# Patient Record
Sex: MALE | Race: WHITE | Employment: FULL TIME
[De-identification: names, ages, dates, MRNs, and addresses within clinical notes are randomized per-mention and may not be internally consistent; named-entity substitution may affect disease eponyms.]

---

## 2016-12-02 LAB
CHOLESTEROL, TOTAL: 187 MG/DL
CHOLESTEROL/HDL RATIO: 5.8
HDLC SERPL-MCNC: 32 MG/DL (ref 35–70)
LDL CHOLESTEROL CALCULATED: 97 MG/DL (ref 0–160)
PROSTATE SPECIFIC ANTIGEN: 0.58 NG/ML
TRIGL SERPL-MCNC: 289 MG/DL
VLDLC SERPL CALC-MCNC: 58 MG/DL

## 2017-03-01 ENCOUNTER — OFFICE VISIT (OUTPATIENT)
Dept: INTERNAL MEDICINE | Facility: CLINIC | Age: 52
End: 2017-03-01

## 2017-03-01 VITALS
DIASTOLIC BLOOD PRESSURE: 80 MMHG | BODY MASS INDEX: 37.08 KG/M2 | WEIGHT: 259 LBS | HEIGHT: 70 IN | RESPIRATION RATE: 18 BRPM | HEART RATE: 82 BPM | SYSTOLIC BLOOD PRESSURE: 142 MMHG

## 2017-03-01 DIAGNOSIS — Z00.00 ENCOUNTER FOR GENERAL ADULT MEDICAL EXAMINATION W/O ABNORMAL FINDINGS: Primary | ICD-10-CM

## 2017-03-01 DIAGNOSIS — E78.2 MIXED HYPERLIPIDEMIA: ICD-10-CM

## 2017-03-01 DIAGNOSIS — F41.9 ANXIETY AND DEPRESSION: ICD-10-CM

## 2017-03-01 DIAGNOSIS — M54.6 THORACIC BACK PAIN, UNSPECIFIED BACK PAIN LATERALITY, UNSPECIFIED CHRONICITY: ICD-10-CM

## 2017-03-01 DIAGNOSIS — F32.A ANXIETY AND DEPRESSION: ICD-10-CM

## 2017-03-01 PROBLEM — I10 ESSENTIAL HYPERTENSION: Status: ACTIVE | Noted: 2017-03-01

## 2017-03-01 PROBLEM — M15.0 PRIMARY OSTEOARTHRITIS INVOLVING MULTIPLE JOINTS: Status: ACTIVE | Noted: 2017-03-01

## 2017-03-01 PROBLEM — M15.9 PRIMARY OSTEOARTHRITIS INVOLVING MULTIPLE JOINTS: Status: ACTIVE | Noted: 2017-03-01

## 2017-03-01 PROCEDURE — 99204 OFFICE O/P NEW MOD 45 MIN: CPT | Performed by: NURSE PRACTITIONER

## 2017-03-01 RX ORDER — CYCLOBENZAPRINE HCL 10 MG
10 TABLET ORAL EVERY 8 HOURS PRN
Qty: 30 TABLET | Refills: 0 | Status: SHIPPED | OUTPATIENT
Start: 2017-03-01 | End: 2017-03-11

## 2017-03-01 RX ORDER — ATORVASTATIN CALCIUM 10 MG/1
10 TABLET, FILM COATED ORAL DAILY
Qty: 30 TABLET | Refills: 3 | Status: SHIPPED | OUTPATIENT
Start: 2017-03-01 | End: 2017-07-21 | Stop reason: SDUPTHER

## 2017-03-01 RX ORDER — ALBUTEROL SULFATE 90 UG/1
2 AEROSOL, METERED RESPIRATORY (INHALATION)
COMMUNITY
End: 2017-05-08 | Stop reason: SDUPTHER

## 2017-03-01 RX ORDER — FLUTICASONE PROPIONATE 50 MCG
SPRAY, SUSPENSION (ML) NASAL PRN
COMMUNITY
Start: 2016-12-22 | End: 2020-12-11 | Stop reason: SDUPTHER

## 2017-03-01 ASSESSMENT — PATIENT HEALTH QUESTIONNAIRE - PHQ9
SUM OF ALL RESPONSES TO PHQ QUESTIONS 1-9: 0
1. LITTLE INTEREST OR PLEASURE IN DOING THINGS: 0
SUM OF ALL RESPONSES TO PHQ9 QUESTIONS 1 & 2: 0
2. FEELING DOWN, DEPRESSED OR HOPELESS: 0

## 2017-03-01 ASSESSMENT — ENCOUNTER SYMPTOMS
BOWEL INCONTINENCE: 0
BACK PAIN: 1
COUGH: 0
ABDOMINAL PAIN: 0
SHORTNESS OF BREATH: 0

## 2017-03-16 RX ORDER — CITALOPRAM 40 MG/1
40 TABLET ORAL DAILY
Qty: 90 TABLET | Refills: 3 | Status: SHIPPED | OUTPATIENT
Start: 2017-03-16 | End: 2017-05-08 | Stop reason: ALTCHOICE

## 2017-03-16 RX ORDER — METOPROLOL SUCCINATE 50 MG/1
50 TABLET, EXTENDED RELEASE ORAL DAILY
Qty: 90 TABLET | Refills: 3 | Status: SHIPPED | OUTPATIENT
Start: 2017-03-16 | End: 2017-05-01 | Stop reason: SDUPTHER

## 2017-03-28 ENCOUNTER — TELEPHONE (OUTPATIENT)
Dept: PRIMARY CARE CLINIC | Age: 52
End: 2017-03-28

## 2017-03-28 DIAGNOSIS — M15.9 PRIMARY OSTEOARTHRITIS INVOLVING MULTIPLE JOINTS: Primary | ICD-10-CM

## 2017-03-31 ENCOUNTER — TELEPHONE (OUTPATIENT)
Dept: PRIMARY CARE CLINIC | Age: 52
End: 2017-03-31

## 2017-03-31 DIAGNOSIS — M15.9 PRIMARY OSTEOARTHRITIS INVOLVING MULTIPLE JOINTS: Primary | ICD-10-CM

## 2017-05-01 ENCOUNTER — OFFICE VISIT (OUTPATIENT)
Dept: PRIMARY CARE CLINIC | Age: 52
End: 2017-05-01
Payer: COMMERCIAL

## 2017-05-01 VITALS
HEIGHT: 70 IN | WEIGHT: 250 LBS | BODY MASS INDEX: 35.79 KG/M2 | SYSTOLIC BLOOD PRESSURE: 168 MMHG | RESPIRATION RATE: 18 BRPM | DIASTOLIC BLOOD PRESSURE: 108 MMHG | HEART RATE: 80 BPM

## 2017-05-01 DIAGNOSIS — J01.40 ACUTE NON-RECURRENT PANSINUSITIS: Primary | ICD-10-CM

## 2017-05-01 DIAGNOSIS — I10 ESSENTIAL HYPERTENSION: ICD-10-CM

## 2017-05-01 PROCEDURE — 99214 OFFICE O/P EST MOD 30 MIN: CPT | Performed by: NURSE PRACTITIONER

## 2017-05-01 RX ORDER — METOPROLOL SUCCINATE 100 MG/1
100 TABLET, EXTENDED RELEASE ORAL DAILY
Qty: 90 TABLET | Refills: 3
Start: 2017-05-01 | End: 2017-05-08 | Stop reason: SDUPTHER

## 2017-05-01 RX ORDER — ALPRAZOLAM 0.5 MG/1
0.5 TABLET ORAL 2 TIMES DAILY
Qty: 180 TABLET | Refills: 0 | Status: SHIPPED | OUTPATIENT
Start: 2017-05-01 | End: 2017-10-30 | Stop reason: SDUPTHER

## 2017-05-01 ASSESSMENT — ENCOUNTER SYMPTOMS
CONSTIPATION: 0
VOMITING: 0
CHEST TIGHTNESS: 1
WHEEZING: 0
ABDOMINAL PAIN: 0
TROUBLE SWALLOWING: 0
DIARRHEA: 0
COUGH: 1
SORE THROAT: 0
BLOOD IN STOOL: 0
SINUS PRESSURE: 1
NAUSEA: 0
RHINORRHEA: 1
SHORTNESS OF BREATH: 1

## 2017-05-04 ENCOUNTER — TELEPHONE (OUTPATIENT)
Dept: PRIMARY CARE CLINIC | Age: 52
End: 2017-05-04

## 2017-05-04 DIAGNOSIS — I10 ESSENTIAL HYPERTENSION: Primary | ICD-10-CM

## 2017-05-04 RX ORDER — LISINOPRIL 10 MG/1
10 TABLET ORAL DAILY
Qty: 30 TABLET | Refills: 3 | Status: SHIPPED | OUTPATIENT
Start: 2017-05-04 | End: 2017-05-08 | Stop reason: SDUPTHER

## 2017-05-08 ENCOUNTER — OFFICE VISIT (OUTPATIENT)
Dept: PRIMARY CARE CLINIC | Age: 52
End: 2017-05-08
Payer: COMMERCIAL

## 2017-05-08 VITALS
SYSTOLIC BLOOD PRESSURE: 158 MMHG | DIASTOLIC BLOOD PRESSURE: 88 MMHG | BODY MASS INDEX: 35.79 KG/M2 | WEIGHT: 250 LBS | HEIGHT: 70 IN | HEART RATE: 60 BPM | RESPIRATION RATE: 18 BRPM

## 2017-05-08 DIAGNOSIS — F32.A ANXIETY AND DEPRESSION: ICD-10-CM

## 2017-05-08 DIAGNOSIS — F41.9 ANXIETY AND DEPRESSION: ICD-10-CM

## 2017-05-08 DIAGNOSIS — I10 ESSENTIAL HYPERTENSION: Primary | ICD-10-CM

## 2017-05-08 PROCEDURE — 99214 OFFICE O/P EST MOD 30 MIN: CPT | Performed by: NURSE PRACTITIONER

## 2017-05-08 RX ORDER — LISINOPRIL 20 MG/1
20 TABLET ORAL DAILY
Qty: 30 TABLET | Refills: 3 | Status: SHIPPED | OUTPATIENT
Start: 2017-05-08 | End: 2017-06-05 | Stop reason: SDUPTHER

## 2017-05-08 RX ORDER — ALBUTEROL SULFATE 90 UG/1
2 AEROSOL, METERED RESPIRATORY (INHALATION) EVERY 6 HOURS PRN
Qty: 1 INHALER | Refills: 3 | Status: SHIPPED | OUTPATIENT
Start: 2017-05-08 | End: 2018-03-19 | Stop reason: SDUPTHER

## 2017-05-08 RX ORDER — METOPROLOL SUCCINATE 100 MG/1
100 TABLET, EXTENDED RELEASE ORAL DAILY
Qty: 90 TABLET | Refills: 3 | Status: SHIPPED | OUTPATIENT
Start: 2017-05-08 | End: 2018-01-19 | Stop reason: SDUPTHER

## 2017-05-08 RX ORDER — VENLAFAXINE HYDROCHLORIDE 37.5 MG/1
37.5 CAPSULE, EXTENDED RELEASE ORAL DAILY
Qty: 30 CAPSULE | Refills: 3 | Status: SHIPPED | OUTPATIENT
Start: 2017-05-08 | End: 2017-06-05 | Stop reason: SDUPTHER

## 2017-05-08 ASSESSMENT — ENCOUNTER SYMPTOMS
TROUBLE SWALLOWING: 0
ABDOMINAL PAIN: 0
WHEEZING: 0
BLOOD IN STOOL: 0
COUGH: 0
NAUSEA: 0
CONSTIPATION: 0
VOMITING: 0
SHORTNESS OF BREATH: 0
SINUS PRESSURE: 0
DIARRHEA: 0
SORE THROAT: 0

## 2017-06-05 ENCOUNTER — OFFICE VISIT (OUTPATIENT)
Dept: PRIMARY CARE CLINIC | Age: 52
End: 2017-06-05
Payer: COMMERCIAL

## 2017-06-05 VITALS
RESPIRATION RATE: 18 BRPM | BODY MASS INDEX: 36.71 KG/M2 | WEIGHT: 256.4 LBS | HEIGHT: 70 IN | DIASTOLIC BLOOD PRESSURE: 82 MMHG | SYSTOLIC BLOOD PRESSURE: 138 MMHG | HEART RATE: 68 BPM

## 2017-06-05 DIAGNOSIS — F41.9 ANXIETY AND DEPRESSION: ICD-10-CM

## 2017-06-05 DIAGNOSIS — F32.A ANXIETY AND DEPRESSION: ICD-10-CM

## 2017-06-05 DIAGNOSIS — I10 ESSENTIAL HYPERTENSION: ICD-10-CM

## 2017-06-05 PROCEDURE — 99214 OFFICE O/P EST MOD 30 MIN: CPT | Performed by: NURSE PRACTITIONER

## 2017-06-05 RX ORDER — VENLAFAXINE HYDROCHLORIDE 75 MG/1
75 CAPSULE, EXTENDED RELEASE ORAL DAILY
Qty: 30 CAPSULE | Refills: 3 | Status: SHIPPED | OUTPATIENT
Start: 2017-06-05 | End: 2017-10-06 | Stop reason: SDUPTHER

## 2017-06-05 RX ORDER — LISINOPRIL 30 MG/1
30 TABLET ORAL DAILY
Qty: 30 TABLET | Refills: 3 | Status: SHIPPED | OUTPATIENT
Start: 2017-06-05 | End: 2017-09-26 | Stop reason: SDUPTHER

## 2017-06-05 ASSESSMENT — ENCOUNTER SYMPTOMS
SHORTNESS OF BREATH: 0
VOMITING: 0
SORE THROAT: 0
COUGH: 0
DIARRHEA: 0
CONSTIPATION: 0
TROUBLE SWALLOWING: 0
NAUSEA: 0
BLOOD IN STOOL: 0
SINUS PRESSURE: 0
ABDOMINAL PAIN: 0
WHEEZING: 0

## 2017-07-05 ENCOUNTER — TELEPHONE (OUTPATIENT)
Dept: PRIMARY CARE CLINIC | Age: 52
End: 2017-07-05

## 2017-07-21 ENCOUNTER — OFFICE VISIT (OUTPATIENT)
Dept: PRIMARY CARE CLINIC | Age: 52
End: 2017-07-21
Payer: COMMERCIAL

## 2017-07-21 VITALS
DIASTOLIC BLOOD PRESSURE: 74 MMHG | HEIGHT: 71 IN | HEART RATE: 76 BPM | WEIGHT: 252 LBS | SYSTOLIC BLOOD PRESSURE: 116 MMHG | BODY MASS INDEX: 35.28 KG/M2 | RESPIRATION RATE: 16 BRPM

## 2017-07-21 DIAGNOSIS — R91.8 MULTIPLE LUNG NODULES: ICD-10-CM

## 2017-07-21 DIAGNOSIS — I10 ESSENTIAL HYPERTENSION: Primary | ICD-10-CM

## 2017-07-21 DIAGNOSIS — Z23 NEED FOR PROPHYLACTIC VACCINATION WITH TETANUS-DIPHTHERIA (TD): ICD-10-CM

## 2017-07-21 DIAGNOSIS — E78.2 MIXED HYPERLIPIDEMIA: ICD-10-CM

## 2017-07-21 PROCEDURE — 90714 TD VACC NO PRESV 7 YRS+ IM: CPT | Performed by: NURSE PRACTITIONER

## 2017-07-21 PROCEDURE — 99214 OFFICE O/P EST MOD 30 MIN: CPT | Performed by: NURSE PRACTITIONER

## 2017-07-21 PROCEDURE — 90471 IMMUNIZATION ADMIN: CPT | Performed by: NURSE PRACTITIONER

## 2017-07-21 RX ORDER — ATORVASTATIN CALCIUM 10 MG/1
10 TABLET, FILM COATED ORAL DAILY
Qty: 30 TABLET | Refills: 5 | Status: SHIPPED | OUTPATIENT
Start: 2017-07-21 | End: 2018-01-19 | Stop reason: SDUPTHER

## 2017-07-21 ASSESSMENT — ENCOUNTER SYMPTOMS
VOMITING: 0
BLURRED VISION: 0
BLOOD IN STOOL: 0
TROUBLE SWALLOWING: 0
COUGH: 0
SORE THROAT: 0
SINUS PRESSURE: 0
NAUSEA: 0
DIARRHEA: 0
CONSTIPATION: 0
WHEEZING: 0
ABDOMINAL PAIN: 0
SHORTNESS OF BREATH: 0

## 2017-09-26 DIAGNOSIS — I10 ESSENTIAL HYPERTENSION: ICD-10-CM

## 2017-09-26 RX ORDER — LISINOPRIL 30 MG/1
TABLET ORAL
Qty: 30 TABLET | Refills: 2 | Status: SHIPPED | OUTPATIENT
Start: 2017-09-26 | End: 2017-10-06 | Stop reason: SDUPTHER

## 2017-10-06 ENCOUNTER — OFFICE VISIT (OUTPATIENT)
Dept: PRIMARY CARE CLINIC | Age: 52
End: 2017-10-06
Payer: COMMERCIAL

## 2017-10-06 VITALS
HEART RATE: 77 BPM | HEIGHT: 71 IN | DIASTOLIC BLOOD PRESSURE: 88 MMHG | SYSTOLIC BLOOD PRESSURE: 134 MMHG | BODY MASS INDEX: 35.28 KG/M2 | WEIGHT: 252 LBS | OXYGEN SATURATION: 98 %

## 2017-10-06 DIAGNOSIS — E78.2 MIXED HYPERLIPIDEMIA: ICD-10-CM

## 2017-10-06 DIAGNOSIS — Z12.5 SCREENING FOR PROSTATE CANCER: ICD-10-CM

## 2017-10-06 DIAGNOSIS — F41.9 ANXIETY AND DEPRESSION: ICD-10-CM

## 2017-10-06 DIAGNOSIS — F32.A ANXIETY AND DEPRESSION: ICD-10-CM

## 2017-10-06 DIAGNOSIS — Z13.1 ENCOUNTER FOR SCREENING FOR DIABETES MELLITUS: ICD-10-CM

## 2017-10-06 DIAGNOSIS — I10 ESSENTIAL HYPERTENSION: Primary | ICD-10-CM

## 2017-10-06 PROCEDURE — 90670 PCV13 VACCINE IM: CPT | Performed by: NURSE PRACTITIONER

## 2017-10-06 PROCEDURE — 90471 IMMUNIZATION ADMIN: CPT | Performed by: NURSE PRACTITIONER

## 2017-10-06 PROCEDURE — 99214 OFFICE O/P EST MOD 30 MIN: CPT | Performed by: NURSE PRACTITIONER

## 2017-10-06 RX ORDER — LISINOPRIL 30 MG/1
TABLET ORAL
Qty: 90 TABLET | Refills: 3 | Status: SHIPPED | OUTPATIENT
Start: 2017-10-06 | End: 2018-09-08 | Stop reason: SDUPTHER

## 2017-10-06 RX ORDER — ALPRAZOLAM 0.5 MG/1
0.5 TABLET ORAL 2 TIMES DAILY
Qty: 180 TABLET | Refills: 0 | Status: CANCELLED | OUTPATIENT
Start: 2017-10-06

## 2017-10-06 RX ORDER — VENLAFAXINE HYDROCHLORIDE 75 MG/1
75 CAPSULE, EXTENDED RELEASE ORAL DAILY
Qty: 90 CAPSULE | Refills: 3 | Status: SHIPPED | OUTPATIENT
Start: 2017-10-06 | End: 2017-10-09 | Stop reason: SDUPTHER

## 2017-10-06 ASSESSMENT — ENCOUNTER SYMPTOMS
BLURRED VISION: 0
ABDOMINAL PAIN: 0
SHORTNESS OF BREATH: 0
COUGH: 0
ORTHOPNEA: 0
BACK PAIN: 0

## 2017-10-06 ASSESSMENT — PATIENT HEALTH QUESTIONNAIRE - PHQ9
SUM OF ALL RESPONSES TO PHQ9 QUESTIONS 1 & 2: 0
2. FEELING DOWN, DEPRESSED OR HOPELESS: 0
1. LITTLE INTEREST OR PLEASURE IN DOING THINGS: 0
SUM OF ALL RESPONSES TO PHQ QUESTIONS 1-9: 0

## 2017-10-06 NOTE — PROGRESS NOTES
tablet by mouth daily 30 tablet 5    Diclofenac Sodium  MG TB24 Take 100 mg by mouth daily 10 tablet 0    metoprolol succinate (TOPROL XL) 100 MG extended release tablet Take 1 tablet by mouth daily 90 tablet 3    albuterol sulfate  (90 BASE) MCG/ACT inhaler Inhale 2 puffs into the lungs every 6 hours as needed for Wheezing 1 Inhaler 3    ALPRAZolam (XANAX) 0.5 MG tablet Take 1 tablet by mouth 2 times daily 180 tablet 0    Handicap Placmike MISC Expires 3/31/2022 1 each 0    fluticasone (FLONASE) 50 MCG/ACT nasal spray        No current facility-administered medications for this visit. No Known Allergies    Health Maintenance   Topic Date Due    Hepatitis C screen  1965    HIV screen  10/18/1978    Diabetes screen  10/18/2003    DTaP/Tdap/Td vaccine (1 - Tdap) 07/22/2017    Flu vaccine (1) 10/06/2018 (Originally 9/1/2017)    Lipid screen  12/02/2021    Colon cancer screen colonoscopy  05/27/2026    Pneumococcal med risk  Completed       Subjective:      Review of Systems   Constitutional: Negative for chills, fever and malaise/fatigue. HENT: Positive for hearing loss. Negative for congestion. Eyes: Negative for blurred vision and visual disturbance. Respiratory: Negative for cough and shortness of breath. Cardiovascular: Negative for chest pain, palpitations, orthopnea and PND. Gastrointestinal: Negative for abdominal pain. Genitourinary: Negative for difficulty urinating and dysuria. Musculoskeletal: Negative for arthralgias, back pain and neck pain. Neurological: Negative for dizziness and headaches. Psychiatric/Behavioral: Negative for self-injury and sleep disturbance. Objective:     Physical Exam   Constitutional: He is oriented to person, place, and time. He appears well-developed and well-nourished. HENT:   Head: Normocephalic and atraumatic. Eyes: Pupils are equal, round, and reactive to light. Neck: Normal range of motion. Cardiovascular: Normal rate, regular rhythm and normal heart sounds. Pulmonary/Chest: Effort normal and breath sounds normal.   Abdominal: Soft. Bowel sounds are normal. There is no tenderness. Musculoskeletal: Normal range of motion. Neurological: He is alert and oriented to person, place, and time. Skin: Skin is warm and dry. Psychiatric: He has a normal mood and affect. His behavior is normal. Judgment and thought content normal.   Nursing note and vitals reviewed. /88  Pulse 77  Ht 5' 10.5\" (1.791 m)  Wt 252 lb (114.3 kg)  SpO2 98%  BMI 35.65 kg/m2    Assessment:      1. Essential hypertension  lisinopril (PRINIVIL;ZESTRIL) 30 MG tablet    Comprehensive Metabolic Panel    CBC   2. Encounter for screening for diabetes mellitus  Hemoglobin A1C   3. Anxiety and depression  venlafaxine (EFFEXOR XR) 75 MG extended release capsule   4. Mixed hyperlipidemia  Lipid Panel   5. Screening for prostate cancer  Psa screening       Plan:      Return in about 6 months (around 4/6/2018) for hypertension. 1. HTN- Stable. Continue current meds. Rx given for annual labs. Follow up in six months for recheck BP. 2. Anxiety/depression- Well controlled. Continue current meds. Follow up in six months for recheck. Of the 25 minute duration appointment visit, Sofi Sanchez [facility] spent at least 50% of the face-to-face time in counseling, explanation of diagnosis, planning of further management, and answering all questions.       Orders Placed This Encounter   Procedures    Comprehensive Metabolic Panel     Standing Status:   Future     Standing Expiration Date:   10/7/2018    CBC     Standing Status:   Future     Standing Expiration Date:   10/6/2018    Psa screening     Standing Status:   Future     Standing Expiration Date:   10/6/2018    Lipid Panel     Standing Status:   Future     Standing Expiration Date:   10/6/2018     Order Specific Question:   Is Patient Fasting?/# of Hours     Answer: 12    Hemoglobin A1C     Standing Status:   Future     Standing Expiration Date:   10/6/2018     Orders Placed This Encounter   Medications    lisinopril (PRINIVIL;ZESTRIL) 30 MG tablet     Sig: TAKE 1 TABLET BY MOUTH ONE TIME A DAY     Dispense:  90 tablet     Refill:  3    venlafaxine (EFFEXOR XR) 75 MG extended release capsule     Sig: Take 1 capsule by mouth daily     Dispense:  90 capsule     Refill:  3       Patient given educational materials - see patient instructions. Discussed use, benefit, and side effects of prescribed medications. All patient questions answered. Pt voiced understanding. Reviewed health maintenance. Instructed to continue current medications, diet and exercise. Patient agreed with treatment plan. Follow up as directed.       Electronically signed by Cuco Khoury CNP on 10/6/2017 at 4:18 PM

## 2017-10-06 NOTE — MR AVS SNAPSHOT
albuterol sulfate  (90 BASE) MCG/ACT inhaler Inhale 2 puffs into the lungs every 6 hours as needed for Wheezing    ALPRAZolam (XANAX) 0.5 MG tablet Take 1 tablet by mouth 2 times daily    Shelby ROSAS Expires 3/31/2022    fluticasone (FLONASE) 50 MCG/ACT nasal spray       Allergies           No Known Allergies         Additional Information        Basic Information     Date Of Birth Sex Race Ethnicity Preferred Language    1965 Male White Non-/Non  English      Problem List as of 10/6/2017  Date Reviewed: 10/6/2017                Multiple lung nodules    Anxiety and depression    Mixed hyperlipidemia    Essential hypertension    Primary osteoarthritis involving multiple joints      Immunizations as of 10/6/2017     Name Date    Pneumococcal Polysaccharide ([id number]) 10/6/2015    Td 7/21/2017      Preventive Care        Date Due    Hepatitis C screening is recommended for all adults regardless of risk factors born between [facility] at least once (lifetime) who have never been tested. 1965    HIV screening is recommended for all people regardless of risk factors  aged 15-65 years at least once (lifetime) who have never been HIV tested. 10/18/1978    Diabetes Screening 10/18/2003    Tetanus Combination Vaccine (1 - Tdap) 7/22/2017    Yearly Flu Vaccine (1) 10/6/2018 (Originally 9/1/2017)    Cholesterol Screening 12/2/2021    Colonoscopy 5/27/2026            [employer] Signup           Our records indicate that you have an active [employer] account. You can view your After Visit Summary by going to [email or web address] and logging in with your [employer] username and password. If you don't have a [employer] username and password but a parent or guardian has access to your record, the parent or guardian should login with their own [employer] username and password and access your record to view the After Visit Summary.      Additional Information

## 2017-10-06 NOTE — PROGRESS NOTES
Visit Information    Have you changed or started any medications since your last visit including any over-the-counter medicines, vitamins, or herbal medicines? no   Are you having any side effects from any of your medications? -  no  Have you stopped taking any of your medications? Is so, why? -  no    Have you seen any other physician or provider since your last visit? No  Have you had any other diagnostic tests since your last visit? No  Have you been seen in the emergency room and/or had an admission to a hospital since we last saw you? No  Have you had your routine dental cleaning in the past 6 months? yes     Have you activated your [employer] account? If not, what are your barriers?  Yes     Patient Care Team:  Della Barron CNP as PCP - General (Nurse Practitioner)  Alaina Villatoro MD as Consulting Physician (Internal Medicine)  Zak Morris MD (Thoracic Surgery)    Medical History Review  Past Medical, Family, and Social History reviewed and does contribute to the patient presenting condition    Health Maintenance   Topic Date Due    Hepatitis C screen  1965    HIV screen  10/18/1978    Pneumococcal med risk (1 of 1 - PPSV23) 10/18/1982    Diabetes screen  10/18/2003    DTaP/Tdap/Td vaccine (1 - Tdap) 07/22/2017    Flu vaccine (1) 09/01/2017    Lipid screen  12/02/2021    Colon cancer screen colonoscopy  05/27/2026

## 2017-10-09 DIAGNOSIS — F32.A ANXIETY AND DEPRESSION: ICD-10-CM

## 2017-10-09 DIAGNOSIS — F41.9 ANXIETY AND DEPRESSION: ICD-10-CM

## 2017-10-09 RX ORDER — VENLAFAXINE HYDROCHLORIDE 75 MG/1
75 CAPSULE, EXTENDED RELEASE ORAL DAILY
Qty: 30 CAPSULE | Refills: 0 | Status: SHIPPED | OUTPATIENT
Start: 2017-10-09 | End: 2018-01-19 | Stop reason: SDUPTHER

## 2017-10-30 DIAGNOSIS — F41.9 ANXIETY AND DEPRESSION: Primary | ICD-10-CM

## 2017-10-30 DIAGNOSIS — F32.A ANXIETY AND DEPRESSION: Primary | ICD-10-CM

## 2017-10-30 RX ORDER — ALPRAZOLAM 0.5 MG/1
0.5 TABLET ORAL 2 TIMES DAILY
Qty: 180 TABLET | Refills: 0 | Status: SHIPPED | OUTPATIENT
Start: 2017-10-30 | End: 2017-11-03 | Stop reason: SDUPTHER

## 2017-10-30 NOTE — TELEPHONE ENCOUNTER
Health Maintenance   Topic Date Due    Hepatitis C screen  1965    HIV screen  10/18/1978    Diabetes screen  10/18/2003    DTaP/Tdap/Td vaccine (1 - Tdap) 07/22/2017    Flu vaccine (1) 10/06/2018 (Originally 9/1/2017)    Lipid screen  12/02/2021    Colon cancer screen colonoscopy  05/27/2026    Pneumococcal med risk  Completed             (applicable per patient's age: Cancer Screenings, Depression Screening, Fall Risk Screening, Immunizations)    LDL Calculated (mg/dL)   Date Value   12/02/2016 97      (goal A1C is < 7)   (goal LDL is <100) need 30-50% reduction from baseline     BP Readings from Last 3 Encounters:   10/06/17 134/88   07/21/17 116/74   06/05/17 138/82    (goal /80)      All Future Testing planned in [facility]:  Lab Frequency Next Occurrence   CT CHEST W CONTRAST Once 09/21/2017   Comprehensive Metabolic Panel Once 95/14/7542   CBC Once 10/06/2017   Psa screening Once 01/06/2018   Lipid Panel Once 01/06/2018   Hemoglobin A1C Once 11/05/2017       Next Visit Date:  Future Appointments  Date Time Provider Mirtha Bonilla   4/6/2018 9:00 AM Jaelyn Tavera CNP [id number]            Patient Active Problem List:     Anxiety and depression     Mixed hyperlipidemia     Essential hypertension     Primary osteoarthritis involving multiple joints     Multiple lung nodules

## 2017-11-03 DIAGNOSIS — F41.9 ANXIETY AND DEPRESSION: ICD-10-CM

## 2017-11-03 DIAGNOSIS — F32.A ANXIETY AND DEPRESSION: ICD-10-CM

## 2017-11-03 RX ORDER — ALPRAZOLAM 0.5 MG/1
0.5 TABLET ORAL 2 TIMES DAILY
Qty: 180 TABLET | Refills: 0 | Status: SHIPPED | OUTPATIENT
Start: 2017-11-03 | End: 2018-03-19 | Stop reason: SDUPTHER

## 2017-11-11 ENCOUNTER — HOSPITAL ENCOUNTER (OUTPATIENT)
Age: 52
Discharge: HOME | End: 2017-11-11
Payer: COMMERCIAL

## 2017-11-11 DIAGNOSIS — Z13.1 ENCOUNTER FOR SCREENING FOR DIABETES MELLITUS: ICD-10-CM

## 2017-11-11 DIAGNOSIS — I10 ESSENTIAL HYPERTENSION: ICD-10-CM

## 2017-11-11 DIAGNOSIS — E78.2 MIXED HYPERLIPIDEMIA: ICD-10-CM

## 2017-11-11 DIAGNOSIS — Z12.5 SCREENING FOR PROSTATE CANCER: ICD-10-CM

## 2017-11-11 LAB
ALBUMIN SERPL-MCNC: 4.1 G/DL (ref 3.5–5.2)
ALBUMIN/GLOBULIN RATIO: 1.2 (ref 1–2.5)
ALP BLD-CCNC: 121 U/L (ref 40–129)
ALT SERPL-CCNC: 39 U/L (ref 5–41)
ANION GAP SERPL CALCULATED.3IONS-SCNC: 13 MMOL/L (ref 9–17)
AST SERPL-CCNC: 28 U/L
BILIRUB SERPL-MCNC: 0.33 MG/DL (ref 0.3–1.2)
BUN BLDV-MCNC: 11 MG/DL (ref 6–20)
BUN/CREAT BLD: NORMAL (ref 9–20)
CALCIUM SERPL-MCNC: 8.9 MG/DL (ref 8.6–10.4)
CHLORIDE BLD-SCNC: 102 MMOL/L (ref 98–107)
CHOLESTEROL/HDL RATIO: 7.3
CHOLESTEROL: 174 MG/DL
CO2: 26 MMOL/L (ref 20–31)
CREAT SERPL-MCNC: 0.7 MG/DL (ref 0.7–1.2)
GFR AFRICAN AMERICAN: >60 ML/MIN
GFR NON-AFRICAN AMERICAN: >60 ML/MIN
GFR SERPL CREATININE-BSD FRML MDRD: NORMAL ML/MIN/{1.73_M2}
GFR SERPL CREATININE-BSD FRML MDRD: NORMAL ML/MIN/{1.73_M2}
GLUCOSE BLD-MCNC: 94 MG/DL (ref 70–99)
HCT VFR BLD CALC: 49.7 % (ref 40.7–50.3)
HDLC SERPL-MCNC: 24 MG/DL
HEMOGLOBIN: 15.8 G/DL (ref 13–17)
LDL CHOLESTEROL: 110 MG/DL (ref 0–130)
MCH RBC QN AUTO: 30.2 PG (ref 25.2–33.5)
MCHC RBC AUTO-ENTMCNC: 31.8 G/DL (ref 28.4–34.8)
MCV RBC AUTO: 95 FL (ref 82.6–102.9)
PDW BLD-RTO: 12.5 % (ref 11.8–14.4)
PLATELET # BLD: 260 K/UL (ref 138–453)
PMV BLD AUTO: 9.8 FL (ref 8.1–13.5)
POTASSIUM SERPL-SCNC: 5 MMOL/L (ref 3.7–5.3)
PROSTATE SPECIFIC ANTIGEN: 0.59 UG/L
RBC # BLD: 5.23 M/UL (ref 4.21–5.77)
SODIUM BLD-SCNC: 141 MMOL/L (ref 135–144)
TOTAL PROTEIN: 7.4 G/DL (ref 6.4–8.3)
TRIGL SERPL-MCNC: 201 MG/DL
VLDLC SERPL CALC-MCNC: ABNORMAL MG/DL (ref 1–30)
WBC # BLD: 11.3 K/UL (ref 3.5–11.3)

## 2017-11-11 PROCEDURE — 80053 COMPREHEN METABOLIC PANEL: CPT

## 2017-11-11 PROCEDURE — 85027 COMPLETE CBC AUTOMATED: CPT

## 2017-11-11 PROCEDURE — G0103 PSA SCREENING: HCPCS

## 2017-11-11 PROCEDURE — 80061 LIPID PANEL: CPT

## 2017-11-11 PROCEDURE — 36415 COLL VENOUS BLD VENIPUNCTURE: CPT

## 2017-11-11 PROCEDURE — 83036 HEMOGLOBIN GLYCOSYLATED A1C: CPT

## 2017-11-12 LAB
ESTIMATED AVERAGE GLUCOSE: 100 MG/DL
HBA1C MFR BLD: 5.1 % (ref 4–6)

## 2017-11-14 ENCOUNTER — TELEPHONE (OUTPATIENT)
Dept: PRIMARY CARE CLINIC | Age: 52
End: 2017-11-14

## 2017-11-17 ENCOUNTER — HOSPITAL ENCOUNTER (OUTPATIENT)
Dept: CT IMAGING | Age: 52
Discharge: HOME | End: 2017-11-17
Payer: COMMERCIAL

## 2017-11-17 ENCOUNTER — TELEPHONE (OUTPATIENT)
Dept: PRIMARY CARE CLINIC | Age: 52
End: 2017-11-17

## 2017-11-17 DIAGNOSIS — R91.8 MULTIPLE LUNG NODULES: ICD-10-CM

## 2017-11-17 PROCEDURE — 71260 CT THORAX DX C+: CPT

## 2017-11-17 PROCEDURE — 6360000004 HC RX CONTRAST MEDICATION: Performed by: NURSE PRACTITIONER

## 2017-11-17 PROCEDURE — 2580000003 HC RX 258: Performed by: NURSE PRACTITIONER

## 2017-11-17 RX ORDER — 0.9 % SODIUM CHLORIDE 0.9 %
80 INTRAVENOUS SOLUTION INTRAVENOUS ONCE
Status: COMPLETED | OUTPATIENT
Start: 2017-11-17 | End: 2017-11-17

## 2017-11-17 RX ORDER — SODIUM CHLORIDE 0.9 % (FLUSH) 0.9 %
10 SYRINGE (ML) INJECTION PRN
Status: DISCONTINUED | OUTPATIENT
Start: 2017-11-17 | End: 2017-11-20 | Stop reason: HOSPADM

## 2017-11-17 RX ADMIN — IOPAMIDOL 75 ML: 755 INJECTION, SOLUTION INTRAVENOUS at 09:03

## 2017-11-17 RX ADMIN — Medication 10 ML: at 09:04

## 2017-11-17 RX ADMIN — SODIUM CHLORIDE 80 ML: 9 INJECTION, SOLUTION INTRAVENOUS at 09:03

## 2017-11-17 NOTE — TELEPHONE ENCOUNTER
----- Message from Nereyda Gallo CNP sent at 11/17/2017  1:19 PM EST -----  Please let him know that his CT is normal, no suspicious nodules.  No need for further follow up

## 2018-01-07 ENCOUNTER — HOSPITAL ENCOUNTER (EMERGENCY)
Age: 53
Discharge: HOME | End: 2018-01-07
Attending: EMERGENCY MEDICINE
Payer: COMMERCIAL

## 2018-01-07 VITALS
HEART RATE: 83 BPM | WEIGHT: 250 LBS | TEMPERATURE: 98.4 F | BODY MASS INDEX: 35.79 KG/M2 | OXYGEN SATURATION: 99 % | SYSTOLIC BLOOD PRESSURE: 154 MMHG | RESPIRATION RATE: 16 BRPM | DIASTOLIC BLOOD PRESSURE: 87 MMHG | HEIGHT: 70 IN

## 2018-01-07 DIAGNOSIS — J01.00 ACUTE MAXILLARY SINUSITIS, RECURRENCE NOT SPECIFIED: Primary | ICD-10-CM

## 2018-01-07 PROCEDURE — 99282 EMERGENCY DEPT VISIT SF MDM: CPT

## 2018-01-07 RX ORDER — AZITHROMYCIN 250 MG/1
TABLET, FILM COATED ORAL
Qty: 1 PACKET | Refills: 0 | Status: SHIPPED | OUTPATIENT
Start: 2018-01-07 | End: 2018-01-17

## 2018-01-07 RX ORDER — PREDNISONE 10 MG/1
TABLET ORAL
Qty: 20 TABLET | Refills: 0 | Status: SHIPPED | OUTPATIENT
Start: 2018-01-07 | End: 2018-01-17

## 2018-01-07 ASSESSMENT — ENCOUNTER SYMPTOMS
SORE THROAT: 0
COUGH: 1
VOMITING: 0
DIARRHEA: 0

## 2018-01-07 NOTE — ED PROVIDER NOTES
LISINOPRIL (PRINIVIL;ZESTRIL) 30 MG TABLET    TAKE 1 TABLET BY MOUTH ONE TIME A DAY    METOPROLOL SUCCINATE (TOPROL XL) 100 MG EXTENDED RELEASE TABLET    Take 1 tablet by mouth daily    VENLAFAXINE (EFFEXOR XR) 75 MG EXTENDED RELEASE CAPSULE    Take 1 capsule by mouth daily       ALLERGIES     has No Known Allergies. FAMILY HISTORY     indicated that his mother is alive. He indicated that his father is . He indicated that his sister is alive. family history includes Heart Disease in his sister; Mult Sclerosis in his father. SOCIAL HISTORY      reports that he has been smoking. He has been smoking about 2.00 packs per day. He has never used smokeless tobacco. He reports that he drinks alcohol. He reports that he does not use drugs. PHYSICAL EXAM    (up to 7 for level 4, 8 or more for level 5)   INITIAL VITALS:  height is 5' 10\" (1.778 m) and weight is 113.4 kg (250 lb). His oral temperature is 98.4 °F (36.9 °C). His blood pressure is 154/87 (abnormal) and his pulse is 83. His respiration is 16 and oxygen saturation is 99%. Physical Exam   Constitutional: He appears well-developed and well-nourished. HENT:   Head: Normocephalic and atraumatic. Right Ear: External ear normal.   Left Ear: External ear normal.   Mouth/Throat: Oropharynx is clear and moist.   The patient is noted to have some swelling of the nasal turbinates suggestive of a sinusitis   Eyes: Conjunctivae are normal.   Neck: Normal range of motion. Neck supple. No JVD present. Cardiovascular: Normal rate, regular rhythm and normal heart sounds. Pulmonary/Chest: Effort normal and breath sounds normal. No stridor. No respiratory distress. He has no wheezes. He has no rales. Abdominal: Soft. He exhibits no distension. There is no tenderness. Musculoskeletal: Normal range of motion. Lymphadenopathy:     He has no cervical adenopathy. Neurological: He is alert.    No focal deficits are appreciated   Skin: Skin is

## 2018-01-19 ENCOUNTER — OFFICE VISIT (OUTPATIENT)
Dept: PRIMARY CARE CLINIC | Age: 53
End: 2018-01-19
Payer: COMMERCIAL

## 2018-01-19 VITALS
WEIGHT: 244 LBS | HEART RATE: 76 BPM | RESPIRATION RATE: 17 BRPM | OXYGEN SATURATION: 98 % | HEIGHT: 71 IN | SYSTOLIC BLOOD PRESSURE: 122 MMHG | BODY MASS INDEX: 34.16 KG/M2 | DIASTOLIC BLOOD PRESSURE: 84 MMHG

## 2018-01-19 DIAGNOSIS — J06.9 UPPER RESPIRATORY TRACT INFECTION, UNSPECIFIED TYPE: ICD-10-CM

## 2018-01-19 DIAGNOSIS — I10 ESSENTIAL HYPERTENSION: Primary | ICD-10-CM

## 2018-01-19 DIAGNOSIS — R22.2 NODULE OF CHEST WALL: ICD-10-CM

## 2018-01-19 DIAGNOSIS — E78.2 MIXED HYPERLIPIDEMIA: ICD-10-CM

## 2018-01-19 DIAGNOSIS — F41.9 ANXIETY AND DEPRESSION: ICD-10-CM

## 2018-01-19 DIAGNOSIS — F32.A ANXIETY AND DEPRESSION: ICD-10-CM

## 2018-01-19 PROCEDURE — 99215 OFFICE O/P EST HI 40 MIN: CPT | Performed by: NURSE PRACTITIONER

## 2018-01-19 RX ORDER — PREDNISONE 20 MG/1
TABLET ORAL
Qty: 18 TABLET | Refills: 0 | Status: SHIPPED | OUTPATIENT
Start: 2018-01-19 | End: 2018-03-19 | Stop reason: SDUPTHER

## 2018-01-19 RX ORDER — LEVOFLOXACIN 500 MG/1
500 TABLET, FILM COATED ORAL DAILY
Qty: 10 TABLET | Refills: 0 | Status: SHIPPED | OUTPATIENT
Start: 2018-01-19 | End: 2018-03-19 | Stop reason: SDUPTHER

## 2018-01-19 RX ORDER — ALPRAZOLAM 0.5 MG/1
0.5 TABLET ORAL 2 TIMES DAILY
Qty: 180 TABLET | Refills: 0 | Status: CANCELLED | OUTPATIENT
Start: 2018-01-19 | End: 2018-04-19

## 2018-01-19 RX ORDER — ATORVASTATIN CALCIUM 10 MG/1
10 TABLET, FILM COATED ORAL DAILY
Qty: 30 TABLET | Refills: 5 | Status: SHIPPED | OUTPATIENT
Start: 2018-01-19 | End: 2018-08-04 | Stop reason: SDUPTHER

## 2018-01-19 RX ORDER — METOPROLOL SUCCINATE 100 MG/1
100 TABLET, EXTENDED RELEASE ORAL DAILY
Qty: 90 TABLET | Refills: 3 | Status: SHIPPED | OUTPATIENT
Start: 2018-01-19 | End: 2019-03-18 | Stop reason: SDUPTHER

## 2018-01-19 RX ORDER — VENLAFAXINE HYDROCHLORIDE 150 MG/1
150 CAPSULE, EXTENDED RELEASE ORAL DAILY
Qty: 90 CAPSULE | Refills: 3 | Status: SHIPPED | OUTPATIENT
Start: 2018-01-19 | End: 2018-12-24 | Stop reason: SDUPTHER

## 2018-01-19 ASSESSMENT — ENCOUNTER SYMPTOMS
SINUS PRESSURE: 1
BACK PAIN: 0
COUGH: 0
SINUS PAIN: 1
ABDOMINAL PAIN: 0
SHORTNESS OF BREATH: 0

## 2018-01-19 NOTE — PROGRESS NOTES
Abdominal: Soft. Bowel sounds are normal. There is no tenderness. Musculoskeletal: Normal range of motion. Neurological: He is alert and oriented to person, place, and time. Skin: Skin is warm and dry. Soft mobile lesion to right shin   Flesh colored   Psychiatric: He has a normal mood and affect. His behavior is normal. Judgment and thought content normal.   Nursing note and vitals reviewed. /84   Pulse 76   Resp 17   Ht 5' 10.5\" (1.791 m)   Wt 244 lb (110.7 kg)   SpO2 98%   BMI 34.52 kg/m²     Assessment:      1. Essential hypertension  metoprolol succinate (TOPROL XL) 100 MG extended release tablet   2. Anxiety and depression  venlafaxine (EFFEXOR XR) 150 MG extended release capsule   3. Mixed hyperlipidemia  atorvastatin (LIPITOR) 10 MG tablet   4. Nodule of chest wall  US CHEST INCLUDING MEDIASTINUM   5. Upper respiratory tract infection, unspecified type         Plan:      Return in about 3 months (around 4/19/2018), or if symptoms worsen or fail to improve, for recheck. 1. Nodule of chest wall- Extensively reviewed past results with patient and wife. Recommend that he seek follow up appointment with Dr. Adonis Aguirre to review chest CT 11/2017 and obtain further plans. Recommend he follow with Dr. Veto Pretty to review US chest/ct chest and further actions. Patient and wife are in agreement with plan of care. 2. URI- Rx given for levaquin and prednisone. Follow up if no improvement in symptoms with completion of antibiotic. 3. Chronic medical conditions- Stable. Meds renewed at current dose. Labs are up to date. Follow up in three months for recheck. Of the 40 minute duration appointment visit, Hailey VARGAS spent at least 50% of the face-to-face time in counseling, explanation of diagnosis, planning of further management, and answering all questions.       Orders Placed This Encounter   Procedures    US CHEST INCLUDING MEDIASTINUM     Standing Status:   Future

## 2018-01-26 ENCOUNTER — TELEPHONE (OUTPATIENT)
Dept: PRIMARY CARE CLINIC | Age: 53
End: 2018-01-26

## 2018-01-26 DIAGNOSIS — R22.2 NODULE OF CHEST WALL: ICD-10-CM

## 2018-03-19 ENCOUNTER — OFFICE VISIT (OUTPATIENT)
Dept: PRIMARY CARE CLINIC | Age: 53
End: 2018-03-19
Payer: COMMERCIAL

## 2018-03-19 VITALS
RESPIRATION RATE: 18 BRPM | BODY MASS INDEX: 34.94 KG/M2 | DIASTOLIC BLOOD PRESSURE: 80 MMHG | WEIGHT: 249.6 LBS | HEIGHT: 71 IN | TEMPERATURE: 98.2 F | HEART RATE: 72 BPM | SYSTOLIC BLOOD PRESSURE: 138 MMHG

## 2018-03-19 DIAGNOSIS — F32.A ANXIETY AND DEPRESSION: ICD-10-CM

## 2018-03-19 DIAGNOSIS — J06.9 UPPER RESPIRATORY TRACT INFECTION, UNSPECIFIED TYPE: Primary | ICD-10-CM

## 2018-03-19 DIAGNOSIS — R05.9 COUGH: ICD-10-CM

## 2018-03-19 DIAGNOSIS — F41.9 ANXIETY AND DEPRESSION: ICD-10-CM

## 2018-03-19 PROCEDURE — 99213 OFFICE O/P EST LOW 20 MIN: CPT | Performed by: NURSE PRACTITIONER

## 2018-03-19 RX ORDER — GUAIFENESIN AND CODEINE PHOSPHATE 100; 10 MG/5ML; MG/5ML
5 SOLUTION ORAL 2 TIMES DAILY PRN
Qty: 100 ML | Refills: 0 | Status: SHIPPED | OUTPATIENT
Start: 2018-03-19 | End: 2018-03-26

## 2018-03-19 RX ORDER — ALBUTEROL SULFATE 90 UG/1
2 AEROSOL, METERED RESPIRATORY (INHALATION) EVERY 6 HOURS PRN
Qty: 1 INHALER | Refills: 3 | Status: SHIPPED | OUTPATIENT
Start: 2018-03-19 | End: 2019-11-18 | Stop reason: SDUPTHER

## 2018-03-19 RX ORDER — ALPRAZOLAM 0.5 MG/1
0.5 TABLET ORAL 2 TIMES DAILY
Qty: 180 TABLET | Refills: 1 | Status: SHIPPED | OUTPATIENT
Start: 2018-03-19 | End: 2018-10-05 | Stop reason: SDUPTHER

## 2018-03-19 RX ORDER — PREDNISONE 20 MG/1
TABLET ORAL
Qty: 18 TABLET | Refills: 0 | Status: SHIPPED | OUTPATIENT
Start: 2018-03-19 | End: 2018-03-29

## 2018-03-19 RX ORDER — LEVOFLOXACIN 500 MG/1
500 TABLET, FILM COATED ORAL DAILY
Qty: 10 TABLET | Refills: 0 | Status: SHIPPED | OUTPATIENT
Start: 2018-03-19 | End: 2018-03-29

## 2018-03-19 ASSESSMENT — ENCOUNTER SYMPTOMS
SHORTNESS OF BREATH: 1
TROUBLE SWALLOWING: 0
BLOOD IN STOOL: 0
COUGH: 1
CONSTIPATION: 0
SORE THROAT: 0
SINUS PAIN: 0
DIARRHEA: 0
NAUSEA: 0
VOMITING: 0
CHEST TIGHTNESS: 0
ABDOMINAL PAIN: 0
WHEEZING: 1
SINUS PRESSURE: 1

## 2018-03-19 NOTE — PROGRESS NOTES
Pneumococcal med risk  Completed       Subjective:      Review of Systems   Constitutional: Positive for chills and fatigue. Negative for activity change, appetite change, fever and unexpected weight change. HENT: Positive for congestion, postnasal drip and sinus pressure. Negative for ear pain, hearing loss, sinus pain, sore throat and trouble swallowing. Eyes: Negative for visual disturbance. Respiratory: Positive for cough, shortness of breath and wheezing. Negative for chest tightness. Cardiovascular: Negative for chest pain, palpitations and leg swelling. Gastrointestinal: Negative for abdominal pain, blood in stool, constipation, diarrhea, nausea and vomiting. Endocrine: Negative for cold intolerance, heat intolerance, polydipsia, polyphagia and polyuria. Genitourinary: Negative for difficulty urinating, frequency, hematuria and urgency. Musculoskeletal: Negative for arthralgias and myalgias. Skin: Negative for rash. Allergic/Immunologic: Negative for environmental allergies. Neurological: Negative for dizziness, weakness, light-headedness and headaches. Psychiatric/Behavioral: Negative for confusion. The patient is not nervous/anxious. Objective:     Physical Exam   Constitutional: He is oriented to person, place, and time. He appears well-developed and well-nourished. HENT:   Head: Normocephalic. Right Ear: Tympanic membrane is not erythematous. A middle ear effusion is present. Left Ear: Tympanic membrane is not erythematous. A middle ear effusion is present. Nose: Mucosal edema and rhinorrhea present. Right sinus exhibits no maxillary sinus tenderness and no frontal sinus tenderness. Left sinus exhibits no maxillary sinus tenderness and no frontal sinus tenderness. Mouth/Throat: Posterior oropharyngeal erythema present. Eyes: Conjunctivae and EOM are normal. Pupils are equal, round, and reactive to light. Neck: Normal range of motion.    Cardiovascular: Normal rate, regular rhythm, normal heart sounds and intact distal pulses. No murmur heard. Pulmonary/Chest: Effort normal. He has wheezes. Expiratory wheezes throughout   Abdominal: Soft. Bowel sounds are normal. He exhibits no distension. Musculoskeletal: Normal range of motion. Neurological: He is alert and oriented to person, place, and time. Skin: Skin is warm and dry. Psychiatric: He has a normal mood and affect. His behavior is normal. Judgment and thought content normal.     /80 (Site: Left Arm, Position: Sitting, Cuff Size: Large Adult)   Pulse 72   Temp 98.2 °F (36.8 °C)   Resp 18   Ht 5' 10.5\" (1.791 m)   Wt 249 lb 9.6 oz (113.2 kg)   BMI 35.31 kg/m²     Assessment:      1. Upper respiratory tract infection, unspecified type  predniSONE (DELTASONE) 20 MG tablet    levofloxacin (LEVAQUIN) 500 MG tablet   2. Cough  albuterol sulfate  (90 Base) MCG/ACT inhaler    guaiFENesin-codeine (TUSSI-ORGANIDIN NR) 100-10 MG/5ML syrup   3. Anxiety and depression  ALPRAZolam (XANAX) 0.5 MG tablet             Plan:      Return if symptoms worsen or fail to improve. URI, coughprednisone, Levaquin, albuterol, increase fluids and rest, cough does not improve  Anxiety/depressionrequesting refill on Xanax   Patient given educational materials - see patient instructions. Discussed use, benefit, and side effects of prescribed medications. All patient questions answered. Pt voiced understanding. Reviewed health maintenance. Instructed to continue current medications, diet and exercise. Patient agreed with treatment plan. Follow up as directed.      Electronically signed by Yasir Doe CNP on 3/20/2018 at 10:08 AM

## 2018-04-06 ENCOUNTER — OFFICE VISIT (OUTPATIENT)
Dept: PRIMARY CARE CLINIC | Age: 53
End: 2018-04-06
Payer: COMMERCIAL

## 2018-04-06 VITALS
SYSTOLIC BLOOD PRESSURE: 118 MMHG | HEIGHT: 71 IN | BODY MASS INDEX: 34.72 KG/M2 | DIASTOLIC BLOOD PRESSURE: 72 MMHG | WEIGHT: 248 LBS | HEART RATE: 78 BPM | OXYGEN SATURATION: 98 %

## 2018-04-06 DIAGNOSIS — J30.1 ACUTE SEASONAL ALLERGIC RHINITIS DUE TO POLLEN: ICD-10-CM

## 2018-04-06 DIAGNOSIS — I10 ESSENTIAL HYPERTENSION: Primary | ICD-10-CM

## 2018-04-06 PROCEDURE — 99214 OFFICE O/P EST MOD 30 MIN: CPT | Performed by: NURSE PRACTITIONER

## 2018-04-06 RX ORDER — LORATADINE 10 MG/1
10 TABLET ORAL DAILY
Qty: 30 TABLET | Refills: 2 | Status: SHIPPED | OUTPATIENT
Start: 2018-04-06 | End: 2018-12-04 | Stop reason: SDUPTHER

## 2018-04-06 ASSESSMENT — ENCOUNTER SYMPTOMS
RHINORRHEA: 1
ORTHOPNEA: 0
DIARRHEA: 1
BLURRED VISION: 0
COUGH: 0
BACK PAIN: 1
SHORTNESS OF BREATH: 0
ABDOMINAL PAIN: 0

## 2018-07-30 ENCOUNTER — OFFICE VISIT (OUTPATIENT)
Dept: PRIMARY CARE CLINIC | Age: 53
End: 2018-07-30
Payer: COMMERCIAL

## 2018-07-30 VITALS
OXYGEN SATURATION: 98 % | RESPIRATION RATE: 16 BRPM | SYSTOLIC BLOOD PRESSURE: 108 MMHG | DIASTOLIC BLOOD PRESSURE: 68 MMHG | WEIGHT: 248 LBS | HEIGHT: 71 IN | BODY MASS INDEX: 34.72 KG/M2 | HEART RATE: 71 BPM

## 2018-07-30 DIAGNOSIS — K58.0 IRRITABLE BOWEL SYNDROME WITH DIARRHEA: Primary | ICD-10-CM

## 2018-07-30 DIAGNOSIS — M54.5 ACUTE MIDLINE LOW BACK PAIN, WITH SCIATICA PRESENCE UNSPECIFIED: ICD-10-CM

## 2018-07-30 PROCEDURE — 99214 OFFICE O/P EST MOD 30 MIN: CPT | Performed by: NURSE PRACTITIONER

## 2018-07-30 RX ORDER — DICYCLOMINE HYDROCHLORIDE 10 MG/1
10 CAPSULE ORAL 4 TIMES DAILY
Qty: 120 CAPSULE | Refills: 3 | Status: SHIPPED | OUTPATIENT
Start: 2018-07-30 | End: 2018-09-11 | Stop reason: ALTCHOICE

## 2018-07-30 ASSESSMENT — ENCOUNTER SYMPTOMS
ABDOMINAL PAIN: 0
COUGH: 0
ABDOMINAL DISTENTION: 1
SHORTNESS OF BREATH: 0
CONSTIPATION: 0
VOMITING: 0
BACK PAIN: 0
NAUSEA: 0
DIARRHEA: 0

## 2018-07-30 ASSESSMENT — PATIENT HEALTH QUESTIONNAIRE - PHQ9
2. FEELING DOWN, DEPRESSED OR HOPELESS: 0
1. LITTLE INTEREST OR PLEASURE IN DOING THINGS: 0
SUM OF ALL RESPONSES TO PHQ9 QUESTIONS 1 & 2: 0
SUM OF ALL RESPONSES TO PHQ QUESTIONS 1-9: 0

## 2018-07-30 NOTE — PROGRESS NOTES
[facility] PR[facility]  [address] [place] [address]   [address]  [place] [address]  Dept: [phone]  Dept Fax: [phone]    Ana Valdovinos is a 45 y.o. male who presents today for his medical conditions/complaints as noted below. Ana Valdovinos is c/o of Back Pain (mid back ); Abdominal Pain (x 2 weeks cramping ); and Gas (x 2 weeks )        HPI:     Presents with wife  Called off work today because back pain and abdominal pain were really bad, however symptoms have resolved  Denies need for work note    Back pain x 2 weeks  Denies mechanism of injury  No pain on exam today, states pain was lower back on the left side    Abdominal pain x 2 weeks  Resolved on exam today  C/o gas/ bloating/cramping. \"As soon as I walk out of here the symptoms will be back. \"    Also concerned about the following:  Possible abdominal hernia, left wrist pain x 1 day, possible diverticulitis  States that when it is really hot at work everything hurts, \"I get leg cramps, rib cramps, everything hurts. \"    Discussed importance of diet, weight loss and drinking at least 64oz water daily  Hard to obtain accurate hx from patient, jumping for different problem/complaint        Past Medical History:   Diagnosis Date    Arthritis     Depression     Emphysema lung ([address]     Hyperlipidemia     Hypersomnia with sleep apnea, unspecified     Hypertension       Past Surgical History:   Procedure Laterality Date    COLONOSCOPY  5/27/2016    JOINT REPLACEMENT Bilateral        Family History   Problem Relation Age of Onset    Mult Sclerosis Father     Heart Disease Sister        Social History   Substance Use Topics    Smoking status: Current Every Day Smoker     Packs/day: 2.00    Smokeless tobacco: Never Used    Alcohol use Yes      Comment: daily      Current Outpatient Prescriptions   Medication Sig Dispense Refill    aspirin 81 MG tablet Take 81 mg by mouth daily      dicyclomine (BENTYL) 10 MG capsule Take 1 capsule by mouth 4 times daily 120 capsule 3    loratadine (CLARITIN) 10 MG tablet Take 1 tablet by mouth daily 30 tablet 2    albuterol sulfate  (90 Base) MCG/ACT inhaler Inhale 2 puffs into the lungs every 6 hours as needed for Wheezing 1 Inhaler 3    Diclofenac Sodium  MG TB24 Take 100 mg by mouth daily 90 tablet 3    metoprolol succinate (TOPROL XL) 100 MG extended release tablet Take 1 tablet by mouth daily 90 tablet 3    atorvastatin (LIPITOR) 10 MG tablet Take 1 tablet by mouth daily 30 tablet 5    venlafaxine (EFFEXOR XR) 150 MG extended release capsule Take 1 capsule by mouth daily 90 capsule 3    lisinopril (PRINIVIL;ZESTRIL) 30 MG tablet TAKE 1 TABLET BY MOUTH ONE TIME A DAY 90 tablet 3    Handicap Klever MISC Expires 3/31/2022 1 each 0    fluticasone (FLONASE) 50 MCG/ACT nasal spray        No current facility-administered medications for this visit. No Known Allergies    Health Maintenance   Topic Date Due    Hepatitis C screen  1965    HIV screen  10/18/1978    Shingles Vaccine (1 of 2 - 2 Dose Series) 10/18/2013    DTaP/Tdap/Td vaccine (1 - Tdap) 07/22/2017    Flu vaccine (1) 10/06/2018 (Originally 9/1/2018)    Potassium monitoring  11/11/2018    Creatinine monitoring  11/11/2018    Lipid screen  11/11/2022    Colon cancer screen colonoscopy  05/27/2026    Pneumococcal med risk  Completed       Subjective:      Review of Systems   Constitutional: Negative for chills, fatigue and fever. HENT: Negative for congestion. Eyes: Negative for visual disturbance. Respiratory: Negative for cough and shortness of breath. Cardiovascular: Negative for chest pain and palpitations. Gastrointestinal: Positive for abdominal distention. Negative for abdominal pain, constipation, diarrhea, nausea and vomiting. Genitourinary: Negative for difficulty urinating and dysuria. Musculoskeletal: Negative for arthralgias and back pain.    Neurological: Negative for dizziness and headaches. Psychiatric/Behavioral: Positive for decreased concentration. Negative for self-injury, sleep disturbance and suicidal ideas. The patient is nervous/anxious. Objective:     Physical Exam   Constitutional: He is oriented to person, place, and time. He appears well-developed and well-nourished. HENT:   Head: Normocephalic and atraumatic. Eyes: Pupils are equal, round, and reactive to light. Neck: Normal range of motion. Cardiovascular: Normal rate, regular rhythm and normal heart sounds. Pulmonary/Chest: Effort normal and breath sounds normal.   Abdominal: Soft. Bowel sounds are normal. There is no tenderness. Musculoskeletal: Normal range of motion. Neurological: He is alert and oriented to person, place, and time. Skin: Skin is warm and dry. Psychiatric: He has a normal mood and affect. His behavior is normal. Judgment and thought content normal.   Nursing note and vitals reviewed. /68   Pulse 71   Resp 16   Ht 5' 11\" (1.803 m)   Wt 248 lb (112.5 kg)   SpO2 98%   BMI 34.59 kg/m²     Assessment:       Diagnosis Orders   1. Irritable bowel syndrome with diarrhea     2. Acute midline low back pain, with sciatica presence unspecified         Plan:      Return if symptoms worsen or fail to improve. 1. IBS-D- Rx given for bentyl with instruction for use. Follow up if symptoms persist with use of medication. 2. Low back pain- Resolved. Notify office if symptoms change/worsen. Of the 25 minute duration appointment visit, Dre [facility]redia [facility] spent at least 50% of the face-to-face time in counseling, explanation of diagnosis, planning of further management, and answering all questions. Orders Placed This Encounter   Medications    dicyclomine (BENTYL) 10 MG capsule     Sig: Take 1 capsule by mouth 4 times daily     Dispense:  120 capsule     Refill:  3       Patient given educational materials - see patient instructions. Discussed use, benefit, and side effects of prescribed medications. All patient questions answered. Pt voiced understanding. Reviewed health maintenance. Instructed to continue current medications, diet and exercise. Patient agreed with treatment plan. Follow up as directed.       Electronically signed by BULMARO Erwin CNP on 7/30/2018 at 12:35 PM

## 2018-08-04 DIAGNOSIS — E78.2 MIXED HYPERLIPIDEMIA: ICD-10-CM

## 2018-08-06 RX ORDER — ATORVASTATIN CALCIUM 10 MG/1
TABLET, FILM COATED ORAL
Qty: 90 TABLET | Refills: 4 | Status: SHIPPED | OUTPATIENT
Start: 2018-08-06 | End: 2019-04-05 | Stop reason: SDUPTHER

## 2018-08-06 NOTE — TELEPHONE ENCOUNTER
Last OV 07/30/2018    Health Maintenance   Topic Date Due    Hepatitis C screen  1965    HIV screen  10/18/1978    Shingles Vaccine (1 of 2 - 2 Dose Series) 10/18/2013    DTaP/Tdap/Td vaccine (1 - Tdap) 07/22/2017    Flu vaccine (1) 10/06/2018 (Originally 9/1/2018)    Potassium monitoring  11/11/2018    Creatinine monitoring  11/11/2018    Lipid screen  11/11/2022    Colon cancer screen colonoscopy  05/27/2026    Pneumococcal med risk  Completed             (applicable per patient's age: Cancer Screenings, Depression Screening, Fall Risk Screening, Immunizations)    Hemoglobin A1C (%)   Date Value   11/11/2017 5.1     LDL Cholesterol (mg/dL)   Date Value   11/11/2017 110     LDL Calculated (mg/dL)   Date Value   12/02/2016 97     AST (U/L)   Date Value   11/11/2017 28     ALT (U/L)   Date Value   11/11/2017 39     BUN (mg/dL)   Date Value   11/11/2017 11      (goal A1C is < 7)   (goal LDL is <100) need 30-50% reduction from baseline     BP Readings from Last 3 Encounters:   07/30/18 108/68   04/06/18 118/72   03/19/18 138/80    (goal /80)      All Future Testing planned in [facility]:      Next Visit Date:  Future Appointments  Date Time Provider Mirtha Bonilla   10/5/2018 9:00 AM BULMARO Figueroa 14            Patient Active Problem List:     Anxiety and depression     Mixed hyperlipidemia     Essential hypertension     Primary osteoarthritis involving multiple joints     Multiple lung nodules

## 2018-08-08 DIAGNOSIS — E78.2 MIXED HYPERLIPIDEMIA: ICD-10-CM

## 2018-08-08 RX ORDER — ATORVASTATIN CALCIUM 10 MG/1
TABLET, FILM COATED ORAL
Qty: 90 TABLET | Refills: 4 | Status: SHIPPED | OUTPATIENT
Start: 2018-08-08 | End: 2019-03-01 | Stop reason: SDUPTHER

## 2018-09-04 ENCOUNTER — HOSPITAL ENCOUNTER (OUTPATIENT)
Age: 53
Discharge: HOME | End: 2018-09-04
Payer: COMMERCIAL

## 2018-09-04 ENCOUNTER — OFFICE VISIT (OUTPATIENT)
Dept: PRIMARY CARE CLINIC | Age: 53
End: 2018-09-04
Payer: COMMERCIAL

## 2018-09-04 VITALS
HEIGHT: 71 IN | OXYGEN SATURATION: 98 % | WEIGHT: 250 LBS | SYSTOLIC BLOOD PRESSURE: 113 MMHG | DIASTOLIC BLOOD PRESSURE: 74 MMHG | BODY MASS INDEX: 35 KG/M2 | HEART RATE: 69 BPM

## 2018-09-04 DIAGNOSIS — R10.84 GENERALIZED ABDOMINAL PAIN: ICD-10-CM

## 2018-09-04 DIAGNOSIS — R10.84 GENERALIZED ABDOMINAL PAIN: Primary | ICD-10-CM

## 2018-09-04 LAB
ABSOLUTE EOS #: 0.42 K/UL (ref 0–0.44)
ABSOLUTE IMMATURE GRANULOCYTE: 0.06 K/UL (ref 0–0.3)
ABSOLUTE LYMPH #: 3.15 K/UL (ref 1.1–3.7)
ABSOLUTE MONO #: 0.79 K/UL (ref 0.1–1.2)
ALBUMIN SERPL-MCNC: 4 G/DL (ref 3.5–5.2)
ALBUMIN/GLOBULIN RATIO: 1.3 (ref 1–2.5)
ALP BLD-CCNC: 112 U/L (ref 40–129)
ALT SERPL-CCNC: 27 U/L (ref 5–41)
ANION GAP SERPL CALCULATED.3IONS-SCNC: 18 MMOL/L (ref 9–17)
AST SERPL-CCNC: 21 U/L
BASOPHILS # BLD: 1 % (ref 0–2)
BASOPHILS ABSOLUTE: 0.07 K/UL (ref 0–0.2)
BILIRUB SERPL-MCNC: 0.44 MG/DL (ref 0.3–1.2)
BUN BLDV-MCNC: 17 MG/DL (ref 6–20)
BUN/CREAT BLD: ABNORMAL (ref 9–20)
CALCIUM SERPL-MCNC: 8.9 MG/DL (ref 8.6–10.4)
CHLORIDE BLD-SCNC: 101 MMOL/L (ref 98–107)
CO2: 21 MMOL/L (ref 20–31)
CREAT SERPL-MCNC: 1.11 MG/DL (ref 0.7–1.2)
DIFFERENTIAL TYPE: ABNORMAL
EOSINOPHILS RELATIVE PERCENT: 4 % (ref 1–4)
GFR AFRICAN AMERICAN: >60 ML/MIN
GFR NON-AFRICAN AMERICAN: >60 ML/MIN
GFR SERPL CREATININE-BSD FRML MDRD: ABNORMAL ML/MIN/{1.73_M2}
GFR SERPL CREATININE-BSD FRML MDRD: ABNORMAL ML/MIN/{1.73_M2}
GLUCOSE BLD-MCNC: 120 MG/DL (ref 70–99)
HCT VFR BLD CALC: 45.8 % (ref 40.7–50.3)
HEMOGLOBIN: 15.1 G/DL (ref 13–17)
IMMATURE GRANULOCYTES: 1 %
LYMPHOCYTES # BLD: 27 % (ref 24–43)
MCH RBC QN AUTO: 30.1 PG (ref 25.2–33.5)
MCHC RBC AUTO-ENTMCNC: 33 G/DL (ref 28.4–34.8)
MCV RBC AUTO: 91.2 FL (ref 82.6–102.9)
MONOCYTES # BLD: 7 % (ref 3–12)
NRBC AUTOMATED: 0 PER 100 WBC
PDW BLD-RTO: 12.8 % (ref 11.8–14.4)
PLATELET # BLD: 293 K/UL (ref 138–453)
PLATELET ESTIMATE: ABNORMAL
PMV BLD AUTO: 9.9 FL (ref 8.1–13.5)
POTASSIUM SERPL-SCNC: 4.7 MMOL/L (ref 3.7–5.3)
RBC # BLD: 5.02 M/UL (ref 4.21–5.77)
RBC # BLD: ABNORMAL 10*6/UL
SEG NEUTROPHILS: 60 % (ref 36–65)
SEGMENTED NEUTROPHILS ABSOLUTE COUNT: 7.38 K/UL (ref 1.5–8.1)
SODIUM BLD-SCNC: 140 MMOL/L (ref 135–144)
TOTAL PROTEIN: 7.1 G/DL (ref 6.4–8.3)
WBC # BLD: 11.9 K/UL (ref 3.5–11.3)
WBC # BLD: ABNORMAL 10*3/UL

## 2018-09-04 PROCEDURE — 36415 COLL VENOUS BLD VENIPUNCTURE: CPT

## 2018-09-04 PROCEDURE — 85025 COMPLETE CBC W/AUTO DIFF WBC: CPT

## 2018-09-04 PROCEDURE — 80053 COMPREHEN METABOLIC PANEL: CPT

## 2018-09-04 PROCEDURE — 99213 OFFICE O/P EST LOW 20 MIN: CPT | Performed by: NURSE PRACTITIONER

## 2018-09-04 ASSESSMENT — ENCOUNTER SYMPTOMS
ABDOMINAL DISTENTION: 1
NAUSEA: 1
CONSTIPATION: 0
ANAL BLEEDING: 1
HEMATOCHEZIA: 1
SHORTNESS OF BREATH: 0
VOMITING: 1
CHEST TIGHTNESS: 0
TROUBLE SWALLOWING: 0
BACK PAIN: 1
ABDOMINAL PAIN: 1
BELCHING: 1
DIARRHEA: 0
RECTAL PAIN: 0

## 2018-09-04 NOTE — PATIENT INSTRUCTIONS
Patient Education        Abdominal Pain: Care Instructions  Your Care Instructions    Abdominal pain has many possible causes. Some aren't serious and get better on their own in a few days. Others need more testing and treatment. If your pain continues or gets worse, you need to be rechecked and may need more tests to find out what is wrong. You may need surgery to correct the problem. Don't ignore new symptoms, such as fever, nausea and vomiting, urination problems, pain that gets worse, and dizziness. These may be signs of a more serious problem. Your doctor may have recommended a follow-up visit in the next 8 to 12 hours. If you are not getting better, you may need more tests or treatment. The doctor has checked you carefully, but problems can develop later. If you notice any problems or new symptoms, get medical treatment right away. Follow-up care is a key part of your treatment and safety. Be sure to make and go to all appointments, and call your doctor if you are having problems. It's also a good idea to know your test results and keep a list of the medicines you take. How can you care for yourself at home? · Rest until you feel better. · To prevent dehydration, drink plenty of fluids, enough so that your urine is light yellow or clear like water. Choose water and other caffeine-free clear liquids until you feel better. If you have kidney, heart, or liver disease and have to limit fluids, talk with your doctor before you increase the amount of fluids you drink. · If your stomach is upset, eat mild foods, such as rice, dry toast or crackers, bananas, and applesauce. Try eating several small meals instead of two or three large ones. · Wait until 48 hours after all symptoms have gone away before you have spicy foods, alcohol, and drinks that contain caffeine. · Do not eat foods that are high in fat. · Avoid anti-inflammatory medicines such as aspirin, ibuprofen (Advil, Motrin), and naproxen (Aleve). These can cause stomach upset. Talk to your doctor if you take daily aspirin for another health problem. When should you call for help? Call 911 anytime you think you may need emergency care. For example, call if:    · You passed out (lost consciousness).     · You pass maroon or very bloody stools.     · You vomit blood or what looks like coffee grounds.     · You have new, severe belly pain.    Call your doctor now or seek immediate medical care if:    · Your pain gets worse, especially if it becomes focused in one area of your belly.     · You have a new or higher fever.     · Your stools are black and look like tar, or they have streaks of blood.     · You have unexpected vaginal bleeding.     · You have symptoms of a urinary tract infection. These may include:  ¨ Pain when you urinate. ¨ Urinating more often than usual.  ¨ Blood in your urine.     · You are dizzy or lightheaded, or you feel like you may faint.    Watch closely for changes in your health, and be sure to contact your doctor if:    · You are not getting better after 1 day (24 hours). Where can you learn more? Go to [email or web address]://[email or web address] org and sign in to your [employer] account. Enter L917 in the [employer] box to learn more about \"Abdominal Pain: Care Instructions. \"     If you do not have an account, please click on the \"Sign Up Now\" link. Current as of: November 20, 2017  Content Version: 11.7  © 7746-9676 [employer]. Care instructions adapted under license by [facility] ([facility]). If you have questions about a medical condition or this instruction, always ask your healthcare professional. [place] [address] any warranty or liability for your use of this information.

## 2018-09-04 NOTE — PROGRESS NOTES
Contrast? Radiologist Recommendation; Future    Return or seek emergent care if no improvement. Cheryl Masonodessa is agreeable to this plan of care.   Electronically signed by BULMARO Frnak CNP on 9/4/2018 at 1:09 PM

## 2018-09-08 ENCOUNTER — HOSPITAL ENCOUNTER (OUTPATIENT)
Dept: CT IMAGING | Age: 53
Discharge: HOME | End: 2018-09-10
Payer: COMMERCIAL

## 2018-09-08 DIAGNOSIS — R10.84 GENERALIZED ABDOMINAL PAIN: ICD-10-CM

## 2018-09-08 DIAGNOSIS — I10 ESSENTIAL HYPERTENSION: ICD-10-CM

## 2018-09-08 PROCEDURE — 6360000004 HC RX CONTRAST MEDICATION: Performed by: NURSE PRACTITIONER

## 2018-09-08 PROCEDURE — 74177 CT ABD & PELVIS W/CONTRAST: CPT

## 2018-09-08 PROCEDURE — 2580000003 HC RX 258: Performed by: NURSE PRACTITIONER

## 2018-09-08 RX ORDER — SODIUM CHLORIDE 0.9 % (FLUSH) 0.9 %
10 SYRINGE (ML) INJECTION PRN
Status: DISCONTINUED | OUTPATIENT
Start: 2018-09-08 | End: 2018-09-11 | Stop reason: HOSPADM

## 2018-09-08 RX ORDER — 0.9 % SODIUM CHLORIDE 0.9 %
80 INTRAVENOUS SOLUTION INTRAVENOUS ONCE
Status: COMPLETED | OUTPATIENT
Start: 2018-09-08 | End: 2018-09-08

## 2018-09-08 RX ADMIN — IOHEXOL 50 ML: 240 INJECTION, SOLUTION INTRATHECAL; INTRAVASCULAR; INTRAVENOUS; ORAL at 15:10

## 2018-09-08 RX ADMIN — Medication 10 ML: at 15:11

## 2018-09-08 RX ADMIN — SODIUM CHLORIDE 60 ML: 9 INJECTION, SOLUTION INTRAVENOUS at 15:11

## 2018-09-08 RX ADMIN — IOPAMIDOL 75 ML: 755 INJECTION, SOLUTION INTRAVENOUS at 15:10

## 2018-09-10 ENCOUNTER — TELEPHONE (OUTPATIENT)
Dept: PRIMARY CARE CLINIC | Age: 53
End: 2018-09-10

## 2018-09-10 RX ORDER — PREDNISONE 20 MG/1
TABLET ORAL
Qty: 18 TABLET | Refills: 0 | Status: SHIPPED | OUTPATIENT
Start: 2018-09-10 | End: 2018-09-11 | Stop reason: SDUPTHER

## 2018-09-10 RX ORDER — LISINOPRIL 30 MG/1
TABLET ORAL
Qty: 90 TABLET | Refills: 3 | Status: SHIPPED | OUTPATIENT
Start: 2018-09-10 | End: 2019-09-08 | Stop reason: SDUPTHER

## 2018-09-11 ENCOUNTER — TELEPHONE (OUTPATIENT)
Dept: PRIMARY CARE CLINIC | Age: 53
End: 2018-09-11

## 2018-09-11 ENCOUNTER — OFFICE VISIT (OUTPATIENT)
Dept: PRIMARY CARE CLINIC | Age: 53
End: 2018-09-11
Payer: COMMERCIAL

## 2018-09-11 VITALS
TEMPERATURE: 98 F | SYSTOLIC BLOOD PRESSURE: 118 MMHG | WEIGHT: 246 LBS | HEIGHT: 71 IN | BODY MASS INDEX: 34.44 KG/M2 | DIASTOLIC BLOOD PRESSURE: 60 MMHG | RESPIRATION RATE: 16 BRPM | HEART RATE: 74 BPM

## 2018-09-11 DIAGNOSIS — R10.84 GENERALIZED ABDOMINAL PAIN: Primary | ICD-10-CM

## 2018-09-11 PROCEDURE — 99214 OFFICE O/P EST MOD 30 MIN: CPT | Performed by: NURSE PRACTITIONER

## 2018-09-11 RX ORDER — PREDNISONE 20 MG/1
TABLET ORAL
Qty: 18 TABLET | Refills: 0 | Status: SHIPPED | OUTPATIENT
Start: 2018-09-11 | End: 2018-09-21

## 2018-09-11 ASSESSMENT — ENCOUNTER SYMPTOMS
SHORTNESS OF BREATH: 0
ABDOMINAL PAIN: 1
COUGH: 0
FLATUS: 1
DIARRHEA: 0
CONSTIPATION: 0
NAUSEA: 1
BELCHING: 0
HEMATOCHEZIA: 0
VOMITING: 1
BACK PAIN: 0

## 2018-09-11 NOTE — PROGRESS NOTES
[facility] PRIMARY CARE  [address] [place] [address]   [address]  alex onofre [facility] [address]  Dept: [phone]  Dept Fax: [phone]    Meera Ho is a 45 y.o. male who presents today for his medical conditions/complaints as noted below. Meera Ho is c/o of   Chief Complaint   Patient presents with    Abdominal Pain           HPI:     Presents with wife  Started prednisone yesterday  Continues to have abdominal pain  Would like to review CT abdomen  Requesting CT and labs be faxed to Dr. Daniel Lopez  On going abdominal pain since 7/2018  Has missed 1 week of work d/t pain        Abdominal Pain   This is a recurrent problem. The current episode started in the past 7 days. The onset quality is sudden. The problem occurs constantly. The problem has been gradually worsening. The pain is located in the periumbilical region. The pain is at a severity of 5/10. The pain is moderate. The quality of the pain is sharp and cramping. The abdominal pain radiates to the periumbilical region. Associated symptoms include flatus, nausea, vomiting and weight loss. Pertinent negatives include no anorexia, arthralgias, belching, constipation, diarrhea, dysuria, fever, frequency, headaches, hematochezia, hematuria, melena or myalgias. The pain is aggravated by eating. The pain is relieved by nothing. The treatment provided no relief. Prior diagnostic workup includes CT scan.        Hemoglobin A1C (%)   Date Value   11/11/2017 5.1             ( goal A1C is < 7)   No results found for: LABMICR  LDL Cholesterol (mg/dL)   Date Value   11/11/2017 110     LDL Calculated (mg/dL)   Date Value   12/02/2016 97       (goal LDL is <100)   AST (U/L)   Date Value   09/04/2018 21     ALT (U/L)   Date Value   09/04/2018 27     BUN (mg/dL)   Date Value   09/04/2018 17     BP Readings from Last 3 Encounters:   09/11/18 118/60   09/04/18 113/74   07/30/18 108/68          (goal 120/80)    Past Medical History: Ht 5' 11\" (1.803 m)   Wt 246 lb (111.6 kg)   BMI 34.31 kg/m²     Assessment:       Diagnosis Orders   1. Generalized abdominal pain  [facility] [address], Tonya Da Silva, DO*    predniSONE (DELTASONE) 20 MG tablet             Plan:      Return if symptoms worsen or fail to improve. 1. Generalized abdominal pain- Continue prednisone. Will complete FMLA. Rx given for referral to GI. Recommend clear fluid diet. Follow up as needed. Of the 25 minute duration appointment visit, Timmy CASTANEDA-BC spent at least 50% of the face-to-face time in counseling, explanation of diagnosis, planning of further management, and answering all questions. Orders Placed This Encounter   Procedures    [facility] Kiersten Barker DO*     Referral Priority:   Routine     Referral Type:   Consult for Advice and Opinion     Referral Reason:   Specialty Services Required     Referred to Provider:   Rajani Sanchez DO     Requested Specialty:   Gastroenterology     Number of Visits Requested:   1     Orders Placed This Encounter   Medications    predniSONE (DELTASONE) 20 MG tablet     Sig: 3 tabs x 3 days, then 2 tabs x 3 days, then 1 tab x 3 days     Dispense:  18 tablet     Refill:  0       Patient given educational materials - see patient instructions. Discussed use, benefit, and side effects of prescribed medications. All patient questions answered. Pt voiced understanding. Reviewed health maintenance. Instructed to continue current medications, diet and exercise. Patient agreed with treatment plan. Follow up as directed.      Electronically signed by BULMARO Fowler CNP on 9/11/2018 at 12:24 PM

## 2018-09-18 ENCOUNTER — TELEPHONE (OUTPATIENT)
Dept: PRIMARY CARE CLINIC | Age: 53
End: 2018-09-18

## 2018-10-05 ENCOUNTER — OFFICE VISIT (OUTPATIENT)
Dept: PRIMARY CARE CLINIC | Age: 53
End: 2018-10-05
Payer: COMMERCIAL

## 2018-10-05 VITALS
WEIGHT: 246.2 LBS | HEART RATE: 69 BPM | OXYGEN SATURATION: 97 % | BODY MASS INDEX: 34.47 KG/M2 | RESPIRATION RATE: 15 BRPM | DIASTOLIC BLOOD PRESSURE: 82 MMHG | SYSTOLIC BLOOD PRESSURE: 134 MMHG | HEIGHT: 71 IN

## 2018-10-05 DIAGNOSIS — R10.84 GENERALIZED ABDOMINAL PAIN: ICD-10-CM

## 2018-10-05 DIAGNOSIS — I10 ESSENTIAL HYPERTENSION: Primary | ICD-10-CM

## 2018-10-05 DIAGNOSIS — F32.A ANXIETY AND DEPRESSION: ICD-10-CM

## 2018-10-05 DIAGNOSIS — F41.9 ANXIETY AND DEPRESSION: ICD-10-CM

## 2018-10-05 PROCEDURE — 99214 OFFICE O/P EST MOD 30 MIN: CPT | Performed by: NURSE PRACTITIONER

## 2018-10-05 RX ORDER — ALPRAZOLAM 0.5 MG/1
0.5 TABLET ORAL 2 TIMES DAILY
Qty: 180 TABLET | Refills: 1 | Status: SHIPPED | OUTPATIENT
Start: 2018-10-05 | End: 2019-09-11 | Stop reason: SDUPTHER

## 2018-10-05 ASSESSMENT — ENCOUNTER SYMPTOMS
NAUSEA: 1
ABDOMINAL PAIN: 1
ABDOMINAL DISTENTION: 1
VOMITING: 1
SHORTNESS OF BREATH: 0
BACK PAIN: 0
COUGH: 0

## 2018-10-19 ENCOUNTER — HOSPITAL ENCOUNTER (OUTPATIENT)
Age: 53
Setting detail: SPECIMEN
Discharge: HOME | End: 2018-10-19
Payer: COMMERCIAL

## 2018-10-23 LAB — SURGICAL PATHOLOGY REPORT: NORMAL

## 2018-10-24 ENCOUNTER — OFFICE VISIT (OUTPATIENT)
Dept: PRIMARY CARE CLINIC | Age: 53
End: 2018-10-24
Payer: COMMERCIAL

## 2018-10-24 VITALS
DIASTOLIC BLOOD PRESSURE: 78 MMHG | HEART RATE: 70 BPM | WEIGHT: 245.5 LBS | SYSTOLIC BLOOD PRESSURE: 120 MMHG | OXYGEN SATURATION: 98 % | RESPIRATION RATE: 15 BRPM | HEIGHT: 71 IN | BODY MASS INDEX: 34.37 KG/M2

## 2018-10-24 DIAGNOSIS — M15.9 PRIMARY OSTEOARTHRITIS INVOLVING MULTIPLE JOINTS: Primary | ICD-10-CM

## 2018-10-24 PROCEDURE — 99214 OFFICE O/P EST MOD 30 MIN: CPT | Performed by: NURSE PRACTITIONER

## 2018-10-24 ASSESSMENT — PATIENT HEALTH QUESTIONNAIRE - PHQ9
SUM OF ALL RESPONSES TO PHQ9 QUESTIONS 1 & 2: 0
SUM OF ALL RESPONSES TO PHQ QUESTIONS 1-9: 0
2. FEELING DOWN, DEPRESSED OR HOPELESS: 0
1. LITTLE INTEREST OR PLEASURE IN DOING THINGS: 0
SUM OF ALL RESPONSES TO PHQ QUESTIONS 1-9: 0

## 2018-10-24 ASSESSMENT — ENCOUNTER SYMPTOMS
BACK PAIN: 0
ABDOMINAL PAIN: 0
SHORTNESS OF BREATH: 0
COUGH: 0

## 2018-10-24 NOTE — PROGRESS NOTES
[facility] PRIMARY CARE  [address] [place] [address]   [address]  [facility] [address]  Dept: [phone]  Dept Fax: [phone]    Maryuri Mccollum is a 52 y.o. male who presentstoday for his medical conditions/complaints as noted below.   Maryuri Mccollum is c/o of  Chief Complaint   Patient presents with    Discuss Medications           HPI:     Presents today to discuss medication for joint pain  Was seen in [facility] 10/20/18 prior to EGD, was advised to avoid diclofenac sodium as it may be contributing/irritating ulcer  Had EGD, states he is to repeat EGD in 6 weeks with Dr. Rossy Thakur  Has held diclofenac sodium with improvement in abdominal pain  Worsening of joint pain in hands/feet        Hemoglobin A1C (%)   Date Value   11/11/2017 5.1             ( goal A1C is < 7)   No results found for: LABMICR  LDL Cholesterol (mg/dL)   Date Value   11/11/2017 110     LDL Calculated (mg/dL)   Date Value   12/02/2016 97       (goal LDL is <100)   AST (U/L)   Date Value   09/04/2018 21     ALT (U/L)   Date Value   09/04/2018 27     BUN (mg/dL)   Date Value   09/04/2018 17     BP Readings from Last 3 Encounters:   10/24/18 120/78   10/05/18 134/82   09/11/18 118/60          ([id number])    Past Medical History:   Diagnosis Date    Arthritis     Depression     Emphysema lung ([address]     Hyperlipidemia     Hypersomnia with sleep apnea, unspecified     Hypertension       Past Surgical History:   Procedure Laterality Date    COLONOSCOPY  5/27/2016    JOINT REPLACEMENT Bilateral        Family History   Problem Relation Age of Onset    Mult Sclerosis Father     Heart Disease Sister        Social History   Substance Use Topics    Smoking status: Current Every Day Smoker     Packs/day: 2.00     Years: 20.00    Smokeless tobacco: Never Used    Alcohol use Yes      Comment: daily      Current Outpatient Prescriptions   Medication Sig Dispense Refill    diclofenac sodium (VOLTAREN) 1 % GEL Apply 2 g topically 4 times daily as needed for Pain For upper extremity, use 2 gm QID. For lower extremity, use 4 gm QID. 10 Tube 11    ALPRAZolam (XANAX) 0.5 MG tablet Take 1 tablet by mouth 2 times daily for 90 days. . 180 tablet 1    lisinopril (PRINIVIL;ZESTRIL) 30 MG tablet TAKE 1 TABLET DAILY 90 tablet 3    atorvastatin (LIPITOR) 10 MG tablet TAKE 1 TABLET BY MOUTH ONE TIME A DAY  90 tablet 4    atorvastatin (LIPITOR) 10 MG tablet TAKE 1 TABLET BY MOUTH ONE TIME A DAY  90 tablet 4    loratadine (CLARITIN) 10 MG tablet Take 1 tablet by mouth daily 30 tablet 2    albuterol sulfate  (90 Base) MCG/ACT inhaler Inhale 2 puffs into the lungs every 6 hours as needed for Wheezing 1 Inhaler 3    metoprolol succinate (TOPROL XL) 100 MG extended release tablet Take 1 tablet by mouth daily 90 tablet 3    venlafaxine (EFFEXOR XR) 150 MG extended release capsule Take 1 capsule by mouth daily 90 capsule 3    Handicap Klever MISC Expires 3/31/2022 1 each 0    fluticasone (FLONASE) 50 MCG/ACT nasal spray        No current facility-administered medications for this visit. No Known Allergies    Health Maintenance   Topic Date Due    Hepatitis C screen  1965    HIV screen  10/18/1978    Shingles Vaccine (1 of 2 - 2 Dose Series) 10/18/2013    DTaP/Tdap/Td vaccine (1 - Tdap) 07/22/2017    Flu vaccine (1) 09/01/2018    Low dose CT lung screening  06/04/2019    Potassium monitoring  09/04/2019    Creatinine monitoring  09/04/2019    Diabetes screen  11/11/2020    Lipid screen  11/11/2022    Colon cancer screen colonoscopy  05/27/2026    Pneumococcal med risk  Completed       Subjective:      Review of Systems   Constitutional: Negative for chills and fever. HENT: Negative for congestion. Eyes: Negative for visual disturbance. Respiratory: Negative for cough and shortness of breath. Cardiovascular: Negative for chest pain and palpitations. Gastrointestinal: Negative for abdominal pain.

## 2018-11-06 ENCOUNTER — TELEPHONE (OUTPATIENT)
Dept: PRIMARY CARE CLINIC | Age: 53
End: 2018-11-06

## 2018-11-06 RX ORDER — PANTOPRAZOLE SODIUM 20 MG/1
20 TABLET, DELAYED RELEASE ORAL
Qty: 180 TABLET | Refills: 1 | Status: SHIPPED | OUTPATIENT
Start: 2018-11-06 | End: 2019-03-01 | Stop reason: SDUPTHER

## 2018-11-19 ENCOUNTER — TELEPHONE (OUTPATIENT)
Dept: NEUROLOGY | Age: 53
End: 2018-11-19

## 2018-12-04 DIAGNOSIS — J30.1 ACUTE SEASONAL ALLERGIC RHINITIS DUE TO POLLEN: ICD-10-CM

## 2018-12-04 RX ORDER — LORATADINE 10 MG/1
TABLET ORAL
Qty: 30 TABLET | Refills: 1 | Status: SHIPPED | OUTPATIENT
Start: 2018-12-04 | End: 2019-03-01 | Stop reason: SDUPTHER

## 2018-12-11 ENCOUNTER — ANESTHESIA EVENT (OUTPATIENT)
Dept: ENDOSCOPY | Age: 53
End: 2018-12-11
Payer: COMMERCIAL

## 2018-12-11 ENCOUNTER — ANESTHESIA (OUTPATIENT)
Dept: ENDOSCOPY | Age: 53
End: 2018-12-11
Payer: COMMERCIAL

## 2018-12-11 ENCOUNTER — HOSPITAL ENCOUNTER (OUTPATIENT)
Age: 53
Setting detail: OUTPATIENT SURGERY
Discharge: HOME | End: 2018-12-11
Attending: INTERNAL MEDICINE | Admitting: INTERNAL MEDICINE
Payer: COMMERCIAL

## 2018-12-11 VITALS
HEART RATE: 62 BPM | RESPIRATION RATE: 19 BRPM | DIASTOLIC BLOOD PRESSURE: 84 MMHG | BODY MASS INDEX: 35 KG/M2 | SYSTOLIC BLOOD PRESSURE: 142 MMHG | WEIGHT: 250 LBS | TEMPERATURE: 98.2 F | OXYGEN SATURATION: 94 % | HEIGHT: 71 IN

## 2018-12-11 VITALS
DIASTOLIC BLOOD PRESSURE: 87 MMHG | SYSTOLIC BLOOD PRESSURE: 137 MMHG | OXYGEN SATURATION: 89 % | RESPIRATION RATE: 27 BRPM

## 2018-12-11 PROCEDURE — 2580000003 HC RX 258: Performed by: INTERNAL MEDICINE

## 2018-12-11 PROCEDURE — 7100000011 HC PHASE II RECOVERY - ADDTL 15 MIN: Performed by: INTERNAL MEDICINE

## 2018-12-11 PROCEDURE — 2500000003 HC RX 250 WO HCPCS: Performed by: NURSE ANESTHETIST, CERTIFIED REGISTERED

## 2018-12-11 PROCEDURE — 6360000002 HC RX W HCPCS: Performed by: NURSE ANESTHETIST, CERTIFIED REGISTERED

## 2018-12-11 PROCEDURE — 3700000000 HC ANESTHESIA ATTENDED CARE: Performed by: INTERNAL MEDICINE

## 2018-12-11 PROCEDURE — 7100000010 HC PHASE II RECOVERY - FIRST 15 MIN: Performed by: INTERNAL MEDICINE

## 2018-12-11 PROCEDURE — 3609017100 HC EGD: Performed by: INTERNAL MEDICINE

## 2018-12-11 PROCEDURE — 3700000001 HC ADD 15 MINUTES (ANESTHESIA): Performed by: INTERNAL MEDICINE

## 2018-12-11 PROCEDURE — 2580000003 HC RX 258: Performed by: NURSE ANESTHETIST, CERTIFIED REGISTERED

## 2018-12-11 RX ORDER — ASPIRIN 81 MG/1
81 TABLET, CHEWABLE ORAL DAILY
COMMUNITY
End: 2019-01-04 | Stop reason: ALTCHOICE

## 2018-12-11 RX ORDER — LIDOCAINE HYDROCHLORIDE 10 MG/ML
INJECTION, SOLUTION INFILTRATION; PERINEURAL PRN
Status: DISCONTINUED | OUTPATIENT
Start: 2018-12-11 | End: 2018-12-11 | Stop reason: SDUPTHER

## 2018-12-11 RX ORDER — PROPOFOL 10 MG/ML
INJECTION, EMULSION INTRAVENOUS PRN
Status: DISCONTINUED | OUTPATIENT
Start: 2018-12-11 | End: 2018-12-11 | Stop reason: SDUPTHER

## 2018-12-11 RX ORDER — SODIUM CHLORIDE, SODIUM LACTATE, POTASSIUM CHLORIDE, CALCIUM CHLORIDE 600; 310; 30; 20 MG/100ML; MG/100ML; MG/100ML; MG/100ML
INJECTION, SOLUTION INTRAVENOUS CONTINUOUS PRN
Status: DISCONTINUED | OUTPATIENT
Start: 2018-12-11 | End: 2018-12-11 | Stop reason: SDUPTHER

## 2018-12-11 RX ORDER — SODIUM CHLORIDE 9 MG/ML
INJECTION, SOLUTION INTRAVENOUS CONTINUOUS
Status: DISCONTINUED | OUTPATIENT
Start: 2018-12-11 | End: 2018-12-11 | Stop reason: HOSPADM

## 2018-12-11 RX ADMIN — PROPOFOL 50 MG: 10 INJECTION, EMULSION INTRAVENOUS at 09:24

## 2018-12-11 RX ADMIN — SODIUM CHLORIDE, POTASSIUM CHLORIDE, SODIUM LACTATE AND CALCIUM CHLORIDE: 600; 310; 30; 20 INJECTION, SOLUTION INTRAVENOUS at 09:15

## 2018-12-11 RX ADMIN — LIDOCAINE HYDROCHLORIDE 50 MG: 10 INJECTION, SOLUTION INFILTRATION; PERINEURAL at 09:21

## 2018-12-11 RX ADMIN — SODIUM CHLORIDE: 9 INJECTION, SOLUTION INTRAVENOUS at 08:25

## 2018-12-11 RX ADMIN — PROPOFOL 50 MG: 10 INJECTION, EMULSION INTRAVENOUS at 09:27

## 2018-12-11 RX ADMIN — PROPOFOL 150 MG: 10 INJECTION, EMULSION INTRAVENOUS at 09:21

## 2018-12-11 ASSESSMENT — PAIN - FUNCTIONAL ASSESSMENT: PAIN_FUNCTIONAL_ASSESSMENT: 0-10

## 2018-12-11 ASSESSMENT — ENCOUNTER SYMPTOMS
STRIDOR: 0
SHORTNESS OF BREATH: 0

## 2018-12-11 NOTE — ANESTHESIA PRE PROCEDURE
Sarah Louise CNP   Handicap Placard MISC Expires 3/31/2022 3/31/17   BULMARO Diaz CNP       Current medications:    No current facility-administered medications for this encounter. Allergies:  No Known Allergies    Problem List:    Patient Active Problem List   Diagnosis Code    Anxiety and depression F41.9, F32.9    Mixed hyperlipidemia E78.2    Essential hypertension I10    Primary osteoarthritis involving multiple joints M15.0    Multiple lung nodules R91.8       Past Medical History:        Diagnosis Date    Arthritis     Depression     Emphysema lung ([address]     Hyperlipidemia     Hypersomnia with sleep apnea, unspecified     Hypertension        Past Surgical History:        Procedure Laterality Date    COLONOSCOPY  5/27/2016    JOINT REPLACEMENT Bilateral        Social History:    Social History   Substance Use Topics    Smoking status: Current Every Day Smoker     Packs/day: 2.00     Years: 20.00    Smokeless tobacco: Never Used    Alcohol use Yes      Comment: daily                                Ready to quit: Not Answered  Counseling given: Not Answered      Vital Signs (Current):   Vitals:    12/11/18 0810   BP: 130/74   Pulse: 70   Resp: 16   Temp: 96.9 °F (36.1 °C)   TempSrc: Infrared   SpO2: 97%   Weight: 250 lb (113.4 kg)   Height: 5' 11\" (1.803 m)                                              BP Readings from Last 3 Encounters:   12/11/18 130/74   10/24/18 120/78   10/05/18 134/82       NPO Status: Time of last liquid consumption: 2000                        Time of last solid consumption: 2000                        Date of last liquid consumption: 12/10/18                        Date of last solid food consumption: 12/10/18    BMI:   Wt Readings from Last 3 Encounters:   12/11/18 250 lb (113.4 kg)   10/24/18 245 lb 8 oz (111.4 kg)   10/05/18 246 lb 3.2 oz (111.7 kg)     Body mass index is 34.87 kg/m².     CBC:   Lab Results   Component Value Date    WBC 11.9 09/04/2018    RBC 5.02 09/04/2018    HGB 15.1 09/04/2018    HCT 45.8 09/04/2018    MCV 91.2 09/04/2018    RDW 12.8 09/04/2018     09/04/2018       CMP:   Lab Results   Component Value Date     09/04/2018    K 4.7 09/04/2018     09/04/2018    CO2 21 09/04/2018    BUN 17 09/04/2018    CREATININE 1.11 09/04/2018    GFRAA >60 09/04/2018    LABGLOM >60 09/04/2018    GLUCOSE 120 09/04/2018    PROT 7.1 09/04/2018    CALCIUM 8.9 09/04/2018    BILITOT 0.44 09/04/2018    ALKPHOS 112 09/04/2018    AST 21 09/04/2018    ALT 27 09/04/2018       POC Tests: No results for input(s): POCGLU, POCNA, POCK, POCCL, POCBUN, POCHEMO, POCHCT in the last 72 hours. Coags: No results found for: PROTIME, INR, APTT    HCG (If Applicable): No results found for: PREGTESTUR, PREGSERUM, HCG, HCGQUANT     ABGs: No results found for: PHART, PO2ART, JYW0EQD, LUC6KFF, BEART, D2KINTKC     Type & Screen (If Applicable):  No results found for: LABABO, LABRH    Anesthesia Evaluation   no history of anesthetic complications:   Airway: Mallampati: II     Neck ROM: full  Mouth opening: > = 3 FB Dental:    (+) partials      Pulmonary:   (+) COPD:  sleep apnea:      (-) asthma, shortness of breath and stridor                           Cardiovascular:    (+) hypertension:, hyperlipidemia    (-) pacemaker, past MI, CABG/stent and  angina        Rate: normal                    Neuro/Psych:   (+) depression/anxiety    (-) seizures, TIA and CVA           GI/Hepatic/Renal:        (-) GERD, liver disease and no renal disease       Endo/Other:        (-) diabetes mellitus, hypothyroidism, hyperthyroidism, blood dyscrasia               Abdominal:       Abdomen: soft. Vascular:                                        Anesthesia Plan      MAC     ASA 3       Induction: intravenous. Anesthetic plan and risks discussed with patient.                       Heron Juárez MD   12/11/2018

## 2018-12-24 DIAGNOSIS — F41.9 ANXIETY AND DEPRESSION: ICD-10-CM

## 2018-12-24 DIAGNOSIS — F32.A ANXIETY AND DEPRESSION: ICD-10-CM

## 2018-12-24 NOTE — TELEPHONE ENCOUNTER
Last ov  10/24/18    Health Maintenance   Topic Date Due    Hepatitis C screen  1965    HIV screen  10/18/1978    Shingles Vaccine (1 of 2 - 2 Dose Series) 10/18/2013    DTaP/Tdap/Td vaccine (1 - Tdap) 07/22/2017    Flu vaccine (1) 09/01/2018    Low dose CT lung screening  06/04/2019    Potassium monitoring  09/04/2019    Creatinine monitoring  09/04/2019    Diabetes screen  11/11/2020    Lipid screen  11/11/2022    Colon cancer screen colonoscopy  05/27/2026    Pneumococcal med risk  Completed             (applicable per patient's age: Cancer Screenings, Depression Screening, Fall Risk Screening, Immunizations)    Hemoglobin A1C (%)   Date Value   11/11/2017 5.1     LDL Cholesterol (mg/dL)   Date Value   11/11/2017 110     LDL Calculated (mg/dL)   Date Value   12/02/2016 97     AST (U/L)   Date Value   09/04/2018 21     ALT (U/L)   Date Value   09/04/2018 27     BUN (mg/dL)   Date Value   09/04/2018 17      (goal A1C is < 7)   (goal LDL is <100) need 30-50% reduction from baseline     BP Readings from Last 3 Encounters:   12/11/18 (!) 142/84   12/11/18 137/87   10/24/18 120/78    (goal /80)      All Future Testing planned in [facility]:      Next Visit Date:  Future Appointments  Date Time Provider Mritha Bonilla   1/4/2019 9:40 AM BULMARO Mcgarry - CNP [facility]            Patient Active Problem List:     Anxiety and depression     Mixed hyperlipidemia     Essential hypertension     Primary osteoarthritis involving multiple joints     Multiple lung nodules

## 2018-12-25 RX ORDER — VENLAFAXINE HYDROCHLORIDE 150 MG/1
CAPSULE, EXTENDED RELEASE ORAL
Qty: 90 CAPSULE | Refills: 3 | Status: SHIPPED | OUTPATIENT
Start: 2018-12-25 | End: 2019-09-11 | Stop reason: SDUPTHER

## 2019-01-04 ENCOUNTER — OFFICE VISIT (OUTPATIENT)
Dept: PRIMARY CARE CLINIC | Age: 54
End: 2019-01-04
Payer: COMMERCIAL

## 2019-01-04 VITALS
SYSTOLIC BLOOD PRESSURE: 128 MMHG | BODY MASS INDEX: 38.25 KG/M2 | HEART RATE: 75 BPM | DIASTOLIC BLOOD PRESSURE: 72 MMHG | OXYGEN SATURATION: 97 % | WEIGHT: 267.2 LBS | HEIGHT: 70 IN

## 2019-01-04 DIAGNOSIS — Z12.5 SCREENING FOR PROSTATE CANCER: ICD-10-CM

## 2019-01-04 DIAGNOSIS — Z13.1 SCREENING FOR DIABETES MELLITUS: ICD-10-CM

## 2019-01-04 DIAGNOSIS — E78.2 MIXED HYPERLIPIDEMIA: ICD-10-CM

## 2019-01-04 DIAGNOSIS — Z13.0 SCREENING FOR DEFICIENCY ANEMIA: ICD-10-CM

## 2019-01-04 DIAGNOSIS — R60.0 LOCALIZED EDEMA: ICD-10-CM

## 2019-01-04 DIAGNOSIS — Z13.29 SCREENING FOR THYROID DISORDER: ICD-10-CM

## 2019-01-04 DIAGNOSIS — I10 ESSENTIAL HYPERTENSION: Primary | ICD-10-CM

## 2019-01-04 PROCEDURE — 99214 OFFICE O/P EST MOD 30 MIN: CPT | Performed by: NURSE PRACTITIONER

## 2019-01-04 RX ORDER — HYDROCHLOROTHIAZIDE 25 MG/1
25 TABLET ORAL EVERY MORNING
Qty: 30 TABLET | Refills: 0 | Status: SHIPPED | OUTPATIENT
Start: 2019-01-04 | End: 2019-01-31 | Stop reason: SDUPTHER

## 2019-01-04 RX ORDER — HYDROCHLOROTHIAZIDE 25 MG/1
25 TABLET ORAL DAILY
Qty: 30 TABLET | Refills: 0 | Status: SHIPPED | OUTPATIENT
Start: 2019-01-04 | End: 2019-01-04 | Stop reason: CLARIF

## 2019-01-04 ASSESSMENT — PATIENT HEALTH QUESTIONNAIRE - PHQ9
1. LITTLE INTEREST OR PLEASURE IN DOING THINGS: 0
SUM OF ALL RESPONSES TO PHQ9 QUESTIONS 1 & 2: 0
2. FEELING DOWN, DEPRESSED OR HOPELESS: 0
SUM OF ALL RESPONSES TO PHQ QUESTIONS 1-9: 0
SUM OF ALL RESPONSES TO PHQ QUESTIONS 1-9: 0

## 2019-01-04 ASSESSMENT — ENCOUNTER SYMPTOMS
ABDOMINAL PAIN: 0
BACK PAIN: 0
SHORTNESS OF BREATH: 0
COUGH: 0
ORTHOPNEA: 0
BLURRED VISION: 0

## 2019-01-31 RX ORDER — HYDROCHLOROTHIAZIDE 25 MG/1
25 TABLET ORAL DAILY
Qty: 30 TABLET | Refills: 5 | Status: SHIPPED | OUTPATIENT
Start: 2019-01-31 | End: 2019-04-05

## 2019-03-01 ENCOUNTER — OFFICE VISIT (OUTPATIENT)
Dept: PRIMARY CARE CLINIC | Age: 54
End: 2019-03-01
Payer: COMMERCIAL

## 2019-03-01 ENCOUNTER — HOSPITAL ENCOUNTER (OUTPATIENT)
Age: 54
Discharge: HOME | End: 2019-03-01
Payer: COMMERCIAL

## 2019-03-01 VITALS
DIASTOLIC BLOOD PRESSURE: 74 MMHG | HEART RATE: 66 BPM | SYSTOLIC BLOOD PRESSURE: 122 MMHG | BODY MASS INDEX: 36.45 KG/M2 | WEIGHT: 254 LBS | RESPIRATION RATE: 16 BRPM

## 2019-03-01 DIAGNOSIS — J06.9 UPPER RESPIRATORY TRACT INFECTION, UNSPECIFIED TYPE: Primary | ICD-10-CM

## 2019-03-01 DIAGNOSIS — E78.2 MIXED HYPERLIPIDEMIA: ICD-10-CM

## 2019-03-01 DIAGNOSIS — Z13.1 SCREENING FOR DIABETES MELLITUS: ICD-10-CM

## 2019-03-01 DIAGNOSIS — J30.1 ACUTE SEASONAL ALLERGIC RHINITIS DUE TO POLLEN: ICD-10-CM

## 2019-03-01 DIAGNOSIS — R60.0 LOCALIZED EDEMA: ICD-10-CM

## 2019-03-01 DIAGNOSIS — Z12.5 SCREENING FOR PROSTATE CANCER: ICD-10-CM

## 2019-03-01 DIAGNOSIS — Z13.0 SCREENING FOR DEFICIENCY ANEMIA: ICD-10-CM

## 2019-03-01 DIAGNOSIS — Z13.29 SCREENING FOR THYROID DISORDER: ICD-10-CM

## 2019-03-01 DIAGNOSIS — I10 ESSENTIAL HYPERTENSION: ICD-10-CM

## 2019-03-01 LAB
ALBUMIN SERPL-MCNC: 4.1 G/DL (ref 3.5–5.2)
ALBUMIN/GLOBULIN RATIO: 1.5 (ref 1–2.5)
ALP BLD-CCNC: 100 U/L (ref 40–129)
ALT SERPL-CCNC: 36 U/L (ref 5–41)
ANION GAP SERPL CALCULATED.3IONS-SCNC: 14 MMOL/L (ref 9–17)
AST SERPL-CCNC: 30 U/L
BILIRUB SERPL-MCNC: 0.29 MG/DL (ref 0.3–1.2)
BUN BLDV-MCNC: 18 MG/DL (ref 6–20)
BUN/CREAT BLD: ABNORMAL (ref 9–20)
CALCIUM SERPL-MCNC: 9 MG/DL (ref 8.6–10.4)
CHLORIDE BLD-SCNC: 107 MMOL/L (ref 98–107)
CHOLESTEROL/HDL RATIO: 5.5
CHOLESTEROL: 142 MG/DL
CO2: 22 MMOL/L (ref 20–31)
CREAT SERPL-MCNC: 0.94 MG/DL (ref 0.7–1.2)
ESTIMATED AVERAGE GLUCOSE: 117 MG/DL
GFR AFRICAN AMERICAN: >60 ML/MIN
GFR NON-AFRICAN AMERICAN: >60 ML/MIN
GFR SERPL CREATININE-BSD FRML MDRD: ABNORMAL ML/MIN/{1.73_M2}
GFR SERPL CREATININE-BSD FRML MDRD: ABNORMAL ML/MIN/{1.73_M2}
GLUCOSE BLD-MCNC: 111 MG/DL (ref 70–99)
HBA1C MFR BLD: 5.7 % (ref 4–6)
HCT VFR BLD CALC: 47.3 % (ref 40.7–50.3)
HDLC SERPL-MCNC: 26 MG/DL
HEMOGLOBIN: 15.1 G/DL (ref 13–17)
LDL CHOLESTEROL: 69 MG/DL (ref 0–130)
MCH RBC QN AUTO: 28.9 PG (ref 25.2–33.5)
MCHC RBC AUTO-ENTMCNC: 31.9 G/DL (ref 28.4–34.8)
MCV RBC AUTO: 90.4 FL (ref 82.6–102.9)
NRBC AUTOMATED: 0 PER 100 WBC
PDW BLD-RTO: 14.4 % (ref 11.8–14.4)
PLATELET # BLD: 284 K/UL (ref 138–453)
PMV BLD AUTO: 10.1 FL (ref 8.1–13.5)
POTASSIUM SERPL-SCNC: 5.8 MMOL/L (ref 3.7–5.3)
PROSTATE SPECIFIC ANTIGEN: 0.6 UG/L
RBC # BLD: 5.23 M/UL (ref 4.21–5.77)
SODIUM BLD-SCNC: 143 MMOL/L (ref 135–144)
TOTAL PROTEIN: 6.8 G/DL (ref 6.4–8.3)
TRIGL SERPL-MCNC: 234 MG/DL
TSH SERPL DL<=0.05 MIU/L-ACNC: 1.15 MIU/L (ref 0.3–5)
VLDLC SERPL CALC-MCNC: ABNORMAL MG/DL (ref 1–30)
WBC # BLD: 8.3 K/UL (ref 3.5–11.3)

## 2019-03-01 PROCEDURE — G0103 PSA SCREENING: HCPCS

## 2019-03-01 PROCEDURE — 84443 ASSAY THYROID STIM HORMONE: CPT

## 2019-03-01 PROCEDURE — 85027 COMPLETE CBC AUTOMATED: CPT

## 2019-03-01 PROCEDURE — 99214 OFFICE O/P EST MOD 30 MIN: CPT | Performed by: NURSE PRACTITIONER

## 2019-03-01 PROCEDURE — 36415 COLL VENOUS BLD VENIPUNCTURE: CPT

## 2019-03-01 PROCEDURE — 80061 LIPID PANEL: CPT

## 2019-03-01 PROCEDURE — 83036 HEMOGLOBIN GLYCOSYLATED A1C: CPT

## 2019-03-01 PROCEDURE — 80053 COMPREHEN METABOLIC PANEL: CPT

## 2019-03-01 RX ORDER — PANTOPRAZOLE SODIUM 20 MG/1
20 TABLET, DELAYED RELEASE ORAL
Qty: 180 TABLET | Refills: 1 | Status: SHIPPED | OUTPATIENT
Start: 2019-03-01 | End: 2019-04-05 | Stop reason: SDUPTHER

## 2019-03-01 RX ORDER — LEVOFLOXACIN 500 MG/1
500 TABLET, FILM COATED ORAL DAILY
Qty: 10 TABLET | Refills: 0 | Status: SHIPPED | OUTPATIENT
Start: 2019-03-01 | End: 2019-05-15 | Stop reason: SDUPTHER

## 2019-03-01 RX ORDER — LORATADINE 10 MG/1
TABLET ORAL
Qty: 30 TABLET | Refills: 1 | Status: SHIPPED | OUTPATIENT
Start: 2019-03-01 | End: 2019-09-11 | Stop reason: SDUPTHER

## 2019-03-01 RX ORDER — ATORVASTATIN CALCIUM 10 MG/1
TABLET, FILM COATED ORAL
Qty: 90 TABLET | Refills: 4 | Status: SHIPPED | OUTPATIENT
Start: 2019-03-01 | End: 2019-09-11 | Stop reason: SDUPTHER

## 2019-03-01 ASSESSMENT — ENCOUNTER SYMPTOMS
SINUS PAIN: 1
COUGH: 0
SORE THROAT: 0
SINUS PRESSURE: 1
ABDOMINAL PAIN: 0
BACK PAIN: 0
SHORTNESS OF BREATH: 0

## 2019-03-18 DIAGNOSIS — I10 ESSENTIAL HYPERTENSION: ICD-10-CM

## 2019-03-18 RX ORDER — METOPROLOL SUCCINATE 100 MG/1
TABLET, EXTENDED RELEASE ORAL
Qty: 90 TABLET | Refills: 3 | Status: SHIPPED | OUTPATIENT
Start: 2019-03-18 | End: 2019-09-11 | Stop reason: SDUPTHER

## 2019-04-04 NOTE — TELEPHONE ENCOUNTER
Last OV 03/01/2019    Health Maintenance   Topic Date Due    Hepatitis C screen  1965    HIV screen  10/18/1978    Shingles Vaccine (1 of 2) 10/18/2013    DTaP/Tdap/Td vaccine (1 - Tdap) 07/22/2017    Flu vaccine (Season Ended) 09/02/2019 (Originally 9/1/2019)    Low dose CT lung screening  06/04/2019    A1C test (Diabetic or Prediabetic)  03/01/2020    Potassium monitoring  03/01/2020    Creatinine monitoring  03/01/2020    Lipid screen  03/01/2024    Colon cancer screen colonoscopy  05/27/2026    Pneumococcal 0-64 years at Risk Vaccine  Completed             (applicable per patient's age: Cancer Screenings, Depression Screening, Fall Risk Screening, Immunizations)    Hemoglobin A1C (%)   Date Value   03/01/2019 5.7   11/11/2017 5.1     LDL Cholesterol (mg/dL)   Date Value   03/01/2019 69     LDL Calculated (mg/dL)   Date Value   12/02/2016 97     AST (U/L)   Date Value   03/01/2019 30     ALT (U/L)   Date Value   03/01/2019 36     BUN (mg/dL)   Date Value   03/01/2019 18      (goal A1C is < 7)   (goal LDL is <100) need 30-50% reduction from baseline     BP Readings from Last 3 Encounters:   03/01/19 122/74   01/04/19 128/72   12/11/18 (!) 142/84    (goal /80)      All Future Testing planned in [facility]:      Next Visit Date:  Future Appointments   Date Time Provider Mirtha Bonilla   4/5/2019  7:40 AM BULMARO Miles - CNP [facility] Evangel[facility] Dawson            Patient Active Problem List:     Anxiety and depression     Mixed hyperlipidemia     Essential hypertension     Primary osteoarthritis involving multiple joints     Multiple lung nodules

## 2019-04-05 ENCOUNTER — HOSPITAL ENCOUNTER (OUTPATIENT)
Age: 54
Discharge: HOME | End: 2019-04-05
Payer: COMMERCIAL

## 2019-04-05 ENCOUNTER — OFFICE VISIT (OUTPATIENT)
Dept: PRIMARY CARE CLINIC | Age: 54
End: 2019-04-05
Payer: COMMERCIAL

## 2019-04-05 VITALS
SYSTOLIC BLOOD PRESSURE: 114 MMHG | WEIGHT: 268 LBS | DIASTOLIC BLOOD PRESSURE: 70 MMHG | BODY MASS INDEX: 38.37 KG/M2 | HEART RATE: 71 BPM | RESPIRATION RATE: 16 BRPM | HEIGHT: 70 IN

## 2019-04-05 DIAGNOSIS — E87.5 HYPERKALEMIA: ICD-10-CM

## 2019-04-05 DIAGNOSIS — Z00.00 ENCOUNTER FOR GENERAL ADULT MEDICAL EXAMINATION W/O ABNORMAL FINDINGS: Primary | ICD-10-CM

## 2019-04-05 LAB — POTASSIUM SERPL-SCNC: 5.2 MMOL/L (ref 3.7–5.3)

## 2019-04-05 PROCEDURE — 84132 ASSAY OF SERUM POTASSIUM: CPT

## 2019-04-05 PROCEDURE — 99396 PREV VISIT EST AGE 40-64: CPT | Performed by: NURSE PRACTITIONER

## 2019-04-05 PROCEDURE — 36415 COLL VENOUS BLD VENIPUNCTURE: CPT

## 2019-04-05 RX ORDER — HYDROCHLOROTHIAZIDE 25 MG/1
25 TABLET ORAL DAILY
Qty: 90 TABLET | Refills: 0 | Status: SHIPPED | OUTPATIENT
Start: 2019-04-05 | End: 2019-06-13 | Stop reason: SDUPTHER

## 2019-04-05 RX ORDER — PANTOPRAZOLE SODIUM 20 MG/1
20 TABLET, DELAYED RELEASE ORAL
Qty: 180 TABLET | Refills: 1 | Status: SHIPPED | OUTPATIENT
Start: 2019-04-05 | End: 2019-09-09 | Stop reason: SDUPTHER

## 2019-04-05 ASSESSMENT — PATIENT HEALTH QUESTIONNAIRE - PHQ9
2. FEELING DOWN, DEPRESSED OR HOPELESS: 0
SUM OF ALL RESPONSES TO PHQ QUESTIONS 1-9: 0
SUM OF ALL RESPONSES TO PHQ9 QUESTIONS 1 & 2: 0
1. LITTLE INTEREST OR PLEASURE IN DOING THINGS: 0
SUM OF ALL RESPONSES TO PHQ QUESTIONS 1-9: 0

## 2019-04-05 ASSESSMENT — ENCOUNTER SYMPTOMS
COUGH: 0
SHORTNESS OF BREATH: 0
ABDOMINAL PAIN: 0
BACK PAIN: 0

## 2019-04-05 NOTE — PROGRESS NOTES
N/A 12/11/2018    EGD ESOPHAGOGASTRODUODENOSCOPY performed by Annette Ford DO at [facility]       Family History   Problem Relation Age of Onset    Mult Sclerosis Father     Heart Disease Sister           Social History     Tobacco Use    Smoking status: Current Every Day Smoker     Packs/day: 2.00     Years: 20.00     Pack years: 40.00    Smokeless tobacco: Never Used   Substance Use Topics    Alcohol use: Yes     Comment: daily      Current Outpatient Medications   Medication Sig Dispense Refill    pantoprazole (PROTONIX) 20 MG tablet Take 1 tablet by mouth 2 times daily (before meals) 180 tablet 1    hydrochlorothiazide (HYDRODIURIL) 25 MG tablet Take 1 tablet by mouth daily 90 tablet 0    Diclofenac Sodium  MG TB24 TAKE 1 TABLET DAILY 90 tablet 3    metoprolol succinate (TOPROL XL) 100 MG extended release tablet TAKE 1 TABLET DAILY 90 tablet 3    atorvastatin (LIPITOR) 10 MG tablet TAKE 1 TABLET BY MOUTH ONE TIME A DAY 90 tablet 4    loratadine (CLARITIN) 10 MG tablet TAKE 1 TABLET BY MOUTH ONE TIME A DAY 30 tablet 1    venlafaxine (EFFEXOR XR) 150 MG extended release capsule TAKE 1 CAPSULE DAILY 90 capsule 3    lisinopril (PRINIVIL;ZESTRIL) 30 MG tablet TAKE 1 TABLET DAILY 90 tablet 3    albuterol sulfate  (90 Base) MCG/ACT inhaler Inhale 2 puffs into the lungs every 6 hours as needed for Wheezing 1 Inhaler 3    [facility] 1 each 0    fluticasone (FLONASE) 50 MCG/ACT nasal spray        No current facility-administered medications for this visit.       No Known Allergies    Health Maintenance   Topic Date Due    Hepatitis C screen  1965    HIV screen  10/18/1978    Shingles Vaccine (1 of 2) 10/18/2013    DTaP/Tdap/Td vaccine (1 - Tdap) 07/22/2017    Flu vaccine (Season Ended) 09/02/2019 (Originally 9/1/2019)    Low dose CT lung screening  06/04/2019    A1C test (Diabetic or Prediabetic)  03/01/2020    Potassium monitoring  03/01/2020    Creatinine monitoring  03/01/2020    Lipid screen  03/01/2024    Colon cancer screen colonoscopy  05/27/2026    Pneumococcal 0-64 years at Risk Vaccine  Completed       Subjective:      Review of Systems   Constitutional: Negative for chills, fatigue and fever. HENT: Negative for congestion. Eyes: Negative for visual disturbance. Respiratory: Negative for cough and shortness of breath. Cardiovascular: Positive for leg swelling (1+ BLE). Negative for chest pain and palpitations. Gastrointestinal: Negative for abdominal pain. Genitourinary: Negative for difficulty urinating and dysuria. Musculoskeletal: Negative for arthralgias and back pain. Neurological: Negative for dizziness and headaches. Psychiatric/Behavioral: Negative for self-injury, sleep disturbance and suicidal ideas. The patient is not nervous/anxious. Objective:     Physical Exam   Constitutional: He is oriented to person, place, and time. He appears well-developed and well-nourished. HENT:   Head: Normocephalic and atraumatic. Eyes: Pupils are equal, round, and reactive to light. Neck: Normal range of motion. Cardiovascular: Normal rate, regular rhythm and normal heart sounds. Pulmonary/Chest: Effort normal and breath sounds normal.   Abdominal: Soft. Bowel sounds are normal. There is no tenderness. Musculoskeletal: Normal range of motion. Neurological: He is alert and oriented to person, place, and time. Skin: Skin is warm and dry. Psychiatric: He has a normal mood and affect. His behavior is normal. Judgment and thought content normal.   Nursing note and vitals reviewed. /70   Pulse 71   Resp 16   Ht 5' 10\" (1.778 m)   Wt 268 lb (121.6 kg)   BMI 38.45 kg/m²     Assessment:       Diagnosis Orders   1. Encounter for general adult medical examination w/o abnormal findings     2. Hyperkalemia  Potassium             Plan:      Return in about 6 months (around 10/5/2019) for hypertension.     1. Health maintenance Reviewed labs, will repeat potassium. Follow up in six months/as needed. 2. Hyperkalemia- Resume HCTZ, repeat potassium. Follow up pending results. Orders Placed This Encounter   Procedures    Potassium     Standing Status:   Future     Standing Expiration Date:   4/4/2020        Orders Placed This Encounter   Medications    pantoprazole (PROTONIX) 20 MG tablet     Sig: Take 1 tablet by mouth 2 times daily (before meals)     Dispense:  180 tablet     Refill:  1    hydrochlorothiazide (HYDRODIURIL) 25 MG tablet     Sig: Take 1 tablet by mouth daily     Dispense:  90 tablet     Refill:  0       Patient given educational materials - see patient instructions. Discussed use, benefit, and side effects of prescribed medications. All patientquestions answered. Pt voiced understanding. Reviewed health maintenance. Instructedto continue current medications, diet and exercise. Patient agreed with treatmentplan. Follow up as directed.      Electronicallysigned by BULMARO Garza CNP on 4/5/2019 at 8:09 AM

## 2019-04-05 NOTE — PROGRESS NOTES
monitoring  03/01/2020    Creatinine monitoring  03/01/2020    Lipid screen  03/01/2024    Colon cancer screen colonoscopy  05/27/2026    Pneumococcal 0-64 years at Risk Vaccine  Completed

## 2019-05-15 ENCOUNTER — OFFICE VISIT (OUTPATIENT)
Dept: PRIMARY CARE CLINIC | Age: 54
End: 2019-05-15
Payer: COMMERCIAL

## 2019-05-15 VITALS
WEIGHT: 269 LBS | DIASTOLIC BLOOD PRESSURE: 84 MMHG | HEART RATE: 80 BPM | BODY MASS INDEX: 38.51 KG/M2 | RESPIRATION RATE: 16 BRPM | SYSTOLIC BLOOD PRESSURE: 132 MMHG | HEIGHT: 70 IN | TEMPERATURE: 98 F

## 2019-05-15 DIAGNOSIS — J06.9 UPPER RESPIRATORY TRACT INFECTION, UNSPECIFIED TYPE: ICD-10-CM

## 2019-05-15 DIAGNOSIS — G56.03 BILATERAL CARPAL TUNNEL SYNDROME: ICD-10-CM

## 2019-05-15 PROCEDURE — 99214 OFFICE O/P EST MOD 30 MIN: CPT | Performed by: NURSE PRACTITIONER

## 2019-05-15 RX ORDER — BENZONATATE 100 MG/1
100-200 CAPSULE ORAL 3 TIMES DAILY PRN
Qty: 60 CAPSULE | Refills: 0 | Status: SHIPPED | OUTPATIENT
Start: 2019-05-15 | End: 2019-05-22

## 2019-05-15 RX ORDER — PREDNISONE 20 MG/1
TABLET ORAL
Qty: 18 TABLET | Refills: 0 | Status: SHIPPED | OUTPATIENT
Start: 2019-05-15 | End: 2019-05-25

## 2019-05-15 RX ORDER — LEVOFLOXACIN 500 MG/1
500 TABLET, FILM COATED ORAL DAILY
Qty: 10 TABLET | Refills: 0 | Status: SHIPPED | OUTPATIENT
Start: 2019-05-15 | End: 2019-05-25

## 2019-05-15 ASSESSMENT — ENCOUNTER SYMPTOMS
COUGH: 1
ABDOMINAL PAIN: 0
WHEEZING: 1
SORE THROAT: 1
SINUS PAIN: 1
SHORTNESS OF BREATH: 1
BACK PAIN: 0
SINUS PRESSURE: 1

## 2019-06-13 RX ORDER — HYDROCHLOROTHIAZIDE 25 MG/1
25 TABLET ORAL DAILY
Qty: 90 TABLET | Refills: 1 | Status: SHIPPED | OUTPATIENT
Start: 2019-06-13 | End: 2019-09-11 | Stop reason: ALTCHOICE

## 2019-09-08 DIAGNOSIS — I10 ESSENTIAL HYPERTENSION: ICD-10-CM

## 2019-09-09 RX ORDER — LISINOPRIL 30 MG/1
TABLET ORAL
Qty: 90 TABLET | Refills: 3 | Status: SHIPPED | OUTPATIENT
Start: 2019-09-09 | End: 2019-09-11 | Stop reason: SDUPTHER

## 2019-09-09 RX ORDER — PANTOPRAZOLE SODIUM 20 MG/1
TABLET, DELAYED RELEASE ORAL
Qty: 180 TABLET | Refills: 3 | Status: SHIPPED | OUTPATIENT
Start: 2019-09-09 | End: 2019-09-11 | Stop reason: SDUPTHER

## 2019-09-09 NOTE — TELEPHONE ENCOUNTER
Last OV 5/15/19    Health Maintenance   Topic Date Due    Hepatitis C screen  1965    HIV screen  10/18/1978    Shingles Vaccine (1 of 2) 10/18/2013    DTaP/Tdap/Td vaccine (1 - Tdap) 07/22/2017    Low dose CT lung screening  06/04/2019    Flu vaccine (1) 09/01/2019    A1C test (Diabetic or Prediabetic)  03/01/2020    Creatinine monitoring  03/01/2020    Potassium monitoring  04/05/2020    Lipid screen  03/01/2024    Colon cancer screen colonoscopy  05/27/2026    Pneumococcal 0-64 years Vaccine  Completed             (applicable per patient's age: Cancer Screenings, Depression Screening, Fall Risk Screening, Immunizations)    Hemoglobin A1C (%)   Date Value   03/01/2019 5.7   11/11/2017 5.1     LDL Cholesterol (mg/dL)   Date Value   03/01/2019 69     LDL Calculated (mg/dL)   Date Value   12/02/2016 97     AST (U/L)   Date Value   03/01/2019 30     ALT (U/L)   Date Value   03/01/2019 36     BUN (mg/dL)   Date Value   03/01/2019 18      (goal A1C is < 7)   (goal LDL is <100) need 30-50% reduction from baseline     BP Readings from Last 3 Encounters:   05/15/19 132/84   04/05/19 114/70   03/01/19 122/74    (goal /80)      All Future Testing planned in [facility]:      Next Visit Date:  Future Appointments   Date Time Provider Mirtha Bonilla   10/11/2019  8:00 AM BULMARO Rodriguez - CNP [facility] Natasha Goltz            Patient Active Problem List:     Anxiety and depression     Mixed hyperlipidemia     Essential hypertension     Primary osteoarthritis involving multiple joints     Multiple lung nodules

## 2019-09-11 ENCOUNTER — OFFICE VISIT (OUTPATIENT)
Dept: PRIMARY CARE CLINIC | Age: 54
End: 2019-09-11
Payer: COMMERCIAL

## 2019-09-11 VITALS
HEART RATE: 80 BPM | RESPIRATION RATE: 16 BRPM | SYSTOLIC BLOOD PRESSURE: 128 MMHG | WEIGHT: 270.4 LBS | HEIGHT: 71 IN | OXYGEN SATURATION: 98 % | DIASTOLIC BLOOD PRESSURE: 78 MMHG | BODY MASS INDEX: 37.85 KG/M2

## 2019-09-11 DIAGNOSIS — F32.A ANXIETY AND DEPRESSION: ICD-10-CM

## 2019-09-11 DIAGNOSIS — G47.30 SLEEP APNEA, UNSPECIFIED TYPE: Primary | ICD-10-CM

## 2019-09-11 DIAGNOSIS — F41.9 ANXIETY AND DEPRESSION: ICD-10-CM

## 2019-09-11 DIAGNOSIS — J30.1 ACUTE SEASONAL ALLERGIC RHINITIS DUE TO POLLEN: ICD-10-CM

## 2019-09-11 DIAGNOSIS — E78.2 MIXED HYPERLIPIDEMIA: ICD-10-CM

## 2019-09-11 DIAGNOSIS — I10 ESSENTIAL HYPERTENSION: ICD-10-CM

## 2019-09-11 PROCEDURE — 99214 OFFICE O/P EST MOD 30 MIN: CPT | Performed by: NURSE PRACTITIONER

## 2019-09-11 RX ORDER — LISINOPRIL 30 MG/1
TABLET ORAL
Qty: 90 TABLET | Refills: 3 | Status: SHIPPED | OUTPATIENT
Start: 2019-09-11 | End: 2020-05-11

## 2019-09-11 RX ORDER — METOPROLOL SUCCINATE 100 MG/1
TABLET, EXTENDED RELEASE ORAL
Qty: 90 TABLET | Refills: 3 | Status: SHIPPED | OUTPATIENT
Start: 2019-09-11 | End: 2020-09-08

## 2019-09-11 RX ORDER — ALPRAZOLAM 0.5 MG/1
0.5 TABLET ORAL 2 TIMES DAILY PRN
Qty: 14 TABLET | Refills: 0 | Status: SHIPPED | OUTPATIENT
Start: 2019-09-11 | End: 2019-09-18

## 2019-09-11 RX ORDER — LORATADINE 10 MG/1
TABLET ORAL
Qty: 30 TABLET | Refills: 1 | Status: SHIPPED | OUTPATIENT
Start: 2019-09-11 | End: 2020-12-11

## 2019-09-11 RX ORDER — ALPRAZOLAM 0.5 MG/1
0.5 TABLET ORAL 2 TIMES DAILY
Qty: 180 TABLET | Refills: 1 | Status: CANCELLED | OUTPATIENT
Start: 2019-09-11 | End: 2019-12-10

## 2019-09-11 RX ORDER — PANTOPRAZOLE SODIUM 20 MG/1
TABLET, DELAYED RELEASE ORAL
Qty: 180 TABLET | Refills: 3 | Status: SHIPPED | OUTPATIENT
Start: 2019-09-11 | End: 2020-09-08

## 2019-09-11 RX ORDER — PANTOPRAZOLE SODIUM 20 MG/1
TABLET, DELAYED RELEASE ORAL
Qty: 180 TABLET | Refills: 3 | Status: CANCELLED | OUTPATIENT
Start: 2019-09-11

## 2019-09-11 RX ORDER — VENLAFAXINE HYDROCHLORIDE 150 MG/1
CAPSULE, EXTENDED RELEASE ORAL
Qty: 90 CAPSULE | Refills: 3 | Status: SHIPPED | OUTPATIENT
Start: 2019-09-11 | End: 2020-09-08

## 2019-09-11 RX ORDER — ALPRAZOLAM 0.5 MG/1
0.5 TABLET ORAL 2 TIMES DAILY
Qty: 180 TABLET | Refills: 1 | Status: SHIPPED | OUTPATIENT
Start: 2019-09-11 | End: 2019-12-10

## 2019-09-11 RX ORDER — ATORVASTATIN CALCIUM 10 MG/1
TABLET, FILM COATED ORAL
Qty: 90 TABLET | Refills: 4 | Status: SHIPPED | OUTPATIENT
Start: 2019-09-11 | End: 2020-02-19 | Stop reason: SDUPTHER

## 2019-09-11 ASSESSMENT — PATIENT HEALTH QUESTIONNAIRE - PHQ9
SUM OF ALL RESPONSES TO PHQ9 QUESTIONS 1 & 2: 0
2. FEELING DOWN, DEPRESSED OR HOPELESS: 0
SUM OF ALL RESPONSES TO PHQ QUESTIONS 1-9: 0
SUM OF ALL RESPONSES TO PHQ QUESTIONS 1-9: 0
1. LITTLE INTEREST OR PLEASURE IN DOING THINGS: 0

## 2019-09-11 ASSESSMENT — ENCOUNTER SYMPTOMS
COUGH: 0
SHORTNESS OF BREATH: 0
ABDOMINAL PAIN: 0
RHINORRHEA: 0
BACK PAIN: 0
SINUS PRESSURE: 0
SINUS PAIN: 0
SORE THROAT: 0

## 2019-09-11 NOTE — PROGRESS NOTES
[facility] PRIMARY CARE  [facility] [address]  [address] [address]  Dept: [phone]  Dept Fax: [phone]    Marisela Suresh is a 52 y.o. male who presentstoday for his medical conditions/complaints as noted below.   Marisela Suresh is c/o of  Chief Complaint   Patient presents with    Laryngitis     1 month            HPI:     Presents today with chief concern of loss of voice/raspy voice  Has been ongoing for the past month  Comes/goes    Hx of allergies, using claritin  Not currently using flonase    Hx of GERD, c/o midsternal chest pain    Anxiety currently well controlled with meds, requesting refill  Has 3 tabs left, will need short term rx to local pharmacy    Hx of sleep apnea  Requesting rx for So Clean machine  Feels like this is contribute to his symptoms    Denies any other problems/concerns      Hemoglobin A1C (%)   Date Value   03/01/2019 5.7   11/11/2017 5.1             ( goal A1C is < 7)   No results found for: LABMICR  LDL Cholesterol (mg/dL)   Date Value   03/01/2019 69   11/11/2017 110     LDL Calculated (mg/dL)   Date Value   12/02/2016 97       (goal LDL is <100)   AST (U/L)   Date Value   03/01/2019 30     ALT (U/L)   Date Value   03/01/2019 36     BUN (mg/dL)   Date Value   03/01/2019 18     BP Readings from Last 3 Encounters:   09/11/19 128/78   05/15/19 132/84   04/05/19 114/70          ([id number])    Past Medical History:   Diagnosis Date    Arthritis     Depression     Emphysema lung ([address]     Hyperlipidemia     Hypersomnia with sleep apnea, unspecified     Hypertension       Past Surgical History:   Procedure Laterality Date    COLONOSCOPY  5/27/2016    JOINT REPLACEMENT Bilateral     UPPER GASTROINTESTINAL ENDOSCOPY N/A 12/11/2018    EGD ESOPHAGOGASTRODUODENOSCOPY performed by Cathleen July, DO at [facility]       Family History   Problem Relation Age of Onset    Mult Sclerosis Father     Heart Disease Sister

## 2019-10-11 ENCOUNTER — OFFICE VISIT (OUTPATIENT)
Dept: PRIMARY CARE CLINIC | Age: 54
End: 2019-10-11
Payer: COMMERCIAL

## 2019-10-11 VITALS
HEIGHT: 71 IN | OXYGEN SATURATION: 98 % | HEART RATE: 69 BPM | DIASTOLIC BLOOD PRESSURE: 70 MMHG | SYSTOLIC BLOOD PRESSURE: 118 MMHG | WEIGHT: 268 LBS | BODY MASS INDEX: 37.52 KG/M2

## 2019-10-11 DIAGNOSIS — K21.9 GASTROESOPHAGEAL REFLUX DISEASE WITHOUT ESOPHAGITIS: ICD-10-CM

## 2019-10-11 DIAGNOSIS — G56.03 BILATERAL CARPAL TUNNEL SYNDROME: Primary | ICD-10-CM

## 2019-10-11 PROCEDURE — 99214 OFFICE O/P EST MOD 30 MIN: CPT | Performed by: NURSE PRACTITIONER

## 2019-10-11 RX ORDER — GREEN TEA/HOODIA GORDONII 315-12.5MG
1 CAPSULE ORAL DAILY
Qty: 90 TABLET | Refills: 0 | Status: SHIPPED | OUTPATIENT
Start: 2019-10-11 | End: 2019-11-10

## 2019-10-11 ASSESSMENT — ENCOUNTER SYMPTOMS
SHORTNESS OF BREATH: 0
BACK PAIN: 0
ABDOMINAL PAIN: 0
COUGH: 0

## 2019-10-25 ENCOUNTER — OFFICE VISIT (OUTPATIENT)
Dept: ORTHOPEDIC SURGERY | Age: 54
End: 2019-10-25
Payer: COMMERCIAL

## 2019-10-25 VITALS — BODY MASS INDEX: 36.4 KG/M2 | HEIGHT: 71 IN | WEIGHT: 260 LBS

## 2019-10-25 DIAGNOSIS — G56.03 BILATERAL CARPAL TUNNEL SYNDROME: Primary | ICD-10-CM

## 2019-10-25 PROCEDURE — 99213 OFFICE O/P EST LOW 20 MIN: CPT | Performed by: PHYSICIAN ASSISTANT

## 2019-10-25 ASSESSMENT — ENCOUNTER SYMPTOMS
EYE PAIN: 0
COLOR CHANGE: 0
SORE THROAT: 0
CHEST TIGHTNESS: 0
EYE REDNESS: 0
NAUSEA: 0
RHINORRHEA: 0
SHORTNESS OF BREATH: 0
VOMITING: 0

## 2019-11-14 ENCOUNTER — TELEPHONE (OUTPATIENT)
Dept: ORTHOPEDIC SURGERY | Age: 54
End: 2019-11-14

## 2019-11-18 ENCOUNTER — OFFICE VISIT (OUTPATIENT)
Dept: PRIMARY CARE CLINIC | Age: 54
End: 2019-11-18
Payer: COMMERCIAL

## 2019-11-18 VITALS
RESPIRATION RATE: 16 BRPM | WEIGHT: 272.8 LBS | OXYGEN SATURATION: 96 % | HEIGHT: 70 IN | HEART RATE: 66 BPM | DIASTOLIC BLOOD PRESSURE: 82 MMHG | BODY MASS INDEX: 39.05 KG/M2 | SYSTOLIC BLOOD PRESSURE: 124 MMHG

## 2019-11-18 DIAGNOSIS — J06.9 UPPER RESPIRATORY TRACT INFECTION, UNSPECIFIED TYPE: Primary | ICD-10-CM

## 2019-11-18 DIAGNOSIS — R05.9 COUGH: ICD-10-CM

## 2019-11-18 PROCEDURE — 99214 OFFICE O/P EST MOD 30 MIN: CPT | Performed by: NURSE PRACTITIONER

## 2019-11-18 RX ORDER — PREDNISONE 20 MG/1
TABLET ORAL
Qty: 18 TABLET | Refills: 0 | Status: SHIPPED | OUTPATIENT
Start: 2019-11-18 | End: 2020-03-30 | Stop reason: SDUPTHER

## 2019-11-18 RX ORDER — ALBUTEROL SULFATE 90 UG/1
2 AEROSOL, METERED RESPIRATORY (INHALATION) EVERY 6 HOURS PRN
Qty: 1 INHALER | Refills: 3 | Status: SHIPPED | OUTPATIENT
Start: 2019-11-18 | End: 2021-12-23 | Stop reason: SDUPTHER

## 2019-11-18 RX ORDER — AZITHROMYCIN 250 MG/1
TABLET, FILM COATED ORAL
Qty: 6 TABLET | Refills: 0 | Status: SHIPPED | OUTPATIENT
Start: 2019-11-18 | End: 2020-03-30 | Stop reason: SDUPTHER

## 2019-11-18 ASSESSMENT — ENCOUNTER SYMPTOMS
SINUS PAIN: 1
SORE THROAT: 1
COUGH: 1
SHORTNESS OF BREATH: 1
SINUS PRESSURE: 1
ABDOMINAL PAIN: 0
BACK PAIN: 0
WHEEZING: 1

## 2019-11-18 ASSESSMENT — PATIENT HEALTH QUESTIONNAIRE - PHQ9
SUM OF ALL RESPONSES TO PHQ QUESTIONS 1-9: 0
1. LITTLE INTEREST OR PLEASURE IN DOING THINGS: 0
SUM OF ALL RESPONSES TO PHQ QUESTIONS 1-9: 0
2. FEELING DOWN, DEPRESSED OR HOPELESS: 0
SUM OF ALL RESPONSES TO PHQ9 QUESTIONS 1 & 2: 0

## 2019-12-03 ENCOUNTER — HOSPITAL ENCOUNTER (OUTPATIENT)
Dept: PREADMISSION TESTING | Age: 54
Discharge: HOME | End: 2019-12-07
Payer: COMMERCIAL

## 2019-12-03 VITALS
TEMPERATURE: 98.2 F | OXYGEN SATURATION: 100 % | BODY MASS INDEX: 37.1 KG/M2 | WEIGHT: 265 LBS | SYSTOLIC BLOOD PRESSURE: 138 MMHG | RESPIRATION RATE: 18 BRPM | DIASTOLIC BLOOD PRESSURE: 81 MMHG | HEART RATE: 66 BPM | HEIGHT: 71 IN

## 2019-12-03 LAB
ABSOLUTE EOS #: 0.3 K/UL (ref 0–0.4)
ABSOLUTE IMMATURE GRANULOCYTE: ABNORMAL K/UL (ref 0–0.3)
ABSOLUTE LYMPH #: 3 K/UL (ref 1–4.8)
ABSOLUTE MONO #: 0.9 K/UL (ref 0.1–1.3)
ANION GAP SERPL CALCULATED.3IONS-SCNC: 12 MMOL/L (ref 9–17)
BASOPHILS # BLD: 0 % (ref 0–2)
BASOPHILS ABSOLUTE: 0 K/UL (ref 0–0.2)
BUN BLDV-MCNC: 19 MG/DL (ref 6–20)
BUN/CREAT BLD: ABNORMAL (ref 9–20)
CALCIUM SERPL-MCNC: 9.9 MG/DL (ref 8.6–10.4)
CHLORIDE BLD-SCNC: 96 MMOL/L (ref 98–107)
CO2: 26 MMOL/L (ref 20–31)
CREAT SERPL-MCNC: 0.89 MG/DL (ref 0.7–1.2)
DIFFERENTIAL TYPE: ABNORMAL
EOSINOPHILS RELATIVE PERCENT: 2 % (ref 0–4)
GFR AFRICAN AMERICAN: >60 ML/MIN
GFR NON-AFRICAN AMERICAN: >60 ML/MIN
GFR SERPL CREATININE-BSD FRML MDRD: ABNORMAL ML/MIN/{1.73_M2}
GFR SERPL CREATININE-BSD FRML MDRD: ABNORMAL ML/MIN/{1.73_M2}
GLUCOSE BLD-MCNC: 136 MG/DL (ref 70–99)
HCT VFR BLD CALC: 48.5 % (ref 41–53)
HEMOGLOBIN: 16.4 G/DL (ref 13.5–17.5)
IMMATURE GRANULOCYTES: ABNORMAL %
LYMPHOCYTES # BLD: 20 % (ref 24–44)
MCH RBC QN AUTO: 30.4 PG (ref 26–34)
MCHC RBC AUTO-ENTMCNC: 33.9 G/DL (ref 31–37)
MCV RBC AUTO: 89.7 FL (ref 80–100)
MONOCYTES # BLD: 6 % (ref 1–7)
NRBC AUTOMATED: ABNORMAL PER 100 WBC
PDW BLD-RTO: 13.6 % (ref 11.5–14.9)
PLATELET # BLD: 293 K/UL (ref 150–450)
PLATELET ESTIMATE: ABNORMAL
PMV BLD AUTO: 7.2 FL (ref 6–12)
POTASSIUM SERPL-SCNC: 5.9 MMOL/L (ref 3.7–5.3)
RBC # BLD: 5.41 M/UL (ref 4.5–5.9)
RBC # BLD: ABNORMAL 10*6/UL
SEG NEUTROPHILS: 72 % (ref 36–66)
SEGMENTED NEUTROPHILS ABSOLUTE COUNT: 10.8 K/UL (ref 1.3–9.1)
SODIUM BLD-SCNC: 134 MMOL/L (ref 135–144)
WBC # BLD: 15.1 K/UL (ref 3.5–11)
WBC # BLD: ABNORMAL 10*3/UL

## 2019-12-03 PROCEDURE — 93005 ELECTROCARDIOGRAM TRACING: CPT | Performed by: ANESTHESIOLOGY

## 2019-12-03 PROCEDURE — 80048 BASIC METABOLIC PNL TOTAL CA: CPT

## 2019-12-03 PROCEDURE — 85025 COMPLETE CBC W/AUTO DIFF WBC: CPT

## 2019-12-03 PROCEDURE — 36415 COLL VENOUS BLD VENIPUNCTURE: CPT

## 2019-12-04 ENCOUNTER — ANESTHESIA EVENT (OUTPATIENT)
Dept: OPERATING ROOM | Age: 54
End: 2019-12-04
Payer: COMMERCIAL

## 2019-12-04 LAB
EKG ATRIAL RATE: 65 BPM
EKG P AXIS: 42 DEGREES
EKG P-R INTERVAL: 136 MS
EKG Q-T INTERVAL: 386 MS
EKG QRS DURATION: 80 MS
EKG QTC CALCULATION (BAZETT): 401 MS
EKG R AXIS: 51 DEGREES
EKG T AXIS: 55 DEGREES
EKG VENTRICULAR RATE: 65 BPM

## 2019-12-04 PROCEDURE — 93010 ELECTROCARDIOGRAM REPORT: CPT | Performed by: INTERNAL MEDICINE

## 2019-12-04 RX ORDER — LIDOCAINE HYDROCHLORIDE 10 MG/ML
1 INJECTION, SOLUTION EPIDURAL; INFILTRATION; INTRACAUDAL; PERINEURAL
Status: CANCELLED | OUTPATIENT
Start: 2019-12-04 | End: 2019-12-04

## 2019-12-04 RX ORDER — SODIUM CHLORIDE 0.9 % (FLUSH) 0.9 %
10 SYRINGE (ML) INJECTION PRN
Status: CANCELLED | OUTPATIENT
Start: 2019-12-04

## 2019-12-04 RX ORDER — SODIUM CHLORIDE 0.9 % (FLUSH) 0.9 %
10 SYRINGE (ML) INJECTION EVERY 12 HOURS SCHEDULED
Status: CANCELLED | OUTPATIENT
Start: 2019-12-04

## 2019-12-04 RX ORDER — SODIUM CHLORIDE, SODIUM LACTATE, POTASSIUM CHLORIDE, CALCIUM CHLORIDE 600; 310; 30; 20 MG/100ML; MG/100ML; MG/100ML; MG/100ML
INJECTION, SOLUTION INTRAVENOUS CONTINUOUS
Status: CANCELLED | OUTPATIENT
Start: 2019-12-04

## 2019-12-11 RX ORDER — HYDROCHLOROTHIAZIDE 25 MG/1
TABLET ORAL
Qty: 90 TABLET | Refills: 2 | Status: SHIPPED | OUTPATIENT
Start: 2019-12-11 | End: 2020-05-22 | Stop reason: ALTCHOICE

## 2019-12-17 ENCOUNTER — ANESTHESIA (OUTPATIENT)
Dept: OPERATING ROOM | Age: 54
End: 2019-12-17
Payer: COMMERCIAL

## 2019-12-17 ENCOUNTER — HOSPITAL ENCOUNTER (OUTPATIENT)
Age: 54
Setting detail: OUTPATIENT SURGERY
Discharge: HOME | End: 2019-12-17
Attending: ORTHOPAEDIC SURGERY | Admitting: ORTHOPAEDIC SURGERY
Payer: COMMERCIAL

## 2019-12-17 VITALS
WEIGHT: 265 LBS | HEART RATE: 68 BPM | OXYGEN SATURATION: 100 % | TEMPERATURE: 97.1 F | SYSTOLIC BLOOD PRESSURE: 122 MMHG | BODY MASS INDEX: 37.1 KG/M2 | RESPIRATION RATE: 16 BRPM | HEIGHT: 71 IN | DIASTOLIC BLOOD PRESSURE: 79 MMHG

## 2019-12-17 VITALS — DIASTOLIC BLOOD PRESSURE: 50 MMHG | SYSTOLIC BLOOD PRESSURE: 86 MMHG | OXYGEN SATURATION: 99 % | TEMPERATURE: 97.2 F

## 2019-12-17 DIAGNOSIS — G56.02 CARPAL TUNNEL SYNDROME, LEFT: Primary | ICD-10-CM

## 2019-12-17 LAB
-: NORMAL
POTASSIUM SERPL-SCNC: 5.1 MMOL/L (ref 3.7–5.3)
REASON FOR REJECTION: NORMAL
ZZ NTE CLEAN UP: ORDERED TEST: NORMAL
ZZ NTE WITH NAME CLEAN UP: SPECIMEN SOURCE: NORMAL

## 2019-12-17 PROCEDURE — 3700000001 HC ADD 15 MINUTES (ANESTHESIA): Performed by: ORTHOPAEDIC SURGERY

## 2019-12-17 PROCEDURE — 6360000002 HC RX W HCPCS: Performed by: ORTHOPAEDIC SURGERY

## 2019-12-17 PROCEDURE — 2580000003 HC RX 258: Performed by: ANESTHESIOLOGY

## 2019-12-17 PROCEDURE — 3600000002 HC SURGERY LEVEL 2 BASE: Performed by: ORTHOPAEDIC SURGERY

## 2019-12-17 PROCEDURE — 2500000003 HC RX 250 WO HCPCS: Performed by: ANESTHESIOLOGY

## 2019-12-17 PROCEDURE — 7100000000 HC PACU RECOVERY - FIRST 15 MIN: Performed by: ORTHOPAEDIC SURGERY

## 2019-12-17 PROCEDURE — 2709999900 HC NON-CHARGEABLE SUPPLY: Performed by: ORTHOPAEDIC SURGERY

## 2019-12-17 PROCEDURE — 64721 CARPAL TUNNEL SURGERY: CPT | Performed by: ORTHOPAEDIC SURGERY

## 2019-12-17 PROCEDURE — 84132 ASSAY OF SERUM POTASSIUM: CPT

## 2019-12-17 PROCEDURE — 7100000031 HC ASPR PHASE II RECOVERY - ADDTL 15 MIN: Performed by: ORTHOPAEDIC SURGERY

## 2019-12-17 PROCEDURE — 36415 COLL VENOUS BLD VENIPUNCTURE: CPT

## 2019-12-17 PROCEDURE — 7100000001 HC PACU RECOVERY - ADDTL 15 MIN: Performed by: ORTHOPAEDIC SURGERY

## 2019-12-17 PROCEDURE — 7100000030 HC ASPR PHASE II RECOVERY - FIRST 15 MIN: Performed by: ORTHOPAEDIC SURGERY

## 2019-12-17 PROCEDURE — 3600000012 HC SURGERY LEVEL 2 ADDTL 15MIN: Performed by: ORTHOPAEDIC SURGERY

## 2019-12-17 PROCEDURE — 3700000000 HC ANESTHESIA ATTENDED CARE: Performed by: ORTHOPAEDIC SURGERY

## 2019-12-17 PROCEDURE — 6360000002 HC RX W HCPCS: Performed by: ANESTHESIOLOGY

## 2019-12-17 RX ORDER — MEPERIDINE HYDROCHLORIDE 25 MG/ML
12.5 INJECTION INTRAMUSCULAR; INTRAVENOUS; SUBCUTANEOUS EVERY 5 MIN PRN
Status: DISCONTINUED | OUTPATIENT
Start: 2019-12-17 | End: 2019-12-17 | Stop reason: HOSPADM

## 2019-12-17 RX ORDER — SODIUM CHLORIDE 0.9 % (FLUSH) 0.9 %
10 SYRINGE (ML) INJECTION PRN
Status: DISCONTINUED | OUTPATIENT
Start: 2019-12-17 | End: 2019-12-17 | Stop reason: HOSPADM

## 2019-12-17 RX ORDER — GLYCOPYRROLATE 1 MG/5 ML
SYRINGE (ML) INTRAVENOUS PRN
Status: DISCONTINUED | OUTPATIENT
Start: 2019-12-17 | End: 2019-12-17 | Stop reason: SDUPTHER

## 2019-12-17 RX ORDER — METOCLOPRAMIDE HYDROCHLORIDE 5 MG/ML
10 INJECTION INTRAMUSCULAR; INTRAVENOUS
Status: DISCONTINUED | OUTPATIENT
Start: 2019-12-17 | End: 2019-12-17 | Stop reason: HOSPADM

## 2019-12-17 RX ORDER — SODIUM CHLORIDE, SODIUM LACTATE, POTASSIUM CHLORIDE, CALCIUM CHLORIDE 600; 310; 30; 20 MG/100ML; MG/100ML; MG/100ML; MG/100ML
INJECTION, SOLUTION INTRAVENOUS CONTINUOUS PRN
Status: DISCONTINUED | OUTPATIENT
Start: 2019-12-17 | End: 2019-12-17 | Stop reason: SDUPTHER

## 2019-12-17 RX ORDER — HYDROCODONE BITARTRATE AND ACETAMINOPHEN 5; 325 MG/1; MG/1
1 TABLET ORAL EVERY 6 HOURS PRN
Qty: 20 TABLET | Refills: 0 | Status: SHIPPED | OUTPATIENT
Start: 2019-12-17 | End: 2019-12-24

## 2019-12-17 RX ORDER — DIPHENHYDRAMINE HYDROCHLORIDE 50 MG/ML
12.5 INJECTION INTRAMUSCULAR; INTRAVENOUS
Status: DISCONTINUED | OUTPATIENT
Start: 2019-12-17 | End: 2019-12-17 | Stop reason: HOSPADM

## 2019-12-17 RX ORDER — FENTANYL CITRATE 50 UG/ML
INJECTION, SOLUTION INTRAMUSCULAR; INTRAVENOUS PRN
Status: DISCONTINUED | OUTPATIENT
Start: 2019-12-17 | End: 2019-12-17 | Stop reason: SDUPTHER

## 2019-12-17 RX ORDER — HYDROCODONE BITARTRATE AND ACETAMINOPHEN 5; 325 MG/1; MG/1
1 TABLET ORAL PRN
Status: DISCONTINUED | OUTPATIENT
Start: 2019-12-17 | End: 2019-12-17 | Stop reason: HOSPADM

## 2019-12-17 RX ORDER — SODIUM CHLORIDE 0.9 % (FLUSH) 0.9 %
10 SYRINGE (ML) INJECTION EVERY 12 HOURS SCHEDULED
Status: DISCONTINUED | OUTPATIENT
Start: 2019-12-17 | End: 2019-12-17 | Stop reason: HOSPADM

## 2019-12-17 RX ORDER — LABETALOL 20 MG/4 ML (5 MG/ML) INTRAVENOUS SYRINGE
5 EVERY 10 MIN PRN
Status: DISCONTINUED | OUTPATIENT
Start: 2019-12-17 | End: 2019-12-17 | Stop reason: HOSPADM

## 2019-12-17 RX ORDER — PROPOFOL 10 MG/ML
INJECTION, EMULSION INTRAVENOUS PRN
Status: DISCONTINUED | OUTPATIENT
Start: 2019-12-17 | End: 2019-12-17 | Stop reason: SDUPTHER

## 2019-12-17 RX ORDER — HYDRALAZINE HYDROCHLORIDE 20 MG/ML
5 INJECTION INTRAMUSCULAR; INTRAVENOUS EVERY 10 MIN PRN
Status: DISCONTINUED | OUTPATIENT
Start: 2019-12-17 | End: 2019-12-17 | Stop reason: HOSPADM

## 2019-12-17 RX ORDER — FENTANYL CITRATE 50 UG/ML
25 INJECTION, SOLUTION INTRAMUSCULAR; INTRAVENOUS EVERY 5 MIN PRN
Status: DISCONTINUED | OUTPATIENT
Start: 2019-12-17 | End: 2019-12-17 | Stop reason: HOSPADM

## 2019-12-17 RX ORDER — LIDOCAINE HYDROCHLORIDE 10 MG/ML
INJECTION, SOLUTION EPIDURAL; INFILTRATION; INTRACAUDAL; PERINEURAL PRN
Status: DISCONTINUED | OUTPATIENT
Start: 2019-12-17 | End: 2019-12-17 | Stop reason: SDUPTHER

## 2019-12-17 RX ORDER — ONDANSETRON 2 MG/ML
4 INJECTION INTRAMUSCULAR; INTRAVENOUS
Status: DISCONTINUED | OUTPATIENT
Start: 2019-12-17 | End: 2019-12-17 | Stop reason: HOSPADM

## 2019-12-17 RX ORDER — HYDROCODONE BITARTRATE AND ACETAMINOPHEN 5; 325 MG/1; MG/1
2 TABLET ORAL PRN
Status: DISCONTINUED | OUTPATIENT
Start: 2019-12-17 | End: 2019-12-17 | Stop reason: HOSPADM

## 2019-12-17 RX ORDER — ROPIVACAINE HYDROCHLORIDE 5 MG/ML
INJECTION, SOLUTION EPIDURAL; INFILTRATION; PERINEURAL PRN
Status: DISCONTINUED | OUTPATIENT
Start: 2019-12-17 | End: 2019-12-17 | Stop reason: ALTCHOICE

## 2019-12-17 RX ORDER — LIDOCAINE HYDROCHLORIDE 10 MG/ML
1 INJECTION, SOLUTION EPIDURAL; INFILTRATION; INTRACAUDAL; PERINEURAL
Status: DISCONTINUED | OUTPATIENT
Start: 2019-12-17 | End: 2019-12-17 | Stop reason: HOSPADM

## 2019-12-17 RX ORDER — MORPHINE SULFATE 2 MG/ML
2 INJECTION, SOLUTION INTRAMUSCULAR; INTRAVENOUS EVERY 5 MIN PRN
Status: DISCONTINUED | OUTPATIENT
Start: 2019-12-17 | End: 2019-12-17 | Stop reason: HOSPADM

## 2019-12-17 RX ORDER — FENTANYL CITRATE 50 UG/ML
50 INJECTION, SOLUTION INTRAMUSCULAR; INTRAVENOUS EVERY 5 MIN PRN
Status: DISCONTINUED | OUTPATIENT
Start: 2019-12-17 | End: 2019-12-17 | Stop reason: HOSPADM

## 2019-12-17 RX ORDER — ALPRAZOLAM 0.5 MG/1
0.5 TABLET ORAL DAILY
COMMUNITY
End: 2020-08-26 | Stop reason: SDUPTHER

## 2019-12-17 RX ORDER — SODIUM CHLORIDE, SODIUM LACTATE, POTASSIUM CHLORIDE, CALCIUM CHLORIDE 600; 310; 30; 20 MG/100ML; MG/100ML; MG/100ML; MG/100ML
INJECTION, SOLUTION INTRAVENOUS CONTINUOUS
Status: DISCONTINUED | OUTPATIENT
Start: 2019-12-17 | End: 2019-12-17 | Stop reason: HOSPADM

## 2019-12-17 RX ADMIN — PROPOFOL 200 MG: 10 INJECTION, EMULSION INTRAVENOUS at 13:34

## 2019-12-17 RX ADMIN — SODIUM CHLORIDE, POTASSIUM CHLORIDE, SODIUM LACTATE AND CALCIUM CHLORIDE: 600; 310; 30; 20 INJECTION, SOLUTION INTRAVENOUS at 12:25

## 2019-12-17 RX ADMIN — LIDOCAINE HYDROCHLORIDE 5 ML: 10 INJECTION, SOLUTION EPIDURAL; INFILTRATION; INTRACAUDAL at 13:34

## 2019-12-17 RX ADMIN — FENTANYL CITRATE 50 MCG: 50 INJECTION, SOLUTION INTRAMUSCULAR; INTRAVENOUS at 13:35

## 2019-12-17 RX ADMIN — FENTANYL CITRATE 50 MCG: 50 INJECTION, SOLUTION INTRAMUSCULAR; INTRAVENOUS at 13:49

## 2019-12-17 RX ADMIN — Medication 0.4 MG: at 13:58

## 2019-12-17 RX ADMIN — SODIUM CHLORIDE, POTASSIUM CHLORIDE, SODIUM LACTATE AND CALCIUM CHLORIDE: 600; 310; 30; 20 INJECTION, SOLUTION INTRAVENOUS at 13:31

## 2019-12-17 ASSESSMENT — PULMONARY FUNCTION TESTS
PIF_VALUE: 3
PIF_VALUE: 4
PIF_VALUE: 4
PIF_VALUE: 3
PIF_VALUE: 5
PIF_VALUE: 2
PIF_VALUE: 3
PIF_VALUE: 3
PIF_VALUE: 5
PIF_VALUE: 3
PIF_VALUE: 4
PIF_VALUE: 2
PIF_VALUE: 3
PIF_VALUE: 3
PIF_VALUE: 2
PIF_VALUE: 0
PIF_VALUE: 5
PIF_VALUE: 3
PIF_VALUE: 0
PIF_VALUE: 4
PIF_VALUE: 2
PIF_VALUE: 13
PIF_VALUE: 3
PIF_VALUE: 5
PIF_VALUE: 6
PIF_VALUE: 3
PIF_VALUE: 2

## 2019-12-17 ASSESSMENT — PAIN SCALES - WONG BAKER: WONGBAKER_NUMERICALRESPONSE: 0

## 2019-12-17 ASSESSMENT — PAIN - FUNCTIONAL ASSESSMENT: PAIN_FUNCTIONAL_ASSESSMENT: 0-10

## 2019-12-17 ASSESSMENT — COPD QUESTIONNAIRES: CAT_SEVERITY: NO INTERVAL CHANGE

## 2019-12-17 ASSESSMENT — ENCOUNTER SYMPTOMS
STRIDOR: 0
SHORTNESS OF BREATH: 0

## 2019-12-17 ASSESSMENT — LIFESTYLE VARIABLES: SMOKING_STATUS: 0

## 2019-12-17 ASSESSMENT — PAIN SCALES - GENERAL: PAINLEVEL_OUTOF10: 0

## 2019-12-27 ENCOUNTER — OFFICE VISIT (OUTPATIENT)
Dept: ORTHOPEDIC SURGERY | Age: 54
End: 2019-12-27

## 2019-12-27 DIAGNOSIS — G56.02 CARPAL TUNNEL SYNDROME, LEFT: Primary | ICD-10-CM

## 2019-12-27 PROCEDURE — 99024 POSTOP FOLLOW-UP VISIT: CPT | Performed by: PHYSICIAN ASSISTANT

## 2020-01-27 ENCOUNTER — ANESTHESIA EVENT (OUTPATIENT)
Dept: OPERATING ROOM | Age: 55
End: 2020-01-27
Payer: COMMERCIAL

## 2020-01-27 ENCOUNTER — ANESTHESIA (OUTPATIENT)
Dept: OPERATING ROOM | Age: 55
End: 2020-01-27
Payer: COMMERCIAL

## 2020-01-27 ENCOUNTER — HOSPITAL ENCOUNTER (OUTPATIENT)
Age: 55
Setting detail: OUTPATIENT SURGERY
Discharge: HOME | End: 2020-01-27
Attending: ORTHOPAEDIC SURGERY | Admitting: ORTHOPAEDIC SURGERY
Payer: COMMERCIAL

## 2020-01-27 VITALS — SYSTOLIC BLOOD PRESSURE: 89 MMHG | TEMPERATURE: 97.3 F | OXYGEN SATURATION: 100 % | DIASTOLIC BLOOD PRESSURE: 51 MMHG

## 2020-01-27 VITALS
OXYGEN SATURATION: 97 % | DIASTOLIC BLOOD PRESSURE: 92 MMHG | BODY MASS INDEX: 35.21 KG/M2 | HEART RATE: 68 BPM | SYSTOLIC BLOOD PRESSURE: 135 MMHG | HEIGHT: 72 IN | RESPIRATION RATE: 16 BRPM | WEIGHT: 260 LBS | TEMPERATURE: 96.5 F

## 2020-01-27 LAB — POTASSIUM SERPL-SCNC: 4.8 MMOL/L (ref 3.7–5.3)

## 2020-01-27 PROCEDURE — 7100000031 HC ASPR PHASE II RECOVERY - ADDTL 15 MIN: Performed by: ORTHOPAEDIC SURGERY

## 2020-01-27 PROCEDURE — 7100000010 HC PHASE II RECOVERY - FIRST 15 MIN: Performed by: ORTHOPAEDIC SURGERY

## 2020-01-27 PROCEDURE — 2580000003 HC RX 258: Performed by: ANESTHESIOLOGY

## 2020-01-27 PROCEDURE — 2709999900 HC NON-CHARGEABLE SUPPLY: Performed by: ORTHOPAEDIC SURGERY

## 2020-01-27 PROCEDURE — 2500000003 HC RX 250 WO HCPCS: Performed by: NURSE ANESTHETIST, CERTIFIED REGISTERED

## 2020-01-27 PROCEDURE — 7100000000 HC PACU RECOVERY - FIRST 15 MIN: Performed by: ORTHOPAEDIC SURGERY

## 2020-01-27 PROCEDURE — 6360000002 HC RX W HCPCS: Performed by: ORTHOPAEDIC SURGERY

## 2020-01-27 PROCEDURE — 3600000012 HC SURGERY LEVEL 2 ADDTL 15MIN: Performed by: ORTHOPAEDIC SURGERY

## 2020-01-27 PROCEDURE — 3700000001 HC ADD 15 MINUTES (ANESTHESIA): Performed by: ORTHOPAEDIC SURGERY

## 2020-01-27 PROCEDURE — 3700000000 HC ANESTHESIA ATTENDED CARE: Performed by: ORTHOPAEDIC SURGERY

## 2020-01-27 PROCEDURE — 3600000002 HC SURGERY LEVEL 2 BASE: Performed by: ORTHOPAEDIC SURGERY

## 2020-01-27 PROCEDURE — 64721 CARPAL TUNNEL SURGERY: CPT | Performed by: ORTHOPAEDIC SURGERY

## 2020-01-27 PROCEDURE — 6360000002 HC RX W HCPCS: Performed by: NURSE ANESTHETIST, CERTIFIED REGISTERED

## 2020-01-27 PROCEDURE — 7100000011 HC PHASE II RECOVERY - ADDTL 15 MIN: Performed by: ORTHOPAEDIC SURGERY

## 2020-01-27 PROCEDURE — 7100000001 HC PACU RECOVERY - ADDTL 15 MIN: Performed by: ORTHOPAEDIC SURGERY

## 2020-01-27 PROCEDURE — 36415 COLL VENOUS BLD VENIPUNCTURE: CPT

## 2020-01-27 PROCEDURE — 7100000030 HC ASPR PHASE II RECOVERY - FIRST 15 MIN: Performed by: ORTHOPAEDIC SURGERY

## 2020-01-27 PROCEDURE — 84132 ASSAY OF SERUM POTASSIUM: CPT

## 2020-01-27 RX ORDER — ONDANSETRON 2 MG/ML
4 INJECTION INTRAMUSCULAR; INTRAVENOUS
Status: DISCONTINUED | OUTPATIENT
Start: 2020-01-27 | End: 2020-01-27 | Stop reason: HOSPADM

## 2020-01-27 RX ORDER — METOCLOPRAMIDE HYDROCHLORIDE 5 MG/ML
10 INJECTION INTRAMUSCULAR; INTRAVENOUS
Status: DISCONTINUED | OUTPATIENT
Start: 2020-01-27 | End: 2020-01-27 | Stop reason: HOSPADM

## 2020-01-27 RX ORDER — MEPERIDINE HYDROCHLORIDE 25 MG/ML
12.5 INJECTION INTRAMUSCULAR; INTRAVENOUS; SUBCUTANEOUS EVERY 5 MIN PRN
Status: DISCONTINUED | OUTPATIENT
Start: 2020-01-27 | End: 2020-01-27 | Stop reason: HOSPADM

## 2020-01-27 RX ORDER — FENTANYL CITRATE 50 UG/ML
50 INJECTION, SOLUTION INTRAMUSCULAR; INTRAVENOUS EVERY 5 MIN PRN
Status: DISCONTINUED | OUTPATIENT
Start: 2020-01-27 | End: 2020-01-27 | Stop reason: HOSPADM

## 2020-01-27 RX ORDER — HYDRALAZINE HYDROCHLORIDE 20 MG/ML
5 INJECTION INTRAMUSCULAR; INTRAVENOUS EVERY 10 MIN PRN
Status: DISCONTINUED | OUTPATIENT
Start: 2020-01-27 | End: 2020-01-27 | Stop reason: HOSPADM

## 2020-01-27 RX ORDER — HYDROCODONE BITARTRATE AND ACETAMINOPHEN 5; 325 MG/1; MG/1
2 TABLET ORAL PRN
Status: DISCONTINUED | OUTPATIENT
Start: 2020-01-27 | End: 2020-01-27 | Stop reason: HOSPADM

## 2020-01-27 RX ORDER — HYDROCODONE BITARTRATE AND ACETAMINOPHEN 5; 325 MG/1; MG/1
1 TABLET ORAL PRN
Status: DISCONTINUED | OUTPATIENT
Start: 2020-01-27 | End: 2020-01-27 | Stop reason: HOSPADM

## 2020-01-27 RX ORDER — FENTANYL CITRATE 50 UG/ML
25 INJECTION, SOLUTION INTRAMUSCULAR; INTRAVENOUS EVERY 5 MIN PRN
Status: DISCONTINUED | OUTPATIENT
Start: 2020-01-27 | End: 2020-01-27 | Stop reason: HOSPADM

## 2020-01-27 RX ORDER — FENTANYL CITRATE 50 UG/ML
INJECTION, SOLUTION INTRAMUSCULAR; INTRAVENOUS PRN
Status: DISCONTINUED | OUTPATIENT
Start: 2020-01-27 | End: 2020-01-27 | Stop reason: SDUPTHER

## 2020-01-27 RX ORDER — SODIUM CHLORIDE, SODIUM LACTATE, POTASSIUM CHLORIDE, CALCIUM CHLORIDE 600; 310; 30; 20 MG/100ML; MG/100ML; MG/100ML; MG/100ML
INJECTION, SOLUTION INTRAVENOUS CONTINUOUS
Status: DISCONTINUED | OUTPATIENT
Start: 2020-01-27 | End: 2020-01-27 | Stop reason: HOSPADM

## 2020-01-27 RX ORDER — HYDROCODONE BITARTRATE AND ACETAMINOPHEN 5; 325 MG/1; MG/1
1 TABLET ORAL EVERY 6 HOURS PRN
Qty: 20 TABLET | Refills: 0 | Status: SHIPPED | OUTPATIENT
Start: 2020-01-27 | End: 2020-02-03

## 2020-01-27 RX ORDER — LIDOCAINE HYDROCHLORIDE 10 MG/ML
INJECTION, SOLUTION EPIDURAL; INFILTRATION; INTRACAUDAL; PERINEURAL PRN
Status: DISCONTINUED | OUTPATIENT
Start: 2020-01-27 | End: 2020-01-27 | Stop reason: SDUPTHER

## 2020-01-27 RX ORDER — ROPIVACAINE HYDROCHLORIDE 5 MG/ML
INJECTION, SOLUTION EPIDURAL; INFILTRATION; PERINEURAL PRN
Status: DISCONTINUED | OUTPATIENT
Start: 2020-01-27 | End: 2020-01-27 | Stop reason: ALTCHOICE

## 2020-01-27 RX ORDER — DEXAMETHASONE SODIUM PHOSPHATE 4 MG/ML
INJECTION, SOLUTION INTRA-ARTICULAR; INTRALESIONAL; INTRAMUSCULAR; INTRAVENOUS; SOFT TISSUE PRN
Status: DISCONTINUED | OUTPATIENT
Start: 2020-01-27 | End: 2020-01-27 | Stop reason: SDUPTHER

## 2020-01-27 RX ORDER — PROPOFOL 10 MG/ML
INJECTION, EMULSION INTRAVENOUS PRN
Status: DISCONTINUED | OUTPATIENT
Start: 2020-01-27 | End: 2020-01-27 | Stop reason: SDUPTHER

## 2020-01-27 RX ORDER — MORPHINE SULFATE 2 MG/ML
2 INJECTION, SOLUTION INTRAMUSCULAR; INTRAVENOUS EVERY 5 MIN PRN
Status: DISCONTINUED | OUTPATIENT
Start: 2020-01-27 | End: 2020-01-27 | Stop reason: HOSPADM

## 2020-01-27 RX ORDER — MIDAZOLAM HYDROCHLORIDE 1 MG/ML
INJECTION INTRAMUSCULAR; INTRAVENOUS PRN
Status: DISCONTINUED | OUTPATIENT
Start: 2020-01-27 | End: 2020-01-27 | Stop reason: SDUPTHER

## 2020-01-27 RX ORDER — DIPHENHYDRAMINE HYDROCHLORIDE 50 MG/ML
12.5 INJECTION INTRAMUSCULAR; INTRAVENOUS
Status: DISCONTINUED | OUTPATIENT
Start: 2020-01-27 | End: 2020-01-27 | Stop reason: HOSPADM

## 2020-01-27 RX ORDER — ONDANSETRON 2 MG/ML
INJECTION INTRAMUSCULAR; INTRAVENOUS PRN
Status: DISCONTINUED | OUTPATIENT
Start: 2020-01-27 | End: 2020-01-27 | Stop reason: SDUPTHER

## 2020-01-27 RX ORDER — LABETALOL 20 MG/4 ML (5 MG/ML) INTRAVENOUS SYRINGE
5 EVERY 10 MIN PRN
Status: DISCONTINUED | OUTPATIENT
Start: 2020-01-27 | End: 2020-01-27 | Stop reason: HOSPADM

## 2020-01-27 RX ADMIN — FENTANYL CITRATE 50 MCG: 50 INJECTION, SOLUTION INTRAMUSCULAR; INTRAVENOUS at 13:18

## 2020-01-27 RX ADMIN — FENTANYL CITRATE 50 MCG: 50 INJECTION, SOLUTION INTRAMUSCULAR; INTRAVENOUS at 13:09

## 2020-01-27 RX ADMIN — DEXAMETHASONE SODIUM PHOSPHATE 4 MG: 4 INJECTION, SOLUTION INTRA-ARTICULAR; INTRALESIONAL; INTRAMUSCULAR; INTRAVENOUS; SOFT TISSUE at 13:09

## 2020-01-27 RX ADMIN — SODIUM CHLORIDE, POTASSIUM CHLORIDE, SODIUM LACTATE AND CALCIUM CHLORIDE: 600; 310; 30; 20 INJECTION, SOLUTION INTRAVENOUS at 13:00

## 2020-01-27 RX ADMIN — MIDAZOLAM 2 MG: 1 INJECTION INTRAMUSCULAR; INTRAVENOUS at 13:01

## 2020-01-27 RX ADMIN — ONDANSETRON 4 MG: 2 INJECTION INTRAMUSCULAR; INTRAVENOUS at 13:09

## 2020-01-27 RX ADMIN — PROPOFOL 200 MG: 10 INJECTION, EMULSION INTRAVENOUS at 13:04

## 2020-01-27 RX ADMIN — LIDOCAINE HYDROCHLORIDE 50 MG: 10 INJECTION, SOLUTION EPIDURAL; INFILTRATION; INTRACAUDAL; PERINEURAL at 13:04

## 2020-01-27 ASSESSMENT — PULMONARY FUNCTION TESTS
PIF_VALUE: 3
PIF_VALUE: 3
PIF_VALUE: 0
PIF_VALUE: 1
PIF_VALUE: 0
PIF_VALUE: 2
PIF_VALUE: 5
PIF_VALUE: 4
PIF_VALUE: 4
PIF_VALUE: 5
PIF_VALUE: 3
PIF_VALUE: 4
PIF_VALUE: 2
PIF_VALUE: 3
PIF_VALUE: 3
PIF_VALUE: 0
PIF_VALUE: 3
PIF_VALUE: 1
PIF_VALUE: 5
PIF_VALUE: 1
PIF_VALUE: 3
PIF_VALUE: 4
PIF_VALUE: 3
PIF_VALUE: 2
PIF_VALUE: 4
PIF_VALUE: 1
PIF_VALUE: 3
PIF_VALUE: 0
PIF_VALUE: 1

## 2020-01-27 ASSESSMENT — PAIN - FUNCTIONAL ASSESSMENT: PAIN_FUNCTIONAL_ASSESSMENT: 0-10

## 2020-01-27 ASSESSMENT — PAIN SCALES - GENERAL
PAINLEVEL_OUTOF10: 0

## 2020-01-27 ASSESSMENT — ENCOUNTER SYMPTOMS: STRIDOR: 0

## 2020-01-27 NOTE — H&P
[facility] [address]       NAME:  Alec Dumont  MRN: [id number]   YOB: 1965   Date: 1/27/2020   Age: 62 y.o. Gender: male       COMPLAINT AND PRESENT HISTORY:               Alec Dumont is 62 y.o. , male, here for right carpal tunnel release. He is s/p left carpal tunnel release 12/17/2019. Pt completed pre-admission testing 12/3/2019. Pt states he has healed well from his surgery to the left hand and wrist. Pt reports some mild paraesthesias that are resolving, and his  strength has increased. He is right handed. He continues to have right hand numbness and tingling with decreased  strength. He works as a . Pt reports he has had the bilateral hand and wrist pain for 7 years. He has tried nocturnal splints with minimal reduction of symptoms. He was dropping objects frequently. No other somatic complaints. He has hearing loss, wears hearing aids. Pt is a heavy smoker, 2.5 ppd x 46 years.      PAST MEDICAL HISTORY     Past Medical History:   Diagnosis Date    Arthritis     Wilcox esophagus     Depression     DU (duodenal ulcer)     Emphysema lung ([address]     [place] (hard of hearing)     Hyperlipidemia     Hypersomnia with sleep apnea, unspecified     CPAP nightly    Hypertension     AGUS (obstructive sleep apnea)     wears CPAP    Thoracic outlet syndrome     Rt         SURGICAL HISTORY       Past Surgical History:   Procedure Laterality Date    CARPAL TUNNEL RELEASE Left 12/17/2019    CARPAL TUNNEL RELEASE performed by Amanda Fernando MD at [facility] 1945 Right     COLONOSCOPY  5/27/2016    JOINT REPLACEMENT Bilateral     hips    LYMPH NODE DISSECTION      neck    SOFT TISSUE TUMOR RESECTION      back    UPPER GASTROINTESTINAL ENDOSCOPY N/A 12/11/2018    EGD ESOPHAGOGASTRODUODENOSCOPY [facility]    Marital status:      Spouse name: Not on file    Number of children: Not on file    Years of education: Not on file    Highest education level: Not on file   Occupational History    Not on file   Social Needs    Financial resource strain: Not on file    Food insecurity:     Worry: Not on file     Inability: Not on file    Transportation needs:     Medical: Not on file     Non-medical: Not on file   Tobacco Use    Smoking status: Current Every Day Smoker     Packs/day: 2.50     Years: 45.00     Pack years: 112.50     Types: Cigarettes    Smokeless tobacco: Never Used   Substance and Sexual Activity    Alcohol use: Yes     Comment: daily, 6-8 beer daily    Drug use: No    Sexual activity: Not on file   Lifestyle    Physical activity:     Days per week: Not on file     Minutes per session: Not on file    Stress: Not on file   Relationships    Social connections:     Talks on phone: Not on file     Gets together: Not on file     Attends Denominational service: Not on file     Active member of club or organization: Not on file     Attends meetings of clubs or organizations: Not on file     Relationship status: Not on file    Intimate partner violence:     Fear of current or ex partner: Not on file     Emotionally abused: Not on file     Physically abused: Not on file     Forced sexual activity: Not on file   Other Topics Concern    Not on file   Social History Narrative    Not on file           REVIEW OF SYSTEMS      No Known Allergies    No current facility-administered medications on file prior to encounter. Current Outpatient Medications on File Prior to Encounter   Medication Sig Dispense Refill    ALPRAZolam (XANAX) 0.5 MG tablet Take 0.5 mg by mouth daily.       lisinopril (PRINIVIL;ZESTRIL) 30 MG tablet TAKE 1 TABLET DAILY 90 tablet 3    metoprolol succinate (TOPROL XL) 100 MG extended release tablet TAKE 1 TABLET DAILY 90 tablet 3    loratadine (CLARITIN) 10 MG tablet TAKE lymphadenopathy. LOCOMOTOR, BACK AND SPINE:  No tenderness or deformities. EXTREMITIES:  Right hand  strength diminished as compared to left. Post surgical scar to the left dorsal wrist. +Tinel's right. Symmetrical, 1+ pitting LE edema. No calf tenderness. No discoloration or ulcerations. NEUROLOGIC:  The patient is conscious, alert, oriented, No apparent focal sensory or motor deficits.                       PROVISIONAL DIAGNOSES / SURGERY:      Right Carpal Tunnel Syndrome  Right Carpal Tunnel Release     BULMARO Andersen - CNP on 1/27/2020 at 10:57 AM

## 2020-01-27 NOTE — OP NOTE
Pre-operative diagnosis- Carpal Tunnel Syndrome right  Post-operative diagnosis- Carpal Tunnel Syndrome right  Procedure-right carpal Tunnel Release  Surgeon-Lashawn  Assistant- [facility]  Anesthetist-General  EBL- None      This patient has presented with a history of carpal tunnel symptoms of the right hand including pain, numbness, weakness, and nocturnal paraesthesias. The patient has EMG/NCV findings consistent with carpal tunnel syndrome. The patient has failed a trail of nocturnal splinting and, therefore, it was advised the patient would benefit from carpal tunnel release. Consent was obtained with a  good comprehension of all risks and benefits. The patient was taken to the operative suite and the above anesthesia was administered. A tourniquet was applied to the operative arm which was then prepped and draped in the usual sterile fashion. Time-out was called to verify the appropriate side for surgery. The arm was exsanguinated and the tourniquet inflated to 250mmHg. An incision was made at the junction of Castro's line and the radial side of the ring finger. It was extended proximally for 1.5cm and curved slightly ulnarly. The Sub-Q and the palmar fascia was spread with a mosquito hemostat and retractors positioned. The transverse carpal ligament was identified puncture with the hemostat, which was used to protect the underlying structures of the carpal tunnel while the TCL was divided with a #15 blade proximally and distally. The wrist was hyperextended to facilitate complete release of the TCL into the deep volar forearm fascia with blunt Metzenbaum scissor. Complete distal release was carried out with complete release verified. , the wound was irrigated and then injected with 6cc of 0.5% ropivacaine. The wound was then closed with4-0 nylon sutures in a vertical mattress pattern. A sterile bulky bandage was applied and wrapped with a 2inch ace bandage.  The tourniquet was deflated at less than 20 minutes. The patient was awaken and taken to the PACU on good condition.

## 2020-01-27 NOTE — ANESTHESIA POSTPROCEDURE EVALUATION
Department of Anesthesiology  Postprocedure Note    Patient: Radames Doyle  MRN: [id number]  YOB: 1965  Date of evaluation: 1/27/2020  Time:  2:20 PM     Procedure Summary     Date:  01/27/20 Room / Location:  [address] [place] [address]    Anesthesia Start:  1301 Anesthesia Stop:  7098    Procedure:  CARPAL TUNNEL RELEASE (Right Hand) Diagnosis:  (RIGHT CARPAL TUNNEL SYNDROME   UPDATE PER ANES)    Surgeon:  Reed Willson MD Responsible Provider:  Laurence Prader, MD    Anesthesia Type:  general ASA Status:  2          Anesthesia Type: general    Mita Phase I: Mita Score: 10    Mita Phase II:      Last vitals: Reviewed and per EMR flowsheets.        Anesthesia Post Evaluation    Comments: POST- ANESTHESIA EVALUATION       Pt Name: Radames Doyle  MRN: [id number]  YOB: 1965  Date of evaluation: 1/27/2020  Time:  2:20 PM      /66   Pulse 65   Temp 97.7 °F (36.5 °C)   Resp 19   Ht 6' (1.829 m)   Wt 260 lb (117.9 kg)   SpO2 95%   BMI 35.26 kg/m²      Consciousness Level  Awake  Cardiopulmonary Status  Stable  Pain Adequately Treated YES  Nausea / Vomiting  NO  Adequate Hydration  YES  Anesthesia Related Complications NONE      Electronically signed by Dena Hernandez MD on 1/27/2020 at 2:20 PM

## 2020-01-27 NOTE — ANESTHESIA PRE PROCEDURE
Department of Anesthesiology  Preprocedure Note       Name:  Tessa Phoenix   Age:  62 y.o.  :  1965                                          MRN:  [id number]         Date:  2020      Surgeon: Ramón Rosen):  Nahun Pereira MD    Procedure: CARPAL TUNNEL RELEASE (Right Hand)    Medications prior to admission:   Prior to Admission medications    Medication Sig Start Date End Date Taking? Authorizing Provider   ALPRAZolam Ulysess Miracle) 0.5 MG tablet Take 0.5 mg by mouth daily.    Yes Historical Provider, MD   lisinopril (PRINIVIL;ZESTRIL) 30 MG tablet TAKE 1 TABLET DAILY 19  Yes BULMARO Dempsey CNP   metoprolol succinate (TOPROL XL) 100 MG extended release tablet TAKE 1 TABLET DAILY 19  Yes BULMARO Dempsey CNP   loratadine (CLARITIN) 10 MG tablet TAKE 1 TABLET BY MOUTH ONE TIME A DAY 19  Yes BULMARO Dempsey CNP   venlafaxine (EFFEXOR XR) 150 MG extended release capsule TAKE 1 CAPSULE DAILY 19  Yes BULMARO Dempsey CNP   pantoprazole (PROTONIX) 20 MG tablet TAKE 1 TABLET TWICE A DAY BEFORE MEALS 19  Yes BULMARO Dempsey CNP   hydrochlorothiazide (HYDRODIURIL) 25 MG tablet TAKE 1 TABLET DAILY 19   BULMARO Dempsey CNP   albuterol sulfate  (90 Base) MCG/ACT inhaler Inhale 2 puffs into the lungs every 6 hours as needed for Wheezing 19   BULMARO Dempsey CNP   Diclofenac Sodium  MG TB24 TAKE 1 TABLET DAILY 19   BULMARO Dempsey CNP   atorvastatin (LIPITOR) 10 MG tablet TAKE 1 TABLET BY MOUTH ONE TIME A DAY 19   BULMARO Dempsey CNP   Handicap Placard MISC Expires 3/31/2022 3/31/17   BULMARO Dempsey CNP   fluticasone Rafaela Nilesh) 50 MCG/ACT nasal spray as needed  16   Historical Provider, MD       Current medications:    Current Facility-Administered Medications   Medication Dose Route Frequency Provider Last Rate Last Dose    lactated ringers infusion last liquid consumption: 0030                        Time of last solid consumption: 1900                        Date of last liquid consumption: 01/27/20                        Date of last solid food consumption: 01/26/20    BMI:   Wt Readings from Last 3 Encounters:   01/27/20 260 lb (117.9 kg)   12/17/19 265 lb (120.2 kg)   12/03/19 265 lb (120.2 kg)     Body mass index is 35.26 kg/m². CBC:   Lab Results   Component Value Date    WBC 15.1 12/03/2019    RBC 5.41 12/03/2019    HGB 16.4 12/03/2019    HCT 48.5 12/03/2019    MCV 89.7 12/03/2019    RDW 13.6 12/03/2019     12/03/2019       CMP:   Lab Results   Component Value Date     12/03/2019    K 5.1 12/17/2019    CL 96 12/03/2019    CO2 26 12/03/2019    BUN 19 12/03/2019    CREATININE 0.89 12/03/2019    GFRAA >60 12/03/2019    LABGLOM >60 12/03/2019    GLUCOSE 136 12/03/2019    PROT 6.8 03/01/2019    CALCIUM 9.9 12/03/2019    BILITOT 0.29 03/01/2019    ALKPHOS 100 03/01/2019    AST 30 03/01/2019    ALT 36 03/01/2019       POC Tests: No results for input(s): POCGLU, POCNA, POCK, POCCL, POCBUN, POCHEMO, POCHCT in the last 72 hours.     Coags: No results found for: PROTIME, INR, APTT    HCG (If Applicable): No results found for: PREGTESTUR, PREGSERUM, HCG, HCGQUANT     ABGs: No results found for: PHART, PO2ART, XRG6PHC, GOJ4UJP, BEART, K7UZWCRB     Type & Screen (If Applicable):  No results found for: LABABO, [address]    Anesthesia Evaluation  Patient summary reviewed and Nursing notes reviewed  Airway: Mallampati: III  TM distance: >3 FB   Neck ROM: full  Mouth opening: > = 3 FB Dental: normal exam         Pulmonary: breath sounds clear to auscultation  (+) COPD:  sleep apnea: on CPAP,      (-) rhonchi, wheezes, rales and stridor                           Cardiovascular:    (+) hypertension:,     (-) murmur, weak pulses,  friction rub, systolic click, carotid bruit,  JVD and peripheral edema    ECG reviewed  Rhythm: regular  Rate: normal           Beta Blocker:  Dose within 24 Hrs         Neuro/Psych:   (+) psychiatric history: stable with treatmentdepression/anxiety             GI/Hepatic/Renal:   (+) PUD,           Endo/Other:    (+) : arthritis: OA and no interval change. , . Abdominal:           Vascular:                                      Anesthesia Plan      general     ASA 2       Induction: intravenous. MIPS: Postoperative opioids intended and Prophylactic antiemetics administered. Anesthetic plan and risks discussed with patient. Plan discussed with CRNA.                   Esther Foy MD   1/27/2020

## 2020-02-07 ENCOUNTER — OFFICE VISIT (OUTPATIENT)
Dept: ORTHOPEDIC SURGERY | Age: 55
End: 2020-02-07

## 2020-02-07 VITALS — HEIGHT: 71 IN | WEIGHT: 260 LBS | BODY MASS INDEX: 36.4 KG/M2

## 2020-02-07 PROCEDURE — 99024 POSTOP FOLLOW-UP VISIT: CPT | Performed by: ORTHOPAEDIC SURGERY

## 2020-02-19 ENCOUNTER — TELEPHONE (OUTPATIENT)
Dept: PRIMARY CARE CLINIC | Age: 55
End: 2020-02-19

## 2020-02-19 RX ORDER — ATORVASTATIN CALCIUM 10 MG/1
TABLET, FILM COATED ORAL
Qty: 90 TABLET | Refills: 3 | Status: SHIPPED | OUTPATIENT
Start: 2020-02-19 | End: 2020-09-10 | Stop reason: SDUPTHER

## 2020-02-19 NOTE — TELEPHONE ENCOUNTER
Pt needs medication refilled to mail order pharmacy due to [facility] not covering medication any more. He will need a 90 Day supply sent. Please advise.     Last OV 11/18/2019    Next OV 04/17/2020    Health Maintenance   Topic Date Due    Hepatitis C screen  1965    DTaP/Tdap/Td vaccine (1 - Tdap) 10/18/1974    HIV screen  10/18/1978    Shingles Vaccine (1 of 2) 10/18/2013    Flu vaccine (1) 09/01/2019    A1C test (Diabetic or Prediabetic)  03/01/2020    Lipid screen  03/01/2020    Low dose CT lung screening  07/19/2020    Creatinine monitoring  12/03/2020    Potassium monitoring  01/27/2021    Colon cancer screen colonoscopy  05/27/2026    Pneumococcal 0-64 years Vaccine  Completed    Hepatitis A vaccine  Aged Out    Hepatitis B vaccine  Aged Out    Hib vaccine  Aged Out    Meningococcal (ACWY) vaccine  Aged Out             (applicable per patient's age: Cancer Screenings, Depression Screening, Fall Risk Screening, Immunizations)    Hemoglobin A1C (%)   Date Value   03/01/2019 5.7   11/11/2017 5.1     LDL Cholesterol (mg/dL)   Date Value   03/01/2019 69     LDL Calculated (mg/dL)   Date Value   12/02/2016 97     AST (U/L)   Date Value   03/01/2019 30     ALT (U/L)   Date Value   03/01/2019 36     BUN (mg/dL)   Date Value   12/03/2019 19      (goal A1C is < 7)   (goal LDL is <100) need 30-50% reduction from baseline     BP Readings from Last 3 Encounters:   01/27/20 (!) 135/92   01/27/20 (!) 89/51   12/17/19 122/79    (goal /80)      All Future Testing planned in [facility]:  Lab Frequency Next Occurrence   EMG Once 10/25/2019       Next Visit Date:  Future Appointments   Date Time Provider Mirtha Bonilla   4/17/2020  7:40 AM BULMARO Burnette - CNP [facility] [address]            Patient Active Problem List:     Anxiety and depression     Mixed hyperlipidemia     Essential hypertension     Primary osteoarthritis involving multiple joints     Multiple lung nodules

## 2020-02-25 ENCOUNTER — HOSPITAL ENCOUNTER (EMERGENCY)
Age: 55
Discharge: HOME | End: 2020-02-25
Attending: EMERGENCY MEDICINE
Payer: COMMERCIAL

## 2020-02-25 VITALS
TEMPERATURE: 98.1 F | BODY MASS INDEX: 35.21 KG/M2 | WEIGHT: 260 LBS | HEART RATE: 89 BPM | DIASTOLIC BLOOD PRESSURE: 75 MMHG | OXYGEN SATURATION: 98 % | SYSTOLIC BLOOD PRESSURE: 153 MMHG | HEIGHT: 72 IN | RESPIRATION RATE: 18 BRPM

## 2020-02-25 PROCEDURE — 99282 EMERGENCY DEPT VISIT SF MDM: CPT

## 2020-02-25 PROCEDURE — 12001 RPR S/N/AX/GEN/TRNK 2.5CM/<: CPT

## 2020-02-25 NOTE — ED PROVIDER NOTES
with others     PHYSICAL EXAM    (up to 7 for level 4, 8 or more for level 5)     ED Triage Vitals [02/25/20 1554]   BP Temp Temp Source Pulse Resp SpO2 Height Weight   (!) 153/75 98.1 °F (36.7 °C) Oral 89 18 98 % 6' (1.829 m) 260 lb (117.9 kg)       Physical Exam   Nursing note and vitals reviewed. Constitutional: Oriented to person, place, and time and well-developed, well-nourished. Head: Normocephalic and atraumatic. Ear: External ears normal.   Nose: Nose normal and midline. Eyes: Conjunctivae and EOM are normal. Pupils are equal, round, and reactive to light. Neck: Normal range of motion. Cardiovascular: Normal rate, regular rhythm, normal heart sounds and intact distal pulses. Pulmonary/Chest: Effort normal and breath sounds normal. No respiratory distress. No wheezes. No rales. No chest tenderness. Musculoskeletal: Normal range of motion. ambulatory  Neurological: Alert and oriented to person, place, and time. GCS score is 15. Skin: Skin is warm and dry. No rash noted. No erythema. No pallor. There is 1 cm laceration to the right thigh, no active bleeding, no deep structure involvement  Psychiatric: Mood, memory, affect and judgment normal.           DIAGNOSTIC RESULTS         RADIOLOGY:   All plain film, CT, MRI, and formal ultrasound images (except ED bedside ultrasound) are read by the radiologist  No orders to display       No results found. LABS:  Labs Reviewed - No data to display    All other labs were within normal range or not returned as of this dictation.     EMERGENCY DEPARTMENT COURSE and DIFFERENTIAL DIAGNOSIS/MDM:   Vitals:    Vitals:    02/25/20 1554   BP: (!) 153/75   Pulse: 89   Resp: 18   Temp: 98.1 °F (36.7 °C)   TempSrc: Oral   SpO2: 98%   Weight: 260 lb (117.9 kg)   Height: 6' (1.829 m)           PROCEDURES:  Laceration was cleansed, repaired using zip line     Instructed to return for signs of infection including redness, swelling, fevers chills, increased

## 2020-03-30 RX ORDER — AZITHROMYCIN 250 MG/1
TABLET, FILM COATED ORAL
Qty: 6 TABLET | Refills: 0 | Status: SHIPPED | OUTPATIENT
Start: 2020-03-30 | End: 2020-04-09

## 2020-03-30 RX ORDER — PREDNISONE 20 MG/1
TABLET ORAL
Qty: 18 TABLET | Refills: 0 | Status: SHIPPED | OUTPATIENT
Start: 2020-03-30 | End: 2020-04-09

## 2020-04-24 ENCOUNTER — TELEMEDICINE (OUTPATIENT)
Dept: PRIMARY CARE CLINIC | Age: 55
End: 2020-04-24
Payer: COMMERCIAL

## 2020-04-24 PROCEDURE — 99213 OFFICE O/P EST LOW 20 MIN: CPT | Performed by: NURSE PRACTITIONER

## 2020-04-24 ASSESSMENT — ENCOUNTER SYMPTOMS
SHORTNESS OF BREATH: 1
ABDOMINAL PAIN: 0
WHEEZING: 1
COUGH: 1
BACK PAIN: 0

## 2020-04-24 NOTE — PROGRESS NOTES
[address] PRIMARY CARE  161 [facility] 100  [address] [address]  Dept: [phone]  Dept Fax: [phone]    Navin Jim is a 62 y.o. male who presentstoday for his medical conditions/complaints as noted below. Navin iJm is c/o of  Chief Complaint   Patient presents with    Shortness of Breath     wants to discuss working due to sob           HPI:     Presents via tele health for concern of SOB and discuss Covid-19 risk  He does not want to quit work, but he is anxious regarding health hx, HTN    Has had increase in SOB. Continued cough. No noted improvement after completing zpack and steroid course 3/30/20  Smoking during video visit  States he is smoking 2-3 ppd. Does note improvement in cough/shortness of breath when he limits cigarette use  Has quit for 3 months with hypnosis in the past  Encouraged to decrease by one cigarette every other day  We have a follow up on 5/12/20, hope that he has cut back by 8 cigarettes at that time    Using albuterol inhaler in am only, d/t wheezing.  This improves the wheeze    Anxiety- Using xanax one time daily, does have enough to increase to BID during this time    Denies any other problems/concerns      Hemoglobin A1C (%)   Date Value   03/01/2019 5.7   11/11/2017 5.1             ( goal A1C is < 7)   No results found for: LABMICR  LDL Cholesterol (mg/dL)   Date Value   03/01/2019 69   11/11/2017 110     LDL Calculated (mg/dL)   Date Value   12/02/2016 97       (goal LDL is <100)   AST (U/L)   Date Value   03/01/2019 30     ALT (U/L)   Date Value   03/01/2019 36     BUN (mg/dL)   Date Value   12/03/2019 19     BP Readings from Last 3 Encounters:   02/25/20 (!) 153/75   01/27/20 (!) 135/92   01/27/20 (!) 89/51          ([id number])    Past Medical History:   Diagnosis Date    Arthritis     Wilcox esophagus     Depression     DU (duodenal ulcer)     Emphysema lung ([address]     [place] (hard of hearing)      venlafaxine (EFFEXOR XR) 150 MG extended release capsule TAKE 1 CAPSULE DAILY 90 capsule 3    pantoprazole (PROTONIX) 20 MG tablet TAKE 1 TABLET TWICE A DAY BEFORE MEALS 180 tablet 3    Handicageraldine Ernst MISC Expires 3/31/2022 1 each 0    fluticasone (FLONASE) 50 MCG/ACT nasal spray as needed        No current facility-administered medications for this visit. No Known Allergies    Health Maintenance   Topic Date Due    Hepatitis C screen  1965    HIV screen  10/18/1978    DTaP/Tdap/Td vaccine (1 - Tdap) 10/18/1982    Shingles Vaccine (1 of 2) 10/18/2013    A1C test (Diabetic or Prediabetic)  03/01/2020    Lipid screen  03/01/2020    Low dose CT lung screening  07/19/2020    Flu vaccine (Season Ended) 09/01/2020    Creatinine monitoring  12/03/2020    Potassium monitoring  01/27/2021    Colon cancer screen colonoscopy  05/27/2026    Pneumococcal 0-64 years Vaccine  Completed    Hepatitis A vaccine  Aged Out    Hepatitis B vaccine  Aged Out    Hib vaccine  Aged Out    Meningococcal (ACWY) vaccine  Aged Out       Subjective:      Review of Systems   Constitutional: Negative for chills and fever. HENT: Negative for congestion. Eyes: Negative for visual disturbance. Respiratory: Positive for cough, shortness of breath and wheezing. Cardiovascular: Negative for chest pain and palpitations. Gastrointestinal: Negative for abdominal pain. Genitourinary: Negative for difficulty urinating and dysuria. Musculoskeletal: Negative for arthralgias and back pain. Neurological: Negative for dizziness and headaches. Psychiatric/Behavioral: Negative for self-injury, sleep disturbance and suicidal ideas. The patient is not nervous/anxious. Objective:     Physical Exam  Vitals reviewed: unable to assess d/t [facility] health. Neurological:      Mental Status: He is alert and oriented to person, place, and time.    Psychiatric:         Mood and Affect: Mood normal.         Behavior:

## 2020-05-11 ENCOUNTER — OFFICE VISIT (OUTPATIENT)
Dept: PRIMARY CARE CLINIC | Age: 55
End: 2020-05-11
Payer: COMMERCIAL

## 2020-05-11 VITALS
DIASTOLIC BLOOD PRESSURE: 82 MMHG | HEART RATE: 80 BPM | RESPIRATION RATE: 16 BRPM | BODY MASS INDEX: 39.57 KG/M2 | OXYGEN SATURATION: 98 % | HEIGHT: 70 IN | SYSTOLIC BLOOD PRESSURE: 158 MMHG | WEIGHT: 276.4 LBS

## 2020-05-11 PROCEDURE — 99396 PREV VISIT EST AGE 40-64: CPT | Performed by: NURSE PRACTITIONER

## 2020-05-11 RX ORDER — LISINOPRIL 40 MG/1
TABLET ORAL
Qty: 90 TABLET | Refills: 0 | Status: SHIPPED | OUTPATIENT
Start: 2020-05-11 | End: 2020-07-22

## 2020-05-11 ASSESSMENT — ENCOUNTER SYMPTOMS
SHORTNESS OF BREATH: 1
COUGH: 1
ABDOMINAL PAIN: 0
BACK PAIN: 0

## 2020-05-11 NOTE — LETTER
[facility] Primary Care  [address]  Phone: [phone]  Fax: [phone]    BULMARO Ambrocio CNP        May 11, 2020     Patient: Hardy Singh   YOB: 1965   Date of Visit: 5/11/2020       To Whom It May Concern: It is my medical opinion that Susan Watkins should be excused 5/4/20-5/17/20. He will be able to return 5/18/20 without restrictions. If you have any questions or concerns, please don't hesitate to call.     Sincerely,        BULMARO Ambrocio CNP

## 2020-05-18 ENCOUNTER — TELEPHONE (OUTPATIENT)
Dept: PRIMARY CARE CLINIC | Age: 55
End: 2020-05-18

## 2020-05-20 ENCOUNTER — HOSPITAL ENCOUNTER (OUTPATIENT)
Dept: GENERAL RADIOLOGY | Age: 55
Discharge: HOME | End: 2020-05-22
Payer: COMMERCIAL

## 2020-05-20 ENCOUNTER — HOSPITAL ENCOUNTER (OUTPATIENT)
Age: 55
Discharge: HOME | End: 2020-05-20
Payer: COMMERCIAL

## 2020-05-20 ENCOUNTER — HOSPITAL ENCOUNTER (OUTPATIENT)
Age: 55
Discharge: HOME | End: 2020-05-22
Payer: COMMERCIAL

## 2020-05-20 LAB
ALBUMIN SERPL-MCNC: 4.1 G/DL (ref 3.5–5.2)
ALBUMIN/GLOBULIN RATIO: 1.4 (ref 1–2.5)
ALP BLD-CCNC: 112 U/L (ref 40–129)
ALT SERPL-CCNC: 38 U/L (ref 5–41)
ANION GAP SERPL CALCULATED.3IONS-SCNC: 14 MMOL/L (ref 9–17)
AST SERPL-CCNC: 30 U/L
BILIRUB SERPL-MCNC: 0.34 MG/DL (ref 0.3–1.2)
BUN BLDV-MCNC: 22 MG/DL (ref 6–20)
BUN/CREAT BLD: ABNORMAL (ref 9–20)
CALCIUM SERPL-MCNC: 9 MG/DL (ref 8.6–10.4)
CHLORIDE BLD-SCNC: 95 MMOL/L (ref 98–107)
CHOLESTEROL/HDL RATIO: 6.1
CHOLESTEROL: 164 MG/DL
CO2: 24 MMOL/L (ref 20–31)
CREAT SERPL-MCNC: 1.05 MG/DL (ref 0.7–1.2)
ESTIMATED AVERAGE GLUCOSE: 143 MG/DL
GFR AFRICAN AMERICAN: >60 ML/MIN
GFR NON-AFRICAN AMERICAN: >60 ML/MIN
GFR SERPL CREATININE-BSD FRML MDRD: ABNORMAL ML/MIN/{1.73_M2}
GFR SERPL CREATININE-BSD FRML MDRD: ABNORMAL ML/MIN/{1.73_M2}
GLUCOSE BLD-MCNC: 138 MG/DL (ref 70–99)
HBA1C MFR BLD: 6.6 % (ref 4–6)
HCT VFR BLD CALC: 46.1 % (ref 40.7–50.3)
HDLC SERPL-MCNC: 27 MG/DL
HEMOGLOBIN: 15.8 G/DL (ref 13–17)
LDL CHOLESTEROL: 71 MG/DL (ref 0–130)
MCH RBC QN AUTO: 30.8 PG (ref 25.2–33.5)
MCHC RBC AUTO-ENTMCNC: 34.3 G/DL (ref 28.4–34.8)
MCV RBC AUTO: 89.9 FL (ref 82.6–102.9)
NRBC AUTOMATED: 0 PER 100 WBC
PDW BLD-RTO: 14.4 % (ref 11.8–14.4)
PLATELET # BLD: 236 K/UL (ref 138–453)
PMV BLD AUTO: 9.5 FL (ref 8.1–13.5)
POTASSIUM SERPL-SCNC: 5.6 MMOL/L (ref 3.7–5.3)
PROSTATE SPECIFIC ANTIGEN: 0.56 UG/L
RBC # BLD: 5.13 M/UL (ref 4.21–5.77)
SODIUM BLD-SCNC: 133 MMOL/L (ref 135–144)
TOTAL PROTEIN: 7 G/DL (ref 6.4–8.3)
TRIGL SERPL-MCNC: 332 MG/DL
TSH SERPL DL<=0.05 MIU/L-ACNC: 1.45 MIU/L (ref 0.3–5)
VLDLC SERPL CALC-MCNC: ABNORMAL MG/DL (ref 1–30)
WBC # BLD: 8.9 K/UL (ref 3.5–11.3)

## 2020-05-20 PROCEDURE — 84443 ASSAY THYROID STIM HORMONE: CPT

## 2020-05-20 PROCEDURE — 80053 COMPREHEN METABOLIC PANEL: CPT

## 2020-05-20 PROCEDURE — 71046 X-RAY EXAM CHEST 2 VIEWS: CPT

## 2020-05-20 PROCEDURE — 83036 HEMOGLOBIN GLYCOSYLATED A1C: CPT

## 2020-05-20 PROCEDURE — G0103 PSA SCREENING: HCPCS

## 2020-05-20 PROCEDURE — 80061 LIPID PANEL: CPT

## 2020-05-20 PROCEDURE — 85027 COMPLETE CBC AUTOMATED: CPT

## 2020-05-20 PROCEDURE — 36415 COLL VENOUS BLD VENIPUNCTURE: CPT

## 2020-05-22 ENCOUNTER — TELEMEDICINE (OUTPATIENT)
Dept: PRIMARY CARE CLINIC | Age: 55
End: 2020-05-22
Payer: COMMERCIAL

## 2020-05-22 PROCEDURE — 99214 OFFICE O/P EST MOD 30 MIN: CPT | Performed by: NURSE PRACTITIONER

## 2020-05-22 RX ORDER — BLOOD-GLUCOSE METER
1 KIT MISCELLANEOUS DAILY
Qty: 1 KIT | Refills: 0 | Status: SHIPPED | OUTPATIENT
Start: 2020-05-22

## 2020-05-22 RX ORDER — LANCETS 30 GAUGE
1 EACH MISCELLANEOUS 3 TIMES DAILY
Qty: 200 EACH | Refills: 5 | Status: SHIPPED | OUTPATIENT
Start: 2020-05-22

## 2020-05-22 ASSESSMENT — ENCOUNTER SYMPTOMS
SHORTNESS OF BREATH: 0
COUGH: 0
BACK PAIN: 0
ABDOMINAL PAIN: 0

## 2020-05-22 NOTE — PROGRESS NOTES
Physical Exam  Vitals reviewed: unable to assess d/t tele health. Constitutional:       Appearance: Normal appearance. Neurological:      Mental Status: He is alert and oriented to person, place, and time. Psychiatric:         Mood and Affect: Mood normal.         Behavior: Behavior normal.         Thought Content: Thought content normal.         Judgment: Judgment normal.       There were no vitals taken for this visit. Assessment:       Diagnosis Orders   1. Type 2 diabetes mellitus without complication, without long-term current use of insulin ([facility])  blood glucose test strips (ASCENSIA AUTODISC VI;ONE TOUCH ULTRA TEST VI) strip    Lancets MISC    Blood Glucose Monitoring Suppl (FREESTYLE FREEDOM) KIT    [facility] Diabetes Education [facility]             Plan:      Return in about 3 months (around 8/22/2020) for Diabetes. 1. DM- Hga1c 6.6. Prefers to manage with diet and exercise prior to starting meds. Reviewed glucometer use and will monitor BS daily. Will meet with diabetic education. Will update eye exam. Follow up in 3 months for recheck with repeat Hga1c, earlier if there are any problems/concerns. Paulette Burgess is a 62 y.o. male being evaluated by a Virtual Visit (video visit) encounter to address concerns as mentioned above. A caregiver was present when appropriate. Due to this being a TeleHealth encounter (During HGJZE-57 public health emergency), evaluation of the following organ systems was limited: Vitals/Constitutional/EENT/Resp/CV/GI//MS/Neuro/Skin/Heme-Lymph-Imm. Pursuant to the emergency declaration under the [facility] [address] authority and the [employer] Act, this Virtual Visit was conducted with patient's (and/or legal guardian's) consent, to reduce the patient's risk of exposure to COVID-19 and provide necessary medical care.   The patient (and/or legal guardian) has also been

## 2020-06-04 ENCOUNTER — HOSPITAL ENCOUNTER (OUTPATIENT)
Dept: DIABETES SERVICES | Age: 55
Setting detail: THERAPIES SERIES
Discharge: HOME | End: 2020-06-04
Payer: COMMERCIAL

## 2020-06-04 PROCEDURE — G0108 DIAB MANAGE TRN  PER INDIV: HCPCS

## 2020-06-04 ASSESSMENT — PROBLEM AREAS IN DIABETES QUESTIONNAIRE (PAID)
PAID-5 TOTAL SCORE: 0
FEELING THAT DIABETES IS TAKING UP TOO MUCH OF YOUR MENTAL AND PHYSICAL ENERGY EVERY DAY: 0
WORRYING ABOUT THE FUTURE AND THE POSSIBILITY OF SERIOUS COMPLICATIONS: 0
FEELING DEPRESSED WHEN YOU THINK ABOUT LIVING WITH DIABETES: 0
COPING WITH COMPLICATIONS OF DIABETES: 0
FEELING SCARED WHEN YOU THINK ABOUT LIVING WITH DIABETES: 0

## 2020-06-04 NOTE — PROGRESS NOTES
07/21/2017     Current Medications  Current Outpatient Medications   Medication Sig Dispense Refill    blood glucose test strips (ASCENSIA AUTODISC VI;ONE TOUCH ULTRA TEST VI) strip Use with associated glucose meter. 100 strip 11    Lancets MISC 1 each by Does not apply route 3 times daily 200 each 5    Blood Glucose Monitoring Suppl (FREESTYLE FREEDOM) KIT 1 kit by Does not apply route daily 1 kit 0    lisinopril (PRINIVIL;ZESTRIL) 40 MG tablet TAKE 1 TABLET DAILY 90 tablet 0    atorvastatin (LIPITOR) 10 MG tablet TAKE 1 TABLET BY MOUTH ONE TIME A DAY 90 tablet 3    ALPRAZolam (XANAX) 0.5 MG tablet Take 0.5 mg by mouth daily.       albuterol sulfate  (90 Base) MCG/ACT inhaler Inhale 2 puffs into the lungs every 6 hours as needed for Wheezing 1 Inhaler 3    Diclofenac Sodium  MG TB24 TAKE 1 TABLET DAILY 90 tablet 3    metoprolol succinate (TOPROL XL) 100 MG extended release tablet TAKE 1 TABLET DAILY 90 tablet 3    loratadine (CLARITIN) 10 MG tablet TAKE 1 TABLET BY MOUTH ONE TIME A DAY 30 tablet 1    venlafaxine (EFFEXOR XR) 150 MG extended release capsule TAKE 1 CAPSULE DAILY 90 capsule 3    pantoprazole (PROTONIX) 20 MG tablet TAKE 1 TABLET TWICE A DAY BEFORE MEALS 180 tablet 3    Handicap Placard [facility] Expires 3/31/2022 1 each 0    fluticasone (FLONASE) 50 MCG/ACT nasal spray as needed        No current facility-administered medications for this encounter.    :     Comments:  Allergies:  No Known Allergies    A1C blood level   Lab Results   Component Value Date    LABA1C 6.6 (H) 05/20/2020    LABA1C 5.7 03/01/2019    LABA1C 5.1 11/11/2017     Lab Results   Component Value Date    LDLCALC 97 12/02/2016    CREATININE 1.05 05/20/2020       Blood pressure   BP Readings from Last 3 Encounters:   05/11/20 (!) 158/82   02/25/20 (!) 153/75   01/27/20 (!) 135/92        Cholesterol  Lab Results   Component Value Date    LDLCALC 97 12/02/2016    LDLCHOLESTEROL 71 05/20/2020        Diabetes Self- Management Education Record    Participant Name: Nicholas Mac  Referring Provider: BULMARO River CNP   Assessment/Evaluation Ratings:  1=Needs Instruction   4=Demonstrates Understanding/Competency  2=Needs Review   NC=Not Covered    3=Comprehends Key Points  N/A=Not Applicable  Topics/Learning Objectives Pre-session Assess Date:  6/4/20CB Instr. Date Reinforce Date Post- session Eval Comments   Diabetes disease process & Treatment process: Define diabetes & pre-diabetes; Identify own type of diabetes; role of the pancreas; signs/symptoms; diagnostic criteria; prevention & treatment options; contributing factors. 1    New Dx in May and wants to avoid meds if possible . Family Hx with sister and poss mom. Wife has T2DM so supportive. 6/4/20CB   Incorporating nutritional management into lifestyle: Describe effect of type, amount & timing of food on blood glucose; Describe basic meal planning techniques & current nutrition guideline   1 5-5:30 eggs    11:30a packed ham sandwich or burger and onion rings ( trying to cut back on fast food)    5-8p spaghetti, or hamburger or mac and cheese,or sometimes comes home from work and drinks 6-8 beers and then goes to bed  Stopped reg pop and mostly water now, occ diet Dr Colin Ray, lost 10# in 1 week. 6/4/20CB    What to eat - Food groups, When to eat - timing of meals and snacks, and How much to eat - portions control. calories/ day   CHO choices/ meal   CHO choices/  day   grams of protein /day   gram of fat /day     Correctly read food labels & demonstrate CHO counting & portion control with personalized meal plan. Identify dining out strategies, & dietary sick day guidelines. 1       Incorporating physical activity into lifestyle:   Verbalize effect of exercise on blood glucose levels; benefits of regular exercise; safety considerations; contraindications; maintenance of activity. 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  Does have Class  Free  Registration is required     0 [facility]. [address] [facility], 125 [facility] [phone] or   Email [email or web address]   Wednesdays-5:00- 6:00 pm       [] Eat Smart  Be Active & Learn How - Free   Families & grandparents with children in home 0- 25 & pregnant moms          Various sites in community - call to find next session near you. [facility] extension office for future sessions - Jose Elias Madrigal  Email : [email or web address]. [employer]  Phone: [phone]     [] (SNAP-Ed)   - free nutrition education   - adults and youth, who are eligible for the Supplemental Nutrition Assistance Program    Various sites in community - call to find next session near you. [facility] PASSAVANT-CRANBERRY-YARELI SNAP-Ed  contact Lorena Arce, Email:[email or web address]           Post Education Referrals:      [] [place] Tobacco Quit information sheet and [address] [facility] , 21       [] Dental care - Dental care of [facility]     [] [facility] ([facility]) link  phone number - for information and referral to [facility] NAY TY  Clinically  [address], WEIGHT MANAGEMENT        []Other  Guerline Perera RN

## 2020-06-11 ENCOUNTER — HOSPITAL ENCOUNTER (OUTPATIENT)
Dept: DIABETES SERVICES | Age: 55
Setting detail: THERAPIES SERIES
Discharge: HOME | End: 2020-06-11
Payer: COMMERCIAL

## 2020-06-11 ENCOUNTER — OFFICE VISIT (OUTPATIENT)
Dept: PRIMARY CARE CLINIC | Age: 55
End: 2020-06-11
Payer: COMMERCIAL

## 2020-06-11 VITALS
HEIGHT: 70 IN | WEIGHT: 269.4 LBS | BODY MASS INDEX: 38.57 KG/M2 | RESPIRATION RATE: 16 BRPM | SYSTOLIC BLOOD PRESSURE: 122 MMHG | DIASTOLIC BLOOD PRESSURE: 84 MMHG | HEART RATE: 76 BPM | OXYGEN SATURATION: 99 %

## 2020-06-11 PROCEDURE — G0108 DIAB MANAGE TRN  PER INDIV: HCPCS

## 2020-06-11 PROCEDURE — 99214 OFFICE O/P EST MOD 30 MIN: CPT | Performed by: NURSE PRACTITIONER

## 2020-06-11 ASSESSMENT — ENCOUNTER SYMPTOMS
COUGH: 0
SHORTNESS OF BREATH: 0
BACK PAIN: 0
ABDOMINAL PAIN: 0

## 2020-06-11 ASSESSMENT — PATIENT HEALTH QUESTIONNAIRE - PHQ9
2. FEELING DOWN, DEPRESSED OR HOPELESS: 0
SUM OF ALL RESPONSES TO PHQ QUESTIONS 1-9: 0
SUM OF ALL RESPONSES TO PHQ9 QUESTIONS 1 & 2: 0
SUM OF ALL RESPONSES TO PHQ QUESTIONS 1-9: 0
1. LITTLE INTEREST OR PLEASURE IN DOING THINGS: 0

## 2020-06-11 NOTE — PROGRESS NOTES
[address] 97 12/02/2016    LDLCHOLESTEROL 71 05/20/2020        Diabetes Self- Management Education Record    Participant Name: Alison Anne  Referring Provider: BULMARO Archer CNP   Assessment/Evaluation Ratings:  1=Needs Instruction   4=Demonstrates Understanding/Competency  2=Needs Review   NC=Not Covered    3=Comprehends Key Points  N/A=Not Applicable  Topics/Learning Objectives Pre-session Assess Date:  6/4/20CB Instr. Date Reinforce Date Post- session Eval Comments   Diabetes disease process & Treatment process: Define diabetes & pre-diabetes; Identify own type of diabetes; role of the pancreas; signs/symptoms; diagnostic criteria; prevention & treatment options; contributing factors. 1 6/11/20CB   New Dx in May and wants to avoid meds if possible . Family Hx with sister and poss mom. Wife has T2DM so supportive. 6/4/20CB    Pt able to view You tube Understanding T2DM and some slides with screen share at beginning of class. Wife present as well.6/11/20CB   Incorporating nutritional management into lifestyle: Describe effect of type, amount & timing of food on blood glucose; Describe basic meal planning techniques & current nutrition guideline   1 5-5:30 eggs    11:30a packed ham sandwich or burger and onion rings ( trying to cut back on fast food)    5-8p spaghetti, or hamburger or mac and cheese,or sometimes comes home from work and drinks 6-8 beers and then goes to bed  Stopped reg pop and mostly water now, occ diet Dr Carter Hernandez, lost 10# in 1 week6/4/20CB    F/u today with pcp and lost 7# and /82    What to eat - Food groups, When to eat - timing of meals and snacks, and How much to eat - portions control. calories/ day   CHO choices/ meal   CHO choices/  day   grams of protein /day   gram of fat /day     Correctly read food labels & demonstrate CHO counting & portion control with personalized meal plan. Identify dining out strategies, & dietary sick day guidelines.    1 Identify preventative measures & standards of care. 1    Did have cataracts removed and sees eye Dr. Kourtney Boateng tear about 4y ago when Fx ribs that was monitored with CT Scans but recent injury from lifting and unsure if he broke a rib so seeing PCP next Thursday. Does see dentist 6 mo.   6/4/20CB   Developing strategies to address psychosocial issues:  Describe feelings about living with diabetes; Describe how stress, depression & anxiety affect blood glucose; Identify coping strategies; Identify support needed & support network available. 1 6/11/20CB   Positive and wants to keep BG down with eating healthy and avoid meds. Wife and daughter supportive. Starting to feel better and motivated by trying to eat healthy and start to exercise. 6/11/20CB   Developing strategies to promote health/change behavior: Identify 7 self-care behaviors; Personal health risk factors; Benefits, challenges & strategies for behavioral change;    1    Does drink 6-8 beers nightly. Had stopped years ago and joined [employer] and eat small frequent meals/snacks and lost about 50# and felt great then. Wants to work on healthier lifestyle again      Individualized goal selection. My goal , to help me improve my health, I will:   1.get the ecliptical out and start using      2. Plan  Follow-up Appointments planned Thursday, June 11@ 4:30p per video  Instruction Method: []Lecture/Discussion  []Power Point Presentation  [x]Handouts  []Return Demonstration    Education Materials/Equipment Provided (VIA Mail for phone visits)  :    [x]Self-Management - Initial assessment - Enrolment in to ADA  Where do I Begin, Living with Type 2 diabetes ADA home support program and  handout on diabetes education classes.  6/4/20CB    [x]Self-Management  Class 1 -Self-Management  Class 1 - \"Diabetes - your take control guide\" - ADA booklet -6/11/20CB  o  \"ready, set, start counting\" teaching sheet in diet section   o  GLP-1 understanding 8 core Daryl Carrillo [facility] (site rotate)  Call [phone] or visit   website: [email or web address] for more details   Check web site for updated times/ dates       [] [address]  1001 [facility], [address] [facility] Tuesday and Thursday -   9 :30 am- 10:30am - ongoing   [] [address] [facility]  [address] [facility], [address] [address] [facility] Thursday 11:00am - 11:45am     [] Third Wednesday Cooking  Class  Free  Registration is required     [address] [facility]. 1100 [facility], 125 [facility] [phone] or   Email [email or web address]   Wednesdays-5:00- 6:00 pm       [] Eat Smart  Be Active & Learn How - Free   Families & grandparents with children in home 0- 25 & pregnant moms          Various sites in community - call to find next session near you. [facility] extension office for future sessions - Jaki Galan  Email : [email or web address] [email or web address]  Phone: [phone]     [] (SNAP-Ed)   - free nutrition education   - adults and youth, who are eligible for the Supplemental Nutrition Assistance Program    Various sites in community - call to find next session near you. [facility] PASSLUZMARIA-CRANBERRY-ER SNAP-Ed  contact Beartice Talbert, Email:[email or web address] [phone]           Post Education Referrals:      [] [place] Tobacco Quit information sheet and [address] , 21       [] Dental care - Dental care of [facility]     [] [facility] ([facility]) link  phone number - for information and referral to [facility] - NAY T[facility]  Clinically  [address], WEIGHT MANAGEMENT        []Other  Migel Lockhart, RN

## 2020-06-11 NOTE — PROGRESS NOTES
Readings from Last 3 Encounters:   06/11/20 122/84   05/11/20 (!) 158/82   02/25/20 (!) 153/75          ([id number])    Past Medical History:   Diagnosis Date    Arthritis     Wilcox esophagus     Depression     DU (duodenal ulcer)     Emphysema lung ([address]     [place] (hard of hearing)     Hyperlipidemia     Hypersomnia with sleep apnea, unspecified     CPAP nightly    Hypertension     AGUS (obstructive sleep apnea)     wears CPAP    Thoracic outlet syndrome     Rt      Past Surgical History:   Procedure Laterality Date    CARPAL TUNNEL RELEASE Left 12/17/2019    CARPAL TUNNEL RELEASE performed by Shira Vigil MD at [address] Right 1/27/2020    CARPAL TUNNEL RELEASE performed by [facility]il [facility] at [facility] 1945 Right     COLONOSCOPY  5/27/2016    JOINT REPLACEMENT Bilateral     hips    LYMPH NODE DISSECTION      neck    SOFT TISSUE TUMOR RESECTION      back    UPPER GASTROINTESTINAL ENDOSCOPY N/A 12/11/2018    EGD ESOPHAGOGASTRODUODENOSCOPY performed by Shefali Suresh DO at [facility]       Family History   Problem Relation Age of Onset    Mult Sclerosis Father     Heart Disease Sister           Social History     Tobacco Use    Smoking status: Current Every Day Smoker     Packs/day: 2.50     Years: 45.00     Pack years: 112.50     Types: Cigarettes    Smokeless tobacco: Never Used   Substance Use Topics    Alcohol use: Yes     Comment: daily, 6-8 beer daily      Current Outpatient Medications   Medication Sig Dispense Refill    blood glucose test strips (ASCENSIA AUTODISC VI;ONE TOUCH ULTRA TEST VI) strip Use with associated glucose meter.  100 strip 11    Lancets MISC 1 each by Does not apply route 3 times daily 200 each 5    Blood Glucose Monitoring Suppl (FREESTYLE FREEDOM) KIT 1 kit by Does not apply route daily 1 kit 0    lisinopril (PRINIVIL;ZESTRIL) 40 MG tablet TAKE 1 TABLET DAILY 90 tablet 0    atorvastatin (LIPITOR) 10 MG tablet TAKE 1 TABLET BY MOUTH ONE TIME A DAY 90 tablet 3    ALPRAZolam (XANAX) 0.5 MG tablet Take 0.5 mg by mouth daily.  albuterol sulfate  (90 Base) MCG/ACT inhaler Inhale 2 puffs into the lungs every 6 hours as needed for Wheezing 1 Inhaler 3    Diclofenac Sodium  MG TB24 TAKE 1 TABLET DAILY 90 tablet 3    metoprolol succinate (TOPROL XL) 100 MG extended release tablet TAKE 1 TABLET DAILY 90 tablet 3    loratadine (CLARITIN) 10 MG tablet TAKE 1 TABLET BY MOUTH ONE TIME A DAY 30 tablet 1    venlafaxine (EFFEXOR XR) 150 MG extended release capsule TAKE 1 CAPSULE DAILY 90 capsule 3    pantoprazole (PROTONIX) 20 MG tablet TAKE 1 TABLET TWICE A DAY BEFORE MEALS 180 tablet 3    Handicap Placard MISC Expires 3/31/2022 1 each 0    fluticasone (FLONASE) 50 MCG/ACT nasal spray as needed        No current facility-administered medications for this visit. No Known Allergies    Health Maintenance   Topic Date Due    Hepatitis C screen  1965    Diabetic foot exam  10/18/1973    Diabetic retinal exam  10/18/1973    HIV screen  10/18/1978    Diabetic microalbuminuria test  10/18/1981    DTaP/Tdap/Td vaccine (1 - Tdap) 10/18/1982    Shingles Vaccine (1 of 2) 10/18/2013    Low dose CT lung screening  07/19/2020    Flu vaccine (Season Ended) 09/01/2020    A1C test (Diabetic or Prediabetic)  05/20/2021    Lipid screen  05/20/2021    Potassium monitoring  05/20/2021    Creatinine monitoring  05/20/2021    Colon cancer screen colonoscopy  05/27/2026    Pneumococcal 0-64 years Vaccine  Completed    Hepatitis A vaccine  Aged Out    Hepatitis B vaccine  Aged Out    Hib vaccine  Aged Out    Meningococcal (ACWY) vaccine  Aged Out       Subjective:      Review of Systems   Constitutional: Negative for chills and fever. HENT: Negative for congestion. Eyes: Negative for visual disturbance. Respiratory: Negative for cough and shortness of breath.     Cardiovascular: Negative for

## 2020-06-12 ENCOUNTER — HOSPITAL ENCOUNTER (OUTPATIENT)
Dept: GENERAL RADIOLOGY | Age: 55
Discharge: HOME | End: 2020-06-14
Payer: COMMERCIAL

## 2020-06-12 ENCOUNTER — HOSPITAL ENCOUNTER (OUTPATIENT)
Age: 55
Discharge: HOME | End: 2020-06-14
Payer: COMMERCIAL

## 2020-06-12 PROCEDURE — 73130 X-RAY EXAM OF HAND: CPT

## 2020-06-19 ENCOUNTER — HOSPITAL ENCOUNTER (OUTPATIENT)
Dept: DIABETES SERVICES | Age: 55
Setting detail: THERAPIES SERIES
Discharge: HOME | End: 2020-06-19
Payer: COMMERCIAL

## 2020-06-19 PROCEDURE — G0108 DIAB MANAGE TRN  PER INDIV: HCPCS

## 2020-06-19 NOTE — PROGRESS NOTES
Diabetes Self- Management Education Program Assessment - PHONE  This visit was done on phone/virtual call (doxy. me)  (During NABUQ-69 public health emergency). Pursuant to the emergency declaration under the [facility], [address] [place] [address] authority and the [employer] Act, this visit was conducted with patient's (and/or legal guardian's) consent, to reduce the patient's risk of exposure to COVID-19 and provide necessary medical care. The patient (and/or legal guardian) has also been advised to contact this office for worsening conditions or problems, and seek emergency medical treatment and/or call 911 if deemed necessary. Patient identification was verified at the start of the visit: Yes,  6/19/20 PRASANNA yes    Total time spent for this encounter: 1 hour   6/11/20 1 hour per video  6/19/20 JW 1 hour 8:30-9:30am  Services were provided through a phone discussion to substitute for in-person clinic visit. Patient and provider were located at their individual home/office. Pt able to use video for this visit on 6/11/20      Also see Diabetic Screening  Patient, Jeison Gomez, contacted via phone  encounters for diabetes self-management education  visit/ assessment on 6/4/20     ( update if when return to face to face encounter)     MEDICAL HISTORY:  Past Medical History:   Diagnosis Date    Arthritis     Wilcox esophagus     Depression     DU (duodenal ulcer)     Emphysema lung ([address]     [place] (hard of hearing)     Hyperlipidemia     Hypersomnia with sleep apnea, unspecified     CPAP nightly    Hypertension     AGUS (obstructive sleep apnea)     wears CPAP    Thoracic outlet syndrome     Rt     Family History   Problem Relation Age of Onset    Mult Sclerosis Father     Heart Disease Sister      Patient has no known allergies.    Immunization History   Administered Date(s) Administered    Pneumococcal Conjugate 13-valent Zackery Sanabria) 10/06/2017    Pneumococcal Polysaccharide ([id number]) 10/06/2015, 11/04/2016, 11/14/2016    Td, unspecified formulation 07/21/2017     Current Medications  Current Outpatient Medications   Medication Sig Dispense Refill    blood glucose test strips (ASCENSIA AUTODISC VI;ONE TOUCH ULTRA TEST VI) strip Use with associated glucose meter. 100 strip 11    Lancets MISC 1 each by Does not apply route 3 times daily 200 each 5    Blood Glucose Monitoring Suppl (FREESTYLE FREEDOM) KIT 1 kit by Does not apply route daily 1 kit 0    lisinopril (PRINIVIL;ZESTRIL) 40 MG tablet TAKE 1 TABLET DAILY 90 tablet 0    atorvastatin (LIPITOR) 10 MG tablet TAKE 1 TABLET BY MOUTH ONE TIME A DAY 90 tablet 3    ALPRAZolam (XANAX) 0.5 MG tablet Take 0.5 mg by mouth daily.       albuterol sulfate  (90 Base) MCG/ACT inhaler Inhale 2 puffs into the lungs every 6 hours as needed for Wheezing 1 Inhaler 3    Diclofenac Sodium  MG TB24 TAKE 1 TABLET DAILY 90 tablet 3    metoprolol succinate (TOPROL XL) 100 MG extended release tablet TAKE 1 TABLET DAILY 90 tablet 3    loratadine (CLARITIN) 10 MG tablet TAKE 1 TABLET BY MOUTH ONE TIME A DAY 30 tablet 1    venlafaxine (EFFEXOR XR) 150 MG extended release capsule TAKE 1 CAPSULE DAILY 90 capsule 3    pantoprazole (PROTONIX) 20 MG tablet TAKE 1 TABLET TWICE A DAY BEFORE MEALS 180 tablet 3    Handicap [facility] Expires 3/31/2022 1 each 0    fluticasone (FLONASE) 50 MCG/ACT nasal spray as needed        No current facility-administered medications for this encounter.    :     Comments:  Allergies:  No Known Allergies    A1C blood level   Lab Results   Component Value Date    LABA1C 6.6 (H) 05/20/2020    LABA1C 5.7 03/01/2019    LABA1C 5.1 11/11/2017     Lab Results   Component Value Date    LDLCALC 97 12/02/2016    CREATININE 1.05 05/20/2020       Blood pressure   BP Readings from Last 3 Encounters:   06/11/20 122/84   05/11/20 (!) 158/82   02/25/20 (!) 153/75 consequences of poor control. Disaster preparedness strategies    1       Prevention, detection & treatment of chronic complications:  Define the natural course of diabetes & describe the relationship of blood glucose levels to long term complications of diabetes. Identify preventative measures & standards of care. 1    Did have cataracts removed and sees eye Dr. Gailen Leyden tear about 4y ago when Fx ribs that was monitored with CT Scans but recent injury from lifting and unsure if he broke a rib so seeing PCP next Thursday. Does see dentist 6 mo.   6/4/20CB   Developing strategies to address psychosocial issues:  Describe feelings about living with diabetes; Describe how stress, depression & anxiety affect blood glucose; Identify coping strategies; Identify support needed & support network available. 1 6/11/20CB   Positive and wants to keep BG down with eating healthy and avoid meds. Wife and daughter supportive. Starting to feel better and motivated by trying to eat healthy and start to exercise. 6/11/20CB   Developing strategies to promote health/change behavior: Identify 7 self-care behaviors; Personal health risk factors; Benefits, challenges & strategies for behavioral change;    1    Does drink 6-8 beers nightly. Had stopped years ago and joined [employer] and eat small frequent meals/snacks and lost about 50# and felt great then. Wants to work on healthier lifestyle again      Individualized goal selection. My goal , to help me improve my health, I will:   1.get the ecliptical out and start using      2.        Plan  Follow-up Appointments planned Thursday, June 11@ 4:30p per video  Instruction Method: []Lecture/Discussion  []Power Point Presentation  [x]Handouts  []Return Demonstration    Education Materials/Equipment Provided (VIA Mail for phone visits)  :    [x]Self-Management - Initial assessment - Enrolment in to ADA  Where do I Begin, Living with Type 2 diabetes ADA home support program and handout on diabetes education classes. 6/4/20CB    [x]Self-Management  Class 1 -Self-Management  Class 1 - \"Diabetes - your take control guide\" - ADA booklet -6/11/20CB  o  \"ready, set, start counting\" teaching sheet in diet section   o  GLP-1 understanding 8 core deficits puzzle sheet in the medication section  o one day food diary and envelope for return of diet HX   o  You tube and website resource sheet-Understanding Type 2 Diabetes from Animated Diabetes Patient [email or web address] [email or web address]      [x] Self-Management  Class 2 - Meal Plan and handout for serving sizes, smarter snacking, Ready Set Carb Counting / Plate Method, Nutrient Conversion and International Diabetes [address] Eating for People with Diabetes and Nutrition in the [employer] - fast facts about fast food6/19/20 JW    [] Self-Management  Class 3 -  Diabetes your take control guide pages 20 - 30,  type 2 diabetes and the role of GLP- 1,  Individualized Diabetes report card     [] Self-Management Class 4 - BD Booklet  Sick Day Rules and  Dinning Out Guide , recipe hand outs and tips, diabetes Cookbooks  ( when available)     []Self-Management - 3 month follow -  AADE7 Self care behaviors work sheets, 2020 [employer],  Online resource list - March 2020      []Self-Management  Gestational - RN class -Resource materials sent out : care booklet - \" Gestational Diabetes Mellitus ( GDM) toolkit form [place] gestational diabetes postpartum care learning collaborative 2018. \"Simple Guidelines for meal planning with gestational diabetes. SMBG sheets to fax back to M weekly. BD  healthy injection site selection and rotation with 6 mm insulin syringe and 4 mm pen needle. Gestational diabetes handout from [facility] 2016. Did you have gestational diabetes when you were pregnant?  Handout from [facility]  April 2014    []Self-Management Gestational - RD class - My Food Plan for Gestational diabetes    []Glucose

## 2020-06-25 ENCOUNTER — HOSPITAL ENCOUNTER (OUTPATIENT)
Dept: DIABETES SERVICES | Age: 55
Setting detail: THERAPIES SERIES
Discharge: HOME | End: 2020-06-25
Payer: COMMERCIAL

## 2020-06-25 PROCEDURE — G0108 DIAB MANAGE TRN  PER INDIV: HCPCS

## 2020-06-25 NOTE — PROGRESS NOTES
1 cho/snack   calories/ day   CHO choices/ meal   CHO choices/  day   grams of protein /day   gram of fat /day     Correctly read food labels & demonstrate CHO counting & portion control with personalized meal plan. Identify dining out strategies, & dietary sick day guidelines. 1 6/19/20 JW      Incorporating physical activity into lifestyle:   Verbalize effect of exercise on blood glucose levels; benefits of regular exercise; safety considerations; contraindications; maintenance of activity. 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 Does have hip problems , past bilateral hip replacement, also ? Emphysema. Does have ecliptical in garage that he plans to get out and start using. 6/4/20CB    Did move elliptical to front of garage so he can use and will start daily and hopes to workup to 20 min/d 6/11/20CB  Work has been busy so has only been able to use Elliptical once. 6/25/20CB   Using medications safely:  Identify effects of diabetes medicines on blood glucose levels; List diabetes medication taken, action & side effects;    1 6/25/20CB   No meds   Insulin / Injectable - Appropriate injection sites; proper storage; supplies needed; proper technique; safe needle disposal guidelines. 1 6/25/20CB   NA  Brief discussion6/25/20CB   Monitoring blood glucose, interpreting and using results:  Identify recommended & personal blood glucose targets; importance of testing; testing supplies; HgbA1C target levels; Factors affecting blood glucose; Importance of logging blood glucose levels for pattern recognition; ketone testing; safe lancet disposal.   1 6/11/20CB 6/25/20CB  A1C 6.6% on May 20th. Testing 3-4x/day  AM  range .  After lunch ,evening 109-150 range        6/4/20CB    BG  continue around 118-119.6/11/20CB    Continues to check,  9p last night 116 at 4a today and 117 at 3p6/25/20CB     Prevention, detection & treatment of acute complications:  Identify symptoms of Appointments planned Thursday, June 11@ 4:30p per video  Follow up for Class 4 July 10@ 8:30a  Instruction Method: []Lecture/Discussion  []Power Point Presentation  [x]Handouts  []Return Demonstration    Education Materials/Equipment Provided (VIA Mail for phone visits)  :    [x]Self-Management - Initial assessment - Enrolment in to ADA  Where do I Begin, Living with Type 2 diabetes ADA home support program and  handout on diabetes education classes. 6/4/20CB    [x]Self-Management  Class 1 -Self-Management  Class 1 - \"Diabetes - your take control guide\" - ADA booklet -6/11/20CB  o  \"ready, set, start counting\" teaching sheet in diet section   o  GLP-1 understanding 8 core deficits puzzle sheet in the medication section  o one day food diary and envelope for return of diet HX   o  You tube and website resource sheet-Understanding Type 2 Diabetes from Animated Diabetes Patient [email or web address] [email or web address]      [x] Self-Management  Class 2 - Meal Plan and handout for serving sizes, smarter snacking, Ready Set Carb Counting / Plate Method, Nutrient Conversion and International Diabetes [address] Eating for People with Diabetes and Nutrition in the [employer] Resources - fast facts about fast food6/19/20 JW    [x] Self-Management  Class 3 -  Diabetes your take control guide pages 21 - 30,  type 2 diabetes and the role of GLP- 1,  Individualized Diabetes report card 6/25/20CB    [] Self-Management Class 4 - BD Booklet  Sick Day [place] and  Dinning Out Guide , recipe hand outs and tips, diabetes Cookbooks  ( when available)     []Self-Management - 3 month follow -  AADE7 Self care behaviors work sheets, 2020 [employer],  Online resource list - March 2020      []Self-Management  Gestational - RN class -Resource materials sent out : care booklet - \" Gestational Diabetes Mellitus ( GDM) toolkit form [place] gestational diabetes postpartum care learning collaborative 2018.  \"Simple Guidelines for meal planning with gestational diabetes. SMBG sheets to fax back to MFM weekly. BD  healthy injection site selection and rotation with 6 mm insulin syringe and 4 mm pen needle. Gestational diabetes handout from [facility] 2016. Did you have gestational diabetes when you were pregnant? Handout from [facility]  April 2014    []Self-Management Gestational - RD class - My Food Plan for Gestational diabetes    []Glucose Meter     []Insulin Kit     []Other      Encounter Type Date Start Time End Time Comments No Show Dates   Assessment 6/4/20CB   4:30 5:45   []In Person  [x]Telephone    Class 1 - Understanding diabetes 6/11/20CB 4:30 5:30  []TelephoneAmerican Diabetes Association  www. diabetes. org  Per video    Class 2- Nutrition and diabetes   6/19/20 JW 8:30 9:30 [x]Doxy. me  Healthy Eating with Diabetes- Automatic Data of Diabetes and Digestive and Kidney   https://[email or web address] be/[email or web address]    Class 3 - Preventing Complications 9/09/75RE 9:96 5:45  []Telephone  By video    Class 4 -  In depth Nutrition and sick day care    []Telephone  Diabetes Food hub  www. diabetesfoodhub.org     Class 5 - 3 month follow up / goal reassessment        Gestational - RN         Gestational - RD        Individual MNT         Shared Med Appt         Yearly Follow-up        Meter Instrx      How to Measure Your Blood Sugar - [facility] Patient Education  https://[email or web address] be/[email or web address]    Insulin Instrx      []Pen  []Vial & Syringe   BD Diabetes Care: How to Inject Insulin with a Pen Needle  https://[email or web address] be/[email or web address]    Diabetes Care: How to Inject Insulin with a Syringe  https://youtu. be/[email or web address]       DSMS Support :   [] MNT      [] Annual update     [] Starting Fresh  adults living with diabetes or pre diabetes.  [address] [place], [place]    [id number]:30pm- on 6 week rotation  Free -  [address] [facility] call for dates    []  Diabetes Group at  [facility] of Assistance Program    Various sites in community - call to find next session near you. [facility]-YARELI SNAP-Ed  contact Katherine Gale, Email:[phone]           Post Education Referrals:      [] [place] Tobacco Quit information sheet and [address]       [] Dental care - Dental care of [facility]     [] [facility]) link  phone number - for information and referral to [facility]  Clinically  [facility], WEIGHT [facility]NAG[facility]        []Other  Izzy Jordan, RN

## 2020-07-10 ENCOUNTER — HOSPITAL ENCOUNTER (OUTPATIENT)
Dept: DIABETES SERVICES | Age: 55
Setting detail: THERAPIES SERIES
Discharge: HOME | End: 2020-07-10
Payer: COMMERCIAL

## 2020-07-10 PROCEDURE — G0108 DIAB MANAGE TRN  PER INDIV: HCPCS

## 2020-07-10 NOTE — PROGRESS NOTES
Administered Date(s) Administered    Pneumococcal Conjugate 13-valent (Bjznehv25) 10/06/2017    Pneumococcal Polysaccharide ([id number]) 10/06/2015, 11/04/2016, 11/14/2016    Td, unspecified formulation 07/21/2017     Current Medications  Current Outpatient Medications   Medication Sig Dispense Refill    blood glucose test strips (ASCENSIA AUTODISC VI;ONE TOUCH ULTRA TEST VI) strip Use with associated glucose meter. 100 strip 11    Lancets MISC 1 each by Does not apply route 3 times daily 200 each 5    Blood Glucose Monitoring Suppl (FREESTYLE FREEDOM) KIT 1 kit by Does not apply route daily 1 kit 0    lisinopril (PRINIVIL;ZESTRIL) 40 MG tablet TAKE 1 TABLET DAILY 90 tablet 0    atorvastatin (LIPITOR) 10 MG tablet TAKE 1 TABLET BY MOUTH ONE TIME A DAY 90 tablet 3    ALPRAZolam (XANAX) 0.5 MG tablet Take 0.5 mg by mouth daily.       albuterol sulfate  (90 Base) MCG/ACT inhaler Inhale 2 puffs into the lungs every 6 hours as needed for Wheezing 1 Inhaler 3    Diclofenac Sodium  MG TB24 TAKE 1 TABLET DAILY 90 tablet 3    metoprolol succinate (TOPROL XL) 100 MG extended release tablet TAKE 1 TABLET DAILY 90 tablet 3    loratadine (CLARITIN) 10 MG tablet TAKE 1 TABLET BY MOUTH ONE TIME A DAY 30 tablet 1    venlafaxine (EFFEXOR XR) 150 MG extended release capsule TAKE 1 CAPSULE DAILY 90 capsule 3    pantoprazole (PROTONIX) 20 MG tablet TAKE 1 TABLET TWICE A DAY BEFORE MEALS 180 tablet 3    [place]icap Plac[place] [facility] [place] 3/31/2022 1 each 0    fluticasone (FLONASE) 50 MCG/ACT nasal spray as needed        No current facility-administered medications for this encounter.    :     Comments:  Allergies:  No Known Allergies    A1C blood level   Lab Results   Component Value Date    LABA1C 6.6 (H) 05/20/2020    LABA1C 5.7 03/01/2019    LABA1C 5.1 11/11/2017     Lab Results   Component Value Date    LDLCALC 97 12/02/2016    CREATININE 1.05 05/20/2020       Blood pressure   BP Readings from Last 3 Encounters:   06/11/20 122/84   05/11/20 (!) 158/82   02/25/20 (!) 153/75        Cholesterol  Lab Results   Component Value Date    LDLCALC 97 12/02/2016    LDLCHOLESTEROL 71 05/20/2020        Diabetes Self- Management Education Record    Participant Name: Saida Molina  Referring Provider: BULMARO Burnham CNP   Assessment/Evaluation Ratings:  1=Needs Instruction   4=Demonstrates Understanding/Competency  2=Needs Review   NC=Not Covered    3=Comprehends Key Points  N/A=Not Applicable  Topics/Learning Objectives Pre-session Assess Date:  6/4/20CB Instr. Date Reinforce Date Post- session Eval Comments   Diabetes disease process & Treatment process: Define diabetes & pre-diabetes; Identify own type of diabetes; role of the pancreas; signs/symptoms; diagnostic criteria; prevention & treatment options; contributing factors. 1 6/11/20CB 6/25/20CB  New Dx in May and wants to avoid meds if possible . Family Hx with sister and poss mom. Wife has T2DM so supportive. 6/4/20CB    Pt able to view You tube Understanding T2DM and some slides with screen share at beginning of class. Wife present as well.6/11/20CB   Incorporating nutritional management into lifestyle: Describe effect of type, amount & timing of food on blood glucose; Describe basic meal planning techniques & current nutrition guideline   1 5-5:30 eggs    11:30a packed ham sandwich or burger and onion rings ( trying to cut back on fast food)    5-8p spaghetti, or hamburger or mac and cheese,or sometimes comes home from work and drinks 6-8 beers and then goes to bed  Stopped reg pop and mostly water now, occ diet Dr Vignesh Echevarria, lost 10# in 1 week6/4/20CB    F/u today with pcp and lost 7# and /82 6/19/20 JW  Good attitude, making good changes and motivated to stay off medication (but ok if ends up needing). Mindful of portions and eating less.  7/10/20 JW- quiz 100%  What to eat - Food groups, When to eat - timing of meals and snacks, and How much to eat - portions control. Would suggest 8026-2077 calories 4-5cho/meal, 1 cho/snack   calories/ day   CHO choices/ meal   CHO choices/  day   grams of protein /day   gram of fat /day     Correctly read food labels & demonstrate CHO counting & portion control with personalized meal plan. Identify dining out strategies, & dietary sick day guidelines. 1 6/19/20 JW 7/10/20 JW     Incorporating physical activity into lifestyle:   Verbalize effect of exercise on blood glucose levels; benefits of regular exercise; safety considerations; contraindications; maintenance of activity. 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 Does have hip problems , past bilateral hip replacement, also ? Emphysema. Does have ecliptical in garage that he plans to get out and start using. 6/4/20CB    Did move elliptical to front of garage so he can use and will start daily and hopes to workup to 20 min/d 6/11/20CB  Work has been busy so has only been able to use Elliptical once. 6/25/20CB   Using medications safely:  Identify effects of diabetes medicines on blood glucose levels; List diabetes medication taken, action & side effects;    1 6/25/20CB   No meds   Insulin / Injectable - Appropriate injection sites; proper storage; supplies needed; proper technique; safe needle disposal guidelines. 1 6/25/20CB   NA  Brief discussion6/25/20CB   Monitoring blood glucose, interpreting and using results:  Identify recommended & personal blood glucose targets; importance of testing; testing supplies; HgbA1C target levels; Factors affecting blood glucose; Importance of logging blood glucose levels for pattern recognition; ketone testing; safe lancet disposal.   1 6/11/20CB 6/25/20CB  A1C 6.6% on May 20th. Testing 3-4x/day  AM  range .  After lunch ,evening 109-150 range        6/4/20CB    BG  continue around 118-119.6/11/20CB    Continues to check,  9p last night 116 at 4a today and 117 at 3p6/25/20CB Prevention, detection & treatment of acute complications:  Identify symptoms of hyper & hypoglycemia, and prevention & treatment strategies. 1 6/11/20CB   Increased urination at night with dry mouth and some blurred vision6/4/20CB   Describe sick day guidelines & indications for  physician notification. Identify short term consequences of poor control. Disaster preparedness strategies    1       Prevention, detection & treatment of chronic complications:  Define the natural course of diabetes & describe the relationship of blood glucose levels to long term complications of diabetes. Identify preventative measures & standards of care. 1 6/25/20CB   Did have cataracts removed and sees eye Dr. Angie Quinteros tear about 4y ago when Fx ribs that was monitored with CT Scans but recent injury from lifting and unsure if he broke a rib so seeing PCP next Thursday. Does see dentist 6 mo.   6/4/20CB   Developing strategies to address psychosocial issues:  Describe feelings about living with diabetes; Describe how stress, depression & anxiety affect blood glucose; Identify coping strategies; Identify support needed & support network available. 1 6/11/20CB   Positive and wants to keep BG down with eating healthy and avoid meds. Wife and daughter supportive. Starting to feel better and motivated by trying to eat healthy and start to exercise. 6/11/20CB   Developing strategies to promote health/change behavior: Identify 7 self-care behaviors; Personal health risk factors; Benefits, challenges & strategies for behavioral change;    1 6/25/20CB   Does drink 6-8 beers nightly. Had stopped years ago and joined [employer] and eat small frequent meals/snacks and lost about 50# and felt great then. Wants to work on healthier lifestyle again    Continues to drink beer but switched to light, smokes. Reviewed effects of smoking on blood vessels. 6/25/20CB     Individualized goal selection.               My goal , to help me improve my health, I will:   1.get the ecliptical out and start using      2. Plan  Follow-up Appointments planned Thursday, June 11@ 4:30p per video  Follow up for Class 4 July 10@ 8:30a  Instruction Method: []Lecture/Discussion  []Power Point Presentation  [x]Handouts  []Return Demonstration    Education Materials/Equipment Provided (VIA Mail for phone visits)  :    [x]Self-Management - Initial assessment - Enrolment in to ADA  Where do I Begin, Living with Type 2 diabetes ADA home support program and  handout on diabetes education classes. 6/4/20CB    [x]Self-Management  Class 1 -Self-Management  Class 1 - \"Diabetes - your take control guide\" - ADA booklet -6/11/20CB  o  \"ready, set, start counting\" teaching sheet in diet section   o  GLP-1 understanding 8 core deficits puzzle sheet in the medication section  o one day food diary and envelope for return of diet HX   o  You tube and website resource sheet-Understanding Type 2 Diabetes from Animated Diabetes Patient [email or web address]      [x] Self-Management  Class 2 - Meal Plan and handout for serving sizes, smarter snacking, Ready Set Carb Counting / Plate Method, Nutrient Conversion and International Diabetes [address] Eating for People with Diabetes and Nutrition in the [employer] Resources - fast facts about fast food6/19/20 JW    [x] Self-Management  Class 3 -  Diabetes your take control guide pages 21 - 30,  type 2 diabetes and the role of GLP- 1,  Individualized Diabetes report card 6/25/20CB    [x] Self-Management Class 4 - BD Booklet  Sick Day [place] and  Dinning Out Guide , recipe hand outs and tips, diabetes Cookbooks  ( when available) 7/10/20 JW    []Self-Management - 3 month follow -  AADE7 Self care behaviors work sheets, 2020 [employer],  Online resource list - March 2020      []Self-Management  Gestational - RN class -Resource materials sent out : care booklet - \" Gestational Diabetes Mellitus ( GDM) toolkit form [place] gestational diabetes postpartum care learning collaborative 2018. \"Simple Guidelines for meal planning with gestational diabetes. SMBG sheets to fax back to M weekly. BD  healthy injection site selection and rotation with 6 mm insulin syringe and 4 mm pen needle. Gestational diabetes handout from [facility] 2016. Did you have gestational diabetes when you were pregnant? Handout from [facility]  April 2014    []Self-Management Gestational - RD class - My Food Plan for Gestational diabetes    []Glucose Meter     []Insulin Kit     []Other      Encounter Type Date Start Time End Time Comments No Show Dates   Assessment 6/4/20CB   4:30 5:45   []In Person  [x]Telephone    Class 1 - Understanding diabetes 6/11/20CB 4:30 5:30  []TelephoneAmerican Diabetes Association  www. diabetes. org  Per video    Class 2- Nutrition and diabetes   6/19/20 JW 8:30 9:30 [x]Doxy. me  Healthy Eating with Diabetes- Automatic Data of Diabetes and Digestive and Kidney   https://youtu. be/[email or web address]    Class 3 - Preventing Complications 1/21/06PF 7:74 5:45  []Telephone  By video    Class 4 -  In depth Nutrition and sick day care 7/10/20 JW 8:45 9:45 []Telephone x video  Diabetes Food hub  www. diabetesfoodhub.org     Class 5 - 3 month follow up / goal reassessment        Gestational - RN         Gestational - RD        Individual MNT         Shared Med Appt         Yearly Follow-up        Meter Instrx      How to Measure Your Blood Sugar - [facility] Patient Education  https://youtu. be/[email or web address]    Insulin Instrx      []Pen  []Vial & Syringe   BD Diabetes Care: How to Inject Insulin with a Pen Needle  https://youtu. be/[email or web address]    Diabetes Care: How to Inject Insulin with a Syringe  https://youtu. be/[email or web address]-[email or web address]       DSMS Support :   [] MNT      [] Annual update     [] Starting Fresh  adults living with diabetes or pre diabetes.  [address] [place], [place]    [id number]-5:30pm- on 6 week rotation  Free -  REGISTRATION IS REQUIRED - [facility] [phone] call for dates    []  Diabetes Group at  [place] [address] of [place] - Free 6 week diabetes education support   classes - contact : Consuelo Luciano [phone]  for dates and locations      []ADA  Where do I Begin, Living with Type 2 diabetes ADA home support program    Web site: diabetes. org/living    Call: 1800 DIABETES  e-mail: Citlali@[email or web address] org     []  Internet web sites - ADAWeb site: www.diabetes. org       [] [facility] opens at 8 30 am daily for walkers, shops open at 10 am   [address] [facility], [facility]   [phone] Daily - 8:30am - 10am    3rd Tuesday of every month [address] [facility] expert speakers visit from 9 - 10am        [] [address] [facility], [facility], [facility], [place], [facility] (site rotate)  Call [phone] or visit   website: [email or web address] for more details   Check web site for updated times/ dates       [] [address]  [address] [facility], [address] [address] Tuesday and Thursday -   9 :30 am- 10:30am - ongoing   [] [address]  [address] [place] [address], [address] [facility] [address] [address] [facility] Thursday 11:00am - 11:45am     [] Third Wednesday Cooking  Class  Free  Registration is required     [address] [facility]. [address] [facility], 125 [facility] [phone] or   Email [email or web address]   Wednesdays-5:00- 6:00 pm       [] Eat Smart  Be Active & Learn How - Free   Families & grandparents with children in home 0- 25 & pregnant moms          Various sites in community - call to find next session near you. [facility] extension office for future sessions - Haven Nolasco  Email : [email or web address]  Phone: [phone] [] (SNAP-Ed)   - free nutrition education   - adults and youth, who are eligible for the Supplemental Nutrition Assistance Program    Various sites in community - call to find next session near you. [facility] CAROL ANN-CRANBERRY-YARELI SNAP-Ed  contact Oziel Loya, Email:[phone]           Post Education Referrals:      [] [place] Tobacco Quit information sheet and [address] [facility] , 21       [] Dental care - [facility]     [] [facility] ([facility]) link  phone number - for information and referral to [address] [facility], WOUND, WEIGHT MANAGEMENT        []Other  Isaac Ramsey RD, LD

## 2020-07-18 ENCOUNTER — HOSPITAL ENCOUNTER (OUTPATIENT)
Dept: PREADMISSION TESTING | Age: 55
Setting detail: SPECIMEN
Discharge: HOME | End: 2020-07-22
Payer: COMMERCIAL

## 2020-07-18 PROCEDURE — U0004 COV-19 TEST NON-CDC HGH THRU: HCPCS

## 2020-07-20 LAB
SARS-COV-2, PCR: NOT DETECTED
SARS-COV-2, RAPID: NORMAL
SARS-COV-2: NORMAL
SOURCE: NORMAL

## 2020-07-21 ENCOUNTER — ANESTHESIA EVENT (OUTPATIENT)
Dept: ENDOSCOPY | Age: 55
End: 2020-07-21
Payer: COMMERCIAL

## 2020-07-21 ENCOUNTER — HOSPITAL ENCOUNTER (OUTPATIENT)
Age: 55
Setting detail: OUTPATIENT SURGERY
Discharge: HOME | End: 2020-07-21
Attending: INTERNAL MEDICINE | Admitting: INTERNAL MEDICINE
Payer: COMMERCIAL

## 2020-07-21 ENCOUNTER — ANESTHESIA (OUTPATIENT)
Dept: ENDOSCOPY | Age: 55
End: 2020-07-21
Payer: COMMERCIAL

## 2020-07-21 VITALS
WEIGHT: 260 LBS | OXYGEN SATURATION: 98 % | HEART RATE: 63 BPM | TEMPERATURE: 98 F | HEIGHT: 71 IN | BODY MASS INDEX: 36.4 KG/M2 | DIASTOLIC BLOOD PRESSURE: 96 MMHG | SYSTOLIC BLOOD PRESSURE: 139 MMHG | RESPIRATION RATE: 22 BRPM

## 2020-07-21 VITALS
RESPIRATION RATE: 22 BRPM | DIASTOLIC BLOOD PRESSURE: 50 MMHG | SYSTOLIC BLOOD PRESSURE: 80 MMHG | OXYGEN SATURATION: 97 %

## 2020-07-21 LAB — GLUCOSE BLD-MCNC: 110 MG/DL (ref 75–110)

## 2020-07-21 PROCEDURE — 2580000003 HC RX 258: Performed by: INTERNAL MEDICINE

## 2020-07-21 PROCEDURE — 82947 ASSAY GLUCOSE BLOOD QUANT: CPT

## 2020-07-21 PROCEDURE — 7100000011 HC PHASE II RECOVERY - ADDTL 15 MIN: Performed by: INTERNAL MEDICINE

## 2020-07-21 PROCEDURE — 88305 TISSUE EXAM BY PATHOLOGIST: CPT

## 2020-07-21 PROCEDURE — 2580000003 HC RX 258: Performed by: NURSE ANESTHETIST, CERTIFIED REGISTERED

## 2020-07-21 PROCEDURE — 3609012400 HC EGD TRANSORAL BIOPSY SINGLE/MULTIPLE: Performed by: INTERNAL MEDICINE

## 2020-07-21 PROCEDURE — 3700000001 HC ADD 15 MINUTES (ANESTHESIA): Performed by: INTERNAL MEDICINE

## 2020-07-21 PROCEDURE — 7100000010 HC PHASE II RECOVERY - FIRST 15 MIN: Performed by: INTERNAL MEDICINE

## 2020-07-21 PROCEDURE — 2500000003 HC RX 250 WO HCPCS: Performed by: NURSE ANESTHETIST, CERTIFIED REGISTERED

## 2020-07-21 PROCEDURE — 2709999900 HC NON-CHARGEABLE SUPPLY: Performed by: INTERNAL MEDICINE

## 2020-07-21 PROCEDURE — 3700000000 HC ANESTHESIA ATTENDED CARE: Performed by: INTERNAL MEDICINE

## 2020-07-21 PROCEDURE — 6360000002 HC RX W HCPCS: Performed by: NURSE ANESTHETIST, CERTIFIED REGISTERED

## 2020-07-21 RX ORDER — LIDOCAINE HYDROCHLORIDE 10 MG/ML
INJECTION, SOLUTION EPIDURAL; INFILTRATION; INTRACAUDAL; PERINEURAL PRN
Status: DISCONTINUED | OUTPATIENT
Start: 2020-07-21 | End: 2020-07-21 | Stop reason: SDUPTHER

## 2020-07-21 RX ORDER — PROPOFOL 10 MG/ML
INJECTION, EMULSION INTRAVENOUS PRN
Status: DISCONTINUED | OUTPATIENT
Start: 2020-07-21 | End: 2020-07-21 | Stop reason: SDUPTHER

## 2020-07-21 RX ORDER — DIPHENHYDRAMINE HYDROCHLORIDE 50 MG/ML
12.5 INJECTION INTRAMUSCULAR; INTRAVENOUS
Status: DISCONTINUED | OUTPATIENT
Start: 2020-07-21 | End: 2020-07-21 | Stop reason: HOSPADM

## 2020-07-21 RX ORDER — OXYCODONE HYDROCHLORIDE AND ACETAMINOPHEN 5; 325 MG/1; MG/1
2 TABLET ORAL PRN
Status: DISCONTINUED | OUTPATIENT
Start: 2020-07-21 | End: 2020-07-21 | Stop reason: HOSPADM

## 2020-07-21 RX ORDER — FENTANYL CITRATE 50 UG/ML
25 INJECTION, SOLUTION INTRAMUSCULAR; INTRAVENOUS EVERY 5 MIN PRN
Status: DISCONTINUED | OUTPATIENT
Start: 2020-07-21 | End: 2020-07-21 | Stop reason: HOSPADM

## 2020-07-21 RX ORDER — HYDRALAZINE HYDROCHLORIDE 20 MG/ML
5 INJECTION INTRAMUSCULAR; INTRAVENOUS EVERY 10 MIN PRN
Status: DISCONTINUED | OUTPATIENT
Start: 2020-07-21 | End: 2020-07-21 | Stop reason: HOSPADM

## 2020-07-21 RX ORDER — DEXAMETHASONE SODIUM PHOSPHATE 10 MG/ML
4 INJECTION INTRAMUSCULAR; INTRAVENOUS
Status: DISCONTINUED | OUTPATIENT
Start: 2020-07-21 | End: 2020-07-21 | Stop reason: HOSPADM

## 2020-07-21 RX ORDER — OXYCODONE HYDROCHLORIDE AND ACETAMINOPHEN 5; 325 MG/1; MG/1
1 TABLET ORAL PRN
Status: DISCONTINUED | OUTPATIENT
Start: 2020-07-21 | End: 2020-07-21 | Stop reason: HOSPADM

## 2020-07-21 RX ORDER — SODIUM CHLORIDE 9 MG/ML
INJECTION, SOLUTION INTRAVENOUS ONCE
Status: COMPLETED | OUTPATIENT
Start: 2020-07-21 | End: 2020-07-21

## 2020-07-21 RX ORDER — MEPERIDINE HYDROCHLORIDE 50 MG/ML
12.5 INJECTION INTRAMUSCULAR; INTRAVENOUS; SUBCUTANEOUS EVERY 5 MIN PRN
Status: DISCONTINUED | OUTPATIENT
Start: 2020-07-21 | End: 2020-07-21 | Stop reason: HOSPADM

## 2020-07-21 RX ORDER — MIDAZOLAM HYDROCHLORIDE 1 MG/ML
INJECTION INTRAMUSCULAR; INTRAVENOUS PRN
Status: DISCONTINUED | OUTPATIENT
Start: 2020-07-21 | End: 2020-07-21 | Stop reason: SDUPTHER

## 2020-07-21 RX ORDER — SODIUM CHLORIDE 9 MG/ML
INJECTION, SOLUTION INTRAVENOUS CONTINUOUS PRN
Status: DISCONTINUED | OUTPATIENT
Start: 2020-07-21 | End: 2020-07-21 | Stop reason: SDUPTHER

## 2020-07-21 RX ORDER — LABETALOL HYDROCHLORIDE 5 MG/ML
5 INJECTION, SOLUTION INTRAVENOUS EVERY 10 MIN PRN
Status: DISCONTINUED | OUTPATIENT
Start: 2020-07-21 | End: 2020-07-21 | Stop reason: HOSPADM

## 2020-07-21 RX ORDER — ONDANSETRON 2 MG/ML
4 INJECTION INTRAMUSCULAR; INTRAVENOUS
Status: DISCONTINUED | OUTPATIENT
Start: 2020-07-21 | End: 2020-07-21 | Stop reason: HOSPADM

## 2020-07-21 RX ADMIN — SODIUM CHLORIDE: 9 INJECTION, SOLUTION INTRAVENOUS at 08:28

## 2020-07-21 RX ADMIN — MIDAZOLAM HYDROCHLORIDE 1 MG: 1 INJECTION, SOLUTION INTRAMUSCULAR; INTRAVENOUS at 09:33

## 2020-07-21 RX ADMIN — LIDOCAINE HYDROCHLORIDE 50 MG: 10 INJECTION, SOLUTION EPIDURAL; INFILTRATION; INTRACAUDAL; PERINEURAL at 09:24

## 2020-07-21 RX ADMIN — SODIUM CHLORIDE: 9 INJECTION, SOLUTION INTRAVENOUS at 08:20

## 2020-07-21 RX ADMIN — PROPOFOL 260 MG: 10 INJECTION, EMULSION INTRAVENOUS at 09:25

## 2020-07-21 RX ADMIN — SODIUM CHLORIDE: 9 INJECTION, SOLUTION INTRAVENOUS at 09:50

## 2020-07-21 ASSESSMENT — PAIN SCALES - GENERAL
PAINLEVEL_OUTOF10: 0

## 2020-07-21 ASSESSMENT — PAIN - FUNCTIONAL ASSESSMENT: PAIN_FUNCTIONAL_ASSESSMENT: 0-10

## 2020-07-21 NOTE — ANESTHESIA POSTPROCEDURE EVALUATION
Department of Anesthesiology  Postprocedure Note    Patient: Markus Pinedo  MRN: [id number]  YOB: 1965  Date of evaluation: 7/21/2020  Time:  10:10 AM     Procedure Summary     Date:  07/21/20 Room / Location:  [address] [place] [address]    Anesthesia Start:  8465 Anesthesia Stop:  6431    Procedure:  EGD ESOPHAGOGASTRODUODENOSCOPY (N/A ) Diagnosis:  (Shae Henry)    Surgeon:  Vandana Bhat MD Responsible Provider:  Vilma Nolan MD    Anesthesia Type:  general ASA Status:  3          Anesthesia Type: general    Mita Phase I:      Mita Phase II: Mita Score: 8    Last vitals: Reviewed and per EMR flowsheets.        Anesthesia Post Evaluation    Patient location during evaluation: PACU  Patient participation: complete - patient participated  Level of consciousness: awake  Pain score: 0  Airway patency: patent  Nausea & Vomiting: no nausea and no vomiting  Complications: no  Cardiovascular status: blood pressure returned to baseline  Respiratory status: acceptable  Hydration status: euvolemic

## 2020-07-21 NOTE — H&P
History and Physical    Pt Name: Markus Pinedo  MRN: [id number]  YOB: 1965  Date of evaluation: 2020  Primary Care Physician: BULMARO Stringer CNP  Patient evaluated at the request of  Dr. Li Patience for evaluation:  Duodenal ulcer hx, olivier esophagus    SUBJECTIVE:   History of Chief Complaint:      Mohini Da Silva is a 62 y.o. male     Who is scheduled to have  His     EGD today , his last EGD was in Dec/2018 , occasionally  when he swallows his spit and goes the down the wrong pipe it makes him cough he reports . Denies dysphagia , abdominal pain and GI bleeding , hx of smoking x 45 yrs . Past Medical History      has a past medical history of Arthritis, Olivier esophagus, Depression, DU (duodenal ulcer), Emphysema lung ([address] [place] (hard of hearing), Hyperlipidemia, Hypersomnia with sleep apnea, unspecified, Hypertension, AGUS (obstructive sleep apnea), and Thoracic outlet syndrome. Past Surgical History   has a past surgical history that includes Colonoscopy (2016); Upper gastrointestinal endoscopy (N/A, 2018); joint replacement (Bilateral); Soft Tissue Tumor Resection; lymph node dissection; Cataract removal with implant (Right); Carpal tunnel release (Left, 2019); and Carpal tunnel release (Right, 2020). Medications   Scheduled Meds:  Continuous Infusions:  PRN Meds:. Allergies  has No Known Allergies. Family History    family history includes Heart Disease in his sister; Mult Sclerosis in his father.     Family Status   Relation Name Status    Mother  Alive    Father      Sister  Alive         Social History  Social History     Socioeconomic History    Marital status:      Spouse name: Not on file    Number of children: Not on file    Years of education: Not on file    Highest education level: Not on file   Occupational History    Not on file   Social Needs    Financial resource strain: Not on file   Eulalio Ventura Food insecurity     Worry: Not on file     Inability: Not on file    Transportation needs     Medical: Not on file     Non-medical: Not on file   Tobacco Use    Smoking status: Current Every Day Smoker     Packs/day: 2.50     Years: 45.00     Pack years: 112.50     Types: Cigarettes    Smokeless tobacco: Never Used   Substance and Sexual Activity    Alcohol use: Yes     Comment: daily, 6-8 beer daily    Drug use: No    Sexual activity: Not on file   Lifestyle    Physical activity     Days per week: Not on file     Minutes per session: Not on file    Stress: Not on file   Relationships    Social connections     Talks on phone: Not on file     Gets together: Not on file     Attends Yazdanism service: Not on file     Active member of club or organization: Not on file     Attends meetings of clubs or organizations: Not on file     Relationship status: Not on file    Intimate partner violence     Fear of current or ex partner: Not on file     Emotionally abused: Not on file     Physically abused: Not on file     Forced sexual activity: Not on file   Other Topics Concern    Not on file   Social History Narrative    Not on file        Occupation:  industrial      Hobbies:   [employer]       OBJECTIVE:   VITALS:  vitals were not taken for this visit. CONSTITUTIONAL: Alert and oriented times 3, no acute distress and cooperative to examination. friendly and pleasant , stocky muscular build     SKIN: rash No    HEENT: Head is normocephalic, atraumatic. EOMI, PERRLA    Oral air way :slightly narrow Yes    NECK: neck supple, no lymphadenopathy noted, trachea midline and straight       2+ carotid, no bruit    LUNGS: Chest expands equally bilaterally upon respiration, no accessory muscle used. Ausculation reveals no adventitious breath sounds.     CARDIOVASCULAR: \"Heart sounds are normal.  Regular rate and rhythm without murmur,    ABDOMEN: Bowel sounds are present in all four quadrants  , over weight GENATALIA:Deferred. NEUROLOGIC: \"CN II-XII are grossly intact. EXTREMITIES: Pitting edema:  No,  Varicose veins: No     Dorsal pedal/posterior tibial pulses palpable: Yes         Strength:   not tested       Patient Active Problem List   Diagnosis    Anxiety and depression    Mixed hyperlipidemia    Essential hypertension    Primary osteoarthritis involving multiple joints    Multiple lung nodules               IMPRESSIONS:   1. Duodenal ulcer hx, olivier esophagus    2. does not have any pertinent problems on file.     [facility]  Electronically signed 7/21/2020 at 7:51 AM       Scheduled for:  EGD

## 2020-07-21 NOTE — ANESTHESIA PRE PROCEDURE
Department of Anesthesiology  Preprocedure Note       Name:  Jose Angel Eddy   Age:  62 y.o.  :  1965                                          MRN:  [id number]         Date:  2020      Surgeon: Elle Bailey):  Kamran Leigh MD    Procedure: Procedure(s):  EGD ESOPHAGOGASTRODUODENOSCOPY    Medications prior to admission:   Prior to Admission medications    Medication Sig Start Date End Date Taking? Authorizing Provider   blood glucose test strips (ASCENSIA AUTODISC VI;ONE TOUCH ULTRA TEST VI) strip Use with associated glucose meter. 20   BULMARO Millan CNP   Lancets MISC 1 each by Does not apply route 3 times daily 20   BULMARO Millan CNP   Blood Glucose Monitoring Suppl (FREESTYLE FREEDOM) KIT 1 kit by Does not apply route daily 20   BULMARO Millan CNP   lisinopril (PRINIVIL;ZESTRIL) 40 MG tablet TAKE 1 TABLET DAILY 20   BULMARO Millan CNP   atorvastatin (LIPITOR) 10 MG tablet TAKE 1 TABLET BY MOUTH ONE TIME A DAY 20   BULMARO Millan CNP   ALPRAZolam Orozco Cardinal) 0.5 MG tablet Take 0.5 mg by mouth daily.     Historical Provider, MD   albuterol sulfate  (90 Base) MCG/ACT inhaler Inhale 2 puffs into the lungs every 6 hours as needed for Wheezing 19   BULMARO Millan CNP   Diclofenac Sodium  MG TB24 TAKE 1 TABLET DAILY 19   BULMARO Millan CNP   metoprolol succinate (TOPROL XL) 100 MG extended release tablet TAKE 1 TABLET DAILY 19   BULMARO Millan CNP   loratadine (CLARITIN) 10 MG tablet TAKE 1 TABLET BY MOUTH ONE TIME A DAY 19   BULMARO Millan CNP   venlafaxine (EFFEXOR XR) 150 MG extended release capsule TAKE 1 CAPSULE DAILY 19   BULMARO Millan CNP   pantoprazole (PROTONIX) 20 MG tablet TAKE 1 TABLET TWICE A DAY BEFORE MEALS 19   BULMARO Millan CNP   Handicap Placard [address] [facility] Expires 3/31/2022 3/31/17   Divina Lara Katherine Vigil, APRN - CNP   fluticasone (FLONASE) 50 MCG/ACT nasal spray as needed  12/22/16   Historical Provider, MD       Current medications:    No current facility-administered medications for this encounter. Current Outpatient Medications   Medication Sig Dispense Refill    blood glucose test strips (ASCENSIA AUTODISC VI;ONE TOUCH ULTRA TEST VI) strip Use with associated glucose meter. 100 strip 11    Lancets MISC 1 each by Does not apply route 3 times daily 200 each 5    Blood Glucose Monitoring Suppl (FREESTYLE FREEDOM) KIT 1 kit by Does not apply route daily 1 kit 0    lisinopril (PRINIVIL;ZESTRIL) 40 MG tablet TAKE 1 TABLET DAILY 90 tablet 0    atorvastatin (LIPITOR) 10 MG tablet TAKE 1 TABLET BY MOUTH ONE TIME A DAY 90 tablet 3    ALPRAZolam (XANAX) 0.5 MG tablet Take 0.5 mg by mouth daily.       albuterol sulfate  (90 Base) MCG/ACT inhaler Inhale 2 puffs into the lungs every 6 hours as needed for Wheezing 1 Inhaler 3    Diclofenac Sodium  MG TB24 TAKE 1 TABLET DAILY 90 tablet 3    metoprolol succinate (TOPROL XL) 100 MG extended release tablet TAKE 1 TABLET DAILY 90 tablet 3    loratadine (CLARITIN) 10 MG tablet TAKE 1 TABLET BY MOUTH ONE TIME A DAY 30 tablet 1    venlafaxine (EFFEXOR XR) 150 MG extended release capsule TAKE 1 CAPSULE DAILY 90 capsule 3    pantoprazole (PROTONIX) 20 MG tablet TAKE 1 TABLET TWICE A DAY BEFORE MEALS 180 tablet 3    Handicap [facility]ver [facility] Expires 3/31/2022 1 each 0    fluticasone (FLONASE) 50 MCG/ACT nasal spray as needed          Allergies:  No Known Allergies    Problem List:    Patient Active Problem List   Diagnosis Code    Anxiety and depression F41.9, F32.9    Mixed hyperlipidemia E78.2    Essential hypertension I10    Primary osteoarthritis involving multiple joints M15.0    Multiple lung nodules R91.8       Past Medical History:        Diagnosis Date    Arthritis     Wilcox esophagus     Depression     DU (duodenal ulcer)     Emphysema lung (HCC)     [place] (hard of hearing)     Hyperlipidemia     Hypersomnia with sleep apnea, unspecified     CPAP nightly    Hypertension     AGUS (obstructive sleep apnea)     wears CPAP    Thoracic outlet syndrome     Rt       Past Surgical History:        Procedure Laterality Date    CARPAL TUNNEL RELEASE Left 12/17/2019    CARPAL TUNNEL RELEASE performed by Timmy Roldan MD at [address] Right 1/27/2020    CARPAL TUNNEL RELEASE performed by [facility]oldan MD at [facility] 1945 Right     COLONOSCOPY  5/27/2016    JOINT REPLACEMENT Bilateral     hips    LYMPH NODE DISSECTION      neck    SOFT TISSUE TUMOR RESECTION      back    UPPER GASTROINTESTINAL ENDOSCOPY N/A 12/11/2018    EGD ESOPHAGOGASTRODUODENOSCOPY performed by Froilan Martínez DO at [facility]       Social History:    Social History     Tobacco Use    Smoking status: Current Every Day Smoker     Packs/day: 2.50     Years: 45.00     Pack years: 112.50     Types: Cigarettes    Smokeless tobacco: Never Used   Substance Use Topics    Alcohol use: Yes     Comment: daily, 6-8 beer daily                                Ready to quit: Not Answered  Counseling given: Not Answered      Vital Signs (Current): There were no vitals filed for this visit.                                            BP Readings from Last 3 Encounters:   06/11/20 122/84   05/11/20 (!) 158/82   02/25/20 (!) 153/75       NPO Status:                                                                                 BMI:   Wt Readings from Last 3 Encounters:   06/11/20 269 lb 6.4 oz (122.2 kg)   05/11/20 276 lb 6.4 oz (125.4 kg)   02/25/20 260 lb (117.9 kg)     There is no height or weight on file to calculate BMI.    CBC:   Lab Results   Component Value Date    WBC 8.9 05/20/2020    RBC 5.13 05/20/2020    HGB 15.8 05/20/2020    HCT 46.1 05/20/2020    MCV 89.9 05/20/2020    RDW 14.4 05/20/2020     05/20/2020 CMP:   Lab Results   Component Value Date     05/20/2020    K 5.6 05/20/2020    CL 95 05/20/2020    CO2 24 05/20/2020    BUN 22 05/20/2020    CREATININE 1.05 05/20/2020    GFRAA >60 05/20/2020    LABGLOM >60 05/20/2020    GLUCOSE 138 05/20/2020    PROT 7.0 05/20/2020    CALCIUM 9.0 05/20/2020    BILITOT 0.34 05/20/2020    ALKPHOS 112 05/20/2020    AST 30 05/20/2020    ALT 38 05/20/2020       POC Tests: No results for input(s): POCGLU, POCNA, POCK, POCCL, POCBUN, POCHEMO, POCHCT in the last 72 hours. Coags: No results found for: PROTIME, INR, APTT    HCG (If Applicable): No results found for: PREGTESTUR, PREGSERUM, HCG, HCGQUANT     ABGs: No results found for: PHART, PO2ART, TAC5SSS, XTD7FOY, BEART, L4NEVVJE     Type & Screen (If Applicable):  No results found for: LABABO, LABRH    Drug/Infectious Status (If Applicable):  No results found for: HIV, HEPCAB    COVID-19 Screening (If Applicable):   Lab Results   Component Value Date    COVID19 Not Detected 07/18/2020         Anesthesia Evaluation  Patient summary reviewed and Nursing notes reviewed  Airway: Mallampati: II  TM distance: >3 FB   Neck ROM: full  Mouth opening: > = 3 FB Dental: normal exam         Pulmonary:normal exam  breath sounds clear to auscultation                             Cardiovascular:  Exercise tolerance: good (>4 METS),           Rhythm: regular  Rate: normal                    Neuro/Psych:               GI/Hepatic/Renal:             Endo/Other:                     Abdominal:       Abdomen: soft. Vascular:                                        Anesthesia Plan      general     ASA 3       Induction: intravenous. MIPS: Postoperative opioids intended and Prophylactic antiemetics administered. Anesthetic plan and risks discussed with patient. Use of blood products discussed with patient whom consented to blood products. Plan discussed with attending and CRNA.     Attending anesthesiologist reviewed and agrees with Pre

## 2020-07-22 LAB — SURGICAL PATHOLOGY REPORT: NORMAL

## 2020-07-22 RX ORDER — LISINOPRIL 40 MG/1
TABLET ORAL
Qty: 90 TABLET | Refills: 3 | Status: SHIPPED | OUTPATIENT
Start: 2020-07-22 | End: 2020-09-10 | Stop reason: SDUPTHER

## 2020-08-14 ENCOUNTER — OFFICE VISIT (OUTPATIENT)
Dept: PRIMARY CARE CLINIC | Age: 55
End: 2020-08-14
Payer: COMMERCIAL

## 2020-08-14 VITALS
RESPIRATION RATE: 16 BRPM | HEIGHT: 71 IN | HEART RATE: 70 BPM | BODY MASS INDEX: 36.68 KG/M2 | DIASTOLIC BLOOD PRESSURE: 62 MMHG | WEIGHT: 262 LBS | SYSTOLIC BLOOD PRESSURE: 120 MMHG

## 2020-08-14 LAB — HBA1C MFR BLD: 5.6 %

## 2020-08-14 PROCEDURE — 99214 OFFICE O/P EST MOD 30 MIN: CPT | Performed by: NURSE PRACTITIONER

## 2020-08-14 PROCEDURE — 83036 HEMOGLOBIN GLYCOSYLATED A1C: CPT | Performed by: NURSE PRACTITIONER

## 2020-08-14 ASSESSMENT — PATIENT HEALTH QUESTIONNAIRE - PHQ9
SUM OF ALL RESPONSES TO PHQ QUESTIONS 1-9: 0
1. LITTLE INTEREST OR PLEASURE IN DOING THINGS: 0
2. FEELING DOWN, DEPRESSED OR HOPELESS: 0
SUM OF ALL RESPONSES TO PHQ9 QUESTIONS 1 & 2: 0
SUM OF ALL RESPONSES TO PHQ QUESTIONS 1-9: 0

## 2020-08-14 ASSESSMENT — ENCOUNTER SYMPTOMS
SHORTNESS OF BREATH: 0
BACK PAIN: 0
ABDOMINAL PAIN: 0
COUGH: 0

## 2020-08-14 NOTE — PROGRESS NOTES
[address] PRIMARY CARE  [facility]  [address]  [address] [address]  Dept: [phone]  Dept Fax: [phone]    Stu Garcia is a 62 y.o. male who presentstoday for his medical conditions/complaints as noted below. Stu Garcia is c/o of  Chief Complaint   Patient presents with    Hypertension           HPI:     Presents for 3 month recheck on chronic conditions  Has gained 2lb since last visit  BP well controlled    Hga1c 6.6 to 5.6  Very excited, he has been able to do this with diet/exercise!   Will continue to spot check BP and be adamant with diet    Smoking 1-2 ppd  Denies any desire to quit    Following with Dr. Robin Jett, dx with trigger finger  Had prednisone injection without improvement  Has follow up next week to discuss surgical options    Denies any other problems/concerns      Hemoglobin A1C (%)   Date Value   05/20/2020 6.6 (H)   03/01/2019 5.7   11/11/2017 5.1             ( goal A1C is < 7)   No results found for: LABMICR  LDL Cholesterol (mg/dL)   Date Value   05/20/2020 71   03/01/2019 69   11/11/2017 110     LDL Calculated (mg/dL)   Date Value   12/02/2016 97       (goal LDL is <100)   AST (U/L)   Date Value   05/20/2020 30     ALT (U/L)   Date Value   05/20/2020 38     BUN (mg/dL)   Date Value   05/20/2020 22 (H)     BP Readings from Last 3 Encounters:   08/14/20 120/62   07/21/20 (!) 139/96   07/21/20 (!) 80/50          ([id number])    Past Medical History:   Diagnosis Date    Arthritis     Wilcox esophagus     Depression     DU (duodenal ulcer)     Emphysema lung ([address]     [place] (hard of hearing)     Hyperlipidemia     Hypersomnia with sleep apnea, unspecified     CPAP nightly    Hypertension     AGUS (obstructive sleep apnea)     wears CPAP    Thoracic outlet syndrome     Rt      Past Surgical History:   Procedure Laterality Date    CARPAL TUNNEL RELEASE Left 12/17/2019    CARPAL TUNNEL RELEASE performed by Jewel Padilla MD at [facility]    CARPAL TUNNEL RELEASE Right 1/27/2020    CARPAL TUNNEL RELEASE performed by Nacho Lindsey MD at [facility] 1945 Right     COLONOSCOPY  5/27/2016    JOINT REPLACEMENT Bilateral     hips    LYMPH NODE DISSECTION      neck    SOFT TISSUE TUMOR RESECTION      back    UPPER GASTROINTESTINAL ENDOSCOPY N/A 12/11/2018    EGD ESOPHAGOGASTRODUODENOSCOPY performed by Rafy Buenrostro DO at [facility]    UPPER GASTROINTESTINAL ENDOSCOPY N/A 7/21/2020    EGD BIOPSY performed by Dinora Diamond MD at [facility]       Family History   Problem Relation Age of Onset    Mult Sclerosis Father     Heart Disease Sister           Social History     Tobacco Use    Smoking status: Current Every Day Smoker     Packs/day: 2.50     Years: 45.00     Pack years: 112.50     Types: Cigarettes    Smokeless tobacco: Never Used   Substance Use Topics    Alcohol use: Yes     Comment: daily, 6-8 beer daily      Current Outpatient Medications   Medication Sig Dispense Refill    lisinopril (PRINIVIL;ZESTRIL) 40 MG tablet TAKE 1 TABLET DAILY 90 tablet 3    blood glucose test strips (ASCENSIA AUTODISC VI;ONE TOUCH ULTRA TEST VI) strip Use with associated glucose meter. 100 strip 11    Lancets MISC 1 each by Does not apply route 3 times daily 200 each 5    Blood Glucose Monitoring Suppl (FREESTYLE FREEDOM) KIT 1 kit by Does not apply route daily 1 kit 0    atorvastatin (LIPITOR) 10 MG tablet TAKE 1 TABLET BY MOUTH ONE TIME A DAY 90 tablet 3    ALPRAZolam (XANAX) 0.5 MG tablet Take 0.5 mg by mouth daily.       albuterol sulfate  (90 Base) MCG/ACT inhaler Inhale 2 puffs into the lungs every 6 hours as needed for Wheezing 1 Inhaler 3    Diclofenac Sodium  MG TB24 TAKE 1 TABLET DAILY 90 tablet 3    metoprolol succinate (TOPROL XL) 100 MG extended release tablet TAKE 1 TABLET DAILY 90 tablet 3    loratadine (CLARITIN) 10 MG tablet TAKE 1 TABLET BY MOUTH ONE TIME A DAY 30 tablet 1  venlafaxine (EFFEXOR XR) 150 MG extended release capsule TAKE 1 CAPSULE DAILY 90 capsule 3    pantoprazole (PROTONIX) 20 MG tablet TAKE 1 TABLET TWICE A DAY BEFORE MEALS 180 tablet 3    Handicap Klever [facility] Expires 3/31/2022 1 each 0    fluticasone (FLONASE) 50 MCG/ACT nasal spray as needed        No current facility-administered medications for this visit. No Known Allergies    Health Maintenance   Topic Date Due    Hepatitis C screen  1965    Diabetic foot exam  10/18/1973    Diabetic retinal exam  10/18/1973    HIV screen  10/18/1978    Diabetic microalbuminuria test  10/18/1981    Hepatitis B vaccine (1 of 3 - Risk 3-dose series) 10/18/1982    DTaP/Tdap/Td vaccine (1 - Tdap) 10/18/1982    Shingles Vaccine (1 of 2) 10/18/2013    Low dose CT lung screening  07/19/2020    Flu vaccine (1) 09/01/2020    A1C test (Diabetic or Prediabetic)  05/20/2021    Lipid screen  05/20/2021    Potassium monitoring  05/20/2021    Creatinine monitoring  05/20/2021    Colon cancer screen colonoscopy  05/27/2026    Pneumococcal 0-64 years Vaccine  Completed    Hepatitis A vaccine  Aged Out    Hib vaccine  Aged Out    Meningococcal (ACWY) vaccine  Aged Out       Subjective:      Review of Systems   Constitutional: Negative for chills, fatigue and fever. HENT: Negative for congestion. Eyes: Negative for visual disturbance. Respiratory: Negative for cough and shortness of breath. Cardiovascular: Negative for chest pain and palpitations. Gastrointestinal: Negative for abdominal pain. Genitourinary: Negative for difficulty urinating and dysuria. Musculoskeletal: Negative for arthralgias and back pain. Neurological: Negative for dizziness and headaches. Psychiatric/Behavioral: Negative for self-injury, sleep disturbance and suicidal ideas. The patient is not nervous/anxious. Objective:     Physical Exam  Vitals signs and nursing note reviewed.    Constitutional: 8/14/2020 at 8:10 AM

## 2020-08-25 ENCOUNTER — TELEMEDICINE (OUTPATIENT)
Dept: PRIMARY CARE CLINIC | Age: 55
End: 2020-08-25
Payer: COMMERCIAL

## 2020-08-25 PROCEDURE — 99214 OFFICE O/P EST MOD 30 MIN: CPT | Performed by: NURSE PRACTITIONER

## 2020-08-25 RX ORDER — PREDNISONE 50 MG/1
50 TABLET ORAL DAILY
Qty: 5 TABLET | Refills: 0 | Status: SHIPPED | OUTPATIENT
Start: 2020-08-25 | End: 2021-12-23 | Stop reason: SDUPTHER

## 2020-08-25 RX ORDER — DOXYCYCLINE 100 MG/1
100 TABLET ORAL 2 TIMES DAILY
Qty: 20 TABLET | Refills: 0 | Status: SHIPPED | OUTPATIENT
Start: 2020-08-25 | End: 2020-09-04

## 2020-08-25 ASSESSMENT — ENCOUNTER SYMPTOMS
CHEST TIGHTNESS: 1
VOMITING: 0
SORE THROAT: 0
DIARRHEA: 1
EYE REDNESS: 0
RHINORRHEA: 1
SHORTNESS OF BREATH: 1
ALLERGIC/IMMUNOLOGIC NEGATIVE: 1
NAUSEA: 1
ABDOMINAL PAIN: 1
SINUS PRESSURE: 0
SINUS PAIN: 0
BLOOD IN STOOL: 0
COUGH: 1
EYE PAIN: 0
EYE ITCHING: 0

## 2020-08-25 NOTE — PROGRESS NOTES
[address] PRIMARY CARE  [address] [facility]  [address]  [address] [address]  Dept: [phone]  Dept Fax: [phone]    Mark Carr is a 62 y.o. male who presents today for his medical conditions/complaintsas noted below. Mark Carr is c/o of No chief complaint on file. HPI:     Pt presents for a virtual visit. Pt of brenden CRAWLEY    Pt presents with c/o cough, fever, chills and diarrhea. Yesterday got home from work. Went to bed at 6pm. Had the chills and diarrhea. C/o rigors for 2-3 hours trying to get warm. Denies body aches. C/o a cough for the last few days. Describes his cough as \"nothing major\". C/o thick sinus drainage that is brown/clear. He has had bad sinuses since he has been a child. He denies any sick contacts. He had to get a rental car that was not very clean and he seemed to get sick afterwards. C/o feeling dizzy. Denies any itchy, red or watery eyes. No sore throat. C/o mild shortness of breath. C/o chest burning. He is a smoker. C/o joint pain. T max 100 that he is aware of. He rates his pain 7/10. Denies taking any medication for his symptoms. He is unsure if anything makes his symptoms better or worse. Also during the visit he c/o back pain. He states he fell off of a ladder a few days ago. He was two steps up and fell down. He hit the middle of his back on a brick wall. He did not hit his head or lose conciousness. He c/o numbness and tingling down his arms and legs since the fall. He has chronic neck pain, hard to tell if it is worse than normal d/t generalized body aches. No loss or retention of urine or stool.  No saddle anesthesia    He is unable to lay flat for an MRI and always request to be \"put out\"'    Not a diabetic         Past Medical History:   Diagnosis Date    Arthritis     Wilcox esophagus     Depression     DU (duodenal ulcer)     Emphysema lung ([address]     [place] (hard of hearing[place]     Hyperlipidemia     Hypersomnia with sleep apnea, unspecified     CPAP nightly    Hypertension     AGUS (obstructive sleep apnea)     wears CPAP    Thoracic outlet syndrome     Rt      Past Surgical History:   Procedure Laterality Date    CARPAL TUNNEL RELEASE Left 12/17/2019    CARPAL TUNNEL RELEASE performed by Catracho Abarca MD at [address] Right 1/27/2020    CARPAL TUNNEL RELEASE performed by Catracho Abarca MD at [facility] 1945 Right     COLONOSCOPY  5/27/2016    JOINT REPLACEMENT Bilateral     hips    LYMPH NODE DISSECTION      neck    SOFT TISSUE TUMOR RESECTION      back    UPPER GASTROINTESTINAL ENDOSCOPY N/A 12/11/2018    EGD ESOPHAGOGASTRODUODENOSCOPY performed by Maricruz Alvarez DO at [facility]    UPPER GASTROINTESTINAL ENDOSCOPY N/A 7/21/2020    EGD BIOPSY performed by Layla Winkler MD at [facility]       Family History   Problem Relation Age of Onset    Mult Sclerosis Father     Heart Disease Sister        Social History     Tobacco Use    Smoking status: Current Every Day Smoker     Packs/day: 2.50     Years: 45.00     Pack years: 112.50     Types: Cigarettes    Smokeless tobacco: Never Used   Substance Use Topics    Alcohol use: Yes     Comment: daily, 6-8 beer daily      Current Outpatient Medications   Medication Sig Dispense Refill    doxycycline monohydrate (ADOXA) 100 MG tablet Take 1 tablet by mouth 2 times daily for 10 days 20 tablet 0    predniSONE (DELTASONE) 50 MG tablet Take 1 tablet by mouth daily for 5 days 5 tablet 0    lisinopril (PRINIVIL;ZESTRIL) 40 MG tablet TAKE 1 TABLET DAILY 90 tablet 3    blood glucose test strips (ASCENSIA AUTODISC VI;ONE TOUCH ULTRA TEST VI) strip Use with associated glucose meter.  100 strip 11    Lancets MISC 1 each by Does not apply route 3 times daily 200 each 5    Blood Glucose Monitoring Suppl (FREESTYLE FREEDOM) KIT 1 kit by Does not apply route daily 1 kit 0    atorvastatin (LIPITOR) 10 MG tablet TAKE 1 TABLET BY MOUTH ONE TIME A DAY 90 tablet 3    ALPRAZolam (XANAX) 0.5 MG tablet Take 0.5 mg by mouth daily.  albuterol sulfate  (90 Base) MCG/ACT inhaler Inhale 2 puffs into the lungs every 6 hours as needed for Wheezing 1 Inhaler 3    Diclofenac Sodium  MG TB24 TAKE 1 TABLET DAILY 90 tablet 3    metoprolol succinate (TOPROL XL) 100 MG extended release tablet TAKE 1 TABLET DAILY 90 tablet 3    loratadine (CLARITIN) 10 MG tablet TAKE 1 TABLET BY MOUTH ONE TIME A DAY 30 tablet 1    venlafaxine (EFFEXOR XR) 150 MG extended release capsule TAKE 1 CAPSULE DAILY 90 capsule 3    pantoprazole (PROTONIX) 20 MG tablet TAKE 1 TABLET TWICE A DAY BEFORE MEALS 180 tablet 3    Handicap Placard MISC Expires 3/31/2022 1 each 0    fluticasone (FLONASE) 50 MCG/ACT nasal spray as needed        No current facility-administered medications for this visit. No Known Allergies    Health Maintenance   Topic Date Due    Hepatitis C screen  1965    Diabetic foot exam  10/18/1973    Diabetic retinal exam  10/18/1973    HIV screen  10/18/1978    Diabetic microalbuminuria test  10/18/1981    Hepatitis B vaccine (1 of 3 - Risk 3-dose series) 10/18/1982    DTaP/Tdap/Td vaccine (1 - Tdap) 10/18/1982    Shingles Vaccine (1 of 2) 10/18/2013    Low dose CT lung screening  07/19/2020    Flu vaccine (1) 09/01/2020    Lipid screen  05/20/2021    Potassium monitoring  05/20/2021    Creatinine monitoring  05/20/2021    A1C test (Diabetic or Prediabetic)  08/14/2021    Colon cancer screen colonoscopy  05/27/2026    Pneumococcal 0-64 years Vaccine  Completed    Hepatitis A vaccine  Aged Out    Hib vaccine  Aged Out    Meningococcal (ACWY) vaccine  Aged Out       :     Review of Systems   Constitutional: Positive for chills, fatigue and fever. HENT: Positive for congestion and rhinorrhea. Negative for ear discharge, ear pain, sinus pressure, sinus pain and sore throat.     Eyes: Negative fall from ladder  -tylenol for pain  -heat or ice for comfort  -discussed image studies. He would like to wait until after his covid test.     7.) acute thoracic back pain  -will likely need imaging of spine either xray or CT scan. D/t his size, Ct scan maybe better    8/) chronic neck pain  -tylenol for pain. Heat or ice for comfort. 9.) paresthesia of upper and lower legs  -discussed he would need imaging such as an MRI if symptoms continue     F/u with brenden later in the week or early next week for back complaints. Orders Placed This Encounter   Procedures    COVID-19 Ambulatory     Standing Status:   Future     Standing Expiration Date:   8/25/2021     Scheduling Instructions:      Saline media preferred given current shortage of viral transport media but both acceptable     Order Specific Question:   Is this test for diagnosis or screening? Answer:   Diagnosis of ill patient     Order Specific Question:   Symptomatic for COVID-19 as defined by CDC? Answer:   Yes     Order Specific Question:   Date of Symptom Onset     Answer:   8/23/2020     Order Specific Question:   Hospitalized for COVID-19? Answer:   No     Order Specific Question:   Admitted to ICU for COVID-19? Answer:   No     Order Specific Question:   Employed in healthcare setting? Answer:   No     Order Specific Question:   Resident in a congregate (group) care setting? Answer:   No     Order Specific Question:   Pregnant: Answer:   No     Order Specific Question:   Previously tested for COVID-19? Answer:   Yes     Orders Placed This Encounter   Medications    doxycycline monohydrate (ADOXA) 100 MG tablet     Sig: Take 1 tablet by mouth 2 times daily for 10 days     Dispense:  20 tablet     Refill:  0    predniSONE (DELTASONE) 50 MG tablet     Sig: Take 1 tablet by mouth daily for 5 days     Dispense:  5 tablet     Refill:  0       Patient given educational materials - seepatient instructions. Discussed use, benefit, and side effects of prescribed medications. All patient questions answered. Pt voiced understanding. Reviewed health maintenance. Instructed to continue current medications, diet and exercise. Patient agreedwith treatment plan. Follow up as directed.       Electronically signed by BULMARO Tejeda CNP on 8/25/2020at 4:03 PM

## 2020-08-26 ENCOUNTER — OFFICE VISIT (OUTPATIENT)
Dept: PRIMARY CARE CLINIC | Age: 55
End: 2020-08-26
Payer: COMMERCIAL

## 2020-08-26 ENCOUNTER — HOSPITAL ENCOUNTER (OUTPATIENT)
Age: 55
Setting detail: SPECIMEN
Discharge: HOME | End: 2020-08-26
Payer: COMMERCIAL

## 2020-08-26 PROCEDURE — 99211 OFF/OP EST MAY X REQ PHY/QHP: CPT | Performed by: NURSE PRACTITIONER

## 2020-08-26 RX ORDER — ALPRAZOLAM 0.5 MG/1
0.5 TABLET ORAL DAILY
Qty: 30 TABLET | Refills: 0 | Status: SHIPPED | OUTPATIENT
Start: 2020-08-26 | End: 2020-10-12 | Stop reason: SDUPTHER

## 2020-08-26 NOTE — TELEPHONE ENCOUNTER
Last OV 08/25/2020    Next OV 02/19/2021    Health Maintenance   Topic Date Due    Hepatitis C screen  1965    Diabetic foot exam  10/18/1973    Diabetic retinal exam  10/18/1973    HIV screen  10/18/1978    Diabetic microalbuminuria test  10/18/1981    Hepatitis B vaccine (1 of 3 - Risk 3-dose series) 10/18/1982    DTaP/Tdap/Td vaccine (1 - Tdap) 10/18/1982    Shingles Vaccine (1 of 2) 10/18/2013    Low dose CT lung screening  07/19/2020    Flu vaccine (1) 09/01/2020    Lipid screen  05/20/2021    Potassium monitoring  05/20/2021    Creatinine monitoring  05/20/2021    A1C test (Diabetic or Prediabetic)  08/14/2021    Colon cancer screen colonoscopy  05/27/2026    Pneumococcal 0-64 years Vaccine  Completed    Hepatitis A vaccine  Aged Out    Hib vaccine  Aged Out    Meningococcal (ACWY) vaccine  Aged Out             (applicable per patient's age: Cancer Screenings, Depression Screening, Fall Risk Screening, Immunizations)    Hemoglobin A1C (%)   Date Value   08/14/2020 5.6   05/20/2020 6.6 (H)   03/01/2019 5.7     LDL Cholesterol (mg/dL)   Date Value   05/20/2020 71     LDL Calculated (mg/dL)   Date Value   12/02/2016 97     AST (U/L)   Date Value   05/20/2020 30     ALT (U/L)   Date Value   05/20/2020 38     BUN (mg/dL)   Date Value   05/20/2020 22 (H)      (goal A1C is < 7)   (goal LDL is <100) need 30-50% reduction from baseline     BP Readings from Last 3 Encounters:   08/14/20 120/62   07/21/20 (!) 139/96   07/21/20 (!) 80/50    (goal /80)      All Future Testing planned in [facility]:  Lab Frequency Next Occurrence   EMG Once 10/25/2019   Full PFT Study With Bronchodilator Once 05/11/2020       Next Visit Date:  Future Appointments   Date Time Provider Mirtha Bonilla   10/2/2020  8:30 AM Evan Crenshaw RD, LD [address] [facility]   2/19/2021  7:40 AM BULMARO Riley - CNP [id number]            Patient Active Problem List:     Anxiety and depression Mixed hyperlipidemia     Essential hypertension     Primary osteoarthritis involving multiple joints     Multiple lung nodules

## 2020-08-28 LAB — SARS-COV-2, NAA: NOT DETECTED

## 2020-08-29 ENCOUNTER — TELEPHONE (OUTPATIENT)
Dept: PRIMARY CARE CLINIC | Age: 55
End: 2020-08-29

## 2020-09-08 RX ORDER — PANTOPRAZOLE SODIUM 20 MG/1
TABLET, DELAYED RELEASE ORAL
Qty: 180 TABLET | Refills: 3 | Status: SHIPPED | OUTPATIENT
Start: 2020-09-08 | End: 2020-09-10 | Stop reason: SDUPTHER

## 2020-09-08 RX ORDER — VENLAFAXINE HYDROCHLORIDE 150 MG/1
CAPSULE, EXTENDED RELEASE ORAL
Qty: 90 CAPSULE | Refills: 3 | Status: SHIPPED | OUTPATIENT
Start: 2020-09-08 | End: 2020-09-10 | Stop reason: SDUPTHER

## 2020-09-08 RX ORDER — METOPROLOL SUCCINATE 100 MG/1
TABLET, EXTENDED RELEASE ORAL
Qty: 90 TABLET | Refills: 3 | Status: SHIPPED | OUTPATIENT
Start: 2020-09-08 | End: 2020-09-10 | Stop reason: SDUPTHER

## 2020-09-08 NOTE — TELEPHONE ENCOUNTER
Anxiety and depression     Mixed hyperlipidemia     Essential hypertension     Primary osteoarthritis involving multiple joints     Multiple lung nodules

## 2020-09-10 RX ORDER — LISINOPRIL 40 MG/1
40 TABLET ORAL DAILY
Qty: 90 TABLET | Refills: 3 | Status: SHIPPED | OUTPATIENT
Start: 2020-09-10 | End: 2021-07-20

## 2020-09-10 RX ORDER — ATORVASTATIN CALCIUM 10 MG/1
TABLET, FILM COATED ORAL
Qty: 90 TABLET | Refills: 3 | Status: SHIPPED | OUTPATIENT
Start: 2020-09-10 | End: 2021-01-21

## 2020-09-10 RX ORDER — PANTOPRAZOLE SODIUM 20 MG/1
20 TABLET, DELAYED RELEASE ORAL DAILY
Qty: 180 TABLET | Refills: 3 | Status: SHIPPED | OUTPATIENT
Start: 2020-09-10 | End: 2021-08-31

## 2020-09-10 RX ORDER — VENLAFAXINE HYDROCHLORIDE 150 MG/1
150 CAPSULE, EXTENDED RELEASE ORAL DAILY
Qty: 90 CAPSULE | Refills: 3 | Status: SHIPPED | OUTPATIENT
Start: 2020-09-10 | End: 2021-08-31

## 2020-09-10 RX ORDER — METOPROLOL SUCCINATE 100 MG/1
100 TABLET, EXTENDED RELEASE ORAL DAILY
Qty: 90 TABLET | Refills: 3 | Status: SHIPPED | OUTPATIENT
Start: 2020-09-10 | End: 2021-08-31

## 2020-09-10 NOTE — TELEPHONE ENCOUNTER
Pt called asking if PCP can send a 10 day supply of pended medication to [facility] in [facility]. Pt is out of town for work and his employer extended his stay. Pt would like to be contacted when medications are sent.     Last OV 08/25/2020    Next OV 02/19/20201    Health Maintenance   Topic Date Due    Hepatitis C screen  1965    Diabetic foot exam  10/18/1973    Diabetic retinal exam  10/18/1973    HIV screen  10/18/1978    Diabetic microalbuminuria test  10/18/1981    Hepatitis B vaccine (1 of 3 - Risk 3-dose series) 10/18/1982    DTaP/Tdap/Td vaccine (1 - Tdap) 10/18/1982    Shingles Vaccine (1 of 2) 10/18/2013    Low dose CT lung screening  07/19/2020    Flu vaccine (1) 09/01/2020    Lipid screen  05/20/2021    Potassium monitoring  05/20/2021    Creatinine monitoring  05/20/2021    A1C test (Diabetic or Prediabetic)  08/14/2021    Colon cancer screen colonoscopy  05/27/2026    Pneumococcal 0-64 years Vaccine  Completed    Hepatitis A vaccine  Aged Out    Hib vaccine  Aged Out    Meningococcal (ACWY) vaccine  Aged Out             (applicable per patient's age: Cancer Screenings, Depression Screening, Fall Risk Screening, Immunizations)    Hemoglobin A1C (%)   Date Value   08/14/2020 5.6   05/20/2020 6.6 (H)   03/01/2019 5.7     LDL Cholesterol (mg/dL)   Date Value   05/20/2020 71     LDL Calculated (mg/dL)   Date Value   12/02/2016 97     AST (U/L)   Date Value   05/20/2020 30     ALT (U/L)   Date Value   05/20/2020 38     BUN (mg/dL)   Date Value   05/20/2020 22 (H)      (goal A1C is < 7)   (goal LDL is <100) need 30-50% reduction from baseline     BP Readings from Last 3 Encounters:   08/14/20 120/62   07/21/20 (!) 139/96   07/21/20 (!) 80/50    (goal /80)      All Future Testing planned in [facility]:  Lab Frequency Next Occurrence   EMG Once 10/25/2019   Full PFT Study With Bronchodilator Once 05/11/2020       Next Visit Date:  Future Appointments   Date Time Provider Mirtha Bonilla   10/2/2020  8:30 AM Tara Monae RD, LD [facility]   2/19/2021  7:40 AM BULMARO Tan - CNP [facility]            Patient Active Problem List:     Anxiety and depression     Mixed hyperlipidemia     Essential hypertension     Primary osteoarthritis involving multiple joints     Multiple lung nodules

## 2020-10-02 ENCOUNTER — HOSPITAL ENCOUNTER (OUTPATIENT)
Dept: DIABETES SERVICES | Age: 55
Setting detail: THERAPIES SERIES
Discharge: HOME | End: 2020-10-02
Payer: COMMERCIAL

## 2020-10-02 PROCEDURE — G0108 DIAB MANAGE TRN  PER INDIV: HCPCS

## 2020-10-02 ASSESSMENT — PROBLEM AREAS IN DIABETES QUESTIONNAIRE (PAID)
FEELING SCARED WHEN YOU THINK ABOUT LIVING WITH DIABETES: 0
COPING WITH COMPLICATIONS OF DIABETES: 0
FEELING THAT DIABETES IS TAKING UP TOO MUCH OF YOUR MENTAL AND PHYSICAL ENERGY EVERY DAY: 0
FEELING DEPRESSED WHEN YOU THINK ABOUT LIVING WITH DIABETES: 0
PAID-5 TOTAL SCORE: 1
WORRYING ABOUT THE FUTURE AND THE POSSIBILITY OF SERIOUS COMPLICATIONS: 1

## 2020-10-02 NOTE — PROGRESS NOTES
Diabetes Self- Management Education Program Assessment - PHONE  This visit was done on phone/virtual call (doxy. me)  (During HKBUG-61 public health emergency). Pursuant to the emergency declaration under the [facility], [address] [place] [address] authority and the [employer] Act, this visit was conducted with patient's (and/or legal guardian's) consent, to reduce the patient's risk of exposure to COVID-19 and provide necessary medical care. The patient (and/or legal guardian) has also been advised to contact this office for worsening conditions or problems, and seek emergency medical treatment and/or call 911 if deemed necessary. Patient identification was verified at the start of the visit: Yes,   Total time spent for this encounter: 1 hour   6/11/20 1 hour per video  6/19/20 JW 1 hour 8:30-9:30am  6/25/20 1 hour per video  7/10/20 JW 1 hour 8:45-9:45 am per video  10/2/20 RS 8 30 - 900 via phone call     Services were provided through a phone discussion to substitute for in-person clinic visit. Patient and provider were located at their individual home/office. SEE - Diabetic Screening  Patient, Carol Ward, contacted via phone  encounters for diabetes self-management education  visit/ assessment on 6/4/20       MEDICAL HISTORY:  Past Medical History:   Diagnosis Date    Arthritis     Wilcox esophagus     Depression     DU (duodenal ulcer)     Emphysema lung ([facility] [address]     [place] (hard of hearing)     Hyperlipidemia     Hypersomnia with sleep apnea, unspecified     CPAP nightly    Hypertension     AGUS (obstructive sleep apnea)     wears CPAP    Thoracic outlet syndrome     Rt     Family History   Problem Relation Age of Onset    Mult Sclerosis Father     Heart Disease Sister      Patient has no known allergies.    Immunization History   Administered Date(s) Administered    Pneumococcal Conjugate 13-valent (Lindberg Lot) 10/06/2017    Pneumococcal Polysaccharide ([id number]) 10/06/2015, 11/04/2016, 11/14/2016    Td, unspecified formulation 07/21/2017     Current Medications  Current Outpatient Medications   Medication Sig Dispense Refill    venlafaxine (EFFEXOR XR) 150 MG extended release capsule Take 1 capsule by mouth daily 90 capsule 3    metoprolol succinate (TOPROL XL) 100 MG extended release tablet Take 1 tablet by mouth daily 90 tablet 3    Diclofenac Sodium  MG TB24 Take 100 mg by mouth daily 90 tablet 3    pantoprazole (PROTONIX) 20 MG tablet Take 1 tablet by mouth daily 180 tablet 3    lisinopril (PRINIVIL;ZESTRIL) 40 MG tablet Take 1 tablet by mouth daily 90 tablet 3    atorvastatin (LIPITOR) 10 MG tablet TAKE 1 TABLET BY MOUTH ONE TIME A DAY 90 tablet 3    blood glucose test strips (ASCENSIA AUTODISC VI;ONE TOUCH ULTRA TEST VI) strip Use with associated glucose meter.  100 strip 11    Lancets MISC 1 each by Does not apply route 3 times daily 200 each 5    Blood Glucose Monitoring Suppl (FREESTYLE FREEDOM) KIT 1 kit by Does not apply route daily 1 kit 0    albuterol sulfate  (90 Base) MCG/ACT inhaler Inhale 2 puffs into the lungs every 6 hours as needed for Wheezing 1 Inhaler 3    loratadine (CLARITIN) 10 MG tablet TAKE 1 TABLET BY MOUTH ONE TIME A DAY 30 tablet 1    Handicap Klever MISC Expires 3/31/2022 1 each 0    fluticasone (FLONASE) 50 MCG/ACT nasal spray as needed        No current facility-administered medications for this encounter.    :     Comments:  Allergies:  No Known Allergies    A1C blood level   Lab Results   Component Value Date    LABA1C 5.6 08/14/2020    LABA1C 6.6 (H) 05/20/2020    LABA1C 5.7 03/01/2019     Lab Results   Component Value Date    LDLCALC 97 12/02/2016    CREATININE 1.05 05/20/2020       Blood pressure   BP Readings from Last 3 Encounters:   08/14/20 120/62   07/21/20 (!) 139/96   07/21/20 (!) 80/50        Cholesterol  Lab Results   Component Value Date [address] 97 12/02/2016    LDLCHOLESTEROL 71 05/20/2020        Diabetes Self- Management Education Record    Participant Name: Valeri Coyle  Referring Provider: BULMARO Phelps CNP   Assessment/Evaluation Ratings:  1=Needs Instruction   4=Demonstrates Understanding/Competency  2=Needs Review   NC=Not Covered    3=Comprehends Key Points  N/A=Not Applicable  Topics/Learning Objectives Pre-session Assess Date:  6/4/20CB Instr. Date Reinforce Date Post- session Eval Comments   Diabetes disease process & Treatment process: Define diabetes & pre-diabetes; Identify own type of diabetes; role of the pancreas; signs/symptoms; diagnostic criteria; prevention & treatment options; contributing factors. 1 6/11/20CB 6/25/20CB  New Dx in May and wants to avoid meds if possible . Family Hx with sister and poss mom. Wife has T2DM so supportive. 6/4/20CB    Pt able to view You tube Understanding T2DM and some slides with screen share at beginning of class. Wife present as well.6/11/20CB   Incorporating nutritional management into lifestyle: Describe effect of type, amount & timing of food on blood glucose; Describe basic meal planning techniques & current nutrition guideline   1 5-5:30 eggs    11:30a packed ham sandwich or burger and onion rings ( trying to cut back on fast food)    5-8p spaghetti, or hamburger or mac and cheese,or sometimes comes home from work and drinks 6-8 beers and then goes to bed  Stopped reg pop and mostly water now, occ diet Dr Rob Vazquez, lost 10# in 1 week6/4/20CB    F/u today with pcp and lost 7# and /82   6/19/20 JW  Good attitude, making good changes and motivated to stay off medication (but ok if ends up needing). Mindful of portions and eating less. 7/10/20 JW- quiz 100%  What to eat - Food groups, When to eat - timing of meals and snacks, and How much to eat - portions control.   Would suggest 2395-1946 calories 4-5cho/meal, 1 cho/snack   calories/ day   CHO choices/ meal   CHO choices/ day   grams of protein /day   gram of fat /day      10/2/20rs- stated that he gave regular pop and changed eating patterns    Correctly read food labels & demonstrate CHO counting & portion control with personalized meal plan. Identify dining out strategies, & dietary sick day guidelines. 1 6/19/20 JW 7/10/20 JW     Incorporating physical activity into lifestyle:   Verbalize effect of exercise on blood glucose levels; benefits of regular exercise; safety considerations; contraindications; maintenance of activity. 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 Does have hip problems , past bilateral hip replacement, also ? Emphysema. Does have ecliptical in garage that he plans to get out and start using. 6/4/20CB    Did move elliptical to front of garage so he can use and will start daily and hopes to workup to 20 min/d 6/11/20CB  Work has been busy so has only been able to use Elliptical once. 6/25/20CB    10/2/20 - lots of walking at work - works at [address]   Using medications safely:  Identify effects of diabetes medicines on blood glucose levels; List diabetes medication taken, action & side effects;    1 6/25/20CB   No meds   Insulin / Injectable - Appropriate injection sites; proper storage; supplies needed; proper technique; safe needle disposal guidelines. 1 6/25/20CB   NA  Brief discussion6/25/20CB   Monitoring blood glucose, interpreting and using results:  Identify recommended & personal blood glucose targets; importance of testing; testing supplies; HgbA1C target levels; Factors affecting blood glucose; Importance of logging blood glucose levels for pattern recognition; ketone testing; safe lancet disposal.   1 6/11/20CB 6/25/20CB  A1C 6.6% on May 20th. Testing 3-4x/day  AM  range .  After lunch ,evening 109-150 range        6/4/20CB    BG  continue around 118-119.6/11/20CB    Continues to check,  9p last night 116 at 4a today and 117 at 3p6/25/20CB     Prevention, detection & treatment of acute complications:  Identify symptoms of hyper & hypoglycemia, and prevention & treatment strategies. 1 6/11/20CB   Increased urination at night with dry mouth and some blurred vision6/4/20CB   Describe sick day guidelines & indications for  physician notification. Identify short term consequences of poor control. Disaster preparedness strategies    1 10/2/20rs   10/2/20rs   He stated he does not get flu shot - he did   Prevention, detection & treatment of chronic complications:  Define the natural course of diabetes & describe the relationship of blood glucose levels to long term complications of diabetes. Identify preventative measures & standards of care. 1 6/25/20CB   Did have cataracts removed and sees eye Dr. Starla Gary tear about 4y ago when Fx ribs that was monitored with CT Scans but recent injury from lifting and unsure if he broke a rib so seeing PCP next Thursday. Does see dentist 6 mo.   6/4/20CB   Developing strategies to address psychosocial issues:  Describe feelings about living with diabetes; Describe how stress, depression & anxiety affect blood glucose; Identify coping strategies; Identify support needed & support network available. 1 6/11/20CB   Positive and wants to keep BG down with eating healthy and avoid meds. Wife and daughter supportive. Starting to feel better and motivated by trying to eat healthy and start to exercise. 6/11/20CB  -10/2/20 wife is his support      Developing strategies to promote health/change behavior: Identify 7 self-care behaviors; Personal health risk factors; Benefits, challenges & strategies for behavioral change;    1 6/25/20CB   Does drink 6-8 beers nightly. Had stopped years ago and joined [email or web address] and eat small frequent meals/snacks and lost about 50# and felt great then. Wants to work on healthier lifestyle again    Continues to drink beer but switched to light, smokes.  Reviewed effects of smoking on blood vessels. 6/25/20CB     Individualized goal selection. My goal , to help me improve my health, I will:   1. 6/4/20CBBe more active:   get the ecliptical out and start using-    10/2/20-  only use about once per month in last 3 month, stated will try to use more in fall/ winter and try to use on days off/ weekends . 2.10/2/20 - Healthy eating:  Work on meal patterns - try to eat smaller more frequent meals, no skip meal - keep away from regular pop         Plan  Follow-up Appointments planned  - annual education session 6/2021    Instruction Method: [x]Lecture/Discussion  []Power Point Presentation  [x]Handouts  []Return Demonstration    Education Materials/Equipment Provided (VIA Mail for phone visits)  :    [x]Self-Management - Initial assessment - Enrolment in to [facility]  Where do I Begin, Living with Type 2 diabetes ADA home support program and  handout on diabetes education classes. 6/4/20CB    [x]Self-Management  Class 1 -Self-Management  Class 1 - \"Diabetes - your take control guide\" - ADA booklet -6/11/20CB  o  \"ready, set, start counting\" teaching sheet in diet section   o  GLP-1 understanding 8 core deficits puzzle sheet in the medication section  o one day food diary and envelope for return of diet HX   o  You tube and website resource sheet-Understanding Type 2 Diabetes from Animated Diabetes Patient https://youtu. be/[email or web address]      [x] Self-Management  Class 2 - Meal Plan and handout for serving sizes, smarter snacking, Ready Set Carb Counting / Plate Method, Nutrient Conversion and International Diabetes [address] Eating for People with Diabetes and Nutrition in the [employer] - fast facts about fast food6/19/20 JW    [x] Self-Management  Class 3 -  Diabetes your take control guide pages 20 - 30,  type 2 diabetes and the role of GLP- 1,  Individualized Diabetes report card 6/25/20CB    [x] Self-Management Class 4 - BD Booklet  Sick Day [place] and  [address] [facility] , recipe hand outs and tips, diabetes Cookbooks  ( when available) 7/10/20 JW    [x]Self-Management - 3 month follow -  AADE7 Self care behaviors work sheets, 2020 [employer],  Online resource list - March 2020  -- 10/2/20rs    []Self-Management  Gestational - RN class -Resource materials sent out : care booklet - \" Gestational Diabetes Mellitus ( GDM) toolkit form [place] gestational diabetes postpartum care learning collaborative 2018. \"Simple Guidelines for meal planning with gestational diabetes. SMBG sheets to fax back to MFM weekly. BD  healthy injection site selection and rotation with 6 mm insulin syringe and 4 mm pen needle. Gestational diabetes handout from [facility] 2016. Did you have gestational diabetes when you were pregnant? Handout from [facility]  April 2014    []Self-Management Gestational - RD class - My Food Plan for Gestational diabetes    []Glucose Meter     []Insulin Kit     []Other      Encounter Type Date Start Time End Time Comments No Show Dates   Assessment 6/4/20CB   4:30 5:45   []In Person  [x]Telephone    Class 1 - Understanding diabetes 6/11/20CB 4:30 5:30  []TelephoneAmerican Diabetes Association  www. diabetes. org  Per video    Class 2- Nutrition and diabetes   6/19/20 JW 8:30 9:30 [x]Doxy. me  Healthy Eating with Diabetes- Automatic Data of Diabetes and Digestive and Kidney   https://youtu. be/ly0xf7Ni2I4    Class 3 - Preventing Complications 4/91/69VJ 3:28 5:45  []Telephone  By video    Class 4 -  In depth Nutrition and sick day care 7/10/20 JW 8:45 9:45 []Telephone x video  Diabetes Food hub  www. diabetesfoodhub.org     Class 5 - 3 month follow up / goal reassessment 10/2/20rs 8 30   9 00 X - on telephone    Gestational - RN         Gestational - RD        Individual MNT         Shared Med Appt         Yearly Follow-up        Meter Instrx      How to Measure Your [address] Caryn Coreai Patient Education  https://youtu. be/nxIJeHWlhF4    Insulin Instrx      []Pen  []Vial & Syringe   BD Diabetes Care: How to Inject Insulin with a Pen Needle  https://youtu. be/XIKkaQ9dh8J    Diabetes Care: How to Inject Insulin with a Syringe  https://youtu. be/9uSSBu-5eSY       DSMS Support :   [] MNT      [x] Annual update     [] Starting Fresh  adults living with diabetes or pre diabetes. [address] [place], [place]    [id number]-4:30pm- on 6 week rotation  Free -  REGISTRATION IS REQUIRED - [facility] [phone] call for dates    []  Diabetes Group at  [address] - Free 6 week diabetes education support   classes - contact : Consuel[address] Luciano [phone]  for dates and locations      []ADA  Where do I Begin, Living with Type 2 diabetes ADA home support program    Web site: diabetes. org/living    Call: 1800 DIABETES  e-mail: Martín@[employer]. org     [x]  Internet web sites - ADAWeb site: www.diabetes. org                       Post Education Referrals:      [x] [place] Tobacco Quit information sheet and [address] [facility] , [facility]       [] Dental care - [facility]     [] [facility]) link  phone number - for information and referral to [address] [facility], WEIGHT MANAGEMENT        []Other  Timothy Navarro RN CDE

## 2020-10-02 NOTE — LETTER
[facility]  [facility] [address]  [facility] [address]  Phone: [phone]         October 2, 2020    To :BULMARO Hemphill CNP    From: Edith Turcios RN    Patient: Estuardo Lowry   YOB: 1965   Date of Visit: 10/2/20     The following content has been reviewed since the last assessment: Managing Diabetes (Carb counting, monitoring, medications, physical activity)  Balancing Diabetes (Meal planning, using BG results, identifying BG targets)  Reducing Risks  Problem Solving  Follow up assessment:  Lab Results   Component Value Date    LABA1C 5.6 08/14/2020    LABA1C 6.6 (H) 05/20/2020    LABA1C 5.7 03/01/2019     Wt Readings from Last 3 Encounters:   08/14/20 262 lb (118.8 kg)   07/21/20 260 lb (117.9 kg)   06/11/20 269 lb 6.4 oz (122.2 kg)     Patent started practice healthy eating habits in June 2020, has given up all regular pop intake and he is starting to be more active on days off and set this area of being active as a long term goal.    An ongoing plan was created to include:Post Program Support (see below)    Post Program Self Management Support Plans include:  Diabetes Care Appointments with MD, Annual Review and Internet Resources    Thank you for the opportunity to provide Diabetes Self Management Education to your patient.

## 2020-10-12 RX ORDER — ALPRAZOLAM 0.5 MG/1
0.5 TABLET ORAL DAILY
Qty: 90 TABLET | Refills: 0 | Status: SHIPPED | OUTPATIENT
Start: 2020-10-12 | End: 2021-01-18

## 2020-10-12 NOTE — TELEPHONE ENCOUNTER
Patient states he always gets 90 days thru mail order and only received 30 days so is out of meds.     Last OV 08/14/2020      Health Maintenance   Topic Date Due    Hepatitis C screen  1965    Diabetic foot exam  10/18/1973    Diabetic retinal exam  10/18/1973    HIV screen  10/18/1978    Diabetic microalbuminuria test  10/18/1981    Hepatitis B vaccine (1 of 3 - Risk 3-dose series) 10/18/1982    DTaP/Tdap/Td vaccine (1 - Tdap) 10/18/1982    Shingles Vaccine (1 of 2) 10/18/2013    Low dose CT lung screening  07/19/2020    Flu vaccine (1) 09/01/2020    Lipid screen  05/20/2021    Potassium monitoring  05/20/2021    A1C test (Diabetic or Prediabetic)  08/14/2021    Creatinine monitoring  09/24/2021    Colon cancer screen colonoscopy  05/27/2026    Pneumococcal 0-64 years Vaccine  Completed    Hepatitis A vaccine  Aged Out    Hib vaccine  Aged Out    Meningococcal (ACWY) vaccine  Aged Out             (applicable per patient's age: Cancer Screenings, Depression Screening, Fall Risk Screening, Immunizations)    Hemoglobin A1C (%)   Date Value   08/14/2020 5.6   05/20/2020 6.6 (H)   03/01/2019 5.7     LDL Cholesterol (mg/dL)   Date Value   05/20/2020 71     LDL Calculated (mg/dL)   Date Value   12/02/2016 97     AST (U/L)   Date Value   05/20/2020 30     ALT (U/L)   Date Value   05/20/2020 38     BUN (mg/dL)   Date Value   05/20/2020 22 (H)      (goal A1C is < 7)   (goal LDL is <100) need 30-50% reduction from baseline     BP Readings from Last 3 Encounters:   08/14/20 120/62   07/21/20 (!) 139/96   07/21/20 (!) 80/50    (goal /80)      All Future Testing planned in [facility]:  Lab Frequency Next Occurrence   EMG Once 10/25/2019   Full PFT Study With Bronchodilator Once 05/11/2020       Next Visit Date:  Future Appointments   Date Time Provider Mirtha Bonilla   2/19/2021  7:40 AM Alina Tyler APRN - CNP [facility]            Patient Active Problem List:     Anxiety and depression     Mixed hyperlipidemia     Essential hypertension     Primary osteoarthritis involving multiple joints     Multiple lung nodules

## 2020-11-19 ENCOUNTER — OFFICE VISIT (OUTPATIENT)
Dept: PRIMARY CARE CLINIC | Age: 55
End: 2020-11-19
Payer: COMMERCIAL

## 2020-11-19 ENCOUNTER — HOSPITAL ENCOUNTER (OUTPATIENT)
Age: 55
Setting detail: SPECIMEN
Discharge: HOME | End: 2020-11-19
Payer: COMMERCIAL

## 2020-11-19 ENCOUNTER — TELEPHONE (OUTPATIENT)
Dept: PRIMARY CARE CLINIC | Age: 55
End: 2020-11-19

## 2020-11-19 VITALS
DIASTOLIC BLOOD PRESSURE: 78 MMHG | TEMPERATURE: 99.1 F | BODY MASS INDEX: 36.68 KG/M2 | RESPIRATION RATE: 16 BRPM | HEIGHT: 71 IN | OXYGEN SATURATION: 97 % | HEART RATE: 72 BPM | SYSTOLIC BLOOD PRESSURE: 127 MMHG | WEIGHT: 262 LBS

## 2020-11-19 PROBLEM — M65.30 ACQUIRED TRIGGER FINGER: Status: ACTIVE | Noted: 2020-11-19

## 2020-11-19 PROBLEM — E66.9 CLASS 2 OBESITY: Status: ACTIVE | Noted: 2020-11-19

## 2020-11-19 PROBLEM — E66.812 CLASS 2 OBESITY: Status: ACTIVE | Noted: 2020-11-19

## 2020-11-19 PROCEDURE — 99214 OFFICE O/P EST MOD 30 MIN: CPT | Performed by: NURSE PRACTITIONER

## 2020-11-19 ASSESSMENT — ENCOUNTER SYMPTOMS
DIARRHEA: 1
EYE PAIN: 0
NAUSEA: 0
COUGH: 1
VOMITING: 0
CHEST TIGHTNESS: 0
ABDOMINAL DISTENTION: 0
SHORTNESS OF BREATH: 0
SHORTNESS OF BREATH: 1
SORE THROAT: 0
EYE REDNESS: 0
ABDOMINAL PAIN: 0
RHINORRHEA: 1

## 2020-11-19 NOTE — PROGRESS NOTES
[facility]  [facility]  [address] [place]  [address] [address]  Dept: [phone]  Dept Fax: [phone]    Kevin James is a 60 y.o. male who presents today forhis medical conditions/complaints as noted below. Kevin James is c/o of   Chief Complaint   Patient presents with    Congestion     symptoms started 11/18/2020    Cough    Shortness of Breath    Diarrhea     HPI:     Cough   This is a new problem. Associated symptoms include shortness of breath. Past Medical History:   Diagnosis Date    Arthritis     Wilcox esophagus     Depression     DU (duodenal ulcer)     Emphysema lung ([address]     [place] (hard of hearing)     Hyperlipidemia     Hypersomnia with sleep apnea, unspecified     CPAP nightly    Hypertension     AGUS (obstructive sleep apnea)     wears CPAP    Thoracic outlet syndrome     Rt      Past Surgical History:   Procedure Laterality Date    CARPAL TUNNEL RELEASE Left 12/17/2019    CARPAL TUNNEL RELEASE performed by Alen Delaney [facility] at [facility] Right 1/27/2020    CARPAL TUNNEL RELEASE performed by Alen Delaney MD at [place] [address] Right     COLONOSCOPY  5/27/2016    JOINT REPLACEMENT Bilateral     hips    LYMPH NODE DISSECTION      neck    SOFT TISSUE TUMOR RESECTION      back    UPPER GASTROINTESTINAL ENDOSCOPY N/A 12/11/2018    EGD ESOPHAGOGASTRODUODENOSCOPY performed by Sage Mills DO at [facility]    UPPER GASTROINTESTINAL ENDOSCOPY N/A 7/21/2020    EGD BIOPSY performed by Robin Galaviz MD at [facility]       Family History   Problem Relation Age of Onset    Mult Sclerosis Father     Heart Disease Sister        Social History     Tobacco Use    Smoking status: Current Every Day Smoker     Packs/day: 2.50     Years: 45.00     Pack years: 112.50     Types: Cigarettes    Smokeless tobacco: Never Used   Substance Use Topics    Alcohol use:  Yes instructions. Discussed use, benefit, and side effectsof prescribed medications. All patient questions answered. Pt verbalized understanding. Instructed to continue current medications, diet and exercise. Patient agreedwith treatment plan. Follow up as directed.      Electronically signed by BULMARO Aviles CNP on 11/19/2020 at 3:45 PM

## 2020-11-19 NOTE — PATIENT INSTRUCTIONS
Patient Education        Viral Infections: Care Instructions  Your Care Instructions     You don't feel well, but it's not clear what's causing it. You may have a viral infection. Viruses cause many illnesses, such as the common cold, influenza, fever, rashes, and the diarrhea, nausea, and vomiting that are often called \"stomach flu. \" You may wonder if antibiotic medicines could make you feel better. But antibiotics only treat infections caused by bacteria. They don't work on viruses. The good news is that viral infections usually aren't serious. Most will go away in a few days without medical treatment. In the meantime, there are a few things you can do to make yourself more comfortable. Follow-up care is a key part of your treatment and safety. Be sure to make and go to all appointments, and call your doctor if you are having problems. It's also a good idea to know your test results and keep a list of the medicines you take. How can you care for yourself at home? · Get plenty of rest if you feel tired. · Take an over-the-counter pain medicine if needed, such as acetaminophen (Tylenol), ibuprofen (Advil, Motrin), or naproxen (Aleve). Read and follow all instructions on the label. · Be careful when taking over-the-counter cold or flu medicines and Tylenol at the same time. Many of these medicines have acetaminophen, which is Tylenol. Read the labels to make sure that you are not taking more than the recommended dose. Too much acetaminophen (Tylenol) can be harmful. · Drink plenty of fluids, enough so that your urine is light yellow or clear like water. If you have kidney, heart, or liver disease and have to limit fluids, talk with your doctor before you increase the amount of fluids you drink. · Stay home from work, school, and other public places while you have a fever. When should you call for help? Call 911 anytime you think you may need emergency care.  For example, call if:    · You have severe trouble breathing.     · You passed out (lost consciousness). Call your doctor now or seek immediate medical care if:    · You seem to be getting much sicker.     · You have a new or higher fever.     · You have blood in your stools.     · You have new belly pain, or your pain gets worse.     · You have a new rash. Watch closely for changes in your health, and be sure to contact your doctor if:    · You start to get better and then get worse.     · You do not get better as expected. Where can you learn more? Go to https://IncellDxpepiceweb.[email or web address]. org and sign in to your Oppex account. Enter K768 in the thinkingphones box to learn more about \"Viral Infections: Care Instructions. \"     If you do not have an account, please click on the \"Sign Up Now\" link. Current as of: February 11, 2020               Content Version: 12.6  © 5629-5099 [employer]. Care instructions adapted under license by [facility] ([facility]). If you have questions about a medical condition or this instruction, always ask your healthcare professional. [address] any warranty or liability for your use of this information. Learning About Coronavirus [phone])  Coronavirus [phone]): Overview  What is coronavirus (COVID-19)? The coronavirus disease (COVID-19) is caused by a virus. It is an illness that was first found in [place], [place], in December 2019. It has since spread worldwide. The virus can cause fever, cough, and trouble breathing. In severe cases, it can cause pneumonia and make it hard to breathe without help. It can cause death. Coronaviruses are a large group of viruses. They cause the common cold. They also cause more serious illnesses like Middle East respiratory syndrome (MERS) and severe acute respiratory syndrome (SARS). COVID-19 is caused by a novel coronavirus. That means it's a new type that has not been seen in people before.   This virus spreads person-to-person through droplets from coughing and sneezing. It can also spread when you are close to someone who is infected. And it can spread when you touch something that has the virus on it, such as a doorknob or a tabletop. What can you do to protect yourself from coronavirus (COVID-19)? The best way to protect yourself from getting sick is to:  · Avoid areas where there is an outbreak. · Avoid contact with people who may be infected. · Wash your hands often with soap or alcohol-based hand sanitizers. · Avoid crowds and try to stay at least 6 feet away from other people. · Wash your hands often, especially after you cough or sneeze. Use soap and water, and scrub for at least 20 seconds. If soap and water aren't available, use an alcohol-based hand . · Avoid touching your mouth, nose, and eyes. What can you do to avoid spreading the virus to others? To help avoid spreading the virus to others:  · Cover your mouth with a tissue when you cough or sneeze. Then throw the tissue in the trash. · Use a disinfectant to clean things that you touch often. · Wear a cloth face cover if you have to go to public areas. · Stay home if you are sick or have been exposed to the virus. Don't go to school, work, or public areas. And don't use public transportation. · If you are sick:  ? Leave your home only if you need to get medical care. But call the doctor's office first so they know you're coming. And wear a face cover. ? Wear the face cover whenever you're around other people. It can help stop the spread of the virus when you cough or sneeze. ? Clean and disinfect your home every day. Use household  and disinfectant wipes or sprays. Take special care to clean things that you grab with your hands. These include doorknobs, remote controls, phones, and handles on your refrigerator and microwave. And don't forget countertops, tabletops, bathrooms, and computer keyboards.   When to call for help  [id number] anytime you think you may need emergency care. For example, call if:  · You have severe trouble breathing. (You can't talk at all.)  · You have constant chest pain or pressure. · You are severely dizzy or lightheaded. · You are confused or can't think clearly. · Your face and lips have a blue color. · You pass out (lose consciousness) or are very hard to wake up. Call your doctor now if you develop symptoms such as:  · Shortness of breath. · Fever. · Cough. If you need to get care, call ahead to the doctor's office for instructions before you go. Make sure you wear a face cover to prevent exposing other people to the virus. Where can you get the latest information? The following health organizations are tracking and studying this virus. Their websites contain the most up-to-date information. Adrienne Lomax also learn what to do if you think you may have been exposed to the virus. · U.S. Centers for Disease Control and Prevention (CDC): The CDC provides updated news about the disease and travel advice. The website also tells you how to prevent the spread of infection. www.cdc.gov  · World Health Organization [facility]): WHO offers information about the virus outbreaks. WHO also has travel advice. www.who.int  Current as of: April 24, 2020               Content Version: 12.4  © 2006-2020 Healthwise[employer]. Care instructions adapted under license by your healthcare professional. If you have questions about a medical condition or this instruction, always ask your healthcare professional. [place] [address] any warranty or liability for your use of this information. Coronavirus ([id number]): Care Instructions  Overview  The coronavirus disease (COVID-19) is caused by a virus. It causes a fever, a cough, and shortness of breath. It mainly spreads person-to-person through droplets from coughing and sneezing. The virus also can spread when people are in close contact with someone who is infected.   Most people have mild symptoms and can take care of themselves at home. If their symptoms get worse, they may need care in a hospital. There is no medicine to fight the virus. It's important to not spread the virus to others. If you have COVID-19, wear a face cover anytime you are around other people. You need to isolate yourself while you are sick. Your doctor will tell you when you no longer need to be isolated. Leave your home only if you need to get medical care. Follow-up care is a key part of your treatment and safety. Be sure to make and go to all appointments, and call your doctor if you are having problems. It's also a good idea to know your test results and keep a list of the medicines you take. How can you care for yourself at home? · Get extra rest. It can help you feel better. · Drink plenty of fluids. This helps replace fluids lost from fever. Fluids also help ease a scratchy throat. Water, soup, fruit juice, and hot tea with lemon are good choices. · Take acetaminophen (such as Tylenol) to reduce a fever. It may also help with muscle aches. Read and follow all instructions on the label. · Sponge your body with lukewarm water to help with fever. Don't use cold water or ice. · Use petroleum jelly on sore skin. This can help if the skin around your nose and lips becomes sore from rubbing a lot with tissues. Tips for isolation  · Wear a cloth face cover when you are around other people. It can help stop the spread of the virus when you cough or sneeze. · Limit contact with people in your home. If possible, stay in a separate bedroom and use a separate bathroom. · Avoid contact with pets and other animals. · Cover your mouth and nose with a tissue when you cough or sneeze. Then throw it in the trash right away. · Wash your hands often, especially after you cough or sneeze. Use soap and water, and scrub for at least 20 seconds.  If soap and water aren't available, use an alcohol-based hand . · Don't share personal household items. These include bedding, towels, cups and glasses, and eating utensils. · Clean and disinfect your home every day. Use household  and disinfectant wipes or sprays. Take special care to clean things that you grab with your hands. These include doorknobs, remote controls, phones, and handles on your refrigerator and microwave. And don't forget countertops, tabletops, bathrooms, and computer keyboards. When should you call for help? [id number] anytime you think you may need emergency care. For example, call if you have life-threatening symptoms, such as:  · You have severe trouble breathing. (You can't talk at all.)  · You have constant chest pain or pressure. · You are severely dizzy or lightheaded. · You are confused or can't think clearly. · Your face and lips have a blue color. · You pass out (lose consciousness) or are very hard to wake up. Call your doctor now or seek immediate medical care if:  · You have moderate trouble breathing. (You can't speak a full sentence.)  · You are coughing up blood (more than about 1 teaspoon). · You have signs of low blood pressure. These include feeling lightheaded; being too weak to stand; and having cold, pale, clammy skin. Watch closely for changes in your health, and be sure to contact your doctor if:  · Your symptoms get worse. · You are not getting better as expected. Call before you go to the doctor's office. Follow their instructions. And wear a cloth face cover. Current as of: April 24, 2020               Content Version: 12.4  © 4867-2253 Healthwise, Incorporated. Care instructions adapted under license by your healthcare professional. If you have questions about a medical condition or this instruction, always ask your healthcare professional. [address] any warranty or liability for your use of this information.

## 2020-11-19 NOTE — PROGRESS NOTES
[facility]  [facility]  [address] [place] [address]  [address] [address]  Dept: [phone]  Dept Fax: [phone]    Adal Dawn is a 60 y.o. male who presents today for his medical conditions/complaints of   Chief Complaint   Patient presents with    Congestion     symptoms started 11/18/2020    Cough    Diarrhea          HPI:     /78   Pulse 72   Temp 99.1 °F (37.3 °C) (Temporal)   Resp 16   Ht 5' 11\" (1.803 m)   Wt 262 lb (118.8 kg)   SpO2 97%   BMI 36.54 kg/m²       HPI  Pt presented to the clinic care today with c/o congestion. This is a new problem. The current episode started 2 days ago. The problem has been unchanged since onset. Associated symptoms include: cough- dry, post nasal drip, diarrhea . Pertinent negatives include: No fever, chills, SOB (no more then usual), chest pain, nausea, vomiting, loss of taste . Pt has tried nohting with no improvement. Employed at [employer].   Exposed to AlegrÃ­a at work       Past Medical History:   Diagnosis Date    Arthritis     Wilcox esophagus     Depression     DU (duodenal ulcer)     Emphysema lung ([address]     [place] (hard of hearing)     Hyperlipidemia     Hypersomnia with sleep apnea, unspecified     CPAP nightly    Hypertension     AGUS (obstructive sleep apnea)     wears CPAP    Thoracic outlet syndrome     Rt        Past Surgical History:   Procedure Laterality Date    CARPAL TUNNEL RELEASE Left 12/17/2019    CARPAL TUNNEL RELEASE performed by Maria Fernanda Goode MD at [address] [place] [address] Right 1/27/2020    CARPAL TUNNEL RELEASE performed by Maria Fernanda Goode MD at [place] [address] Right     COLONOSCOPY  5/27/2016    JOINT REPLACEMENT Bilateral     hips    LYMPH NODE DISSECTION      neck    SOFT TISSUE TUMOR RESECTION      back    UPPER GASTROINTESTINAL ENDOSCOPY N/A 12/11/2018    EGD ESOPHAGOGASTRODUODENOSCOPY performed by Alyssa Aj DO at [facility]    UPPER GASTROINTESTINAL ENDOSCOPY N/A 7/21/2020    EGD BIOPSY performed by Gerardine Bumpers, MD at \[facility]\"" Endoscopy       Family History   Problem Relation Age of Onset    Mult Sclerosis Father     Heart Disease Sister        Social History     Tobacco Use    Smoking status: Current Every Day Smoker     Packs/day: 2.50     Years: 45.00     Pack years: 112.50     Types: Cigarettes    Smokeless tobacco: Never Used   Substance Use Topics    Alcohol use: Yes     Comment: daily, 6-8 beer daily        Prior to Visit Medications    Medication Sig Taking? Authorizing Provider   ALPRAZolam Danney Abelardo) 0.5 MG tablet Take 1 tablet by mouth daily for 90 days. Yes BULMARO Torrez CNP   venlafaxine (EFFEXOR XR) 150 MG extended release capsule Take 1 capsule by mouth daily Yes BULMARO Torrez CNP   metoprolol succinate (TOPROL XL) 100 MG extended release tablet Take 1 tablet by mouth daily Yes BULMARO Torrez CNP   Diclofenac Sodium  MG TB24 Take 100 mg by mouth daily Yes BULMARO Torrez CNP   pantoprazole (PROTONIX) 20 MG tablet Take 1 tablet by mouth daily Yes BULMARO Torrez CNP   lisinopril (PRINIVIL;ZESTRIL) 40 MG tablet Take 1 tablet by mouth daily Yes BULMARO Torrez CNP   atorvastatin (LIPITOR) 10 MG tablet TAKE 1 TABLET BY MOUTH ONE TIME A DAY Yes BULMARO Torrez CNP   blood glucose test strips (ASCENSIA AUTODISC VI;ONE TOUCH ULTRA TEST VI) strip Use with associated glucose meter.  Yes BULMARO Torrez CNP   Lancets MISC 1 each by Does not apply route 3 times daily Yes BULMARO Torrez CNP   Blood Glucose Monitoring Suppl (FREESTYLE FREEDOM) KIT 1 kit by Does not apply route daily Yes BULMARO Torrez CNP   albuterol sulfate  (90 Base) MCG/ACT inhaler Inhale 2 puffs into the lungs every 6 hours as needed for Wheezing Yes BULMARO Torrez CNP   loratadine (CLARITIN) 10 MG tablet TAKE 1 TABLET BY MOUTH ONE TIME A DAY Yes BULMARO Greer CNP   Handicap Placard MISC Expires 3/31/2022 Yes BULMARO Greer CNP   fluticasone (FLONASE) 50 MCG/ACT nasal spray as needed  Yes Historical Provider, MD       No Known Allergies      Subjective:      Review of Systems   Constitutional: Negative for chills and fever. HENT: Positive for congestion, postnasal drip and rhinorrhea. Negative for ear pain and sore throat. Eyes: Negative for pain, redness and visual disturbance. Respiratory: Positive for cough. Negative for chest tightness and shortness of breath. Cardiovascular: Negative for chest pain, palpitations and leg swelling. Gastrointestinal: Positive for diarrhea. Negative for abdominal distention, abdominal pain, nausea and vomiting. Genitourinary: Negative for decreased urine volume and difficulty urinating. Musculoskeletal: Negative for arthralgias, gait problem, myalgias and neck pain. Skin: Negative for pallor and rash. Neurological: Negative for weakness, light-headedness and headaches. Psychiatric/Behavioral: Negative for sleep disturbance. Objective:     Physical Exam  Vitals signs and nursing note reviewed. Constitutional:       General: He is not in acute distress. Appearance: Normal appearance. HENT:      Head: Normocephalic and atraumatic. Right Ear: Tympanic membrane and ear canal normal.      Left Ear: Tympanic membrane and ear canal normal.      Nose: Congestion and rhinorrhea present. Mouth/Throat:      Lips: Pink. Mouth: Mucous membranes are moist.      Pharynx: Oropharynx is clear. Uvula midline. No posterior oropharyngeal erythema. Oropharyngeal exudate: post nasal drip. Eyes:      Extraocular Movements: Extraocular movements intact. Conjunctiva/sclera: Conjunctivae normal.      Pupils: Pupils are equal, round, and reactive to light. Neck:      Musculoskeletal: Normal range of motion and neck supple. Cardiovascular:      Rate and Rhythm: Normal rate and regular rhythm. Pulses: Normal pulses. Pulmonary:      Effort: Pulmonary effort is normal. No tachypnea. Breath sounds: Wheezing (expiratory) present. No rhonchi or rales. Abdominal:      General: Bowel sounds are normal. There is no distension. Palpations: Abdomen is soft. Tenderness: There is no abdominal tenderness. Musculoskeletal: Normal range of motion. Right lower leg: No edema. Left lower leg: No edema. Skin:     General: Skin is warm and dry. Capillary Refill: Capillary refill takes less than 2 seconds. Findings: No rash. Neurological:      Mental Status: He is alert and oriented to person, place, and time. Coordination: Coordination normal.      Gait: Gait normal.   Psychiatric:         Mood and Affect: Mood normal.         Thought Content: Thought content normal.           MEDICAL DECISION MAKING Assessment/Plan:     Harmeet Rutherford was seen today for congestion, cough and diarrhea. Diagnoses and all orders for this visit:    Suspected COVID-19 virus infection  -     COVID-19 Ambulatory; Future    Exposure to COVID-19 virus  -     COVID-19 Ambulatory; Future    Sinus congestion  -     COVID-19 Ambulatory; Future        Results for orders placed or performed during the hospital encounter of 08/26/20   COVID-19 Ambulatory    Specimen: Nasopharyngeal Swab   Result Value Ref Range    SARS-CoV-2, LORENA Not Detected Not Detected     Based on the history and exam,   Will send out COVID19 testing. Possible treatment alterations based on the results. Patient instructed to self-quarantine until testing results are back- and to follow the quarantine instructions in the after visit summary. Even if results are negative- pt informed still needs to isolate for at least 10 days. Tylenol as needed for fever/pain.   Increase fluids, rest.   The patient indicates understanding of these issues and agrees with the plan.  Educational materials provided on AVS.  Follow up if symptoms do not improve/worsen. Call with any questions or concerns. Discussed symptoms that will warrant urgent ED evaluation/treatment. Preventing the Spread of Coronavirus Disease 2019 in Homes and Residential Communities: For the most recent information go to: RetailCleaners.fi    Patient given educational materials - see patientinstructions. Discussed use, benefit, and side effects of prescribed medications. All patient questions answered. Pt verbalized understanding. Instructed to continue current medications, diet and exercise. Patient agreed with treatment plan. Follow up as directed.      Electronically signed by BULMARO Ewing CNP on 11/19/2020 at 4:11 PM

## 2020-11-22 LAB — SARS-COV-2, NAA: NOT DETECTED

## 2020-12-11 ENCOUNTER — TELEMEDICINE (OUTPATIENT)
Dept: PRIMARY CARE CLINIC | Age: 55
End: 2020-12-11
Payer: COMMERCIAL

## 2020-12-11 PROCEDURE — 99213 OFFICE O/P EST LOW 20 MIN: CPT | Performed by: NURSE PRACTITIONER

## 2020-12-11 RX ORDER — FLUTICASONE PROPIONATE 50 MCG
2 SPRAY, SUSPENSION (ML) NASAL DAILY
Qty: 1 BOTTLE | Refills: 3 | Status: SHIPPED | OUTPATIENT
Start: 2020-12-11 | End: 2021-12-23 | Stop reason: SDUPTHER

## 2020-12-11 RX ORDER — GUAIFENESIN 600 MG/1
600 TABLET, EXTENDED RELEASE ORAL 2 TIMES DAILY
Qty: 30 TABLET | Refills: 0 | Status: SHIPPED | OUTPATIENT
Start: 2020-12-11 | End: 2020-12-26

## 2020-12-11 RX ORDER — CETIRIZINE HYDROCHLORIDE 10 MG/1
10 TABLET ORAL DAILY
Qty: 30 TABLET | Refills: 0 | Status: SHIPPED | OUTPATIENT
Start: 2020-12-11 | End: 2021-01-10

## 2020-12-11 ASSESSMENT — ENCOUNTER SYMPTOMS
RHINORRHEA: 1
SHORTNESS OF BREATH: 0
SORE THROAT: 0
VOMITING: 0
DIARRHEA: 0
COLOR CHANGE: 0
ABDOMINAL PAIN: 0
NAUSEA: 0
SINUS PRESSURE: 0
SINUS PAIN: 0
CHEST TIGHTNESS: 0

## 2020-12-11 NOTE — PROGRESS NOTES
2020    TELEHEALTH EVALUATION -- Audio/Visual (During GWRPP-55 public health emergency)    HPI:    Rosalie Banda (:  1965) has requested an audio/video evaluation for the following concern(s):  Here via video visit for acute visit, has complaint of sinus congestion for over a month  Has chronic allergy symptoms all year but has been significantly worse  Not currently taking the claritin or flonase that has been prescribed for him  Denies fever or chills, no sinus pain, no sore throat    Review of Systems   Constitutional: Negative for activity change, fatigue and fever. HENT: Positive for congestion, postnasal drip and rhinorrhea. Negative for ear pain, sinus pressure, sinus pain and sore throat. Eyes: Negative for visual disturbance. Respiratory: Negative for chest tightness and shortness of breath. Cardiovascular: Negative for chest pain and palpitations. Gastrointestinal: Negative for abdominal pain, diarrhea, nausea and vomiting. Endocrine: Negative for polydipsia. Genitourinary: Negative for difficulty urinating. Musculoskeletal: Negative for arthralgias and myalgias. Skin: Negative for color change. Neurological: Negative for weakness and headaches. Psychiatric/Behavioral: Negative for behavioral problems. The patient is not nervous/anxious. All other systems reviewed and are negative. Prior to Visit Medications    Medication Sig Taking? Authorizing Provider   guaiFENesin (MUCINEX) 600 MG extended release tablet Take 1 tablet by mouth 2 times daily for 15 days Yes BULMARO Butler CNP   cetirizine (ZYRTEC) 10 MG tablet Take 1 tablet by mouth daily Yes BULMARO Butler CNP   fluticasone (FLONASE) 50 MCG/ACT nasal spray 2 sprays by Nasal route daily Yes BULMARO Butler CNP   ALPRAZolam (XANAX) 0.5 MG tablet Take 1 tablet by mouth daily for 90 days.   BULMARO Jj CNP   venlafaxine (EFFEXOR XR) 150 MG extended release capsule Take 1 capsule by mouth daily  BULMARO Majano CNP   metoprolol succinate (TOPROL XL) 100 MG extended release tablet Take 1 tablet by mouth daily  BULMARO Majano CNP   Diclofenac Sodium  MG TB24 Take 100 mg by mouth daily  BULMARO Majano CNP   pantoprazole (PROTONIX) 20 MG tablet Take 1 tablet by mouth daily  BULMARO Majano CNP   lisinopril (PRINIVIL;ZESTRIL) 40 MG tablet Take 1 tablet by mouth daily  BULMARO Majano CNP   atorvastatin (LIPITOR) 10 MG tablet TAKE 1 TABLET BY MOUTH ONE TIME A DAY  BULMARO Majano CNP   blood glucose test strips (ASCENSIA AUTODISC VI;ONE TOUCH ULTRA TEST VI) strip Use with associated glucose meter. BULMARO Majano CNP   Lancets MISC 1 each by Does not apply route 3 times daily  BULMARO Majano CNP   Blood Glucose Monitoring Suppl (FREESTYLE FREEDOM) KIT 1 kit by Does not apply route daily  BULMARO Majano CNP   albuterol sulfate  (90 Base) MCG/ACT inhaler Inhale 2 puffs into the lungs every 6 hours as needed for Wheezing  BULMARO Majano CNP   Handicap Placard MISC Expires 3/31/2022  BULMARO Majano CNP       Social History     Tobacco Use    Smoking status: Current Every Day Smoker     Packs/day: 2.50     Years: 45.00     Pack years: 112.50     Types: Cigarettes    Smokeless tobacco: Never Used   Substance Use Topics    Alcohol use: Yes     Comment: daily, 6-8 beer daily    Drug use:  No            PHYSICAL EXAMINATION:  [ INSTRUCTIONS:  \"[x]\" Indicates a positive item  \"[]\" Indicates a negative item  -- DELETE ALL ITEMS NOT EXAMINED]  Vital Signs: (As obtained by patient/caregiver or practitioner observation)    Blood pressure-  Heart rate-    Respiratory rate-    Temperature-  Pulse oximetry-     Constitutional: [x] Appears well-developed and well-nourished [x] No apparent distress      [] Abnormal-   Mental status  [x] Alert and awake [x] Oriented to person/place/time [x]Able to follow commands      Eyes:  EOM    []  Normal  [] Abnormal-  Sclera  []  Normal  [] Abnormal -         Discharge []  None visible  [] Abnormal -    HENT:   [x] Normocephalic, atraumatic. [] Abnormal   [] Mouth/Throat: Mucous membranes are moist.     External Ears [] Normal  [] Abnormal-     Neck: [] No visualized mass     Pulmonary/Chest: [x] Respiratory effort normal.  [] No visualized signs of difficulty breathing or respiratory distress        [] Abnormal-      Musculoskeletal:   [] Normal gait with no signs of ataxia         [x] Normal range of motion of neck        [] Abnormal-       Neurological:        [x] No Facial Asymmetry (Cranial nerve 7 motor function) (limited exam to video visit)          [] No gaze palsy        [] Abnormal-         Skin:        [x] No significant exanthematous lesions or discoloration noted on facial skin         [] Abnormal-            Psychiatric:       [x] Normal Affect [] No Hallucinations        [] Abnormal-     Other pertinent observable physical exam findings-     ASSESSMENT/PLAN:  1. Chronic congestion of paranasal sinus  Chronic, worsening, resume daily use of flonase, trial zyrtec daily and add mucinex. Has used mucinex in past with good relief, continue supportive care, increased fluids, smoking cessation, humidified air. F/u with PCP. - guaiFENesin (MUCINEX) 600 MG extended release tablet; Take 1 tablet by mouth 2 times daily for 15 days  Dispense: 30 tablet; Refill: 0  - cetirizine (ZYRTEC) 10 MG tablet; Take 1 tablet by mouth daily  Dispense: 30 tablet; Refill: 0      Return if symptoms worsen or fail to improve. Zaid Melendrez is a 60 y.o. male being evaluated by a Virtual Visit (video visit) encounter to address concerns as mentioned above. A caregiver was present when appropriate.  Due to this being a TeleHealth encounter (During QAVLR-60 public health emergency), evaluation of the following organ systems was limited: Vitals/Constitutional/EENT/Resp/CV/GI//MS/Neuro/Skin/Heme-Lymph-Imm. Pursuant to the emergency declaration under the [address] [place] [address], [address] [place] [address] and the [employer] Act, this Virtual Visit was conducted with patient's (and/or legal guardian's) consent, to reduce the patient's risk of exposure to COVID-19 and provide necessary medical care. The patient (and/or legal guardian) has also been advised to contact this office for worsening conditions or problems, and seek emergency medical treatment and/or call 911 if deemed necessary. Patient identification was verified at the start of the visit: Yes    Total time spent on this encounter: Not billed by time    Services were provided through a video synchronous discussion virtually to substitute for in-person clinic visit. Patient and provider were located at their individual homes. --Laura Bui, BULMARO - CNP on 12/11/2020 at 11:36 AM    An electronic signature was used to authenticate this note.

## 2020-12-11 NOTE — PATIENT INSTRUCTIONS
you have a fever, rash, or headaches. This medicine can affect the results of certain medical tests. Tell any doctor who treats you that you are using guaifenesin. Store at room temperature away from moisture and heat. Do not freeze. What happens if I miss a dose? Since cough or cold medicine is used when needed, you may not be on a dosing schedule. Skip any missed dose if it's almost time for your next dose. Do not use two doses at one time. What happens if I overdose? Seek emergency medical attention or call the Poison Help line at 1-489.135.5790. What should I avoid while taking guaifenesin? Ask a doctor or pharmacist before using other cough or cold medicines that may contain similar ingredients. Avoid driving or hazardous activity until you know how this medicine will affect you. Your reactions could be impaired. What are the possible side effects of guaifenesin? Get emergency medical help if you have signs of an allergic reaction: hives; difficult breathing; swelling of your face, lips, tongue, or throat. Common side effects may include:  · nausea; or  · vomiting. This is not a complete list of side effects and others may occur. Call your doctor for medical advice about side effects. You may report side effects to FDA at 1-908-GAB-2246. What other drugs will affect guaifenesin ? Avoid using this medicine with other drugs that cause drowsiness or slow your breathing (such as opioid medicine, a muscle relaxer, or medicine for anxiety or seizures). Ask a doctor or pharmacist before using any other medication, including prescription and over-the-counter medicines, vitamins, and herbal products. Not all possible drug interactions are listed in this medication guide. Where can I get more information? Your pharmacist can provide more information about guaifenesin.   Remember, keep this and all other medicines out of the reach of children, never share your medicines with others, and use this

## 2021-01-16 DIAGNOSIS — F41.9 ANXIETY: ICD-10-CM

## 2021-01-18 RX ORDER — ALPRAZOLAM 0.5 MG/1
TABLET ORAL
Qty: 90 TABLET | Refills: 0 | Status: SHIPPED | OUTPATIENT
Start: 2021-01-18 | End: 2021-04-20 | Stop reason: SDUPTHER

## 2021-01-18 NOTE — TELEPHONE ENCOUNTER
Last OV 12/11/2020    Next OV 02/19/2021    Health Maintenance   Topic Date Due    Hepatitis C screen  1965    Diabetic foot exam  10/18/1973    Diabetic retinal exam  10/18/1973    HIV screen  10/18/1978    Diabetic microalbuminuria test  10/18/1981    Hepatitis B vaccine (1 of 3 - Risk 3-dose series) 10/18/1982    DTaP/Tdap/Td vaccine (1 - Tdap) 10/18/1982    Shingles Vaccine (1 of 2) 10/18/2013    Flu vaccine (1) 09/01/2020    Lipid screen  05/20/2021    Potassium monitoring  05/20/2021    A1C test (Diabetic or Prediabetic)  08/14/2021    Creatinine monitoring  09/24/2021    Low dose CT lung screening  11/25/2021    Colon cancer screen colonoscopy  05/27/2026    Pneumococcal 0-64 years Vaccine  Completed    Hepatitis A vaccine  Aged Out    Hib vaccine  Aged Out    Meningococcal (ACWY) vaccine  Aged Out             (applicable per patient's age: Cancer Screenings, Depression Screening, Fall Risk Screening, Immunizations)    Hemoglobin A1C (%)   Date Value   08/14/2020 5.6   05/20/2020 6.6 (H)   03/01/2019 5.7     LDL Cholesterol (mg/dL)   Date Value   05/20/2020 71     LDL Calculated (mg/dL)   Date Value   12/02/2016 97     AST (U/L)   Date Value   05/20/2020 30     ALT (U/L)   Date Value   05/20/2020 38     BUN (mg/dL)   Date Value   05/20/2020 22 (H)      (goal A1C is < 7)   (goal LDL is <100) need 30-50% reduction from baseline     BP Readings from Last 3 Encounters:   11/19/20 127/78   08/14/20 120/62   07/21/20 (!) 139/96    (goal /80)      All Future Testing planned in [facility]:  Lab Frequency Next Occurrence   Full PFT Study With Bronchodilator Once 05/11/2020       Next Visit Date:  Future Appointments   Date Time Provider Mirtha Bonilla   2/19/2021  7:40 AM BULMARO Phillips - CNP [facility]            Patient Active Problem List:     Anxiety and depression     Mixed hyperlipidemia     Essential hypertension     Primary osteoarthritis involving multiple joints     Multiple lung nodules     Acquired trigger finger     Class 2 obesity

## 2021-01-21 DIAGNOSIS — E78.2 MIXED HYPERLIPIDEMIA: ICD-10-CM

## 2021-01-21 RX ORDER — ATORVASTATIN CALCIUM 10 MG/1
TABLET, FILM COATED ORAL
Qty: 90 TABLET | Refills: 3 | Status: SHIPPED | OUTPATIENT
Start: 2021-01-21 | End: 2021-12-08

## 2021-01-21 NOTE — TELEPHONE ENCOUNTER
Last OV 12/11/20  Health Maintenance   Topic Date Due    Hepatitis C screen  1965    Diabetic foot exam  10/18/1973    Diabetic retinal exam  10/18/1973    HIV screen  10/18/1978    Diabetic microalbuminuria test  10/18/1981    Hepatitis B vaccine (1 of 3 - Risk 3-dose series) 10/18/1982    DTaP/Tdap/Td vaccine (1 - Tdap) 10/18/1982    Shingles Vaccine (1 of 2) 10/18/2013    Flu vaccine (1) 09/01/2020    Lipid screen  05/20/2021    Potassium monitoring  05/20/2021    A1C test (Diabetic or Prediabetic)  08/14/2021    Creatinine monitoring  09/24/2021    Low dose CT lung screening  11/25/2021    Colon cancer screen colonoscopy  05/27/2026    Pneumococcal 0-64 years Vaccine  Completed    Hepatitis A vaccine  Aged Out    Hib vaccine  Aged Out    Meningococcal (ACWY) vaccine  Aged Out             (applicable per patient's age: Cancer Screenings, Depression Screening, Fall Risk Screening, Immunizations)    Hemoglobin A1C (%)   Date Value   08/14/2020 5.6   05/20/2020 6.6 (H)   03/01/2019 5.7     LDL Cholesterol (mg/dL)   Date Value   05/20/2020 71     LDL Calculated (mg/dL)   Date Value   12/02/2016 97     AST (U/L)   Date Value   05/20/2020 30     ALT (U/L)   Date Value   05/20/2020 38     BUN (mg/dL)   Date Value   05/20/2020 22 (H)      (goal A1C is < 7)   (goal LDL is <100) need 30-50% reduction from baseline     BP Readings from Last 3 Encounters:   11/19/20 127/78   08/14/20 120/62   07/21/20 (!) 139/96    (goal /80)      All Future Testing planned in [facility]:  Lab Frequency Next Occurrence   Full PFT Study With Bronchodilator Once 05/11/2020       Next Visit Date:  Future Appointments   Date Time Provider Mirtha Bonilla   2/19/2021  7:40 AM BULMARO Nassar - [facility]            Patient Active Problem List:     Anxiety and depression     Mixed hyperlipidemia     Essential hypertension     Primary osteoarthritis involving multiple joints     Multiple lung

## 2021-02-19 ENCOUNTER — OFFICE VISIT (OUTPATIENT)
Dept: PRIMARY CARE CLINIC | Age: 56
End: 2021-02-19
Payer: COMMERCIAL

## 2021-02-19 ENCOUNTER — HOSPITAL ENCOUNTER (OUTPATIENT)
Age: 56
Discharge: HOME | End: 2021-02-19
Payer: COMMERCIAL

## 2021-02-19 ENCOUNTER — HOSPITAL ENCOUNTER (OUTPATIENT)
Dept: GENERAL RADIOLOGY | Age: 56
Discharge: HOME | End: 2021-02-21
Payer: COMMERCIAL

## 2021-02-19 VITALS
SYSTOLIC BLOOD PRESSURE: 130 MMHG | HEART RATE: 74 BPM | WEIGHT: 253.2 LBS | OXYGEN SATURATION: 97 % | BODY MASS INDEX: 35.45 KG/M2 | HEIGHT: 71 IN | RESPIRATION RATE: 14 BRPM | DIASTOLIC BLOOD PRESSURE: 74 MMHG

## 2021-02-19 DIAGNOSIS — E11.9 TYPE 2 DIABETES MELLITUS WITHOUT COMPLICATION, WITHOUT LONG-TERM CURRENT USE OF INSULIN (HCC): Primary | ICD-10-CM

## 2021-02-19 DIAGNOSIS — R22.31 MASS OF FINGER OF RIGHT HAND: ICD-10-CM

## 2021-02-19 DIAGNOSIS — F41.9 ANXIETY: ICD-10-CM

## 2021-02-19 DIAGNOSIS — M79.671 BILATERAL FOOT PAIN: ICD-10-CM

## 2021-02-19 DIAGNOSIS — M79.672 BILATERAL FOOT PAIN: ICD-10-CM

## 2021-02-19 LAB — HBA1C MFR BLD: 5.3 %

## 2021-02-19 PROCEDURE — 83036 HEMOGLOBIN GLYCOSYLATED A1C: CPT | Performed by: NURSE PRACTITIONER

## 2021-02-19 PROCEDURE — 73130 X-RAY EXAM OF HAND: CPT

## 2021-02-19 PROCEDURE — 99214 OFFICE O/P EST MOD 30 MIN: CPT | Performed by: NURSE PRACTITIONER

## 2021-02-19 PROCEDURE — 73120 X-RAY EXAM OF HAND: CPT

## 2021-02-19 ASSESSMENT — PATIENT HEALTH QUESTIONNAIRE - PHQ9
1. LITTLE INTEREST OR PLEASURE IN DOING THINGS: 0
2. FEELING DOWN, DEPRESSED OR HOPELESS: 0
SUM OF ALL RESPONSES TO PHQ QUESTIONS 1-9: 0
SUM OF ALL RESPONSES TO PHQ9 QUESTIONS 1 & 2: 0

## 2021-02-19 ASSESSMENT — ENCOUNTER SYMPTOMS
RHINORRHEA: 1
SINUS PRESSURE: 1
COUGH: 0
SINUS PAIN: 1
SHORTNESS OF BREATH: 0
BACK PAIN: 0
ABDOMINAL PAIN: 0

## 2021-02-19 NOTE — PROGRESS NOTES
[facility] PRIMARY [facility]ARE  [facility]  [address]  [address] [address]  Dept: [phone]  Dept Fax: [phone]    Severo Ha is a 60 y.o. male who presentstoday for his medical conditions/complaints as noted below. Severo Ha is c/o of  Chief Complaint   Patient presents with    Diabetes    6 Month Follow-Up           HPI:     Presents for six month follow up   BP stable   Down 9lbs since last visit  Hga1c 5.6 to 5.3    DM: He is currently not taking medication, but has been trying to watch what he eats. He denies routine exercise. Sinuses: He did a virtual treatment for sinus concerns. He is still having a runny nose, sinus pressure, and tooth pain. Occasional head aches and SOB with nonproductive cough. This has been going on for a year, thinks he needs to follow up with ENT (thinks he sees Dr. Cece De La Fuente, will call to schedule). Taking his allergy medicine, but only notices it slightly. Also recently accidentally injested paint thinner approx 3 weeks ago. Spit it out. Concerned regarding long term effects, denies any current issues related to this. Will discuss with ENT    Smoking 2-3 packs a day. Working on cutting back.      Lesion to right thumb  Present x 1 year  Denies any pain  Getting larger per patient, would like evaluated  Hx of hand surgery, unsure of surgeon name    C/o bilateral foot pain  Denies n/t   Notes thickened toe nails  Willing to meet with podiatry    Anxiety-Stable with current use of xanax daily, denies any side effects with med    Denies any other problems/concerns       Hemoglobin A1C (%)   Date Value   02/19/2021 5.3   08/14/2020 5.6   05/20/2020 6.6 (H)             ( goal A1C is < 7)   No results found for: LABMICR  LDL Cholesterol (mg/dL)   Date Value   05/20/2020 71   03/01/2019 69   11/11/2017 110     LDL Calculated (mg/dL)   Date Value   12/02/2016 97       (goal LDL is <100)   AST (U/L)   Date Value   05/20/2020 30 ALT (U/L)   Date Value   05/20/2020 38     BUN (mg/dL)   Date Value   05/20/2020 22 (H)     BP Readings from Last 3 Encounters:   02/19/21 130/74   11/19/20 127/78   08/14/20 120/62          (otig602/80)    Past Medical History:   Diagnosis Date    Arthritis     Wilcox esophagus     Depression     DU (duodenal ulcer)     Emphysema lung (HCC)     [place] (hard of hearing)     Hyperlipidemia     Hypersomnia with sleep apnea, unspecified     CPAP nightly    Hypertension     AGUS (obstructive sleep apnea)     wears CPAP    Thoracic outlet syndrome     Rt      Past Surgical History:   Procedure Laterality Date    CARPAL TUNNEL RELEASE Left 12/17/2019    CARPAL TUNNEL RELEASE performed by Chelo Irby MD at [address] Right 1/27/2020    CARPAL TUNNEL RELEASE performed by Chelo Irby MD at [place] [address] Right     COLONOSCOPY  5/27/2016    JOINT REPLACEMENT Bilateral     hips    LYMPH NODE DISSECTION      neck    SOFT TISSUE TUMOR RESECTION      back    UPPER GASTROINTESTINAL ENDOSCOPY N/A 12/11/2018    EGD ESOPHAGOGASTRODUODENOSCOPY performed by Fabby Mitchell DO at [address] [place] [address] ENDOSCOPY N/A 7/21/2020    EGD BIOPSY performed by Adi Benson MD at [facility]       Family History   Problem Relation Age of Onset    Mult Sclerosis Father     Heart Disease Sister           Social History     Tobacco Use    Smoking status: Current Every Day Smoker     Packs/day: 2.50     Years: 45.00     Pack years: 112.50     Types: Cigarettes    Smokeless tobacco: Never Used   Substance Use Topics    Alcohol use: Yes     Comment: daily, 6-8 beer daily      Current Outpatient Medications   Medication Sig Dispense Refill    atorvastatin (LIPITOR) 10 MG tablet TAKE 1 TABLET DAILY 90 tablet 3    fluticasone (FLONASE) 50 MCG/ACT nasal spray 2 sprays by Nasal route daily 1 Bottle 3  venlafaxine (EFFEXOR XR) 150 MG extended release capsule Take 1 capsule by mouth daily 90 capsule 3    metoprolol succinate (TOPROL XL) 100 MG extended release tablet Take 1 tablet by mouth daily 90 tablet 3    Diclofenac Sodium  MG TB24 Take 100 mg by mouth daily 90 tablet 3    pantoprazole (PROTONIX) 20 MG tablet Take 1 tablet by mouth daily 180 tablet 3    lisinopril (PRINIVIL;ZESTRIL) 40 MG tablet Take 1 tablet by mouth daily 90 tablet 3    blood glucose test strips (ASCENSIA AUTODISC VI;ONE TOUCH ULTRA TEST VI) strip Use with associated glucose meter. 100 strip 11    Lancets MISC 1 each by Does not apply route 3 times daily 200 each 5    Blood Glucose Monitoring Suppl (FREESTYLE FREEDOM) KIT 1 kit by Does not apply route daily 1 kit 0    albuterol sulfate  (90 Base) MCG/ACT inhaler Inhale 2 puffs into the lungs every 6 hours as needed for Wheezing 1 Inhaler 3    Handicap Placard MISC Expires 3/31/2022 1 each 0     No current facility-administered medications for this visit.       No Known Allergies    Health Maintenance   Topic Date Due    Hepatitis C screen  1965    Diabetic foot exam  10/18/1973    Diabetic retinal exam  10/18/1973    HIV screen  10/18/1978    Diabetic microalbuminuria test  10/18/1981    Hepatitis B vaccine (1 of 3 - Risk 3-dose series) 10/18/1982    DTaP/Tdap/Td vaccine (1 - Tdap) 10/18/1982    Shingles Vaccine (1 of 2) 10/18/2013    Flu vaccine (1) 02/19/2022 (Originally 9/1/2020)    Lipid screen  05/20/2021    Potassium monitoring  05/20/2021    Creatinine monitoring  09/24/2021    Low dose CT lung screening  11/25/2021    A1C test (Diabetic or Prediabetic)  02/19/2022    Colon cancer screen colonoscopy  05/27/2026    Pneumococcal 0-64 years Vaccine  Completed    Hepatitis A vaccine  Aged Out    Hib vaccine  Aged Out    Meningococcal (ACWY) vaccine  Aged Out       Subjective:      Review of Systems Constitutional: Negative for chills and fever. HENT: Positive for postnasal drip, rhinorrhea, sinus pressure, sinus pain and sneezing. Negative for congestion. Eyes: Negative for visual disturbance. Respiratory: Negative for cough and shortness of breath. Cardiovascular: Negative for chest pain and palpitations. Gastrointestinal: Negative for abdominal pain. Genitourinary: Negative for difficulty urinating and dysuria. Musculoskeletal: Negative for arthralgias and back pain. Neurological: Negative for dizziness and headaches. Psychiatric/Behavioral: Negative for self-injury, sleep disturbance and suicidal ideas. The patient is not nervous/anxious. Objective:     Physical Exam  Vitals signs and nursing note reviewed. Constitutional:       Appearance: He is well-developed. HENT:      Head: Normocephalic and atraumatic. Eyes:      Pupils: Pupils are equal, round, and reactive to light. Neck:      Musculoskeletal: Normal range of motion. Cardiovascular:      Rate and Rhythm: Normal rate and regular rhythm. Heart sounds: Normal heart sounds. Pulmonary:      Effort: Pulmonary effort is normal.      Breath sounds: Normal breath sounds. Abdominal:      General: Bowel sounds are normal.      Palpations: Abdomen is soft. Tenderness: There is no abdominal tenderness. Musculoskeletal: Normal range of motion. Skin:     General: Skin is warm and dry. Neurological:      Mental Status: He is alert and oriented to person, place, and time. Psychiatric:         Behavior: Behavior normal.         Thought Content: Thought content normal.         Judgment: Judgment normal.       /74   Pulse 74   Resp 14   Ht 5' 11\" (1.803 m)   Wt 253 lb 3.2 oz (114.9 kg)   SpO2 97%   BMI 35.31 kg/m²     Assessment:       Diagnosis Orders   1.  Type 2 diabetes mellitus without complication, without long-term current use of insulin ([facility])  POCT glycosylated hemoglobin (Hb A1C) Miguel Soulier, SANTI, PodiatrDenisse berrios   2. Anxiety     3. Mass of finger of right hand  XR HAND RIGHT (2 VIEWS)   4. Bilateral foot pain  [facility] Jasmin, SANTI, Podiatry, [facility]             Plan:      Return in about 6 months (around 8/19/2021) for annual exam.    1. Chronic conditions- Stable. Continue current meds. Hga1c 5.3. Continue diet/exercise. Follow up in six months for recheck. 2. Mass right hand- Rx given for xray, consider referral to Dr. Elif Jean Baptiste if needed. 3. Bilateral foot pain- Rx given for referral to Dr. Yolanda Jones, follow up as needed. Orders Placed This Encounter   Procedures    XR HAND RIGHT (2 VIEWS)     Standing Status:   Future     Standing Expiration Date:   2/19/2022     Order Specific Question:   Reason for exam:     Answer:   right thumb. [address] [facility], SANTI, Podiatry, [facility]     Referral Priority:   Routine     Referral Type:   Eval and Treat     Referral Reason:   Specialty Services Required     Referred to Provider:   Barbara Dunaway DPM     Requested Specialty:   Podiatry     Number of Visits Requested:   1    POCT glycosylated hemoglobin (Hb A1C)      No orders of the defined types were placed in this encounter. Patient given educational materials - see patient instructions. Discussed use, benefit, and side effects of prescribed medications. All patientquestions answered. Pt voiced understanding. Reviewed health maintenance. Instructedto continue current medications, diet and exercise. Patient agreed with treatmentplan. Follow up as directed.      Electronicallysigned by BULMARO Pack CNP on 2/19/2021 at 8:25 AM

## 2021-02-25 DIAGNOSIS — R22.31 MASS OF FINGER OF RIGHT HAND: Primary | ICD-10-CM

## 2021-03-09 ENCOUNTER — OFFICE VISIT (OUTPATIENT)
Dept: PODIATRY | Age: 56
End: 2021-03-09
Payer: COMMERCIAL

## 2021-03-09 VITALS — HEIGHT: 71 IN | TEMPERATURE: 97.1 F | BODY MASS INDEX: 35.28 KG/M2 | WEIGHT: 252 LBS

## 2021-03-09 DIAGNOSIS — M72.2 PLANTAR FASCIAL FIBROMATOSIS: Primary | ICD-10-CM

## 2021-03-09 DIAGNOSIS — M79.671 PAIN IN BOTH FEET: ICD-10-CM

## 2021-03-09 DIAGNOSIS — E11.51 TYPE II DIABETES MELLITUS WITH PERIPHERAL CIRCULATORY DISORDER (HCC): ICD-10-CM

## 2021-03-09 DIAGNOSIS — M79.672 PAIN IN BOTH FEET: ICD-10-CM

## 2021-03-09 PROCEDURE — 99203 OFFICE O/P NEW LOW 30 MIN: CPT | Performed by: PODIATRIST

## 2021-03-09 NOTE — PROGRESS NOTES
[facility] PHYSICIANS  [facility]  [address] [address] [place] [address]  Dept: [phone]  Dept Fax: [phone]    NEW PATIENT PROGRESS NOTE  Date of patient's visit: 3/9/2021  Patient's Name:  Kendall Hawkins YOB: 1965            Patient Care Team:  BULMARO Rodriguez CNP as PCP - General (Nurse Practitioner)  BULMARO Rodriguez CNP as PCP - [facility] Empaneled Provider  Sonia Scott MD as Consulting Physician (Internal Medicine)  Nicoletta Koyanagi, MD (Thoracic Surgery)        Chief Complaint   Patient presents with    New Patient    Foot Pain    Nail Problem         HPI:   Kendall Hawkins is a 60 y.o. male who presents to the office today complaining of  Bilateral foot pain. Symptoms began  year(s) ago. Patient relates pain is Present. Pain is rated 8 out of 10 and is described as intermittent. Treatments prior to today's visit include: changing shoes . Currently denies F/C/N/V. Pt's primary care physician is BULMARO Melissa CNP last seen 2/19/21     No Known Allergies    Past Medical History:   Diagnosis Date    Arthritis     Wilcox esophagus     Depression     DU (duodenal ulcer)     Emphysema lung ([address]     [place] (hard of hearing)     Hyperlipidemia     Hypersomnia with sleep apnea, unspecified     CPAP nightly    Hypertension     AGUS (obstructive sleep apnea)     wears CPAP    Thoracic outlet syndrome     Rt       Prior to Admission medications    Medication Sig Start Date End Date Taking?  Authorizing Provider   atorvastatin (LIPITOR) 10 MG tablet TAKE 1 TABLET DAILY 1/21/21   BULMARO Rodriguez CNP   fluticasone [facility]) 50 MCG/ACT nasal spray 2 sprays by Nasal route daily 12/11/20   IsaakBULMARO Dominguez CNP   venlafaxine (EFFEXOR XR) 150 MG extended release capsule Take 1 capsule by mouth daily 9/10/20   BULMARO Rodriguez CNP   metoprolol succinate (TOPROL XL) 100 MG extended release tablet Take 1 tablet by mouth daily 9/10/20   BULMARO Atkins CNP   Diclofenac Sodium  MG TB24 Take 100 mg by mouth daily 9/10/20   BULMARO Atkins CNP   pantoprazole (PROTONIX) 20 MG tablet Take 1 tablet by mouth daily 9/10/20   BULMARO Atkins CNP   lisinopril (PRINIVIL;ZESTRIL) 40 MG tablet Take 1 tablet by mouth daily 9/10/20   BULMARO Atkins CNP   blood glucose test strips (ASCENSIA AUTODISC VI;ONE TOUCH ULTRA TEST VI) strip Use with associated glucose meter.  5/22/20   BULMARO Atkins CNP   Lancets MISC 1 each by Does not apply route 3 times daily 5/22/20   BULMARO Atkins CNP   Blood Glucose Monitoring Suppl (FREESTYLE FREEDOM) KIT 1 kit by Does not apply route daily 5/22/20   BULMARO Atkins CNP   albuterol sulfate  (90 Base) MCG/ACT inhaler Inhale 2 puffs into the lungs every 6 hours as needed for Wheezing 11/18/19   BULMARO Atkins CNP   Handicap Placard [address] Expires 3/31/2022 3/31/17   BULMARO Atkins CNP       Past Surgical History:   Procedure Laterality Date    CARPAL TUNNEL RELEASE Left 12/17/2019    CARPAL TUNNEL RELEASE performed by Kiesha Alvse MD at [address] Right 1/27/2020    CARPAL TUNNEL RELEASE performed by Kiesha Alves MD at [facility] Right     COLONOSCOPY  5/27/2016    JOINT REPLACEMENT Bilateral     hips    LYMPH NODE DISSECTION      neck    SOFT TISSUE TUMOR RESECTION      back    UPPER GASTROINTESTINAL ENDOSCOPY N/A 12/11/2018    EGD ESOPHAGOGASTRODUODENOSCOPY performed by Avila Root DO at [facility]    UPPER GASTROINTESTINAL ENDOSCOPY N/A 7/21/2020    EGD BIOPSY performed by Sintia Sherwood MD at [facility]       Family History   Problem Relation Age of Onset    Mult Sclerosis Father     Heart Disease Sister        Social History     Tobacco Use    Smoking status: Current Every Day Smoker Packs/day: 2.50     Years: 45.00     Pack years: 112.50     Types: Cigarettes    Smokeless tobacco: Never Used   Substance Use Topics    Alcohol use: Yes     Comment: daily, 6-8 beer daily       Review of Systems    Review of Systems:   History obtained from chart review and the patient  General ROS: negative for - chills, fatigue, fever, night sweats or weight gain  Constitutional: Negative for chills, diaphoresis, fatigue, fever and unexpected weight change. Musculoskeletal: Positive for arthralgias, gait problem and joint swelling. Neurological ROS: negative for - behavioral changes, confusion, headaches or seizures. Negative for weakness and numbness. Dermatological ROS: negative for - mole changes, rash  Cardiovascular: Negative for leg swelling. Gastrointestinal: Negative for constipation, diarrhea, nausea and vomiting. Lower Extremity Physical Examination:   Vitals:   Vitals:    03/09/21 0957   Temp: 97.1 °F (36.2 °C)     General: AAO x 3 in NAD. Dermatologic Exam:  Skin lesion/ulceration Absent . Skin No rashes or nodules noted. .   Skin is thin, with flaky sloughing skin as well as decreased hair growth to the lower leg  Small red hemosiderin deposits seen dorsal foot   Musculoskeletal:       POP of the right and left plantar medial calcaneal tuberosity. Pain increased with Dorsiflexion of the right and left lesser toes.   Negative pain on compression of the calcaneus bilaterally    1st MPJ ROM decreased, Bilateral.  Muscle strength 5/5, Bilateral.  Pain present upon palpation of toenails 1-5, Bilateral. decreased medial longitudinal arch, Bilateral.  Ankle ROM decreased,Bilateral.    Dorsally contracted digits present digits 2, Bilateral.     Vascular: DP pulses 1/4 bilateral.  PT pulses 0/4 bilateral.   CFT <5 seconds, Bilateral.  Hair growth absent to the level of the digits, Bilateral.  Edema present, Bilateral.  Varicosities absent, Bilateral. Erythema absent, Bilateral    Neurological: Sensation diminshed to light touch to level of digits, Bilateral.  Protective sensation intact 6/10 sites via 5.07/10g [place] Monofilament, Bilateral.  negative Tinel's, Bilateral.  negative Valleix sign, Bilateral.      Integument: Warm, dry, supple, Bilateral.  Open lesion absent, Bilateral.  Interdigital maceration absent to web spaces 4, Bilateral.  Nails 1-5 left and 1-5 right thickened > 3.0 mm, dystrophic and crumbly, discolored with subungual debris. Fissures absent, Bilateral.     Asessment: Patient is a 60 y.o. male with:    Diagnosis Orders   1. Plantar fascial fibromatosis     2. Pain in both feet     3. Type II diabetes mellitus with peripheral circulatory disorder [facility])           Plan: Patient examined and evaluated. Current condition and treatment options discussed in detail. Discussed conservative and surgical options with the patient. Advised pt to his conditon. Pt cut his own toenails and they are short. Pt will need to come in to have them done every 9 weeks that way he doesn't cut himself with a dirty/unsterile nail nipper. Arch Pain: Exercises  Introduction  Here are some examples of exercises for you to try. The exercises may be suggested for a condition or for rehabilitation. Start each exercise slowly. Ease off the exercises if you start to have pain. You will be told when to start these exercises and which ones will work best for you. How to do the exercises  Plantar fascia stretch    1. Sit in a chair and put your affected foot on your other knee. 2. Hold the heel of your foot in one hand, and grasp your toes with the other hand. 3. Pull on your heel (toward your body), and at the same time pull your toes back with your other hand. 4. You should feel a stretch along the bottom of your foot. 5. Hold 15 to 30 seconds. 6. Repeat 2 to 4 times. Plantar fascia stretch (kneeling)    1. Get on your hands and knees on the floor.  Keep your heels pointing up and the balls of your feet and your toes on the floor. 2. Slowly sit back toward your ankles. 3. If this is too hard, you can try doing it one leg at a time. Stand up, and then kneel on one knee and keep the other leg forward. Place the foot of your forward leg flat on the ground and bend that knee. The heel on the leg still behind you should point up. The ball and toes of that foot should be on the floor. Sit back toward that ankle. 4. Hold 15 to 30 seconds. 5. Repeat 2 to 4 times. Switch legs if you are doing this one leg at a time. Plantar fascia self-massage    1. Sit in a chair. 2. Place your affected foot on a firm, tube-shaped object, such as a can or water bottle. 3. Roll your foot back and forth over the object to massage the bottom of your foot. 4. If you want to do ice massage, fill a water bottle about three-fourths of the way full and freeze before using. 5. Continue for 2 to 5 minutes. Bilateral calf stretch (knees straight)    1. Place a book on the floor a few inches from a wall or countertop, and put the balls of your feet on it. Your heels should be on the floor. The book needs to be thick enough so that you can feel a gentle stretch in each calf. If you are not steady on your feet, hold on to a chair, counter, or wall while you do this stretch. 2. Keep your knees straight, and lean forward until you feel a stretch in each calf. 3. To get more stretch, add another book or use a thicker book, such as a phone book, a dictionary, or an encyclopedia. 4. Hold the stretch for at least 15 to 30 seconds. 5. Repeat 2 to 4 times. Bilateral calf stretch (knees bent)    1. Place a book on the floor a few inches from a wall or countertop, and put the balls of your feet on it. Your heels should be on the floor. The book needs to be thick enough so that you can feel a gentle stretch in each calf.  If you are not steady on your feet, hold on to a chair, counter, or wall while you do this stretch. 2. Bend your knees, and lean forward until you feel a stretch in each calf. 3. To get more stretch, add another book or use a thicker book, such as a phone book, a dictionary, or an encyclopedia. 4. Hold the stretch for at least 15 to 30 seconds. 5. Repeat 2 to 4 times. [place] pick-ups    1. Put some marbles on the floor next to a cup.  2. Sit down, and use the toes of your affected foot to lift up one marble from the floor at a time. Then try to put the marble in the cup.  3. Repeat 8 to 12 times. Towel scrunches    1. Sit down, and place your affected foot on a towel on the floor. You may also do this with both feet on the towel. 2. Scrunch the towel toward you with your toes. Then use your toes to push the towel back into place. 3. Repeat 8 to 12 times. Heel raises on a step    1. Stand on the bottom step of a staircase, facing up toward the stairs. Put the balls of your feet on the step. If you are not steady on your feet, hold on to the banister or wall. 2. Keeping both knees straight, slowly lift your heels above the step so that you are standing on your toes. Then slowly lower your heels below the step and toward the floor. 3. Return to the starting position, with your feet even with the step. 4. Repeat 8 to 12 times. Follow-up care is a key part of your treatment and safety. Be sure to make and go to all appointments, and call your doctor if you are having problems. It's also a good idea to know your test results and keep a list of the medicines you take. Where can you learn more? Go to https://C[employer]omedixpeInflowControleweb.[employer]. org and sign in to your [employer] account. Enter H119 in the LxDATA box to learn more about \"Arch Pain: Exercises. \"     If you do not have an account, please click on the \"Sign Up Now\" link. Current as of: September 20, 2018  Content Version: 12.1  © 8894-5213 [employer].  Care instructions adapted under license by [facility] ([facility]). If you have questions about a medical condition or this instruction, always ask your healthcare professional. [place] [address] any warranty or liability for your use of this information. Verbal and written instructions given to patient. Contact office with any questions/problems/concerns. RTC in 9week(s).     3/9/2021    Electronically signed by Broderick Tao DPM on 3/9/2021 at 10:12 AM  3/9/2021

## 2021-04-20 ENCOUNTER — TELEPHONE (OUTPATIENT)
Dept: PRIMARY CARE CLINIC | Age: 56
End: 2021-04-20

## 2021-04-20 DIAGNOSIS — F41.9 ANXIETY: ICD-10-CM

## 2021-04-20 RX ORDER — ALPRAZOLAM 0.5 MG/1
0.5 TABLET ORAL NIGHTLY PRN
Qty: 120 TABLET | Refills: 0 | Status: SHIPPED | OUTPATIENT
Start: 2021-04-20 | End: 2021-08-16 | Stop reason: SDUPTHER

## 2021-05-10 ENCOUNTER — APPOINTMENT (OUTPATIENT)
Dept: CT IMAGING | Age: 56
End: 2021-05-10
Payer: COMMERCIAL

## 2021-05-10 ENCOUNTER — HOSPITAL ENCOUNTER (EMERGENCY)
Age: 56
Discharge: HOME | End: 2021-05-10
Attending: EMERGENCY MEDICINE
Payer: COMMERCIAL

## 2021-05-10 VITALS
OXYGEN SATURATION: 97 % | WEIGHT: 250 LBS | RESPIRATION RATE: 16 BRPM | SYSTOLIC BLOOD PRESSURE: 148 MMHG | HEIGHT: 71 IN | DIASTOLIC BLOOD PRESSURE: 96 MMHG | BODY MASS INDEX: 35 KG/M2 | HEART RATE: 69 BPM

## 2021-05-10 DIAGNOSIS — R10.9 LEFT FLANK PAIN: Primary | ICD-10-CM

## 2021-05-10 LAB
-: ABNORMAL
ABSOLUTE EOS #: 0.2 K/UL (ref 0–0.4)
ABSOLUTE IMMATURE GRANULOCYTE: NORMAL K/UL (ref 0–0.3)
ABSOLUTE LYMPH #: 2.6 K/UL (ref 1–4.8)
ABSOLUTE MONO #: 0.6 K/UL (ref 0.1–1.2)
AMORPHOUS: ABNORMAL
ANION GAP SERPL CALCULATED.3IONS-SCNC: 7 MMOL/L (ref 9–17)
BACTERIA: ABNORMAL
BASOPHILS # BLD: 1 % (ref 0–2)
BASOPHILS ABSOLUTE: 0.1 K/UL (ref 0–0.2)
BILIRUBIN URINE: NEGATIVE
BUN BLDV-MCNC: 13 MG/DL (ref 6–20)
BUN/CREAT BLD: ABNORMAL (ref 9–20)
CALCIUM SERPL-MCNC: 9.5 MG/DL (ref 8.6–10.4)
CASTS UA: ABNORMAL /LPF
CASTS UA: ABNORMAL /LPF
CHLORIDE BLD-SCNC: 99 MMOL/L (ref 98–107)
CO2: 29 MMOL/L (ref 20–31)
COLOR: YELLOW
COMMENT UA: ABNORMAL
CREAT SERPL-MCNC: 0.92 MG/DL (ref 0.7–1.2)
CRYSTALS, UA: ABNORMAL /HPF
DIFFERENTIAL TYPE: NORMAL
EOSINOPHILS RELATIVE PERCENT: 2 % (ref 1–4)
EPITHELIAL CELLS UA: ABNORMAL /HPF (ref 0–5)
GFR AFRICAN AMERICAN: >60 ML/MIN
GFR NON-AFRICAN AMERICAN: >60 ML/MIN
GFR SERPL CREATININE-BSD FRML MDRD: ABNORMAL ML/MIN/{1.73_M2}
GFR SERPL CREATININE-BSD FRML MDRD: ABNORMAL ML/MIN/{1.73_M2}
GLUCOSE BLD-MCNC: 92 MG/DL (ref 70–99)
GLUCOSE URINE: NEGATIVE
HCT VFR BLD CALC: 47.6 % (ref 41–53)
HEMOGLOBIN: 15.9 G/DL (ref 13.5–17.5)
IMMATURE GRANULOCYTES: NORMAL %
KETONES, URINE: NEGATIVE
LEUKOCYTE ESTERASE, URINE: NEGATIVE
LYMPHOCYTES # BLD: 27 % (ref 24–44)
MCH RBC QN AUTO: 29.6 PG (ref 26–34)
MCHC RBC AUTO-ENTMCNC: 33.5 G/DL (ref 31–37)
MCV RBC AUTO: 88.6 FL (ref 80–100)
MONOCYTES # BLD: 6 % (ref 2–11)
MUCUS: ABNORMAL
NITRITE, URINE: NEGATIVE
NRBC AUTOMATED: NORMAL PER 100 WBC
OTHER OBSERVATIONS UA: ABNORMAL
PDW BLD-RTO: 14.2 % (ref 12.5–15.4)
PH UA: 5.5 (ref 5–8)
PLATELET # BLD: 291 K/UL (ref 140–450)
PLATELET ESTIMATE: NORMAL
PMV BLD AUTO: 6.6 FL (ref 6–12)
POTASSIUM SERPL-SCNC: 5 MMOL/L (ref 3.7–5.3)
PROTEIN UA: ABNORMAL
RBC # BLD: 5.38 M/UL (ref 4.5–5.9)
RBC # BLD: NORMAL 10*6/UL
RBC UA: ABNORMAL /HPF (ref 0–2)
RENAL EPITHELIAL, UA: ABNORMAL /HPF
SEG NEUTROPHILS: 64 % (ref 36–66)
SEGMENTED NEUTROPHILS ABSOLUTE COUNT: 6.2 K/UL (ref 1.8–7.7)
SODIUM BLD-SCNC: 135 MMOL/L (ref 135–144)
SPECIFIC GRAVITY UA: 1.02 (ref 1–1.03)
TRICHOMONAS: ABNORMAL
TURBIDITY: CLEAR
URINE HGB: ABNORMAL
UROBILINOGEN, URINE: NORMAL
WBC # BLD: 9.7 K/UL (ref 3.5–11)
WBC # BLD: NORMAL 10*3/UL
WBC UA: ABNORMAL /HPF (ref 0–5)
YEAST: ABNORMAL

## 2021-05-10 PROCEDURE — 36415 COLL VENOUS BLD VENIPUNCTURE: CPT

## 2021-05-10 PROCEDURE — 80048 BASIC METABOLIC PNL TOTAL CA: CPT

## 2021-05-10 PROCEDURE — 85025 COMPLETE CBC W/AUTO DIFF WBC: CPT

## 2021-05-10 PROCEDURE — 74176 CT ABD & PELVIS W/O CONTRAST: CPT

## 2021-05-10 PROCEDURE — 99283 EMERGENCY DEPT VISIT LOW MDM: CPT

## 2021-05-10 PROCEDURE — 81001 URINALYSIS AUTO W/SCOPE: CPT

## 2021-05-10 RX ORDER — IBUPROFEN 800 MG/1
800 TABLET ORAL EVERY 8 HOURS PRN
Qty: 30 TABLET | Refills: 0 | Status: SHIPPED | OUTPATIENT
Start: 2021-05-10

## 2021-05-10 RX ORDER — CYCLOBENZAPRINE HCL 10 MG
10 TABLET ORAL 3 TIMES DAILY PRN
Qty: 30 TABLET | Refills: 0 | Status: SHIPPED | OUTPATIENT
Start: 2021-05-10

## 2021-05-10 ASSESSMENT — PAIN DESCRIPTION - LOCATION: LOCATION: BACK

## 2021-05-10 ASSESSMENT — PAIN SCALES - GENERAL: PAINLEVEL_OUTOF10: 9

## 2021-05-10 ASSESSMENT — PAIN DESCRIPTION - PAIN TYPE: TYPE: ACUTE PAIN

## 2021-05-10 NOTE — ED NOTES
Patient cleared for discharge. Patient discharge instructions explained, Rx given and explained to patient by Zohra Beverly. Patient verbalized understanding of all instructions and all patient questions answered to their satisfaction. Patient departs in stable condition.         Gallito Grigsby RN  05/10/21 [id number]

## 2021-05-10 NOTE — ED NOTES
Pt ambulated to room 5 c/o low back and left flank pain x 3 days. Pt reports he did lift a heavy object out of a truck but had no pain. He reports he feels like a hot iron is being pressed against his back and states his pain radiates from left low back, around left flank to his left abdomen. Pt denies any dysuria or hematuria but reports urinary frequency. PT does report increased pain with movement but cannot pinpoint a specific movement that causes pain.  Pt denies any pain currently         Miller Minor RN  05/10/21 8239

## 2021-05-10 NOTE — ED PROVIDER NOTES
[id number] [facility] ED  [address] [facility] [address] [facility] [address]  Phone: [phone]  Fax: Urmila Dyer [phone]      Pt Name: Luana Pastor  MRN: [id number]  [place] 1965  Date of evaluation: 5/10/2021    CHIEF COMPLAINT     Left flank pain    HISTORY OF PRESENT ILLNESS    Luana Pastor is a 60 y.o. male who presents to the emergency department complaining of left flank pain that started on Friday. He did lift a heavy object out of a truck but did not notice any pain at that time. Pain feels like a hot iron is being pressed against him and radiates from his left flank to his left abdomen. Denies fevers or chills he does also admit to urinary frequency with small amounts. No blood in his urine. No history of kidney stones. He does have reproducible pain with movement but sometimes it will catch him without any exacerbating features. Currently denies any significant pain. REVIEW OF SYSTEMS       Constitutional: No fevers or chills   HEENT: No sore throat, rhinorrhea, or earache   Eyes: No blurry vision or double vision no drainage   Cardiovascular: No chest pain or tachycardia   Respiratory: No wheezing or shortness of breath no cough   Gastrointestinal: No nausea, vomiting, diarrhea, constipation, or abdominal pain   : Positive frequency no hematuria or dysuria  Musculoskeletal: No extremity swelling or pain positive left flank pain  Skin: No rash   Neurological: No focal neurologic complaints, paresthesias, weakness, or headache     PAST MEDICAL HISTORY    has a past medical history of Arthritis, Wilcox esophagus, Depression, DU (duodenal ulcer), Emphysema lung ([address] [place] (hard of hearing), Hyperlipidemia, Hypersomnia with sleep apnea, unspecified, Hypertension, AGUS (obstructive sleep apnea), and Thoracic outlet syndrome. SURGICAL HISTORY      has a past surgical history that includes Colonoscopy (5/27/2016);  Upper gastrointestinal endoscopy (N/A, 12/11/2018); joint replacement (Bilateral); Soft Tissue Tumor Resection; lymph node dissection; Cataract removal with implant (Right); Carpal tunnel release (Left, 2019); Carpal tunnel release (Right, 2020); and Upper gastrointestinal endoscopy (N/A, 2020). CURRENT MEDICATIONS       Previous Medications    ALBUTEROL SULFATE  (90 BASE) MCG/ACT INHALER    Inhale 2 puffs into the lungs every 6 hours as needed for Wheezing    ALPRAZOLAM (XANAX) 0.5 MG TABLET    Take 1 tablet by mouth nightly as needed for Sleep for up to 90 days. ATORVASTATIN (LIPITOR) 10 MG TABLET    TAKE 1 TABLET DAILY    BLOOD GLUCOSE MONITORING SUPPL (FREESTYLE FREEDOM) KIT    1 kit by Does not apply route daily    BLOOD GLUCOSE TEST STRIPS (ASCENSIA AUTODISC VI;ONE TOUCH ULTRA TEST VI) STRIP    Use with associated glucose meter. DICLOFENAC SODIUM  MG TB24    Take 100 mg by mouth daily    FLUTICASONE (FLONASE) 50 MCG/ACT NASAL SPRAY    2 sprays by Nasal route daily    HANDICAP PLACARD MISC    Expires 3/31/2022    LANCETS MISC    1 each by Does not apply route 3 times daily    LISINOPRIL (PRINIVIL;ZESTRIL) 40 MG TABLET    Take 1 tablet by mouth daily    METOPROLOL SUCCINATE (TOPROL XL) 100 MG EXTENDED RELEASE TABLET    Take 1 tablet by mouth daily    PANTOPRAZOLE (PROTONIX) 20 MG TABLET    Take 1 tablet by mouth daily    VENLAFAXINE (EFFEXOR XR) 150 MG EXTENDED RELEASE CAPSULE    Take 1 capsule by mouth daily       ALLERGIES     has No Known Allergies. FAMILY HISTORY     He indicated that his mother is alive. He indicated that his father is . He indicated that his sister is alive. family history includes Heart Disease in his sister; Mult Sclerosis in his father. SOCIAL HISTORY      reports that he has been smoking cigarettes. He has a 112.50 pack-year smoking history. He has never used smokeless tobacco. He reports current alcohol use. He reports that he does not use drugs.     PHYSICAL EXAM       ED Triage Vitals      69 16 148/96(Abnormal)  97 %         Constitutional: Alert, oriented x3, nontoxic, answering questions appropriately, acting properly for age, in no acute distress   HEENT: Extraocular muscles intact  Neck: Trachea midline   Cardiovascular: Regular rhythm and rate no S3, S4, or murmurs   Respiratory: Clear to auscultation bilaterally no wheezes, rhonchi, rales, no respiratory distress no tachypnea no retractions no hypoxia  Gastrointestinal: Soft, nontender, chronically distended secondary to body habitus no abdominal bruit no pulsatile mass, diminished bowel sounds. No rebound, rigidity, or guarding. Musculoskeletal: No extremity pain or swelling no flank tenderness to palpation. Full range of motion no midline back tenderness palpation. Neurologic: Moving all 4 extremities without difficulty there are no gross focal neurologic deficits   Skin: Warm and dry       DIFFERENTIAL DIAGNOSIS/ MDM:     Suspect kidney stone. Rule out intra-abdominal pathology. Possible muscle strain. Pain controlled at this time.  labs urine and CT    DIAGNOSTIC RESULTS     EKG: All EKG's are interpreted by the Emergency Department Physician who either signs or Co-signs this chart in the absence of a cardiologist.        Not indicated unless otherwise documented above    LABS:  Results for orders placed or performed during the hospital encounter of 05/10/21   Urinalysis Reflex to Culture    Specimen: Urine, clean catch   Result Value Ref Range    Color, UA YELLOW YELLOW    Turbidity UA CLEAR CLEAR    Glucose, Ur NEGATIVE NEGATIVE    Bilirubin Urine NEGATIVE NEGATIVE    Ketones, Urine NEGATIVE NEGATIVE    Specific Gravity, UA 1.025 1.005 - 1.030    Urine Hgb SMALL (A) NEGATIVE    pH, UA 5.5 5.0 - 8.0    Protein, UA 1+ (A) NEGATIVE    Urobilinogen, Urine Normal Normal    Nitrite, Urine NEGATIVE NEGATIVE    Leukocyte Esterase, Urine NEGATIVE NEGATIVE    Urinalysis Comments NOT REPORTED    CBC Auto Differential   Result Value Ref Range    WBC 9.7 3.5 - 11.0 k/uL    RBC 5.38 4.5 - 5.9 m/uL    Hemoglobin 15.9 13.5 - 17.5 g/dL    Hematocrit 47.6 41 - 53 %    MCV 88.6 80 - 100 fL    MCH 29.6 26 - 34 pg    MCHC 33.5 31 - 37 g/dL    RDW 14.2 12.5 - 15.4 %    Platelets 808 161 - 160 k/uL    MPV 6.6 6.0 - 12.0 fL    NRBC Automated NOT REPORTED per 100 WBC    Differential Type NOT REPORTED     Seg Neutrophils 64 36 - 66 %    Lymphocytes 27 24 - 44 %    Monocytes 6 2 - 11 %    Eosinophils % 2 1 - 4 %    Basophils 1 0 - 2 %    Immature Granulocytes NOT REPORTED 0 %    Segs Absolute 6.20 1.8 - 7.7 k/uL    Absolute Lymph # 2.60 1.0 - 4.8 k/uL    Absolute Mono # 0.60 0.1 - 1.2 k/uL    Absolute Eos # 0.20 0.0 - 0.4 k/uL    Basophils Absolute 0.10 0.0 - 0.2 k/uL    Absolute Immature Granulocyte NOT REPORTED 0.00 - 0.30 k/uL    WBC Morphology NOT REPORTED     RBC Morphology NOT REPORTED     Platelet Estimate NOT REPORTED    Basic Metabolic Panel   Result Value Ref Range    Glucose 92 70 - 99 mg/dL    BUN 13 6 - 20 mg/dL    CREATININE 0.92 0.70 - 1.20 mg/dL    Bun/Cre Ratio NOT REPORTED 9 - 20    Calcium 9.5 8.6 - 10.4 mg/dL    Sodium 135 135 - 144 mmol/L    Potassium 5.0 3.7 - 5.3 mmol/L    Chloride 99 98 - 107 mmol/L    CO2 29 20 - 31 mmol/L    Anion Gap 7 (L) 9 - 17 mmol/L    GFR Non-African American >60 >60 mL/min    GFR African American >60 >60 mL/min    GFR Comment          GFR Staging NOT REPORTED    Microscopic Urinalysis   Result Value Ref Range    -          WBC, UA 0 TO 2 0 - 5 /HPF    RBC, UA 5 TO 10 0 - 2 /HPF    Casts UA 2 TO 5 /LPF    Casts UA HYALINE /LPF    Crystals, UA NOT REPORTED None /HPF    Epithelial Cells UA 0 TO 2 0 - 5 /HPF    Renal Epithelial, UA NOT REPORTED 0 /HPF    Bacteria, UA MODERATE (A) None    Mucus, UA 1+ (A) None    Trichomonas, UA NOT REPORTED None    Amorphous, UA NOT REPORTED None    Other Observations UA (A) NOT REQ.      Utilizing a urinalysis as the only screening method to exclude a potential uropathogen can be unreliable in many patient populations. Rapid screening tests are less sensitive than culture and if UTI is a clinical possibility, culture should be considered despite a negative urinalysis. Yeast, UA NOT REPORTED None       Not indicated unless otherwise documented above    RADIOLOGY:   I reviewed the radiologist interpretations:    CT ABDOMEN PELVIS WO CONTRAST Additional Contrast? None   Final Result   1. No urinary tract identified. 2. No acute infective or inflammatory process. 3. No bowel obstruction. Not indicated unless otherwise documented above    EMERGENCY DEPARTMENT COURSE:     The patient was given the following medications:  Orders Placed This Encounter   Medications    ibuprofen (ADVIL;MOTRIN) 800 MG tablet     Sig: Take 1 tablet by mouth every 8 hours as needed for Pain     Dispense:  30 tablet     Refill:  0    cyclobenzaprine (FLEXERIL) 10 MG tablet     Sig: Take 1 tablet by mouth 3 times daily as needed for Muscle spasms     Dispense:  30 tablet     Refill:  0        Vitals:   -------------------------  BP (!) 148/96   Pulse 69   Resp 16   Ht 5' 11\" (1.803 m)   Wt 113.4 kg (250 lb)   SpO2 97%   BMI 34.87 kg/m²     10 AM no acute distress. Urinalysis pending. CT no kidney stone. Suspect musculoskeletal in origin. Labs unremarkable as well. Will discharge with prescription for Flexeril and Motrin. Recommend following up with family physician for reevaluation may need an MRI if not improving. Return if worsening symptoms or other concerns. No loss of bowel or bladder control. Small amount of blood in the urine. Possibly already passed a kidney stone. The patient and his significant other understands that at this time there is no evidence for a more malignant underlying process, but also understands that early in the process of an illness or injury, an emergency department workup can be falsely reassuring.   Routine discharge counseling was given, and it is understood that worsening, changing or persistent symptoms should prompt an immediate call or follow up with their primary physician or return to the emergency department. The importance of appropriate follow up was also discussed. I have reviewed the disposition diagnosis. I have answered the questions and given discharge instructions. There was voiced understanding of these instructions and no further questions or complaints. CRITICAL CARE:    None    CONSULTS:    None    PROCEDURES:    None      OARRS Report if indicated             FINAL IMPRESSION      1.  Left flank pain          DISPOSITION/PLAN   DISPOSITION          CONDITION ON DISPOSITION: STABLE       PATIENT REFERRED TO:  BULMARO Ramirez - JENIFER  [address] [place] [address]  [phone]    Schedule an appointment as soon as possible for a visit in 3 days        DISCHARGE MEDICATIONS:  New Prescriptions    CYCLOBENZAPRINE (FLEXERIL) 10 MG TABLET    Take 1 tablet by mouth 3 times daily as needed for Muscle spasms    IBUPROFEN (ADVIL;MOTRIN) 800 MG TABLET    Take 1 tablet by mouth every 8 hours as needed for Pain       (Please note that portions of thisnote were completed with a voice recognition program.  Efforts were made to edit the dictations but occasionally words are mis-transcribed.)    Liborio Cortes DO  Attending Emergency Physician      Liborio Cortes DO  05/10/21 1024

## 2021-06-17 ENCOUNTER — TELEPHONE (OUTPATIENT)
Dept: PRIMARY CARE CLINIC | Age: 56
End: 2021-06-17

## 2021-06-17 NOTE — TELEPHONE ENCOUNTER
----- Message from Alvin Cannons sent at 6/16/2021  4:54 PM EDT -----  Subject: Referral Request    QUESTIONS   Reason for referral request? 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 Has the physician seen you for this condition before? No   Preferred Specialist (if applicable)? Do you already have an appointment scheduled? No  Additional Information for Provider? Pt said he is having a CT scan 6/18 @   7AM completed at [facility] and wanted to know if orders for the blood test   can be sent to them if no availability at the office. Employer usually   handles this testing but is not this time. Please call pt back as soon as   possible. Had same testing at [facility] several years ago.   ---------------------------------------------------------------------------  --------------  CALL BACK INFO  What is the best way for the office to contact you? OK to leave message on   voicemail  Preferred Call Back Phone Number?  [phone]

## 2021-07-20 DIAGNOSIS — I10 ESSENTIAL HYPERTENSION: ICD-10-CM

## 2021-07-20 RX ORDER — LISINOPRIL 40 MG/1
TABLET ORAL
Qty: 90 TABLET | Refills: 3 | Status: SHIPPED | OUTPATIENT
Start: 2021-07-20 | End: 2021-08-16 | Stop reason: SDUPTHER

## 2021-08-16 DIAGNOSIS — F41.9 ANXIETY: ICD-10-CM

## 2021-08-16 DIAGNOSIS — I10 ESSENTIAL HYPERTENSION: ICD-10-CM

## 2021-08-16 NOTE — TELEPHONE ENCOUNTER
Last Visit Date: 2/19/2021   Next Visit Date: 8/20/2021     Pt states he has a BeneCard with [facility] and pt states that's where medication refills are to be sent. I can not find [address] in our database to add to pts preferred pharmacies. I discussed with Gregory Milton MA and we tried using pharmacy address & phone number and that would not populate results. Told pt I would ask PCP's MA in the morning if she has any information to advise.

## 2021-08-18 RX ORDER — ALPRAZOLAM 0.5 MG/1
0.5 TABLET ORAL NIGHTLY PRN
Qty: 120 TABLET | Refills: 0 | Status: SHIPPED | OUTPATIENT
Start: 2021-08-18 | End: 2021-08-20 | Stop reason: SDUPTHER

## 2021-08-18 RX ORDER — LISINOPRIL 40 MG/1
TABLET ORAL
Qty: 90 TABLET | Refills: 3 | Status: SHIPPED | OUTPATIENT
Start: 2021-08-18 | End: 2021-08-20 | Stop reason: SDUPTHER

## 2021-08-20 ENCOUNTER — OFFICE VISIT (OUTPATIENT)
Dept: PRIMARY CARE CLINIC | Age: 56
End: 2021-08-20
Payer: COMMERCIAL

## 2021-08-20 ENCOUNTER — HOSPITAL ENCOUNTER (OUTPATIENT)
Age: 56
Setting detail: SPECIMEN
Discharge: HOME | End: 2021-08-20
Payer: COMMERCIAL

## 2021-08-20 VITALS
OXYGEN SATURATION: 99 % | RESPIRATION RATE: 12 BRPM | SYSTOLIC BLOOD PRESSURE: 122 MMHG | DIASTOLIC BLOOD PRESSURE: 80 MMHG | HEART RATE: 80 BPM | BODY MASS INDEX: 36.12 KG/M2 | HEIGHT: 71 IN | WEIGHT: 258 LBS

## 2021-08-20 DIAGNOSIS — Z13.29 SCREENING FOR THYROID DISORDER: ICD-10-CM

## 2021-08-20 DIAGNOSIS — Z11.4 ENCOUNTER FOR SCREENING FOR HIV: ICD-10-CM

## 2021-08-20 DIAGNOSIS — E11.9 TYPE 2 DIABETES MELLITUS WITHOUT COMPLICATION, WITHOUT LONG-TERM CURRENT USE OF INSULIN (HCC): ICD-10-CM

## 2021-08-20 DIAGNOSIS — Z13.220 SCREENING FOR LIPID DISORDERS: ICD-10-CM

## 2021-08-20 DIAGNOSIS — E78.2 MIXED HYPERLIPIDEMIA: ICD-10-CM

## 2021-08-20 DIAGNOSIS — Z12.5 SCREENING FOR PROSTATE CANCER: ICD-10-CM

## 2021-08-20 DIAGNOSIS — F41.9 ANXIETY: ICD-10-CM

## 2021-08-20 DIAGNOSIS — Z11.59 NEED FOR HEPATITIS C SCREENING TEST: ICD-10-CM

## 2021-08-20 DIAGNOSIS — F41.9 ANXIETY AND DEPRESSION: ICD-10-CM

## 2021-08-20 DIAGNOSIS — I10 ESSENTIAL HYPERTENSION: ICD-10-CM

## 2021-08-20 DIAGNOSIS — F32.A ANXIETY AND DEPRESSION: ICD-10-CM

## 2021-08-20 DIAGNOSIS — Z13.0 SCREENING FOR DEFICIENCY ANEMIA: ICD-10-CM

## 2021-08-20 DIAGNOSIS — Z00.00 ENCOUNTER FOR GENERAL ADULT MEDICAL EXAMINATION W/O ABNORMAL FINDINGS: Primary | ICD-10-CM

## 2021-08-20 DIAGNOSIS — R05.9 COUGH: ICD-10-CM

## 2021-08-20 LAB
ALBUMIN SERPL-MCNC: 4.3 G/DL (ref 3.5–5.2)
ALBUMIN/GLOBULIN RATIO: 1.5 (ref 1–2.5)
ALP BLD-CCNC: 108 U/L (ref 40–129)
ALT SERPL-CCNC: 25 U/L (ref 5–41)
ANION GAP SERPL CALCULATED.3IONS-SCNC: 13 MMOL/L (ref 9–17)
AST SERPL-CCNC: 24 U/L
BILIRUB SERPL-MCNC: 0.33 MG/DL (ref 0.3–1.2)
BUN BLDV-MCNC: 22 MG/DL (ref 6–20)
BUN/CREAT BLD: ABNORMAL (ref 9–20)
CALCIUM SERPL-MCNC: 9 MG/DL (ref 8.6–10.4)
CHLORIDE BLD-SCNC: 101 MMOL/L (ref 98–107)
CHOLESTEROL/HDL RATIO: 5.2
CHOLESTEROL: 156 MG/DL
CO2: 20 MMOL/L (ref 20–31)
CREAT SERPL-MCNC: 1.3 MG/DL (ref 0.7–1.2)
ESTIMATED AVERAGE GLUCOSE: 120 MG/DL
GFR AFRICAN AMERICAN: >60 ML/MIN
GFR NON-AFRICAN AMERICAN: 57 ML/MIN
GFR SERPL CREATININE-BSD FRML MDRD: ABNORMAL ML/MIN/{1.73_M2}
GFR SERPL CREATININE-BSD FRML MDRD: ABNORMAL ML/MIN/{1.73_M2}
GLUCOSE BLD-MCNC: 85 MG/DL (ref 70–99)
HBA1C MFR BLD: 5.8 % (ref 4–6)
HCT VFR BLD CALC: 44.2 % (ref 40.7–50.3)
HDLC SERPL-MCNC: 30 MG/DL
HEMOGLOBIN: 14.5 G/DL (ref 13–17)
HEPATITIS C ANTIBODY: NONREACTIVE
HIV AG/AB: NONREACTIVE
LDL CHOLESTEROL: 84 MG/DL (ref 0–130)
MCH RBC QN AUTO: 31 PG (ref 25.2–33.5)
MCHC RBC AUTO-ENTMCNC: 32.8 G/DL (ref 28.4–34.8)
MCV RBC AUTO: 94.4 FL (ref 82.6–102.9)
NRBC AUTOMATED: 0 PER 100 WBC
PDW BLD-RTO: 13.9 % (ref 11.8–14.4)
PLATELET # BLD: 279 K/UL (ref 138–453)
PMV BLD AUTO: 9.6 FL (ref 8.1–13.5)
POTASSIUM SERPL-SCNC: 5.7 MMOL/L (ref 3.7–5.3)
PROSTATE SPECIFIC ANTIGEN: 0.63 UG/L
RBC # BLD: 4.68 M/UL (ref 4.21–5.77)
SODIUM BLD-SCNC: 134 MMOL/L (ref 135–144)
TOTAL PROTEIN: 7.2 G/DL (ref 6.4–8.3)
TRIGL SERPL-MCNC: 210 MG/DL
TSH SERPL DL<=0.05 MIU/L-ACNC: 1.08 MIU/L (ref 0.3–5)
VLDLC SERPL CALC-MCNC: ABNORMAL MG/DL (ref 1–30)
WBC # BLD: 8.5 K/UL (ref 3.5–11.3)

## 2021-08-20 PROCEDURE — 99396 PREV VISIT EST AGE 40-64: CPT | Performed by: NURSE PRACTITIONER

## 2021-08-20 RX ORDER — PANTOPRAZOLE SODIUM 20 MG/1
20 TABLET, DELAYED RELEASE ORAL DAILY
Qty: 180 TABLET | Refills: 3 | Status: CANCELLED | OUTPATIENT
Start: 2021-08-20

## 2021-08-20 RX ORDER — ALPRAZOLAM 0.5 MG/1
0.5 TABLET ORAL NIGHTLY PRN
Qty: 14 TABLET | Refills: 0 | Status: SHIPPED | OUTPATIENT
Start: 2021-08-20 | End: 2021-09-03

## 2021-08-20 RX ORDER — LISINOPRIL 40 MG/1
40 TABLET ORAL DAILY
Qty: 14 TABLET | Refills: 0 | Status: SHIPPED | OUTPATIENT
Start: 2021-08-20 | End: 2022-06-20 | Stop reason: SDUPTHER

## 2021-08-20 SDOH — ECONOMIC STABILITY: FOOD INSECURITY: WITHIN THE PAST 12 MONTHS, YOU WORRIED THAT YOUR FOOD WOULD RUN OUT BEFORE YOU GOT MONEY TO BUY MORE.: NEVER TRUE

## 2021-08-20 SDOH — ECONOMIC STABILITY: FOOD INSECURITY: WITHIN THE PAST 12 MONTHS, THE FOOD YOU BOUGHT JUST DIDN'T LAST AND YOU DIDN'T HAVE MONEY TO GET MORE.: NEVER TRUE

## 2021-08-20 ASSESSMENT — PATIENT HEALTH QUESTIONNAIRE - PHQ9
SUM OF ALL RESPONSES TO PHQ QUESTIONS 1-9: 0
SUM OF ALL RESPONSES TO PHQ QUESTIONS 1-9: 0
1. LITTLE INTEREST OR PLEASURE IN DOING THINGS: 0
2. FEELING DOWN, DEPRESSED OR HOPELESS: 0
SUM OF ALL RESPONSES TO PHQ9 QUESTIONS 1 & 2: 0
SUM OF ALL RESPONSES TO PHQ QUESTIONS 1-9: 0

## 2021-08-20 ASSESSMENT — ENCOUNTER SYMPTOMS
SHORTNESS OF BREATH: 0
COUGH: 0
BACK PAIN: 0
ABDOMINAL PAIN: 0

## 2021-08-20 ASSESSMENT — SOCIAL DETERMINANTS OF HEALTH (SDOH): HOW HARD IS IT FOR YOU TO PAY FOR THE VERY BASICS LIKE FOOD, HOUSING, MEDICAL CARE, AND HEATING?: NOT HARD AT ALL

## 2021-08-20 NOTE — PROGRESS NOTES
[address] PRIMARY CARE  [address] [facility]  [address]  [address] [address]  Dept: [phone]  Dept Fax: [phone]    Prabha Padilla is a 60 y.o. male who presentstoday for his medical conditions/complaints as noted below.   Prabha Padilla is c/o of  Chief Complaint   Patient presents with    Annual Exam           HPI:     Presents for annual exam  BP well controlled  Has gained 5lb since last visit    C/o continued congestion  Using either claritin or zyrtec or flonase, alternating without improvement  Has not had time to see ENT    Increase in stress/anxiety  Has been without xanax x 3 days, will contact pharmacy  Can note a difference without the med    ER visit in May for lower back pain/left flank pain  Pain has improved but area is \"numb\" per patient  Denies any problems with bowel/bladder  Will continue to monitor    Willing to update annual labs    Denies any other problems/concerns            Hemoglobin A1C (%)   Date Value   02/19/2021 5.3   08/14/2020 5.6   05/20/2020 6.6 (H)             ( goal A1C is < 7)   No results found for: LABMICR  LDL Cholesterol (mg/dL)   Date Value   05/20/2020 71   03/01/2019 69   11/11/2017 110     LDL Calculated (mg/dL)   Date Value   12/02/2016 97       (goal LDL is <100)   AST (U/L)   Date Value   05/20/2020 30     ALT (U/L)   Date Value   05/20/2020 38     BUN (mg/dL)   Date Value   05/10/2021 13     BP Readings from Last 3 Encounters:   08/20/21 122/80   05/10/21 (!) 148/96   02/19/21 130/74          ([id number])    Past Medical History:   Diagnosis Date    Arthritis     Wilcox esophagus     Depression     DU (duodenal ulcer)     Emphysema lung ([address]     [place] (hard of hearing)     Hyperlipidemia     Hypersomnia with sleep apnea, unspecified     CPAP nightly    Hypertension     AGUS (obstructive sleep apnea)     wears CPAP    Thoracic outlet syndrome     Rt      Past Surgical History:   Procedure Laterality Date    CARPAL TUNNEL RELEASE Left 12/17/2019    CARPAL TUNNEL RELEASE performed by Barak Langford MD at [address] Right 1/27/2020    CARPAL TUNNEL RELEASE performed by Barak Langford MD at [address] Right     COLONOSCOPY  5/27/2016    JOINT REPLACEMENT Bilateral     hips    LYMPH NODE DISSECTION      neck    SOFT TISSUE TUMOR RESECTION      back    UPPER GASTROINTESTINAL ENDOSCOPY N/A 12/11/2018    EGD ESOPHAGOGASTRODUODENOSCOPY performed by Alaina Swain DO at [facility]    UPPER GASTROINTESTINAL ENDOSCOPY N/A 7/21/2020    EGD BIOPSY performed by Saul Park MD at [facility]       Family History   Problem Relation Age of Onset    Mult Sclerosis Father     Heart Disease Sister           Social History     Tobacco Use    Smoking status: Current Every Day Smoker     Packs/day: 2.50     Years: 45.00     Pack years: 112.50     Types: Cigarettes    Smokeless tobacco: Never Used   Substance Use Topics    Alcohol use: Yes     Comment: daily, 6-8 beer daily      Current Outpatient Medications   Medication Sig Dispense Refill    ALPRAZolam (XANAX) 0.5 MG tablet Take 1 tablet by mouth nightly as needed for Sleep for up to 14 days.  14 tablet 0    lisinopril (PRINIVIL;ZESTRIL) 40 MG tablet Take 1 tablet by mouth daily TAKE 1 TABLET DAILY 14 tablet 0    ibuprofen (ADVIL;MOTRIN) 800 MG tablet Take 1 tablet by mouth every 8 hours as needed for Pain 30 tablet 0    cyclobenzaprine (FLEXERIL) 10 MG tablet Take 1 tablet by mouth 3 times daily as needed for Muscle spasms 30 tablet 0    atorvastatin (LIPITOR) 10 MG tablet TAKE 1 TABLET DAILY 90 tablet 3    fluticasone (FLONASE) 50 MCG/ACT nasal spray 2 sprays by Nasal route daily 1 Bottle 3    venlafaxine (EFFEXOR XR) 150 MG extended release capsule Take 1 capsule by mouth daily 90 capsule 3    metoprolol succinate (TOPROL XL) 100 MG extended release tablet Take 1 tablet by mouth daily 90 tablet 3    palpitations. Gastrointestinal: Negative for abdominal pain. Genitourinary: Negative for difficulty urinating and dysuria. Musculoskeletal: Negative for arthralgias and back pain. Neurological: Negative for dizziness and headaches. Psychiatric/Behavioral: Negative for self-injury, sleep disturbance and suicidal ideas. The patient is nervous/anxious. Objective:     Physical Exam  Vitals and nursing note reviewed. Constitutional:       Appearance: He is well-developed. HENT:      Head: Normocephalic and atraumatic. Eyes:      Pupils: Pupils are equal, round, and reactive to light. Cardiovascular:      Rate and Rhythm: Normal rate and regular rhythm. Heart sounds: Normal heart sounds. Pulmonary:      Effort: Pulmonary effort is normal.      Breath sounds: Normal breath sounds. Abdominal:      General: Bowel sounds are normal.      Palpations: Abdomen is soft. Tenderness: There is no abdominal tenderness. Musculoskeletal:         General: Normal range of motion. Cervical back: Normal range of motion. Skin:     General: Skin is warm and dry. Neurological:      Mental Status: He is alert and oriented to person, place, and time. Psychiatric:         Behavior: Behavior normal.         Thought Content: Thought content normal.         Judgment: Judgment normal.       /80   Pulse 80   Resp 12   Ht 5' 11\" (1.803 m)   Wt 258 lb (117 kg)   SpO2 99%   BMI 35.98 kg/m²     Assessment:       Diagnosis Orders   1. Encounter for general adult medical examination w/o abnormal findings     2. Mixed hyperlipidemia     3. Anxiety and depression     4. Essential hypertension  lisinopril (PRINIVIL;ZESTRIL) 40 MG tablet   5. Cough     6. Need for hepatitis C screening test  Hepatitis C Antibody   7. Encounter for screening for HIV  HIV Screen   8.  Type 2 diabetes mellitus without complication, without long-term current use of insulin (HCC)  Microalbumin, Ur    Comprehensive Metabolic Panel    Hemoglobin A1C   9. Screening for lipid disorders  Lipid Panel   10. Screening for deficiency anemia  CBC   11. Screening for thyroid disorder  TSH with Reflex   12. Screening for prostate cancer  PSA screening   13. Anxiety  ALPRAZolam (XANAX) 0.5 MG tablet             Plan:      Return in about 6 months (around 2/20/2022) for Diabetes. 1. - Encouraged diet/exercise. Encouraged smoking cessation. Continue current meds. Rx given for annual labs. Follow up in six months for recheck/repeat Hga1c. Orders Placed This Encounter   Procedures    Hepatitis C Antibody     Standing Status:   Future     Standing Expiration Date:   8/20/2022    HIV Screen     Standing Status:   Future     Standing Expiration Date:   8/20/2022    Microalbumin, Ur     Standing Status:   Future     Standing Expiration Date:   8/20/2022    Lipid Panel     Standing Status:   Future     Standing Expiration Date:   8/20/2022     Order Specific Question:   Is Patient Fasting?/# of Hours     Answer:   12    CBC     Standing Status:   Future     Standing Expiration Date:   8/20/2022    Comprehensive Metabolic Panel     Standing Status:   Future     Standing Expiration Date:   8/20/2022    TSH with Reflex     Standing Status:   Future     Standing Expiration Date:   8/20/2022    Hemoglobin A1C     Standing Status:   Future     Standing Expiration Date:   8/20/2022    PSA screening     Standing Status:   Future     Standing Expiration Date:   8/20/2022        Orders Placed This Encounter   Medications    ALPRAZolam (XANAX) 0.5 MG tablet     Sig: Take 1 tablet by mouth nightly as needed for Sleep for up to 14 days. Dispense:  14 tablet     Refill:  0    lisinopril (PRINIVIL;ZESTRIL) 40 MG tablet     Sig: Take 1 tablet by mouth daily TAKE 1 TABLET DAILY     Dispense:  14 tablet     Refill:  0       Patient given educational materials - see patient instructions. Discussed use, benefit, and side effects of prescribed medications. All patientquestions answered. Pt voiced understanding. Reviewed health maintenance. Instructedto continue current medications, diet and exercise. Patient agreed with treatmentplan. Follow up as directed.      Electronicallysigned by BULMARO Parks CNP on 8/20/2021 at 8:36 AM

## 2021-08-31 DIAGNOSIS — F41.9 ANXIETY AND DEPRESSION: ICD-10-CM

## 2021-08-31 DIAGNOSIS — F32.A ANXIETY AND DEPRESSION: ICD-10-CM

## 2021-08-31 DIAGNOSIS — I10 ESSENTIAL HYPERTENSION: ICD-10-CM

## 2021-08-31 RX ORDER — VENLAFAXINE HYDROCHLORIDE 150 MG/1
CAPSULE, EXTENDED RELEASE ORAL
Qty: 90 CAPSULE | Refills: 3 | Status: SHIPPED | OUTPATIENT
Start: 2021-08-31 | End: 2022-06-20 | Stop reason: SDUPTHER

## 2021-08-31 RX ORDER — PANTOPRAZOLE SODIUM 20 MG/1
TABLET, DELAYED RELEASE ORAL
Qty: 180 TABLET | Refills: 3 | Status: SHIPPED | OUTPATIENT
Start: 2021-08-31 | End: 2022-06-20 | Stop reason: SDUPTHER

## 2021-08-31 RX ORDER — METOPROLOL SUCCINATE 100 MG/1
TABLET, EXTENDED RELEASE ORAL
Qty: 90 TABLET | Refills: 3 | Status: SHIPPED | OUTPATIENT
Start: 2021-08-31 | End: 2022-06-20 | Stop reason: SDUPTHER

## 2021-12-08 DIAGNOSIS — E78.2 MIXED HYPERLIPIDEMIA: ICD-10-CM

## 2021-12-08 RX ORDER — ATORVASTATIN CALCIUM 10 MG/1
TABLET, FILM COATED ORAL
Qty: 90 TABLET | Refills: 3 | Status: SHIPPED | OUTPATIENT
Start: 2021-12-08

## 2021-12-23 ENCOUNTER — TELEMEDICINE (OUTPATIENT)
Dept: PRIMARY CARE CLINIC | Age: 56
End: 2021-12-23
Payer: COMMERCIAL

## 2021-12-23 DIAGNOSIS — J01.90 ACUTE BACTERIAL SINUSITIS: Primary | ICD-10-CM

## 2021-12-23 DIAGNOSIS — R05.9 COUGH: ICD-10-CM

## 2021-12-23 DIAGNOSIS — F41.9 ANXIETY: ICD-10-CM

## 2021-12-23 DIAGNOSIS — B96.89 ACUTE BACTERIAL SINUSITIS: Primary | ICD-10-CM

## 2021-12-23 PROCEDURE — 99214 OFFICE O/P EST MOD 30 MIN: CPT | Performed by: NURSE PRACTITIONER

## 2021-12-23 RX ORDER — AZITHROMYCIN 250 MG/1
TABLET, FILM COATED ORAL
Qty: 6 TABLET | Refills: 0 | Status: SHIPPED | OUTPATIENT
Start: 2021-12-23 | End: 2022-10-28 | Stop reason: SDUPTHER

## 2021-12-23 RX ORDER — ALPRAZOLAM 0.5 MG/1
0.5 TABLET ORAL NIGHTLY PRN
Qty: 120 TABLET | Refills: 0 | Status: SHIPPED | OUTPATIENT
Start: 2021-12-23 | End: 2021-12-27 | Stop reason: SDUPTHER

## 2021-12-23 RX ORDER — PREDNISONE 50 MG/1
50 TABLET ORAL DAILY
Qty: 5 TABLET | Refills: 0 | Status: SHIPPED | OUTPATIENT
Start: 2021-12-23 | End: 2021-12-28

## 2021-12-23 RX ORDER — ALBUTEROL SULFATE 90 UG/1
2 AEROSOL, METERED RESPIRATORY (INHALATION) EVERY 6 HOURS PRN
Qty: 1 EACH | Refills: 2 | Status: SHIPPED | OUTPATIENT
Start: 2021-12-23

## 2021-12-23 RX ORDER — FLUTICASONE PROPIONATE 50 MCG
2 SPRAY, SUSPENSION (ML) NASAL DAILY
Qty: 1 EACH | Refills: 2 | Status: SHIPPED | OUTPATIENT
Start: 2021-12-23

## 2021-12-23 ASSESSMENT — PATIENT HEALTH QUESTIONNAIRE - PHQ9
SUM OF ALL RESPONSES TO PHQ9 QUESTIONS 1 & 2: 0
1. LITTLE INTEREST OR PLEASURE IN DOING THINGS: 0
SUM OF ALL RESPONSES TO PHQ QUESTIONS 1-9: 0
SUM OF ALL RESPONSES TO PHQ QUESTIONS 1-9: 0
2. FEELING DOWN, DEPRESSED OR HOPELESS: 0
SUM OF ALL RESPONSES TO PHQ QUESTIONS 1-9: 0

## 2021-12-23 ASSESSMENT — ENCOUNTER SYMPTOMS
ABDOMINAL PAIN: 0
SINUS PAIN: 1
SORE THROAT: 1
SHORTNESS OF BREATH: 1
SINUS PRESSURE: 1
BACK PAIN: 0
COUGH: 1

## 2021-12-23 NOTE — PROGRESS NOTES
[address] PRIMARY CARE  [facility]  [address]  [address] [address]  Dept: [phone]  Dept Fax: [phone]    Sinan Hoyos is a 60 y.o. male who presentstoday for his medical conditions/complaints as noted below.   Sinan Hoyos is c/o of  Chief Complaint   Patient presents with    Sinusitis           HPI:     Presents via telehealth   Unable to obtain vitals    States that for the past week has been having sinus pain/congestion  Green sputum  Increase in SOB, requesting refill on inhaler and flonase  No improvement with OTC meds  Has not had to miss work- but does not want this to settle in his chest  Continues to smoke    Anxiety well controlled with use of xanax  Requesting refill to mail order  Denies any side effects with use of med    Denies any other problems/concerns      Hemoglobin A1C (%)   Date Value   08/20/2021 5.8   02/19/2021 5.3   08/14/2020 5.6             ( goal A1C is < 7)   No results found for: LABMICR  LDL Cholesterol (mg/dL)   Date Value   08/20/2021 84   05/20/2020 71   03/01/2019 69     LDL Calculated (mg/dL)   Date Value   12/02/2016 97       (goal LDL is <100)   AST (U/L)   Date Value   08/20/2021 24     ALT (U/L)   Date Value   08/20/2021 25     BUN (mg/dL)   Date Value   08/20/2021 22 (H)     BP Readings from Last 3 Encounters:   08/20/21 122/80   05/10/21 (!) 148/96   02/19/21 130/74          ([id number])    Past Medical History:   Diagnosis Date    Arthritis     Wilcox esophagus     Depression     DU (duodenal ulcer)     Emphysema lung ([address]     [place] (hard of hearing)     Hyperlipidemia     Hypersomnia with sleep apnea, unspecified     CPAP nightly    Hypertension     AGUS (obstructive sleep apnea)     wears CPAP    Thoracic outlet syndrome     Rt      Past Surgical History:   Procedure Laterality Date    CARPAL TUNNEL RELEASE Left 12/17/2019    CARPAL TUNNEL RELEASE performed by Syed Calderon [facility] at [facility] TUNNEL RELEASE Right 1/27/2020    CARPAL TUNNEL RELEASE performed by Radames Fox MD at [facility] 1943 Right     COLONOSCOPY  5/27/2016    JOINT REPLACEMENT Bilateral     hips    LYMPH NODE DISSECTION      neck    SOFT TISSUE TUMOR RESECTION      back    UPPER GASTROINTESTINAL ENDOSCOPY N/A 12/11/2018    EGD ESOPHAGOGASTRODUODENOSCOPY performed by Cornelio Mathias DO at [facility]    UPPER GASTROINTESTINAL ENDOSCOPY N/A 7/21/2020    EGD BIOPSY performed by Tg Brunson MD at [facility]       Family History   Problem Relation Age of Onset    Mult Sclerosis Father     Heart Disease Sister           Social History     Tobacco Use    Smoking status: Current Every Day Smoker     Packs/day: 2.50     Years: 45.00     Pack years: 112.50     Types: Cigarettes    Smokeless tobacco: Never Used   Substance Use Topics    Alcohol use: Yes     Comment: daily, 6-8 beer daily      Current Outpatient Medications   Medication Sig Dispense Refill    predniSONE (DELTASONE) 50 MG tablet Take 1 tablet by mouth daily for 5 days 5 tablet 0    azithromycin (ZITHROMAX) 250 MG tablet 2 tablets by mouth on day one. Then, 1 tablet by mouth daily for days 2-5. 6 tablet 0    fluticasone (FLONASE) 50 MCG/ACT nasal spray 2 sprays by Nasal route daily 1 each 2    albuterol sulfate  (90 Base) MCG/ACT inhaler Inhale 2 puffs into the lungs every 6 hours as needed for Wheezing 1 each 2    ALPRAZolam (XANAX) 0.5 MG tablet Take 1 tablet by mouth nightly as needed for Sleep for up to 90 days.  120 tablet 0    atorvastatin (LIPITOR) 10 MG tablet TAKE 1 TABLET DAILY 90 tablet 3    pantoprazole (PROTONIX) 20 MG tablet TAKE 1 TABLET TWICE A DAY BEFORE MEALS 180 tablet 3    Diclofenac Sodium  MG TB24 TAKE 1 TABLET DAILY 90 tablet 3    metoprolol succinate (TOPROL XL) 100 MG extended release tablet TAKE 1 TABLET DAILY 90 tablet 3    venlafaxine (EFFEXOR XR) 150 MG extended release capsule TAKE 1 CAPSULE DAILY 90 capsule 3    lisinopril (PRINIVIL;ZESTRIL) 40 MG tablet Take 1 tablet by mouth daily TAKE 1 TABLET DAILY 14 tablet 0    ibuprofen (ADVIL;MOTRIN) 800 MG tablet Take 1 tablet by mouth every 8 hours as needed for Pain 30 tablet 0    cyclobenzaprine (FLEXERIL) 10 MG tablet Take 1 tablet by mouth 3 times daily as needed for Muscle spasms 30 tablet 0    blood glucose test strips (ASCENSIA AUTODISC VI;ONE TOUCH ULTRA TEST VI) strip Use with associated glucose meter. 100 strip 11    Lancets MISC 1 each by Does not apply route 3 times daily 200 each 5    Blood Glucose Monitoring Suppl (FREESTYLE FREEDOM) KIT 1 kit by Does not apply route daily 1 kit 0    Handicap Placard MISC Expires 3/31/2022 1 each 0     No current facility-administered medications for this visit. No Known Allergies    Health Maintenance   Topic Date Due    Diabetic foot exam  Never done    Diabetic microalbuminuria test  Never done    Diabetic retinal exam  Never done    DTaP/Tdap/Td vaccine (1 - Tdap) 07/22/2017    COVID-19 Vaccine (2 - Booster for Ryan series) 05/26/2021    Flu vaccine (1) Never done    Low dose CT lung screening  11/25/2021    Hepatitis B vaccine (1 of 3 - Risk 3-dose series) 08/20/2022 (Originally 10/18/1984)    Shingles Vaccine (1 of 2) 08/20/2022 (Originally 10/18/2013)    A1C test (Diabetic or Prediabetic)  08/20/2022    Lipid screen  08/20/2022    Potassium monitoring  08/20/2022    Creatinine monitoring  08/20/2022    Colon cancer screen colonoscopy  05/27/2026    Pneumococcal 0-64 years Vaccine (2 of 2 - PPSV23) 10/18/2028    Hepatitis C screen  Completed    HIV screen  Completed    Hepatitis A vaccine  Aged Out    Hib vaccine  Aged Out    Meningococcal (ACWY) vaccine  Aged Out       Subjective:      Review of Systems   Constitutional: Negative for chills, fatigue and fever. HENT: Positive for congestion, sinus pressure, sinus pain and sore throat.  Negative for ear pain.    Eyes: Negative for visual disturbance. Respiratory: Positive for cough and shortness of breath. Cardiovascular: Negative for chest pain and palpitations. Gastrointestinal: Negative for abdominal pain. Genitourinary: Negative for difficulty urinating and dysuria. Musculoskeletal: Negative for arthralgias and back pain. Neurological: Negative for dizziness and headaches. Psychiatric/Behavioral: Negative for self-injury, sleep disturbance and suicidal ideas. The patient is nervous/anxious. Objective:     Physical Exam  Vitals reviewed: unable to assess d/t telehealth. Constitutional:       Appearance: Normal appearance. Neurological:      General: No focal deficit present. Mental Status: He is alert and oriented to person, place, and time. Psychiatric:         Mood and Affect: Mood normal.         Behavior: Behavior normal.         Thought Content: Thought content normal.         Judgment: Judgment normal.       There were no vitals taken for this visit. Assessment:       Diagnosis Orders   1. Acute bacterial sinusitis  predniSONE (DELTASONE) 50 MG tablet    azithromycin (ZITHROMAX) 250 MG tablet   2. Cough  albuterol sulfate  (90 Base) MCG/ACT inhaler   3. Anxiety  ALPRAZolam (XANAX) 0.5 MG tablet             Plan:      Return if symptoms worsen or fail to improve, for in February as scheduled. 1. Acute bacterial sinusitis- Rx given for zpack and steroid with instruction for use. Follow up if no improvement in symptoms with use of med. 2. Cough- Rx given for albuterol, follow up if no improvement with use of meds. 3. Anxiety- Stable. Continue xanax at current dose. Follow up in February as scheduled for recheck. Randy Akins, was evaluated through a synchronous (real-time) audio-video encounter. The patient (or guardian if applicable) is aware that this is a billable service. Verbal consent to proceed has been obtained within the past 12 months.  The visit was conducted pursuant to the emergency declaration under the [facility], [facility] authority and the [employer] and [employer] Act. Patient identification was verified, and a caregiver was present when appropriate. The patient was located in a state where the provider was credentialed to provide care. Total time spent for this encounter: Not billed by time    --BULMARO Gamez CNP on 2021 at 8:28 AM    An electronic signature was used to authenticate this note. Orders Placed This Encounter   Medications    predniSONE (DELTASONE) 50 MG tablet     Sig: Take 1 tablet by mouth daily for 5 days     Dispense:  5 tablet     Refill:  0    azithromycin (ZITHROMAX) 250 MG tablet     Si tablets by mouth on day one. Then, 1 tablet by mouth daily for days 2-5. Dispense:  6 tablet     Refill:  0    fluticasone (FLONASE) 50 MCG/ACT nasal spray     Si sprays by Nasal route daily     Dispense:  1 each     Refill:  2    albuterol sulfate  (90 Base) MCG/ACT inhaler     Sig: Inhale 2 puffs into the lungs every 6 hours as needed for Wheezing     Dispense:  1 each     Refill:  2    ALPRAZolam (XANAX) 0.5 MG tablet     Sig: Take 1 tablet by mouth nightly as needed for Sleep for up to 90 days. Dispense:  120 tablet     Refill:  0       Patient given educational materials - see patient instructions. Discussed use, benefit, and side effects of prescribed medications. All patientquestions answered. Pt voiced understanding. Reviewed health maintenance. Instructedto continue current medications, diet and exercise. Patient agreed with treatmentplan. Follow up as directed.      Electronicallysigned by BULMARO Gamez CNP on 2021 at 8:25 AM

## 2021-12-27 DIAGNOSIS — F41.9 ANXIETY: ICD-10-CM

## 2021-12-27 RX ORDER — ALPRAZOLAM 0.5 MG/1
0.5 TABLET ORAL NIGHTLY PRN
Qty: 120 TABLET | Refills: 0 | Status: SHIPPED | OUTPATIENT
Start: 2021-12-27 | End: 2022-04-25 | Stop reason: SDUPTHER

## 2022-02-25 ENCOUNTER — OFFICE VISIT (OUTPATIENT)
Dept: PRIMARY CARE CLINIC | Age: 57
End: 2022-02-25
Payer: COMMERCIAL

## 2022-02-25 VITALS
RESPIRATION RATE: 15 BRPM | OXYGEN SATURATION: 96 % | SYSTOLIC BLOOD PRESSURE: 128 MMHG | BODY MASS INDEX: 36.01 KG/M2 | WEIGHT: 257.2 LBS | HEIGHT: 71 IN | HEART RATE: 78 BPM | DIASTOLIC BLOOD PRESSURE: 80 MMHG

## 2022-02-25 DIAGNOSIS — F32.A ANXIETY AND DEPRESSION: ICD-10-CM

## 2022-02-25 DIAGNOSIS — I10 ESSENTIAL HYPERTENSION: ICD-10-CM

## 2022-02-25 DIAGNOSIS — F41.9 ANXIETY AND DEPRESSION: ICD-10-CM

## 2022-02-25 DIAGNOSIS — M15.9 PRIMARY OSTEOARTHRITIS INVOLVING MULTIPLE JOINTS: ICD-10-CM

## 2022-02-25 DIAGNOSIS — E11.9 TYPE 2 DIABETES MELLITUS WITHOUT COMPLICATION, WITHOUT LONG-TERM CURRENT USE OF INSULIN (HCC): Primary | ICD-10-CM

## 2022-02-25 DIAGNOSIS — E78.2 MIXED HYPERLIPIDEMIA: ICD-10-CM

## 2022-02-25 PROCEDURE — 83036 HEMOGLOBIN GLYCOSYLATED A1C: CPT | Performed by: NURSE PRACTITIONER

## 2022-02-25 PROCEDURE — 99214 OFFICE O/P EST MOD 30 MIN: CPT | Performed by: NURSE PRACTITIONER

## 2022-02-25 ASSESSMENT — ENCOUNTER SYMPTOMS
ABDOMINAL PAIN: 0
SHORTNESS OF BREATH: 0
BACK PAIN: 0
COUGH: 0

## 2022-02-25 ASSESSMENT — PATIENT HEALTH QUESTIONNAIRE - PHQ9
1. LITTLE INTEREST OR PLEASURE IN DOING THINGS: 0
SUM OF ALL RESPONSES TO PHQ QUESTIONS 1-9: 0
SUM OF ALL RESPONSES TO PHQ QUESTIONS 1-9: 0
2. FEELING DOWN, DEPRESSED OR HOPELESS: 0
SUM OF ALL RESPONSES TO PHQ QUESTIONS 1-9: 0
SUM OF ALL RESPONSES TO PHQ9 QUESTIONS 1 & 2: 0
SUM OF ALL RESPONSES TO PHQ QUESTIONS 1-9: 0

## 2022-02-25 NOTE — PROGRESS NOTES
[address] PRIMARY CARE  [address]  [address]  [address] [address]  Dept: [phone]  Dept Fax: [phone]    Tiburcio Luna is a 60 y.o. male who presentstoday for his medical conditions/complaints as noted below.   Tiburcio Luna is c/o of  Chief Complaint   Patient presents with    Diabetes    6 Month Follow-Up           HPI:     Presents for 6 month recheck on chronic conditions  BP well controlled  Has lost 1lb since last visit    C/o dental pain, meeting with dentist regularly  Currently on amoxicillin  C/o sinus pain/pressure- reveiewed amox will cover    Hga1c well controlled at 5.6   Compliant with meds  Cut out pop  Working on diet/exercise    Continues to smoke, encouraged smoking cessation    Requesting updated handicap placard    Denies any other problems/concerns            Hemoglobin A1C (%)   Date Value   08/20/2021 5.8   02/19/2021 5.3   08/14/2020 5.6             ( goal A1C is < 7)   No results found for: LABMICR  LDL Cholesterol (mg/dL)   Date Value   08/20/2021 84   05/20/2020 71   03/01/2019 69     LDL Calculated (mg/dL)   Date Value   12/02/2016 97       (goal LDL is <100)   AST (U/L)   Date Value   08/20/2021 24     ALT (U/L)   Date Value   08/20/2021 25     BUN (mg/dL)   Date Value   08/20/2021 22 (H)     BP Readings from Last 3 Encounters:   02/25/22 128/80   08/20/21 122/80   05/10/21 (!) 148/96          ([id number])    Past Medical History:   Diagnosis Date    Arthritis     Wilcox esophagus     Depression     DU (duodenal ulcer)     Emphysema lung ([address]     [place] (hard of hearing[place]     Hyperlipidemia     Hypersomnia with sleep apnea, unspecified     CPAP nightly    Hypertension     AGUS (obstructive sleep apnea)     wears CPAP    Thoracic outlet syndrome     Rt      Past Surgical History:   Procedure Laterality Date    CARPAL TUNNEL RELEASE Left 12/17/2019    CARPAL TUNNEL RELEASE performed by Crys Corona MD at [address] [place] [address] CARPAL TUNNEL RELEASE Right 1/27/2020    CARPAL TUNNEL RELEASE performed by Chica Acevedo MD at [facility] 1945 Right     COLONOSCOPY  5/27/2016    JOINT REPLACEMENT Bilateral     hips    LYMPH NODE DISSECTION      neck    SOFT TISSUE TUMOR RESECTION      back    UPPER GASTROINTESTINAL ENDOSCOPY N/A 12/11/2018    EGD ESOPHAGOGASTRODUODENOSCOPY performed by Sam Agosto DO at [facility]    UPPER GASTROINTESTINAL ENDOSCOPY N/A 7/21/2020    EGD BIOPSY performed by Yang Pollard MD at [facility]       Family History   Problem Relation Age of Onset    Mult Sclerosis Father     Heart Disease Sister           Social History     Tobacco Use    Smoking status: Current Every Day Smoker     Packs/day: 2.50     Years: 45.00     Pack years: 112.50     Types: Cigarettes    Smokeless tobacco: Never Used   Substance Use Topics    Alcohol use: Yes     Comment: daily, 6-8 beer daily      Current Outpatient Medications   Medication Sig Dispense Refill    Handicap Placard [facility] Expires 2/2027 1 each 0    ALPRAZolam (XANAX) 0.5 MG tablet Take 1 tablet by mouth nightly as needed for Sleep for up to 90 days.  120 tablet 0    fluticasone (FLONASE) 50 MCG/ACT nasal spray 2 sprays by Nasal route daily 1 each 2    albuterol sulfate  (90 Base) MCG/ACT inhaler Inhale 2 puffs into the lungs every 6 hours as needed for Wheezing 1 each 2    atorvastatin (LIPITOR) 10 MG tablet TAKE 1 TABLET DAILY 90 tablet 3    pantoprazole (PROTONIX) 20 MG tablet TAKE 1 TABLET TWICE A DAY BEFORE MEALS 180 tablet 3    Diclofenac Sodium  MG TB24 TAKE 1 TABLET DAILY 90 tablet 3    metoprolol succinate (TOPROL XL) 100 MG extended release tablet TAKE 1 TABLET DAILY 90 tablet 3    venlafaxine (EFFEXOR XR) 150 MG extended release capsule TAKE 1 CAPSULE DAILY 90 capsule 3    lisinopril (PRINIVIL;ZESTRIL) 40 MG tablet Take 1 tablet by mouth daily TAKE 1 TABLET DAILY 14 tablet 0    ibuprofen (midsternal ) and palpitations. Gastrointestinal: Negative for abdominal pain. Genitourinary: Negative for difficulty urinating and dysuria. Musculoskeletal: Positive for arthralgias. Negative for back pain. Neurological: Negative for dizziness and headaches. Psychiatric/Behavioral: Negative for self-injury, sleep disturbance and suicidal ideas. The patient is not nervous/anxious. Objective:     Physical Exam  Vitals and nursing note reviewed. Constitutional:       Appearance: He is well-developed. HENT:      Head: Normocephalic and atraumatic. Eyes:      Pupils: Pupils are equal, round, and reactive to light. Cardiovascular:      Rate and Rhythm: Normal rate and regular rhythm. Heart sounds: Normal heart sounds. Pulmonary:      Effort: Pulmonary effort is normal.      Breath sounds: Normal breath sounds. Abdominal:      General: Bowel sounds are normal.      Palpations: Abdomen is soft. Tenderness: There is no abdominal tenderness. Musculoskeletal:         General: Normal range of motion. Cervical back: Normal range of motion. Skin:     General: Skin is warm and dry. Neurological:      Mental Status: He is alert and oriented to person, place, and time. Psychiatric:         Behavior: Behavior normal.         Thought Content: Thought content normal.         Judgment: Judgment normal.       /80   Pulse 78   Resp 15   Ht 5' 11\" (1.803 m)   Wt 257 lb 3.2 oz (116.7 kg)   SpO2 96%   BMI 35.87 kg/m²     Assessment:       Diagnosis Orders   1. Type 2 diabetes mellitus without complication, without long-term current use of insulin ([facility])  POCT glycosylated hemoglobin (Hb A1C)   2. Primary osteoarthritis involving multiple joints  Handicap Placard MISC    DISCONTINUED: Handicap Placard MISC   3. Anxiety and depression     4. Mixed hyperlipidemia     5.  Essential hypertension               Plan:      Return in about 6 months (around 8/25/2022) for annual exam.    1. DM- Hga1c well controlled at 5.6. Continue diet/exercise. Continue current meds. Follow up in six months for annual exam.  2. Osteoarthritis- Updated handicap placard x 5 years. Follow up in six months for annual exam.  3. Chronic conditions- Stable. Continue current meds. Encouraged diet/exercise and smoking cessation. Follow up in six months for annual exam.    Orders Placed This Encounter   Procedures    POCT glycosylated hemoglobin (Hb A1C)        Orders Placed This Encounter   Medications    DISCONTD: Handicap Placard MISC     Sig: Expires 3/31/2022     Dispense:  1 each     Refill:  0    Handicap Placard MISC     Sig: Expires 2/2027     Dispense:  1 each     Refill:  0       Patient given educational materials - see patient instructions. Discussed use, benefit, and side effects of prescribed medications. All patientquestions answered. Pt voiced understanding. Reviewed health maintenance. Instructedto continue current medications, diet and exercise. Patient agreed with treatmentplan. Follow up as directed.      Electronicallysigned by BULMARO Mejia CNP on 2/25/2022 at 9:05 AM

## 2022-03-04 ENCOUNTER — OFFICE VISIT (OUTPATIENT)
Dept: NEUROLOGY | Age: 57
End: 2022-03-04
Payer: COMMERCIAL

## 2022-03-04 VITALS
SYSTOLIC BLOOD PRESSURE: 150 MMHG | BODY MASS INDEX: 36.26 KG/M2 | HEIGHT: 71 IN | HEART RATE: 71 BPM | TEMPERATURE: 97.2 F | WEIGHT: 259 LBS | DIASTOLIC BLOOD PRESSURE: 92 MMHG

## 2022-03-04 DIAGNOSIS — E66.01 MORBID OBESITY (HCC): ICD-10-CM

## 2022-03-04 DIAGNOSIS — G47.33 OBSTRUCTIVE SLEEP APNEA SYNDROME: Primary | ICD-10-CM

## 2022-03-04 PROCEDURE — 99204 OFFICE O/P NEW MOD 45 MIN: CPT | Performed by: PSYCHIATRY & NEUROLOGY

## 2022-03-04 NOTE — PROGRESS NOTES
Active Problem sleep apnea with nasal CPAP seen previously in office . The condition is he continues with nightly nasal CPAP use . He is going to bed at 9PM falling asleep within 10 minutes sleeping to 3 AM do to work schedule working as union  . There will be awakening every 2 hours to go to bathroom able to fall back asleep . He will use nasal CPAP through the night . Two months ago his machine broke making whining taking CPAP machine to new  DME with them wanting copy of prior sleep study to get machine repaired not eligible for new machine until December . His mask is changed every 8 months . During waking day there is fatigue feeling CPAP pressure is at 15 cm H20 . There is obesity weighing 259 lbs , BMI 36 . Testing polysomnogram apnea hypopnea index 76 episodes per hour with oxyhemoglobin desaturation to 93 % , May 2020 .  CPAP 15 cm H20      Past Medical History:   Diagnosis Date    Arthritis     Wilcox esophagus     Depression     DU (duodenal ulcer)     Emphysema lung ([address]     [place] (hard of hearing)     Hyperlipidemia     Hypersomnia with sleep apnea, unspecified     CPAP nightly    Hypertension     AGUS (obstructive sleep apnea)     wears CPAP    Thoracic outlet syndrome     Rt       Past Surgical History:   Procedure Laterality Date    CARPAL TUNNEL RELEASE Left 12/17/2019    CARPAL TUNNEL RELEASE performed by Frances Cisse MD at [facility] Right 1/27/2020    CARPAL TUNNEL RELEASE performed by Frances Cisse MD at [address] [place] [address]  2021    CATARACT REMOVAL WITH IMPLANT Right     COLONOSCOPY  5/27/2016    JOINT REPLACEMENT Bilateral     hips    LYMPH NODE DISSECTION      neck    SOFT TISSUE TUMOR RESECTION      back    UPPER GASTROINTESTINAL ENDOSCOPY N/A 12/11/2018    EGD ESOPHAGOGASTRODUODENOSCOPY performed by Renan Delong DO at [facility] N/A 7/21/2020    EGD BIOPSY [facility]ian[facility]rain [facility]       Family History   Problem Relation Age of Onset    Mult Sclerosis Father     Heart Disease Sister        Social History     Socioeconomic History    Marital status:      Spouse name: None    Number of children: None    Years of education: None    Highest education level: None   Occupational History    None   Tobacco Use    Smoking status: Current Every Day Smoker     Packs/day: 2.50     Years: 45.00     Pack years: 112.50     Types: Cigarettes    Smokeless tobacco: Never Used   Vaping Use    Vaping Use: Never used   Substance and Sexual Activity    Alcohol use: Yes     Comment: daily, 6-8 beer daily    Drug use: No    Sexual activity: None   Other Topics Concern    None   Social History Narrative    None     Social Determinants of Health     Financial Resource Strain: Low Risk     Difficulty of Paying Living Expenses: Not hard at all   Food Insecurity: No Food Insecurity    Worried About Running Out of Food in the Last Year: Never true    [place] of Food in the Last Year: Never true   Transportation Needs:     Lack of Transportation (Medical): Not on file    Lack of Transportation (Non-Medical):  Not on file   Physical Activity:     Days of Exercise per Week: Not on file    Minutes of Exercise per Session: Not on file   Stress:     Feeling of Stress : Not on file   Social Connections:     Frequency of Communication with Friends and Family: Not on file    Frequency of Social Gatherings with Friends and Family: Not on file    Attends Caodaism Services: Not on file    Active Member of Clubs or Organizations: Not on file    Attends Club or Organization Meetings: Not on file    Marital Status: Not on file   Intimate Partner Violence:     Fear of Current or Ex-Partner: Not on file    Emotionally Abused: Not on file    Physically Abused: Not on file    Sexually Abused: Not on file   Housing Stability:     Unable to Pay for Housing in the Last Year: Not on file    Number of Places Lived in the Last Year: Not on file    Unstable Housing in the Last Year: Not on file       Current Outpatient Medications   Medication Sig Dispense Refill    ALPRAZolam (XANAX) 0.5 MG tablet Take 1 tablet by mouth nightly as needed for Sleep for up to 90 days. 120 tablet 0    fluticasone (FLONASE) 50 MCG/ACT nasal spray 2 sprays by Nasal route daily 1 each 2    albuterol sulfate  (90 Base) MCG/ACT inhaler Inhale 2 puffs into the lungs every 6 hours as needed for Wheezing 1 each 2    atorvastatin (LIPITOR) 10 MG tablet TAKE 1 TABLET DAILY 90 tablet 3    pantoprazole (PROTONIX) 20 MG tablet TAKE 1 TABLET TWICE A DAY BEFORE MEALS 180 tablet 3    Diclofenac Sodium  MG TB24 TAKE 1 TABLET DAILY 90 tablet 3    metoprolol succinate (TOPROL XL) 100 MG extended release tablet TAKE 1 TABLET DAILY 90 tablet 3    venlafaxine (EFFEXOR XR) 150 MG extended release capsule TAKE 1 CAPSULE DAILY 90 capsule 3    lisinopril (PRINIVIL;ZESTRIL) 40 MG tablet Take 1 tablet by mouth daily TAKE 1 TABLET DAILY 14 tablet 0    Handicap Placard [facility] Expires 2/2027 1 each 0    ibuprofen (ADVIL;MOTRIN) 800 MG tablet Take 1 tablet by mouth every 8 hours as needed for Pain (Patient not taking: Reported on 3/4/2022) 30 tablet 0    cyclobenzaprine (FLEXERIL) 10 MG tablet Take 1 tablet by mouth 3 times daily as needed for Muscle spasms (Patient not taking: Reported on 3/4/2022) 30 tablet 0    blood glucose test strips (ASCENSIA AUTODISC VI;ONE TOUCH ULTRA TEST VI) strip Use with associated glucose meter. 100 strip 11    Lancets MISC 1 each by Does not apply route 3 times daily 200 each 5    Blood Glucose Monitoring Suppl (FREESTYLE FREEDOM) KIT 1 kit by Does not apply route daily 1 kit 0     No current facility-administered medications for this visit.        No Known Allergies      Review of Systems     Vitals:    03/04/22 1348   BP: (!) 150/92   Pulse: 71   Temp:      weight: 259 lb (117.5 kg)      Review of Systems   Constitutional: Negative. HENT: Negative. Eyes: Negative. Respiratory: Negative. Cardiovascular: Negative. Gastrointestinal: Negative. Endocrine: Negative. Genitourinary: Negative. Musculoskeletal: Negative. Skin: Negative. Allergic/Immunologic: Negative. Neurological: Negative. Hematological: Negative. Psychiatric/Behavioral: Negative. Neurological Examination  Constitutional .General exam well groomed   Head/Ears /Nose/Throat: external ear . Normal exam  Neck and thyroid . Normal size. No bruits  Respiratory . Breathsounds clear bilaterally  Cardiovascular: Auscultation of heart with regular rate and rhythm  Musculoskeletal. Muscle bulk and tone normal                                                           Muscle strength 5/5 strength throughout                                                                                No dysmetria or dysdiadokinesis  No tremor   Normal fine motor  Gait normal   Orientation Alert and oriented x 3   Attention and concentration normal  Short term memory normal  Language process and speech normal . No aphasia   Cranial nerve 2 normal acuety and visual fields  Cranial nerve 3, 4 and 6 . Extraocular muscles are intact . Pupils are equal and reactive   Cranial nerve 5 . Normal strength of masseter and temporalis . Intact corneal reflex. Normal facial sensation  Cranial nerve 7 normal exam   Cranial nerve 8. Grossly intact hearing   Cranial nerve 9 and 10. Symmetric palate elevation   Cranial nerve 11 , 5 out of 5 strength   Cranial Nerve 12 midline tongue . No atrophy  Sensation . Normal proprioception . Intact Vibration . Normal pinprick and light touch   Deep Tendon Reflexes normal  Plantar response flexor bilaterally      ASSESSMENT/PLAN      Diagnosis Orders   1. Obstructive sleep apnea syndrome  CPAP treatment - face mask   2.  Morbid obesity ([address]     Will put order to fix nasal CPAP machine along with getting new headgear    Orders Placed This Encounter   Procedures    CPAP treatment - face mask     Standing Status:   Future     Standing Expiration Date:   3/4/2023     Scheduling Instructions:      Repair CPAP machine . CPAP pressure 15 cm H20 .  New mask  headgear , tubing and supplies

## 2022-03-07 ASSESSMENT — ENCOUNTER SYMPTOMS
ALLERGIC/IMMUNOLOGIC NEGATIVE: 1
EYES NEGATIVE: 1
RESPIRATORY NEGATIVE: 1
GASTROINTESTINAL NEGATIVE: 1

## 2022-04-25 DIAGNOSIS — F41.9 ANXIETY: ICD-10-CM

## 2022-04-25 RX ORDER — ALPRAZOLAM 0.5 MG/1
0.5 TABLET ORAL NIGHTLY PRN
Qty: 120 TABLET | Refills: 0 | Status: SHIPPED | OUTPATIENT
Start: 2022-04-25 | End: 2022-07-24

## 2022-06-19 NOTE — TELEPHONE ENCOUNTER
Needs Xanax refill to go to [facility]-90 day.   Last seen 10/06/17  Last fill 10/30/17 # 180-went to [facility] Maintenance   Topic Date Due    Hepatitis C screen  1965    HIV screen  10/18/1978    Diabetes screen  10/18/2003    DTaP/Tdap/Td vaccine (1 - Tdap) 07/22/2017    Flu vaccine (1) 10/06/2018 (Originally 9/1/2017)    Lipid screen  12/02/2021    Colon cancer screen colonoscopy  05/27/2026    Pneumococcal med risk  Completed             (applicable per patient's age: Cancer Screenings, Depression Screening, Fall Risk Screening, Immunizations)    LDL Calculated (mg/dL)   Date Value   12/02/2016 97      (goal A1C is < 7)   (goal LDL is <100) need 30-50% reduction from baseline     BP Readings from Last 3 Encounters:   10/06/17 134/88   07/21/17 116/74   06/05/17 138/82    (goal /80)      All Future Testing planned in [facility]:  Lab Frequency Next Occurrence   CT CHEST W CONTRAST Once 09/21/2017   Comprehensive Metabolic Panel Once 04/02/4367   CBC Once 10/06/2017   Psa screening Once 01/06/2018   Lipid Panel Once 01/06/2018   Hemoglobin A1C Once 11/05/2017       Next Visit Date:  Future Appointments  Date Time Provider Mirtha Bonilla   4/6/2018 9:00 AM JENIFER Aldana [facility]            Patient Active Problem List:     Anxiety and depression     Mixed hyperlipidemia     Essential hypertension     Primary osteoarthritis involving multiple joints     Multiple lung nodules
SUPPORTING DOCUMENTATION / EVIDENCE:  Admitted for evaluation of RLE  redness  Conflicting documentation in chart regarding cellulitis  Attending notes = doubt cellulitis or superimposed infection given negative WBC  ID notes =  may be some element of chronic stasis dermatitis  Podiatry notes = cellulitis and venous stasis dermatitis  DC Summary = possible super imposed cellulitis on eczema  Antibiotics given throughout stay and discharged on them    ED:  presenting with redness, swelling, and foul discharge from RLE. Per EMS- family mentioned that patient has wound to RLE that has started to drain foul-smelling fluid over last few days.  	Skin: +wound present to R lateral ankle with surround erythema/induration/warmth with dried yellow discharge from wound  Principal Discharge DX:  Cellulitis.    H&P:  #RLE cellulitis  Patient was given Vanco/Zosyn by ED  Will continue with vanco/cefepime    ATTENDING 6/8 & 6/9 NOTES:  LE Erythema  - doubt cellulitis or superimposed infection given negative wbc   - probable 2/2 eczema    ID 6/7-9 NOTES:  WBC Count: 7.15 K/uL (06-08-22 @ 04:10)  WBC Count: 7.07 K/uL (06-07-22 @ 14:02)  PT IN NAD IN ER RIGHT LEG WITH EDEMA ERYTHEMA AND SCALINESS NO ODOR OR DRAINAGE NOTED   BLOOD CX X2 SETS SO FAR NEG    on  CEFAZOLIN/CLINDA      PT WITH SOME RASH ON ARM BUT DOES NOT APPEAR TO BE DRUG RASH   AREA WITH ERYTHEMA AND   SCALY  MAY BE SOME ELEMENT OF CHRONIC STASIS DERMATITIS   SPOKE TO HOSPITALIST LEG AREA MUCH IMPROVED WITH TOPICAL STEROIDS  PT NON TOXIC FOR D/C TODAY WOULD TREAT WITH A FEW  MORE DAYS OF ORAL KEFLEX    PODIATRY 6/8 & 6/9 NOTES:  Cellulitis, Right Lower leg  Venous stasis dermatitis, Right lower leg/foot.     DC SUMMARY:  sent in from home for evaluation of RLE cellulitis. patient treated with abx while admitted for possible super imposed cellulitis on eczema   PRINCIPAL DISCHARGE DIAGNOSIS  Diagnosis: Eczema    DC MEDS:  All Active Home Medications at time of Discharge Reconciliation: 09-Jun-2022 10:07    calamine topical lotion 1 application topically 3 times a day     cephalexin 500 mg oral capsule 1 cap(s) orally 3 times a day      triamcinolone 0.1% topical ointment 1 application topically 3 times a day      ANTIBIOTICS:  ceFAZolin   IVPB   100 mL/Hr IV Intermittent (06-08-22)   100 mL/Hr IV Intermittent (06-08-22)   100 mL/Hr IV Intermittent (06-09-22)   100 mL/Hr IV Intermittent (06-09-22)    cefepime   IVPB   100 mL/Hr IV Intermittent (06-07-22)   100 mL/Hr IV Intermittent (06-08-22)    clindamycin IVPB   100 mL/Hr IV Intermittent (06-08-22)   100 mL/Hr IV Intermittent (06-08-22)   100 mL/Hr IV Intermittent (06-09-22)   100 mL/Hr IV Intermittent (06-09-22)    piperacillin/tazobactam IVPB...   200 mL/Hr IV Intermittent (06-07-22)    vancomycin  IVPB   250 mL/Hr IV Intermittent (06-07-22)    vancomycin  IVPB.   250 mL/Hr IV Intermittent (06-07-22)          Please clarify, in addendum to DC summary, the condition patient was treated for on RLE.   - Pt. treated for right lower extremity cellulitis superimposed on eczema and venous stasis dermatitis   - Pt. treated for right lower extremity cellulitis superimposed on __________   - Other   - Not clinically significant    Thank you

## 2022-06-20 DIAGNOSIS — F41.9 ANXIETY AND DEPRESSION: ICD-10-CM

## 2022-06-20 DIAGNOSIS — F32.A ANXIETY AND DEPRESSION: ICD-10-CM

## 2022-06-20 DIAGNOSIS — I10 ESSENTIAL HYPERTENSION: ICD-10-CM

## 2022-06-20 RX ORDER — PANTOPRAZOLE SODIUM 20 MG/1
TABLET, DELAYED RELEASE ORAL
Qty: 180 TABLET | Refills: 3 | Status: SHIPPED | OUTPATIENT
Start: 2022-06-20

## 2022-06-20 RX ORDER — VENLAFAXINE HYDROCHLORIDE 150 MG/1
CAPSULE, EXTENDED RELEASE ORAL
Qty: 90 CAPSULE | Refills: 3 | Status: SHIPPED | OUTPATIENT
Start: 2022-06-20

## 2022-06-20 RX ORDER — METOPROLOL SUCCINATE 100 MG/1
TABLET, EXTENDED RELEASE ORAL
Qty: 90 TABLET | Refills: 3 | Status: SHIPPED | OUTPATIENT
Start: 2022-06-20

## 2022-06-20 RX ORDER — LISINOPRIL 40 MG/1
40 TABLET ORAL DAILY
Qty: 14 TABLET | Refills: 0 | Status: SHIPPED | OUTPATIENT
Start: 2022-06-20

## 2022-07-18 ENCOUNTER — TELEPHONE (OUTPATIENT)
Dept: PRIMARY CARE CLINIC | Age: 57
End: 2022-07-18

## 2022-07-18 DIAGNOSIS — J32.9 RECURRENT SINUSITIS: Primary | ICD-10-CM

## 2022-07-18 NOTE — TELEPHONE ENCOUNTER
----- Message from Werner Tovar sent at 7/18/2022  3:02 PM EDT -----  Subject: Referral Request    Reason for referral request? Pt is dealing with congestion issues with   phelgm and mucus. The coloration is green and leaves a weird taste in pt   mouth. Pt would like to referral to and ENT specialist as soon as   possible. Provider patient wants to be referred to(if known):     Provider Phone Number(if known): Additional Information for Provider?  Pt is open to another provider for   ENT just would like to be seen as soon as possible.   ---------------------------------------------------------------------------  --------------  [employer]    [phone]; OK to leave message on voicemail  ---------------------------------------------------------------------------  --------------

## 2022-08-26 ENCOUNTER — OFFICE VISIT (OUTPATIENT)
Dept: PRIMARY CARE CLINIC | Age: 57
End: 2022-08-26
Payer: COMMERCIAL

## 2022-08-26 VITALS
WEIGHT: 259 LBS | OXYGEN SATURATION: 97 % | DIASTOLIC BLOOD PRESSURE: 84 MMHG | HEIGHT: 71 IN | HEART RATE: 79 BPM | BODY MASS INDEX: 36.26 KG/M2 | RESPIRATION RATE: 13 BRPM | SYSTOLIC BLOOD PRESSURE: 136 MMHG

## 2022-08-26 DIAGNOSIS — E11.9 TYPE 2 DIABETES MELLITUS WITHOUT COMPLICATION, WITHOUT LONG-TERM CURRENT USE OF INSULIN (HCC): ICD-10-CM

## 2022-08-26 DIAGNOSIS — Z13.29 SCREENING FOR THYROID DISORDER: ICD-10-CM

## 2022-08-26 DIAGNOSIS — Z72.0 TOBACCO USE: ICD-10-CM

## 2022-08-26 DIAGNOSIS — Z12.5 SCREENING FOR PROSTATE CANCER: ICD-10-CM

## 2022-08-26 DIAGNOSIS — Z87.891 PERSONAL HISTORY OF TOBACCO USE: ICD-10-CM

## 2022-08-26 DIAGNOSIS — Z13.0 SCREENING FOR DEFICIENCY ANEMIA: ICD-10-CM

## 2022-08-26 DIAGNOSIS — Z00.00 ENCOUNTER FOR GENERAL ADULT MEDICAL EXAMINATION W/O ABNORMAL FINDINGS: Primary | ICD-10-CM

## 2022-08-26 DIAGNOSIS — I10 ESSENTIAL HYPERTENSION: ICD-10-CM

## 2022-08-26 DIAGNOSIS — F41.9 ANXIETY: ICD-10-CM

## 2022-08-26 DIAGNOSIS — Z13.220 SCREENING FOR LIPID DISORDERS: ICD-10-CM

## 2022-08-26 PROCEDURE — 99396 PREV VISIT EST AGE 40-64: CPT | Performed by: NURSE PRACTITIONER

## 2022-08-26 RX ORDER — LISINOPRIL 40 MG/1
TABLET ORAL
Qty: 90 TABLET | Refills: 3 | Status: SHIPPED | OUTPATIENT
Start: 2022-08-26

## 2022-08-26 RX ORDER — ALPRAZOLAM 0.5 MG/1
0.5 TABLET ORAL NIGHTLY PRN
Qty: 120 TABLET | Refills: 0 | Status: CANCELLED | OUTPATIENT
Start: 2022-08-26 | End: 2022-11-24

## 2022-08-26 RX ORDER — ALPRAZOLAM 0.5 MG/1
0.5 TABLET ORAL 2 TIMES DAILY PRN
Qty: 180 TABLET | Refills: 0 | Status: SHIPPED | OUTPATIENT
Start: 2022-08-26 | End: 2022-11-24

## 2022-08-26 RX ORDER — AMOXICILLIN 500 MG/1
500 CAPSULE ORAL 3 TIMES DAILY
COMMUNITY
End: 2022-10-28

## 2022-08-26 SDOH — ECONOMIC STABILITY: FOOD INSECURITY: WITHIN THE PAST 12 MONTHS, THE FOOD YOU BOUGHT JUST DIDN'T LAST AND YOU DIDN'T HAVE MONEY TO GET MORE.: NEVER TRUE

## 2022-08-26 SDOH — ECONOMIC STABILITY: FOOD INSECURITY: WITHIN THE PAST 12 MONTHS, YOU WORRIED THAT YOUR FOOD WOULD RUN OUT BEFORE YOU GOT MONEY TO BUY MORE.: NEVER TRUE

## 2022-08-26 ASSESSMENT — PATIENT HEALTH QUESTIONNAIRE - PHQ9
3. TROUBLE FALLING OR STAYING ASLEEP: 0
5. POOR APPETITE OR OVEREATING: 0
10. IF YOU CHECKED OFF ANY PROBLEMS, HOW DIFFICULT HAVE THESE PROBLEMS MADE IT FOR YOU TO DO YOUR WORK, TAKE CARE OF THINGS AT HOME, OR GET ALONG WITH OTHER PEOPLE: 0
SUM OF ALL RESPONSES TO PHQ QUESTIONS 1-9: 0
1. LITTLE INTEREST OR PLEASURE IN DOING THINGS: 0
9. THOUGHTS THAT YOU WOULD BE BETTER OFF DEAD, OR OF HURTING YOURSELF: 0
4. FEELING TIRED OR HAVING LITTLE ENERGY: 0
SUM OF ALL RESPONSES TO PHQ9 QUESTIONS 1 & 2: 0
2. FEELING DOWN, DEPRESSED OR HOPELESS: 0
6. FEELING BAD ABOUT YOURSELF - OR THAT YOU ARE A FAILURE OR HAVE LET YOURSELF OR YOUR FAMILY DOWN: 0
7. TROUBLE CONCENTRATING ON THINGS, SUCH AS READING THE NEWSPAPER OR WATCHING TELEVISION: 0
SUM OF ALL RESPONSES TO PHQ QUESTIONS 1-9: 0
8. MOVING OR SPEAKING SO SLOWLY THAT OTHER PEOPLE COULD HAVE NOTICED. OR THE OPPOSITE, BEING SO FIGETY OR RESTLESS THAT YOU HAVE BEEN MOVING AROUND A LOT MORE THAN USUAL: 0
SUM OF ALL RESPONSES TO PHQ QUESTIONS 1-9: 0
SUM OF ALL RESPONSES TO PHQ QUESTIONS 1-9: 0

## 2022-08-26 ASSESSMENT — ENCOUNTER SYMPTOMS
SINUS PAIN: 1
SHORTNESS OF BREATH: 1
BACK PAIN: 0
COUGH: 0
SINUS PRESSURE: 1
ABDOMINAL PAIN: 0

## 2022-08-26 ASSESSMENT — SOCIAL DETERMINANTS OF HEALTH (SDOH): HOW HARD IS IT FOR YOU TO PAY FOR THE VERY BASICS LIKE FOOD, HOUSING, MEDICAL CARE, AND HEATING?: NOT HARD AT ALL

## 2022-08-26 NOTE — PROGRESS NOTES
[facility] PRIMARY CARE  [address] [facility]  [address]  [address] [address]  Dept: [phone]  Dept Fax: [phone]    Modesta Wise is a 60 y.o. male who presentstoday for his medical conditions/complaints as noted below.   Modesta Wise is c/o of  Chief Complaint   Patient presents with    Hypertension    3 Month Follow-Up    Discuss Medications     Xanax increase     Annual Exam           HPI:     Presents for recheck/annual exam  BP well controlled  Weight is stable    Recent CTA, reviewed with patient  Continues to smoke 2 ppd, does not wish to quit at this time    Increase in feelings of anxiety  Using xanax in am but having a hard time sleeping  Requesting to return to BID use as needed  Denies any side effects with use of med    Following with ENT, has CT sinus upcoming  No improvement with antibiotic use    Willing to update annual labs    Denies any other problems/concerns      Hemoglobin A1C (%)   Date Value   08/20/2021 5.8   02/19/2021 5.3   08/14/2020 5.6             ( goal A1C is < 7)   No results found for: LABMICR  LDL Cholesterol (mg/dL)   Date Value   08/20/2021 84   05/20/2020 71   03/01/2019 69     LDL Calculated (mg/dL)   Date Value   12/02/2016 97       (goal LDL is <100)   AST (U/L)   Date Value   08/20/2021 24     ALT (U/L)   Date Value   08/20/2021 25     BUN (mg/dL)   Date Value   08/20/2021 22 (H)     BP Readings from Last 3 Encounters:   08/26/22 136/84   03/04/22 (!) 150/92   02/25/22 128/80          ([id number])    Past Medical History:   Diagnosis Date    Arthritis     Wilcox esophagus     Depression     DU (duodenal ulcer)     Emphysema lung (HCC)     [place] (hard of hearing)     Hyperlipidemia     Hypersomnia with sleep apnea, unspecified     CPAP nightly    Hypertension     AGUS (obstructive sleep apnea)     wears CPAP    Thoracic outlet syndrome     Rt      Past Surgical History:   Procedure Laterality Date    CARPAL TUNNEL RELEASE Left 12/17/2019    CARPAL TUNNEL RELEASE performed by Gill Allen MD at [address] Right 1/27/2020    CARPAL TUNNEL RELEASE performed by Gill Allen MD at [facility].  2021    CATARACT REMOVAL WITH IMPLANT Right     COLONOSCOPY  5/27/2016    JOINT REPLACEMENT Bilateral     hips    LYMPH NODE DISSECTION      neck    SOFT TISSUE TUMOR RESECTION      back    UPPER GASTROINTESTINAL ENDOSCOPY N/A 12/11/2018    EGD ESOPHAGOGASTRODUODENOSCOPY performed by Betsy Rangel DO at [facility]    UPPER GASTROINTESTINAL ENDOSCOPY N/A 7/21/2020    EGD BIOPSY performed by Abby Serrano MD at [facility]       Family History   Problem Relation Age of Onset    Mult Sclerosis Father     Heart Disease Sister           Social History     Tobacco Use    Smoking status: Every Day     Packs/day: 2.50     Years: 45.00     Pack years: 112.50     Types: Cigarettes    Smokeless tobacco: Never   Substance Use Topics    Alcohol use: Yes     Comment: daily, 6-8 beer daily      Current Outpatient Medications   Medication Sig Dispense Refill    lisinopril (PRINIVIL;ZESTRIL) 40 MG tablet TAKE 1 TABLET DAILY 90 tablet 3    amoxicillin (AMOXIL) 500 MG capsule Take 500 mg by mouth 3 times daily      venlafaxine (EFFEXOR XR) 150 MG extended release capsule TAKE 1 CAPSULE DAILY 90 capsule 3    lisinopril (PRINIVIL;ZESTRIL) 40 MG tablet Take 1 tablet by mouth daily TAKE 1 TABLET DAILY 14 tablet 0    pantoprazole (PROTONIX) 20 MG tablet TAKE 1 TABLET TWICE A DAY BEFORE MEALS 180 tablet 3    metoprolol succinate (TOPROL XL) 100 MG extended release tablet TAKE 1 TABLET DAILY 90 tablet 3    Diclofenac Sodium  MG TB24 TAKE 1 TABLET DAILY 90 tablet 3    Handicap Placard MISC Expires 2/2027 1 each 0    fluticasone (FLONASE) 50 MCG/ACT nasal spray 2 sprays by Nasal route daily 1 each 2    albuterol sulfate  (90 Base) MCG/ACT inhaler Inhale 2 puffs into the lungs every 6 hours as needed for Wheezing 1 each 2    atorvastatin (LIPITOR) 10 MG tablet TAKE 1 TABLET DAILY 90 tablet 3    ibuprofen (ADVIL;MOTRIN) 800 MG tablet Take 1 tablet by mouth every 8 hours as needed for Pain 30 tablet 0    cyclobenzaprine (FLEXERIL) 10 MG tablet Take 1 tablet by mouth 3 times daily as needed for Muscle spasms 30 tablet 0    blood glucose test strips (ASCENSIA AUTODISC VI;ONE TOUCH ULTRA TEST VI) strip Use with associated glucose meter. 100 strip 11    Lancets MISC 1 each by Does not apply route 3 times daily 200 each 5    Blood Glucose Monitoring Suppl (FREESTYLE FREEDOM) KIT 1 kit by Does not apply route daily 1 kit 0     No current facility-administered medications for this visit. No Known Allergies    Health Maintenance   Topic Date Due    Diabetic foot exam  Never done    Diabetic microalbuminuria test  Never done    Diabetic retinal exam  Never done    Hepatitis B vaccine (1 of 3 - Risk 3-dose series) Never done    Low dose CT lung screening  Never done    COVID-19 Vaccine (2 - Booster for Ryan series) 05/26/2021    A1C test (Diabetic or Prediabetic)  08/20/2022    Lipids  08/20/2022    Shingles vaccine (1 of 2) 08/27/2022 (Originally 10/18/2013)    DTaP/Tdap/Td vaccine (1 - Tdap) 09/16/2022 (Originally 7/22/2017)    Flu vaccine (1) 09/01/2022    Depression Monitoring  02/25/2023    Colorectal Cancer Screen  05/27/2026    Pneumococcal 0-64 years Vaccine (3 - PPSV23 or PCV20) 10/18/2028    Hepatitis C screen  Completed    HIV screen  Completed    Hepatitis A vaccine  Aged Out    Hib vaccine  Aged Out    Meningococcal (ACWY) vaccine  Aged Out       Subjective:      Review of Systems   Constitutional:  Negative for chills, fatigue and fever. HENT:  Positive for congestion, sinus pressure and sinus pain. Eyes:  Negative for visual disturbance. Respiratory:  Positive for shortness of breath. Negative for cough. Cardiovascular:  Negative for chest pain and palpitations.    Gastrointestinal:  Negative for abdominal pain.   Genitourinary:  Negative for difficulty urinating and dysuria. Musculoskeletal:  Negative for arthralgias and back pain. Neurological:  Negative for dizziness and headaches. Psychiatric/Behavioral:  Negative for self-injury, sleep disturbance and suicidal ideas. The patient is nervous/anxious. Objective:     Physical Exam  Vitals and nursing note reviewed. Constitutional:       Appearance: He is well-developed. HENT:      Head: Normocephalic and atraumatic. Eyes:      Pupils: Pupils are equal, round, and reactive to light. Cardiovascular:      Rate and Rhythm: Normal rate and regular rhythm. Heart sounds: Normal heart sounds. Pulmonary:      Effort: Pulmonary effort is normal.      Breath sounds: Normal breath sounds. Abdominal:      General: Bowel sounds are normal.      Palpations: Abdomen is soft. Tenderness: There is no abdominal tenderness. Musculoskeletal:         General: Normal range of motion. Cervical back: Normal range of motion. Skin:     General: Skin is warm and dry. Neurological:      Mental Status: He is alert and oriented to person, place, and time. Psychiatric:         Behavior: Behavior normal.         Thought Content: Thought content normal.         Judgment: Judgment normal.     /84   Pulse 79   Resp 13   Ht 5' 11\" (1.803 m)   Wt 259 lb (117.5 kg)   SpO2 97%   BMI 36.12 kg/m²     Assessment:       Diagnosis Orders   1. Encounter for general adult medical examination w/o abnormal findings        2. Anxiety        3. Essential hypertension  lisinopril (PRINIVIL;ZESTRIL) 40 MG tablet      4. Type 2 diabetes mellitus without complication, without long-term current use of insulin (HCC)  Comprehensive Metabolic Panel    Hemoglobin A1C    Microalbumin, Ur      5. Screening for deficiency anemia  CBC      6. Screening for lipid disorders  Lipid Panel      7. Screening for thyroid disorder  TSH with Reflex      8.  Screening for prostate cancer  PSA Screening      9. Tobacco use  CT lung screen [Initial/Annual]                Plan:      Return in about 6 months (around 2/26/2023) for recheck. HM- Continue diet/exercise. Continue current meds. Increased xanax to BID prn. Rx given for annual labs. Follow up in six months for recheck/earlier if needed. Orders Placed This Encounter   Procedures    CT lung screen [Initial/Annual]     Age: 60 y.o. Smoking History:   Social History    Tobacco Use      Smoking status: Every Day        Packs/day: 2.50        Years: 45.00        Pack years: 112.5        Types: Cigarettes      Smokeless tobacco: Never    Vaping Use      Vaping Use: Never used    Alcohol use: Yes      Comment: daily, 6-8 beer daily    Drug use: No    Pack years: 112.5  Last CT lung screen: No previous lung cancer screening exam     Standing Status:   Future     Standing Expiration Date:   8/26/2023     Order Specific Question:   Is there documentation of shared decision making? Answer:   Yes     Order Specific Question:   Does the patient show any signs or symptoms of lung cancer? Answer:   No     Order Specific Question:   Is this the first (baseline) CT or an annual exam?     Answer:   Baseline [1]     Order Specific Question:   Is this a low dose CT or a routine CT? Answer:   Low Dose CT [1]     Order Specific Question:   Smoking Status? Answer:   Every Day [1]     Order Specific Question:   Smoking packs per day? Answer:   2.5     Order Specific Question:   Years smoking?      Answer:   45    Comprehensive Metabolic Panel     Standing Status:   Future     Standing Expiration Date:   8/26/2023    CBC     Standing Status:   Future     Standing Expiration Date:   8/26/2023    Lipid Panel     Standing Status:   Future     Standing Expiration Date:   8/26/2023     Order Specific Question:   Is Patient Fasting?/# of Hours     Answer:   12    TSH with Reflex     Standing Status:   Future     Standing Expiration Date: 8/26/2023    Hemoglobin A1C     Standing Status:   Future     Standing Expiration Date:   8/26/2023    PSA Screening     Standing Status:   Future     Standing Expiration Date:   8/26/2023    Microalbumin, Ur     Standing Status:   Future     Standing Expiration Date:   8/26/2023        Orders Placed This Encounter   Medications    lisinopril (PRINIVIL;ZESTRIL) 40 MG tablet     Sig: TAKE 1 TABLET DAILY     Dispense:  90 tablet     Refill:  3       Patient given educational materials - see patient instructions. Discussed use, benefit, and side effects of prescribed medications. All patientquestions answered. Pt voiced understanding. Reviewed health maintenance. Instructedto continue current medications, diet and exercise. Patient agreed with treatmentplan. Follow up as directed.      Electronicallysigned by BULMARO Carlton CNP on 8/26/2022 at 8:49 AM

## 2022-09-09 ENCOUNTER — HOSPITAL ENCOUNTER (OUTPATIENT)
Age: 57
Setting detail: SPECIMEN
Discharge: HOME | End: 2022-09-09

## 2022-09-09 DIAGNOSIS — E11.9 TYPE 2 DIABETES MELLITUS WITHOUT COMPLICATION, WITHOUT LONG-TERM CURRENT USE OF INSULIN (HCC): ICD-10-CM

## 2022-09-09 DIAGNOSIS — Z13.220 SCREENING FOR LIPID DISORDERS: ICD-10-CM

## 2022-09-09 DIAGNOSIS — Z13.0 SCREENING FOR DEFICIENCY ANEMIA: ICD-10-CM

## 2022-09-09 DIAGNOSIS — Z13.29 SCREENING FOR THYROID DISORDER: ICD-10-CM

## 2022-09-09 DIAGNOSIS — Z12.5 SCREENING FOR PROSTATE CANCER: ICD-10-CM

## 2022-09-09 LAB
ALBUMIN SERPL-MCNC: 4.2 G/DL (ref 3.5–5.2)
ALBUMIN/GLOBULIN RATIO: 1.4 (ref 1–2.5)
ALP BLD-CCNC: 108 U/L (ref 40–129)
ALT SERPL-CCNC: 31 U/L (ref 5–41)
ANION GAP SERPL CALCULATED.3IONS-SCNC: 14 MMOL/L (ref 9–17)
AST SERPL-CCNC: 29 U/L
BILIRUB SERPL-MCNC: 0.5 MG/DL (ref 0.3–1.2)
BUN BLDV-MCNC: 12 MG/DL (ref 6–20)
CALCIUM SERPL-MCNC: 8.8 MG/DL (ref 8.6–10.4)
CHLORIDE BLD-SCNC: 98 MMOL/L (ref 98–107)
CHOLESTEROL/HDL RATIO: 4.7
CHOLESTEROL: 156 MG/DL
CO2: 21 MMOL/L (ref 20–31)
CREAT SERPL-MCNC: 1.02 MG/DL (ref 0.7–1.2)
ESTIMATED AVERAGE GLUCOSE: 117 MG/DL
GFR AFRICAN AMERICAN: >60 ML/MIN
GFR NON-AFRICAN AMERICAN: >60 ML/MIN
GFR SERPL CREATININE-BSD FRML MDRD: ABNORMAL ML/MIN/{1.73_M2}
GLUCOSE BLD-MCNC: 99 MG/DL (ref 70–99)
HBA1C MFR BLD: 5.7 % (ref 4–6)
HCT VFR BLD CALC: 47.3 % (ref 40.7–50.3)
HDLC SERPL-MCNC: 33 MG/DL
HEMOGLOBIN: 16.1 G/DL (ref 13–17)
LDL CHOLESTEROL: 88 MG/DL (ref 0–130)
MCH RBC QN AUTO: 32.9 PG (ref 25.2–33.5)
MCHC RBC AUTO-ENTMCNC: 34 G/DL (ref 28.4–34.8)
MCV RBC AUTO: 96.7 FL (ref 82.6–102.9)
NRBC AUTOMATED: 0 PER 100 WBC
PDW BLD-RTO: 13.3 % (ref 11.8–14.4)
PLATELET # BLD: 267 K/UL (ref 138–453)
PMV BLD AUTO: 9.6 FL (ref 8.1–13.5)
POTASSIUM SERPL-SCNC: 4.8 MMOL/L (ref 3.7–5.3)
PROSTATE SPECIFIC ANTIGEN: 0.84 NG/ML
RBC # BLD: 4.89 M/UL (ref 4.21–5.77)
SODIUM BLD-SCNC: 133 MMOL/L (ref 135–144)
TOTAL PROTEIN: 7.2 G/DL (ref 6.4–8.3)
TRIGL SERPL-MCNC: 176 MG/DL
TSH SERPL DL<=0.05 MIU/L-ACNC: 1.05 UIU/ML (ref 0.3–5)
WBC # BLD: 10.3 K/UL (ref 3.5–11.3)

## 2022-10-15 ENCOUNTER — HOSPITAL ENCOUNTER (OUTPATIENT)
Age: 57
Discharge: HOME | End: 2022-10-15
Payer: COMMERCIAL

## 2022-10-15 PROCEDURE — 93005 ELECTROCARDIOGRAM TRACING: CPT | Performed by: OTOLARYNGOLOGY

## 2022-10-17 LAB
EKG ATRIAL RATE: 75 BPM
EKG P AXIS: 60 DEGREES
EKG P-R INTERVAL: 132 MS
EKG Q-T INTERVAL: 380 MS
EKG QRS DURATION: 84 MS
EKG QTC CALCULATION (BAZETT): 424 MS
EKG R AXIS: 72 DEGREES
EKG T AXIS: 55 DEGREES
EKG VENTRICULAR RATE: 75 BPM

## 2022-10-17 PROCEDURE — 93010 ELECTROCARDIOGRAM REPORT: CPT | Performed by: INTERNAL MEDICINE

## 2022-10-28 ENCOUNTER — TELEMEDICINE (OUTPATIENT)
Dept: PRIMARY CARE CLINIC | Age: 57
End: 2022-10-28
Payer: COMMERCIAL

## 2022-10-28 DIAGNOSIS — B96.89 ACUTE BACTERIAL SINUSITIS: ICD-10-CM

## 2022-10-28 DIAGNOSIS — J01.90 ACUTE BACTERIAL SINUSITIS: ICD-10-CM

## 2022-10-28 DIAGNOSIS — J32.9 CHRONIC CONGESTION OF PARANASAL SINUS: Primary | ICD-10-CM

## 2022-10-28 PROCEDURE — 98967 PH1 ASSMT&MGMT NQHP 11-20: CPT | Performed by: NURSE PRACTITIONER

## 2022-10-28 RX ORDER — AZITHROMYCIN 250 MG/1
TABLET, FILM COATED ORAL
Qty: 6 TABLET | Refills: 0 | Status: SHIPPED | OUTPATIENT
Start: 2022-10-28 | End: 2022-11-07

## 2022-10-28 NOTE — PROGRESS NOTES
Presents via telephone for acute concerns  Unable to review vitals    C/o cough/congestion x 1 week  Anxious as he is to have surgery next week for his sinus  Currently taking prednisone per ENT  Would like to treat with antibiotics prior to surgery    Will have to hold diclofenac  Rx given for voltaren gel    Denies any other problems/concerns    This visit occurred via telephone, he consented prior  He was at his home and I was in the office  The visit lasted 11 min

## 2022-11-03 ENCOUNTER — HOSPITAL ENCOUNTER (OUTPATIENT)
Age: 57
Setting detail: SPECIMEN
Discharge: HOME | End: 2022-11-03

## 2022-11-05 LAB
CULTURE: ABNORMAL
DIRECT EXAM: ABNORMAL
SPECIMEN DESCRIPTION: ABNORMAL

## 2022-11-15 LAB — SURGICAL PATHOLOGY REPORT: NORMAL

## 2022-11-23 ENCOUNTER — TELEPHONE (OUTPATIENT)
Dept: PRIMARY CARE CLINIC | Age: 57
End: 2022-11-23

## 2022-11-23 NOTE — TELEPHONE ENCOUNTER
Patient called into the office transferred from nurse triage, with SOB. Patient states that this happens anytime he is walking, sitting or sleeping. States that there is no chest tightness or pain. I advised no providers in the office. Advised walk in clinic or ED. Patient states that he will use the ED.

## 2022-12-19 DIAGNOSIS — F41.9 ANXIETY: ICD-10-CM

## 2022-12-19 DIAGNOSIS — E78.2 MIXED HYPERLIPIDEMIA: ICD-10-CM

## 2022-12-19 RX ORDER — ATORVASTATIN CALCIUM 10 MG/1
TABLET, FILM COATED ORAL
Qty: 90 TABLET | Refills: 3 | Status: SHIPPED | OUTPATIENT
Start: 2022-12-19

## 2022-12-19 RX ORDER — ALPRAZOLAM 0.5 MG/1
0.5 TABLET ORAL 2 TIMES DAILY PRN
Qty: 180 TABLET | Refills: 0 | Status: SHIPPED | OUTPATIENT
Start: 2022-12-19 | End: 2023-03-19

## 2023-03-03 ENCOUNTER — OFFICE VISIT (OUTPATIENT)
Dept: PRIMARY CARE CLINIC | Age: 58
End: 2023-03-03

## 2023-03-03 VITALS
DIASTOLIC BLOOD PRESSURE: 88 MMHG | RESPIRATION RATE: 16 BRPM | BODY MASS INDEX: 37.24 KG/M2 | HEART RATE: 82 BPM | WEIGHT: 267 LBS | SYSTOLIC BLOOD PRESSURE: 138 MMHG | OXYGEN SATURATION: 96 %

## 2023-03-03 DIAGNOSIS — J32.9 CHRONIC CONGESTION OF PARANASAL SINUS: ICD-10-CM

## 2023-03-03 DIAGNOSIS — F41.9 ANXIETY: ICD-10-CM

## 2023-03-03 DIAGNOSIS — E11.9 TYPE 2 DIABETES MELLITUS WITHOUT COMPLICATION, WITHOUT LONG-TERM CURRENT USE OF INSULIN (HCC): Primary | ICD-10-CM

## 2023-03-03 DIAGNOSIS — E78.2 MIXED HYPERLIPIDEMIA: ICD-10-CM

## 2023-03-03 LAB — HBA1C MFR BLD: 5.4 %

## 2023-03-03 RX ORDER — TRIAMCINOLONE ACETONIDE 55 UG/1
SPRAY, METERED NASAL
COMMUNITY
Start: 2022-12-28

## 2023-03-03 RX ORDER — BUDESONIDE 0.5 MG/2ML
INHALANT ORAL
COMMUNITY
Start: 2023-01-30

## 2023-03-03 SDOH — ECONOMIC STABILITY: FOOD INSECURITY: WITHIN THE PAST 12 MONTHS, THE FOOD YOU BOUGHT JUST DIDN'T LAST AND YOU DIDN'T HAVE MONEY TO GET MORE.: NEVER TRUE

## 2023-03-03 SDOH — ECONOMIC STABILITY: HOUSING INSECURITY
IN THE LAST 12 MONTHS, WAS THERE A TIME WHEN YOU DID NOT HAVE A STEADY PLACE TO SLEEP OR SLEPT IN A SHELTER (INCLUDING NOW)?: NO

## 2023-03-03 SDOH — ECONOMIC STABILITY: INCOME INSECURITY: HOW HARD IS IT FOR YOU TO PAY FOR THE VERY BASICS LIKE FOOD, HOUSING, MEDICAL CARE, AND HEATING?: NOT HARD AT ALL

## 2023-03-03 SDOH — ECONOMIC STABILITY: FOOD INSECURITY: WITHIN THE PAST 12 MONTHS, YOU WORRIED THAT YOUR FOOD WOULD RUN OUT BEFORE YOU GOT MONEY TO BUY MORE.: NEVER TRUE

## 2023-03-03 ASSESSMENT — PATIENT HEALTH QUESTIONNAIRE - PHQ9
SUM OF ALL RESPONSES TO PHQ QUESTIONS 1-9: 0
10. IF YOU CHECKED OFF ANY PROBLEMS, HOW DIFFICULT HAVE THESE PROBLEMS MADE IT FOR YOU TO DO YOUR WORK, TAKE CARE OF THINGS AT HOME, OR GET ALONG WITH OTHER PEOPLE: 0
2. FEELING DOWN, DEPRESSED OR HOPELESS: 0
SUM OF ALL RESPONSES TO PHQ9 QUESTIONS 1 & 2: 0
9. THOUGHTS THAT YOU WOULD BE BETTER OFF DEAD, OR OF HURTING YOURSELF: 0
6. FEELING BAD ABOUT YOURSELF - OR THAT YOU ARE A FAILURE OR HAVE LET YOURSELF OR YOUR FAMILY DOWN: 0
1. LITTLE INTEREST OR PLEASURE IN DOING THINGS: 0
SUM OF ALL RESPONSES TO PHQ QUESTIONS 1-9: 0
7. TROUBLE CONCENTRATING ON THINGS, SUCH AS READING THE NEWSPAPER OR WATCHING TELEVISION: 0
5. POOR APPETITE OR OVEREATING: 0
3. TROUBLE FALLING OR STAYING ASLEEP: 0
SUM OF ALL RESPONSES TO PHQ QUESTIONS 1-9: 0
4. FEELING TIRED OR HAVING LITTLE ENERGY: 0
8. MOVING OR SPEAKING SO SLOWLY THAT OTHER PEOPLE COULD HAVE NOTICED. OR THE OPPOSITE, BEING SO FIGETY OR RESTLESS THAT YOU HAVE BEEN MOVING AROUND A LOT MORE THAN USUAL: 0
SUM OF ALL RESPONSES TO PHQ QUESTIONS 1-9: 0

## 2023-03-03 ASSESSMENT — ENCOUNTER SYMPTOMS
SHORTNESS OF BREATH: 0
ABDOMINAL PAIN: 0
BACK PAIN: 0
COUGH: 0

## 2023-03-03 NOTE — PROGRESS NOTES
[address] PRIMARY CARE  [address]  [address]  [address] [address]  Dept: [phone]  Dept Fax: [phone]    Socorro Best is a 59 y.o. male who presentstoday for his medical conditions/complaints as noted below. Socorro Best is c/o of  Chief Complaint   Patient presents with    6 Month Follow-Up           HPI:     Presents for 6 month recheck on chronic conditions  BP well controlled  Has gained 8lb since [address]    Recent ENT surgery  Has been on multiple steroid courses.  Contributes weight gain to this  Has had changes in vision and hearing after surgery  Getting new hearing aids    Hga1c from 5.7 to 5.4   Compliant with meds  Denies any side effects    Still smoking 2 ppd, does not want to quit at this time    Needs to follow with dentist for tooth concerns    Denies any other problems/concerns      Hemoglobin A1C (%)   Date Value   03/03/2023 5.4   09/09/2022 5.7   08/20/2021 5.8             ( goal A1C is < 7)   No results found for: LABMICR  LDL Cholesterol (mg/dL)   Date Value   09/09/2022 88   08/20/2021 84   05/20/2020 71     LDL Calculated (mg/dL)   Date Value   12/02/2016 97       (goal LDL is <100)   AST (U/L)   Date Value   09/09/2022 29     ALT (U/L)   Date Value   09/09/2022 31     BUN (mg/dL)   Date Value   09/09/2022 12     BP Readings from Last 3 Encounters:   03/03/23 138/88   08/26/22 136/84   03/04/22 (!) 150/92          ([id number])    Past Medical History:   Diagnosis Date    Arthritis     Wilcox esophagus     Depression     DU (duodenal ulcer)     Emphysema lung (HCC)     [place] (hard of hearing)     Hyperlipidemia     Hypersomnia with sleep apnea, unspecified     CPAP nightly    Hypertension     AGUS (obstructive sleep apnea)     wears CPAP    Thoracic outlet syndrome     Rt      Past Surgical History:   Procedure Laterality Date    CARPAL TUNNEL RELEASE Left 12/17/2019    CARPAL TUNNEL RELEASE performed by Enoch Gibbons MD at [address]  CARPAL TUNNEL RELEASE Right 1/27/2020    CARPAL TUNNEL RELEASE performed by Killian Aleman MD at [facility].  2021    CATARACT REMOVAL WITH IMPLANT Right     COLONOSCOPY  5/27/2016    JOINT REPLACEMENT Bilateral     hips    LYMPH NODE DISSECTION      neck    SOFT TISSUE TUMOR RESECTION      back    UPPER GASTROINTESTINAL ENDOSCOPY N/A 12/11/2018    EGD ESOPHAGOGASTRODUODENOSCOPY performed by Amalia Lewis DO at [facility]    UPPER GASTROINTESTINAL ENDOSCOPY N/A 7/21/2020    EGD BIOPSY performed by Mary Ellen Lopez MD at [facility]       Family History   Problem Relation Age of Onset    Mult Sclerosis Father     Heart Disease Sister           Social History     Tobacco Use    Smoking status: Every Day     Packs/day: 2.50     Years: 45.00     Pack years: 112.50     Types: Cigarettes    Smokeless tobacco: Never   Substance Use Topics    Alcohol use: Yes     Comment: daily, 6-8 beer daily      Current Outpatient Medications   Medication Sig Dispense Refill    triamcinolone (NASACORT) 55 MCG/ACT nasal inhaler USE 2 SPRAYS NASALLY TWICE A DAY      ALPRAZolam (XANAX) 0.5 MG tablet Take 1 tablet by mouth 2 times daily as needed for Sleep or Anxiety for up to 90 days.  180 tablet 0    atorvastatin (LIPITOR) 10 MG tablet Take 1 tablet daily 90 tablet 3    diclofenac sodium (VOLTAREN) 1 % GEL Apply 4 g topically 4 times daily 100 g 1    lisinopril (PRINIVIL;ZESTRIL) 40 MG tablet TAKE 1 TABLET DAILY 90 tablet 3    venlafaxine (EFFEXOR XR) 150 MG extended release capsule TAKE 1 CAPSULE DAILY 90 capsule 3    pantoprazole (PROTONIX) 20 MG tablet TAKE 1 TABLET TWICE A DAY BEFORE MEALS 180 tablet 3    metoprolol succinate (TOPROL XL) 100 MG extended release tablet TAKE 1 TABLET DAILY 90 tablet 3    Diclofenac Sodium  MG TB24 TAKE 1 TABLET DAILY 90 tablet 3    Handicap Placard MISC Expires 2/2027 1 each 0    fluticasone (FLONASE) 50 MCG/ACT nasal spray 2 sprays by Nasal route daily 1 each 2 albuterol sulfate  (90 Base) MCG/ACT inhaler Inhale 2 puffs into the lungs every 6 hours as needed for Wheezing 1 each 2    blood glucose test strips (ASCENSIA AUTODISC VI;ONE TOUCH ULTRA TEST VI) strip Use with associated glucose meter. 100 strip 11    Lancets MISC 1 each by Does not apply route 3 times daily 200 each 5    Blood Glucose Monitoring Suppl (FREESTYLE FREEDOM) KIT 1 kit by Does not apply route daily 1 kit 0    budesonide (PULMICORT) 0.5 MG/2ML nebulizer suspension USE 1 AMPULE IN SINUS RINSE BOTTLE TWICE DAILY      ibuprofen (ADVIL;MOTRIN) 800 MG tablet Take 1 tablet by mouth every 8 hours as needed for Pain (Patient not taking: Reported on 3/3/2023) 30 tablet 0    cyclobenzaprine (FLEXERIL) 10 MG tablet Take 1 tablet by mouth 3 times daily as needed for Muscle spasms (Patient not taking: Reported on 3/3/2023) 30 tablet 0     No current facility-administered medications for this visit. No Known Allergies    Health Maintenance   Topic Date Due    Diabetic foot exam  Never done    Diabetic Alb to Cr ratio (uACR) test  Never done    Diabetic retinal exam  Never done    Hepatitis B vaccine (1 of 3 - Risk 3-dose series) Never done    Shingles vaccine (1 of 2) Never done    Low dose CT lung screening  Never done    DTaP/Tdap/Td vaccine (1 - Tdap) 07/22/2017    COVID-19 Vaccine (2 - Booster for Ryan series) 05/26/2021    Flu vaccine (1) Never done    Depression Monitoring  08/26/2023    Lipids  09/09/2023    GFR test (Diabetes, CKD 3-4, OR last GFR 15-59)  09/09/2023    A1C test (Diabetic or Prediabetic)  03/03/2024    Colorectal Cancer Screen  05/27/2026    Pneumococcal 0-64 years Vaccine (3 - PPSV23 if available, else PCV20) 10/18/2028    Hepatitis C screen  Completed    HIV screen  Completed    Hepatitis A vaccine  Aged Out    Hib vaccine  Aged Out    Meningococcal (ACWY) vaccine  Aged Out       Subjective:      Review of Systems   Constitutional:  Negative for chills, fatigue and fever. HENT:  Negative for congestion. Eyes:  Positive for visual disturbance. Respiratory:  Negative for cough and shortness of breath. Cardiovascular:  Negative for chest pain and palpitations. Gastrointestinal:  Negative for abdominal pain. Genitourinary:  Negative for difficulty urinating and dysuria. Musculoskeletal:  Negative for arthralgias and back pain. Neurological:  Negative for dizziness and headaches. Psychiatric/Behavioral:  Negative for self-injury, sleep disturbance and suicidal ideas. The patient is not nervous/anxious. Objective:     Physical Exam  Vitals and nursing note reviewed. Constitutional:       Appearance: He is well-developed. HENT:      Head: Normocephalic and atraumatic. Eyes:      Pupils: Pupils are equal, round, and reactive to light. Cardiovascular:      Rate and Rhythm: Normal rate and regular rhythm. Heart sounds: Normal heart sounds. Pulmonary:      Effort: Pulmonary effort is normal.      Breath sounds: Normal breath sounds. Abdominal:      General: Bowel sounds are normal.      Palpations: Abdomen is soft. Tenderness: There is no abdominal tenderness. Musculoskeletal:         General: Normal range of motion. Cervical back: Normal range of motion. Skin:     General: Skin is warm and dry. Neurological:      Mental Status: He is alert and oriented to person, place, and time. Psychiatric:         Behavior: Behavior normal.         Thought Content: Thought content normal.         Judgment: Judgment normal.     /88   Pulse 82   Resp 16   Wt 267 lb (121.1 kg)   SpO2 96%   BMI 37.24 kg/m²     Assessment:       Diagnosis Orders   1. Type 2 diabetes mellitus without complication, without long-term current use of insulin (HCC)  POCT glycosylated hemoglobin (Hb A1C)      2. Anxiety        3. Mixed hyperlipidemia        4.  Chronic congestion of paranasal sinus                  Plan:      Return in about 6 months (around 9/3/2023) for annual exam.    Chronic conditions- Encouraged diet/exercise/smoking cessation. Continue current meds. Follow up in six months for annual/earlier if needed. Orders Placed This Encounter   Procedures    POCT glycosylated hemoglobin (Hb A1C)            Patient given educational materials - see patient instructions. Discussed use, benefit, and side effects of prescribed medications. All patientquestions answered. Pt voiced understanding. Reviewed health maintenance. Instructedto continue current medications, diet and exercise. Patient agreed with treatmentplan. Follow up as directed.      Electronicallysigned by BULMARO Olivia CNP on 3/3/2023 at 8:38 AM

## 2023-04-17 RX ORDER — PANTOPRAZOLE SODIUM 20 MG/1
TABLET, DELAYED RELEASE ORAL
Qty: 180 TABLET | Refills: 3 | Status: SHIPPED | OUTPATIENT
Start: 2023-04-17

## 2023-04-26 RX ORDER — PANTOPRAZOLE SODIUM 40 MG/1
TABLET, DELAYED RELEASE ORAL
Qty: 90 TABLET | Refills: 3 | Status: SHIPPED | OUTPATIENT
Start: 2023-04-26

## 2023-05-01 RX ORDER — PANTOPRAZOLE SODIUM 40 MG/1
TABLET, DELAYED RELEASE ORAL
Qty: 90 TABLET | Refills: 3 | Status: SHIPPED | OUTPATIENT
Start: 2023-05-01

## 2023-05-06 ENCOUNTER — APPOINTMENT (OUTPATIENT)
Dept: CT IMAGING | Age: 58
End: 2023-05-06
Payer: COMMERCIAL

## 2023-05-06 ENCOUNTER — HOSPITAL ENCOUNTER (EMERGENCY)
Age: 58
Discharge: HOME | End: 2023-05-06
Attending: EMERGENCY MEDICINE
Payer: COMMERCIAL

## 2023-05-06 VITALS
OXYGEN SATURATION: 98 % | RESPIRATION RATE: 16 BRPM | HEART RATE: 69 BPM | HEIGHT: 71 IN | DIASTOLIC BLOOD PRESSURE: 75 MMHG | SYSTOLIC BLOOD PRESSURE: 124 MMHG | BODY MASS INDEX: 37.1 KG/M2 | WEIGHT: 265 LBS | TEMPERATURE: 98.4 F

## 2023-05-06 DIAGNOSIS — R19.7 DIARRHEA, UNSPECIFIED TYPE: ICD-10-CM

## 2023-05-06 DIAGNOSIS — R10.84 GENERALIZED ABDOMINAL PAIN: Primary | ICD-10-CM

## 2023-05-06 LAB
ABSOLUTE EOS #: 0.4 K/UL (ref 0–0.4)
ABSOLUTE LYMPH #: 2.7 K/UL (ref 1–4.8)
ABSOLUTE MONO #: 0.7 K/UL (ref 0.1–1.2)
ALBUMIN SERPL-MCNC: 3.9 G/DL (ref 3.5–5.2)
ALBUMIN/GLOBULIN RATIO: 1.1 (ref 1–2.5)
ALP SERPL-CCNC: 140 U/L (ref 40–129)
ALT SERPL-CCNC: 37 U/L (ref 5–41)
ANION GAP SERPL CALCULATED.3IONS-SCNC: 14 MMOL/L (ref 9–17)
AST SERPL-CCNC: 29 U/L
BACTERIA: ABNORMAL
BASOPHILS # BLD: 0 % (ref 0–2)
BASOPHILS ABSOLUTE: 0 K/UL (ref 0–0.2)
BILIRUB SERPL-MCNC: 0.3 MG/DL (ref 0.3–1.2)
BILIRUBIN URINE: NEGATIVE
BUN SERPL-MCNC: 15 MG/DL (ref 6–20)
CALCIUM SERPL-MCNC: 9.1 MG/DL (ref 8.6–10.4)
CHLORIDE SERPL-SCNC: 101 MMOL/L (ref 98–107)
CO2 SERPL-SCNC: 20 MMOL/L (ref 20–31)
COLOR: YELLOW
CREAT SERPL-MCNC: 1.04 MG/DL (ref 0.7–1.2)
EOSINOPHILS RELATIVE PERCENT: 4 % (ref 1–4)
EPITHELIAL CELLS UA: ABNORMAL /HPF (ref 0–5)
GFR SERPL CREATININE-BSD FRML MDRD: >60 ML/MIN/1.73M2
GLUCOSE SERPL-MCNC: 98 MG/DL (ref 70–99)
GLUCOSE UR STRIP.AUTO-MCNC: NEGATIVE MG/DL
HCT VFR BLD AUTO: 44.1 % (ref 41–53)
HGB BLD-MCNC: 15 G/DL (ref 13.5–17.5)
KETONES UR STRIP.AUTO-MCNC: NEGATIVE MG/DL
LEUKOCYTE ESTERASE UR QL STRIP.AUTO: NEGATIVE
LIPASE SERPL-CCNC: 39 U/L (ref 13–60)
LYMPHOCYTES # BLD: 25 % (ref 24–44)
MCH RBC QN AUTO: 30.8 PG (ref 26–34)
MCHC RBC AUTO-ENTMCNC: 34 G/DL (ref 31–37)
MCV RBC AUTO: 90.7 FL (ref 80–100)
MONOCYTES # BLD: 6 % (ref 2–11)
NITRITE UR QL STRIP.AUTO: NEGATIVE
OTHER OBSERVATIONS UA: ABNORMAL
PDW BLD-RTO: 14.5 % (ref 12.5–15.4)
PLATELET # BLD AUTO: 275 K/UL (ref 140–450)
PMV BLD AUTO: 7.1 FL (ref 6–12)
POTASSIUM SERPL-SCNC: 4.6 MMOL/L (ref 3.7–5.3)
PROT SERPL-MCNC: 7.3 G/DL (ref 6.4–8.3)
PROT UR STRIP.AUTO-MCNC: 6 MG/DL (ref 5–8)
PROT UR STRIP.AUTO-MCNC: NEGATIVE MG/DL
RBC # BLD: 4.86 M/UL (ref 4.5–5.9)
RBC CLUMPS #/AREA URNS AUTO: ABNORMAL /HPF (ref 0–2)
SEG NEUTROPHILS: 65 % (ref 36–66)
SEGMENTED NEUTROPHILS ABSOLUTE COUNT: 7.1 K/UL (ref 1.8–7.7)
SODIUM SERPL-SCNC: 135 MMOL/L (ref 135–144)
SPECIFIC GRAVITY UA: 1.01 (ref 1–1.03)
TURBIDITY: CLEAR
URINE HGB: ABNORMAL
UROBILINOGEN, URINE: NORMAL
WBC # BLD AUTO: 10.9 K/UL (ref 3.5–11)
WBC UA: ABNORMAL /HPF (ref 0–5)

## 2023-05-06 PROCEDURE — 81001 URINALYSIS AUTO W/SCOPE: CPT

## 2023-05-06 PROCEDURE — 74176 CT ABD & PELVIS W/O CONTRAST: CPT

## 2023-05-06 PROCEDURE — 99284 EMERGENCY DEPT VISIT MOD MDM: CPT

## 2023-05-06 PROCEDURE — 85025 COMPLETE CBC W/AUTO DIFF WBC: CPT

## 2023-05-06 PROCEDURE — 36415 COLL VENOUS BLD VENIPUNCTURE: CPT

## 2023-05-06 PROCEDURE — 2580000003 HC RX 258: Performed by: NURSE PRACTITIONER

## 2023-05-06 PROCEDURE — 83690 ASSAY OF LIPASE: CPT

## 2023-05-06 PROCEDURE — 80053 COMPREHEN METABOLIC PANEL: CPT

## 2023-05-06 RX ORDER — 0.9 % SODIUM CHLORIDE 0.9 %
1000 INTRAVENOUS SOLUTION INTRAVENOUS ONCE
Status: COMPLETED | OUTPATIENT
Start: 2023-05-06 | End: 2023-05-06

## 2023-05-06 RX ADMIN — SODIUM CHLORIDE 1000 ML: 9 INJECTION, SOLUTION INTRAVENOUS at 17:01

## 2023-05-06 ASSESSMENT — PAIN DESCRIPTION - LOCATION: LOCATION: ABDOMEN

## 2023-05-06 ASSESSMENT — PAIN - FUNCTIONAL ASSESSMENT: PAIN_FUNCTIONAL_ASSESSMENT: 0-10

## 2023-05-06 ASSESSMENT — PAIN DESCRIPTION - PAIN TYPE: TYPE: ACUTE PAIN

## 2023-05-06 ASSESSMENT — PAIN SCALES - GENERAL: PAINLEVEL_OUTOF10: 6

## 2023-05-06 ASSESSMENT — PAIN DESCRIPTION - ORIENTATION: ORIENTATION: RIGHT;MID

## 2023-05-06 NOTE — DISCHARGE INSTRUCTIONS
Avoid eating any spicy food, milk type products or drinks that have caffeine in it. Take all medications as prescribed. For pain use ibuprofen (Motrin) or acetaminophen (Tylenol), unless prescribed medications that have acetaminophen in it. You can take over the counter acetaminophen tablets (1 - 2 tablets of the 500-mg strength every 6 hours) or ibuprofen tablets (2 tablets every 4 hours). PLEASE RETURN TO THE EMERGENCY DEPARTMENT IMMEDIATELY for worsening symptoms, or if you develop any concerning symptoms such as: high fever not relieved by acetaminophen (Tylenol) and/or ibuprofen (Motrin), chills, shortness of breath, chest pain, persistent nausea and/or vomiting, numbness, weakness or tingling in the arms or legs or change in color of the extremities, changes in mental status, persistent headache, blurry vision. Return within 8 - 12 hours if you have any of the following: worsening of pain in your abdomen, no food sounds good to you, you continue to vomit, pain goes to your back or testicles, have pain in the abdomen when going over a bump in the car or when you jump up and down, inability to urinate, unable to follow up with your physician, or other any other care or concern.

## 2023-05-06 NOTE — ED PROVIDER NOTES
[facility] Emergency Department    [address] [address] [facility] [address] [facility] [address]  Phone: [phone]  Fax: [phone]  Emergency Department  Faculty Attestation    I performed a history and physical examination of the patient and discussed management with the mid level provideer. I reviewed the mid level provider's note and agree with the documented findings and plan of care. Any areas of disagreement are noted on the chart. I was personally present for the key portions of any procedures. I have documented in the chart those procedures where I was not present during the key portions. I have reviewed the emergency nurses triage note. I agree with the chief complaint, past medical history, past surgical history, allergies, medications, social and family history as documented unless otherwise noted below. Documentation of the HPI, Physical Exam and Medical Decision Making performed by medical students or scribes is based on my personal performance of the HPI, PE and MDM. For Physician Assistant/ Nurse Practitioner cases/documentation I have personally evaluated this patient and have completed at least one if not all key elements of the E/M (history, physical exam, and MDM). Additional findings are as noted.       Primary Care Physician:  Robertedward Marsh, APRN - CNP    CHIEF COMPLAINT       Chief Complaint   Patient presents with    Abdominal Pain     Thursday at work pt was lifting a lot at work and Friday and today has been dealing with pain right abdomen-midway      Nausea     No vomiting Decreased appetite    Diarrhea     Watery stools x 1 week       RECENT VITALS:   Temp: 98.4 °F (36.9 °C),  Pulse: 69, Respirations: 16, BP: 124/75    LABS:  Labs Reviewed   COMPREHENSIVE METABOLIC PANEL - Abnormal; Notable for the following components:       Result Value    Alkaline Phosphatase 140 (*)     All other components within normal limits   URINALYSIS WITH REFLEX TO CULTURE - Abnormal; Notable for the
recognition program.  Efforts were made to edit the dictations but occasionally words are mis-transcribed.)    [place], APRN - CNP (electronically signed)  Attending Emergency Physician           [place], APRN - CNP  05/06/23 2882

## 2023-05-08 ENCOUNTER — TELEMEDICINE (OUTPATIENT)
Dept: PRIMARY CARE CLINIC | Age: 58
End: 2023-05-08
Payer: COMMERCIAL

## 2023-05-08 DIAGNOSIS — R59.0 LYMPHADENOPATHY, MESENTERIC: Primary | ICD-10-CM

## 2023-05-08 DIAGNOSIS — E11.9 TYPE 2 DIABETES MELLITUS WITHOUT COMPLICATION, WITHOUT LONG-TERM CURRENT USE OF INSULIN (HCC): ICD-10-CM

## 2023-05-08 PROCEDURE — 3044F HG A1C LEVEL LT 7.0%: CPT | Performed by: NURSE PRACTITIONER

## 2023-05-08 PROCEDURE — 99214 OFFICE O/P EST MOD 30 MIN: CPT | Performed by: NURSE PRACTITIONER

## 2023-05-08 RX ORDER — PREDNISONE 20 MG/1
TABLET ORAL
Qty: 18 TABLET | Refills: 0 | Status: SHIPPED | OUTPATIENT
Start: 2023-05-08 | End: 2023-05-18

## 2023-05-08 ASSESSMENT — ENCOUNTER SYMPTOMS
ABDOMINAL PAIN: 1
SHORTNESS OF BREATH: 0
COUGH: 0
BACK PAIN: 0

## 2023-05-08 NOTE — PROGRESS NOTES
Patient identification was verified, and a caregiver was present when appropriate. The patient was located at Home: [address] [place] [address] [address] [facility]  Provider was located at [facility] ([facility]): [address] [place] [address] [address]  [address] [address]  Cherry Morales,  [facility] [address]         Total time spent for this encounter: Not billed by time    --BULMARO Blackman CNP on 5/8/2023 at 11:34 AM    An electronic signature was used to authenticate this note. Orders Placed This Encounter   Procedures    [facility] - Erick Owens DO, Gastroenterology, [facility]     Referral Priority:   Routine     Referral Type:   Eval and Treat     Referral Reason:   Specialty Services Required     Referred to Provider:   David Dahl DO     Requested Specialty:   Gastroenterology     Number of Visits Requested:   1        Orders Placed This Encounter   Medications    predniSONE (DELTASONE) 20 MG tablet     Sig: 3 tabs x 3 days, then 2 tabs x 3 days, then 1 tab x 3 days     Dispense:  18 tablet     Refill:  0       Patient given educational materials - see patient instructions. Discussed use, benefit, and side effects of prescribed medications. All patientquestions answered. Pt voiced understanding. Reviewed health maintenance. Instructedto continue current medications, diet and exercise. Patient agreed with treatmentplan. Follow up as directed.      Electronicallysigned by BULMARO Blackman CNP on 5/8/2023 at 11:33 AM

## 2023-05-19 ENCOUNTER — HOSPITAL ENCOUNTER (OUTPATIENT)
Age: 58
Discharge: HOME | End: 2023-05-19
Payer: COMMERCIAL

## 2023-05-19 LAB
AFP SERPL-MCNC: 1.4 UG/L
ALBUMIN SERPL-MCNC: 3.7 G/DL (ref 3.5–5.2)
ALBUMIN/GLOB SERPL: 1.2 {RATIO} (ref 1–2.5)
ALP SERPL-CCNC: 127 U/L (ref 40–129)
ALT SERPL-CCNC: 45 U/L (ref 5–41)
AST SERPL-CCNC: 25 U/L
BILIRUB DIRECT SERPL-MCNC: 0.1 MG/DL
BILIRUB INDIRECT SERPL-MCNC: 0.2 MG/DL (ref 0–1)
BILIRUB SERPL-MCNC: 0.3 MG/DL (ref 0.3–1.2)
ERYTHROCYTE [DISTWIDTH] IN BLOOD BY AUTOMATED COUNT: 13.5 % (ref 11.8–14.4)
HCT VFR BLD AUTO: 46.6 % (ref 40.7–50.3)
HGB BLD-MCNC: 15.7 G/DL (ref 13–17)
INR PPP: 0.9
MCH RBC QN AUTO: 30.5 PG (ref 25.2–33.5)
MCHC RBC AUTO-ENTMCNC: 33.7 G/DL (ref 28.4–34.8)
MCV RBC AUTO: 90.7 FL (ref 82.6–102.9)
NRBC AUTOMATED: 0 PER 100 WBC
PLATELET # BLD AUTO: 301 K/UL (ref 138–453)
PMV BLD AUTO: 9.5 FL (ref 8.1–13.5)
PROT SERPL-MCNC: 6.7 G/DL (ref 6.4–8.3)
PROTHROMBIN TIME: 9.9 SEC (ref 9.4–12.6)
RBC # BLD AUTO: 5.14 M/UL (ref 4.21–5.77)
WBC OTHER # BLD: 13.8 K/UL (ref 3.5–11.3)

## 2023-05-19 PROCEDURE — 36415 COLL VENOUS BLD VENIPUNCTURE: CPT

## 2023-05-19 PROCEDURE — 83883 ASSAY NEPHELOMETRY NOT SPEC: CPT

## 2023-05-19 PROCEDURE — 85027 COMPLETE CBC AUTOMATED: CPT

## 2023-05-19 PROCEDURE — 82105 ALPHA-FETOPROTEIN SERUM: CPT

## 2023-05-19 PROCEDURE — 80076 HEPATIC FUNCTION PANEL: CPT

## 2023-05-19 PROCEDURE — 85610 PROTHROMBIN TIME: CPT

## 2023-05-19 PROCEDURE — 82977 ASSAY OF GGT: CPT

## 2023-05-19 PROCEDURE — 84460 ALANINE AMINO (ALT) (SGPT): CPT

## 2023-05-19 PROCEDURE — 84450 TRANSFERASE (AST) (SGOT): CPT

## 2023-05-19 PROCEDURE — 84520 ASSAY OF UREA NITROGEN: CPT

## 2023-05-20 LAB
ALANINE AMINOTRANSFERASE, FIBROMETER: NORMAL
ALPHA-2-MACROGLOBULIN, FIBROMETER: NORMAL
ASPARTATE AMINOTRANSFERASE, FIBROMETER: NORMAL
CIRRHOMETER PATIENT SCORE: NORMAL
EER FIBROMETER REPORT: NORMAL
FIBROMETER INTERPRETATION: NORMAL
FIBROMETER PATIENT SCORE: NORMAL
FIBROMETER PLATELET COUNT: 276
FIBROMETER PROTHROMBIN INDEX: NORMAL
FIBROSIS METAVIR CLASSIFICATION: NORMAL
GAMMA GLUTAMYL TRANSFERASE, FIBROMETER: NORMAL
INFLAMETER METAVIR CLASSIFICATION: NORMAL
INFLAMETER PATIENT SCORE: NORMAL
UREA NITROGEN, FIBROMETER: NORMAL

## 2023-05-23 LAB
ALANINE AMINOTRANSFERASE, FIBROMETER: 52 U/L (ref 5–50)
ALPHA-2-MACROGLOBULIN, FIBROMETER: 133 MG/DL (ref 131–293)
ASPARTATE AMINOTRANSFERASE, FIBROMETER: 30 U/L (ref 9–50)
CIRRHOMETER PATIENT SCORE: 0
EER FIBROMETER REPORT: ABNORMAL
FIBROMETER INTERPRETATION: ABNORMAL
FIBROMETER PATIENT SCORE: 0.13
FIBROMETER PLATELET COUNT: 276
FIBROMETER PROTHROMBIN INDEX: 114 % (ref 90–120)
FIBROSIS METAVIR CLASSIFICATION: ABNORMAL
GAMMA GLUTAMYL TRANSFERASE, FIBROMETER: 48 U/L (ref 7–51)
INFLAMETER METAVIR CLASSIFICATION: ABNORMAL
INFLAMETER PATIENT SCORE: 0.15
UREA NITROGEN, FIBROMETER: 27 MG/DL (ref 7–20)

## 2023-06-26 DIAGNOSIS — F32.A ANXIETY AND DEPRESSION: ICD-10-CM

## 2023-06-26 DIAGNOSIS — I10 ESSENTIAL HYPERTENSION: ICD-10-CM

## 2023-06-26 DIAGNOSIS — F41.9 ANXIETY AND DEPRESSION: ICD-10-CM

## 2023-06-26 RX ORDER — VENLAFAXINE HYDROCHLORIDE 150 MG/1
CAPSULE, EXTENDED RELEASE ORAL
Qty: 90 CAPSULE | Refills: 3 | Status: SHIPPED | OUTPATIENT
Start: 2023-06-26

## 2023-06-26 RX ORDER — METOPROLOL SUCCINATE 100 MG/1
TABLET, EXTENDED RELEASE ORAL
Qty: 90 TABLET | Refills: 3 | Status: SHIPPED | OUTPATIENT
Start: 2023-06-26

## 2023-09-18 DIAGNOSIS — F32.A ANXIETY AND DEPRESSION: Primary | ICD-10-CM

## 2023-09-18 DIAGNOSIS — F41.9 ANXIETY AND DEPRESSION: Primary | ICD-10-CM

## 2023-09-19 DIAGNOSIS — I10 ESSENTIAL HYPERTENSION: ICD-10-CM

## 2023-09-19 DIAGNOSIS — F41.9 ANXIETY AND DEPRESSION: ICD-10-CM

## 2023-09-19 DIAGNOSIS — F32.A ANXIETY AND DEPRESSION: ICD-10-CM

## 2023-09-19 RX ORDER — VENLAFAXINE HYDROCHLORIDE 150 MG/1
CAPSULE, EXTENDED RELEASE ORAL
Qty: 14 CAPSULE | Refills: 0 | Status: SHIPPED | OUTPATIENT
Start: 2023-09-19

## 2023-09-19 RX ORDER — LISINOPRIL 40 MG/1
TABLET ORAL
Qty: 90 TABLET | Refills: 0 | Status: SHIPPED | OUTPATIENT
Start: 2023-09-19

## 2023-09-19 RX ORDER — ALPRAZOLAM 0.5 MG/1
TABLET ORAL
Qty: 180 TABLET | Refills: 0 | Status: SHIPPED | OUTPATIENT
Start: 2023-09-19 | End: 2023-10-19

## 2023-12-12 DIAGNOSIS — I10 ESSENTIAL HYPERTENSION: ICD-10-CM

## 2023-12-12 RX ORDER — LISINOPRIL 40 MG/1
TABLET ORAL
Qty: 90 TABLET | Refills: 0 | Status: SHIPPED | OUTPATIENT
Start: 2023-12-12

## 2024-01-04 ENCOUNTER — OFFICE VISIT (OUTPATIENT)
Dept: FAMILY MEDICINE CLINIC | Age: 59
End: 2024-01-04
Payer: COMMERCIAL

## 2024-01-04 VITALS
DIASTOLIC BLOOD PRESSURE: 82 MMHG | HEART RATE: 65 BPM | OXYGEN SATURATION: 98 % | TEMPERATURE: 97.2 F | SYSTOLIC BLOOD PRESSURE: 128 MMHG

## 2024-01-04 DIAGNOSIS — J32.9 SINUSITIS, UNSPECIFIED CHRONICITY, UNSPECIFIED LOCATION: Primary | ICD-10-CM

## 2024-01-04 DIAGNOSIS — J02.9 ACUTE PHARYNGITIS, UNSPECIFIED ETIOLOGY: ICD-10-CM

## 2024-01-04 PROCEDURE — 3074F SYST BP LT 130 MM HG: CPT | Performed by: NURSE PRACTITIONER

## 2024-01-04 PROCEDURE — 3079F DIAST BP 80-89 MM HG: CPT | Performed by: NURSE PRACTITIONER

## 2024-01-04 PROCEDURE — 99213 OFFICE O/P EST LOW 20 MIN: CPT | Performed by: NURSE PRACTITIONER

## 2024-01-04 RX ORDER — AMOXICILLIN AND CLAVULANATE POTASSIUM 875; 125 MG/1; MG/1
1 TABLET, FILM COATED ORAL 2 TIMES DAILY
Qty: 20 TABLET | Refills: 0 | Status: SHIPPED | OUTPATIENT
Start: 2024-01-04 | End: 2024-01-14

## 2024-01-04 ASSESSMENT — ENCOUNTER SYMPTOMS
VOMITING: 0
SHORTNESS OF BREATH: 0
SORE THROAT: 1
COUGH: 1
WHEEZING: 0
NAUSEA: 0
TROUBLE SWALLOWING: 0
EYE DISCHARGE: 0
CHEST TIGHTNESS: 0
RHINORRHEA: 1
VISUAL CHANGE: 0
SWOLLEN GLANDS: 1
CHANGE IN BOWEL HABIT: 0
VOICE CHANGE: 0
SINUS PAIN: 0
SINUS PRESSURE: 0
ABDOMINAL PAIN: 0

## 2024-01-04 NOTE — PROGRESS NOTES
[facility]  [address]  [address]  [facility] [address]    Torrey Joy is a 58 y.o. male who presents today for his medical conditions/complaints of Cough (productive), Fever (Highest it got was 99.4), and Congestion (X 1 week)          HPI:     /82   Pulse 65   Temp 97.2 °F (36.2 °C)   SpO2 98%       Chest Congestion  This is a new problem. The current episode started 1 to 4 weeks ago. The problem occurs constantly. The problem has been waxing and waning. Associated symptoms include chills, congestion, coughing, fatigue, headaches, a sore throat and swollen glands. Pertinent negatives include no abdominal pain, anorexia, arthralgias, change in bowel habit, chest pain, diaphoresis, fever, joint swelling, myalgias, nausea, neck pain, numbness, rash, urinary symptoms, vertigo, visual change, vomiting or weakness. The symptoms are aggravated by swallowing. He has tried rest for the symptoms. The treatment provided no relief.       Past Medical History:   Diagnosis Date    Arthritis     Wilcox esophagus     Depression     DU (duodenal ulcer)     Emphysema lung (HCC)     [place] (hard of hearing)     Hyperlipidemia     Hypersomnia with sleep apnea, unspecified     CPAP nightly    Hypertension     AGUS (obstructive sleep apnea)     wears CPAP    Thoracic outlet syndrome     Rt        Past Surgical History:   Procedure Laterality Date    CARPAL TUNNEL RELEASE Left 12/17/2019    CARPAL TUNNEL RELEASE performed by Carlos Hardin MD at [facility] OR    CARPAL TUNNEL RELEASE Right 1/27/2020    CARPAL TUNNEL RELEASE performed by Carlos Hardin MD at [facility] OR    CATARACT REMOVAL  2021    CATARACT REMOVAL WITH IMPLANT Right     COLONOSCOPY  5/27/2016    JOINT REPLACEMENT Bilateral     hips    LYMPH NODE DISSECTION      neck    SOFT TISSUE TUMOR RESECTION      back    UPPER GASTROINTESTINAL ENDOSCOPY N/A 12/11/2018    EGD ESOPHAGOGASTRODUODENOSCOPY performed by Tripp Solitario DO at [facility]

## 2024-03-08 ENCOUNTER — HOSPITAL ENCOUNTER (INPATIENT)
Age: 59
LOS: 2 days | Discharge: HOME | DRG: 308 | End: 2024-03-11
Attending: EMERGENCY MEDICINE | Admitting: STUDENT IN AN ORGANIZED HEALTH CARE EDUCATION/TRAINING PROGRAM
Payer: COMMERCIAL

## 2024-03-08 ENCOUNTER — APPOINTMENT (OUTPATIENT)
Dept: GENERAL RADIOLOGY | Age: 59
DRG: 308 | End: 2024-03-08
Payer: COMMERCIAL

## 2024-03-08 ENCOUNTER — APPOINTMENT (OUTPATIENT)
Dept: CT IMAGING | Age: 59
DRG: 308 | End: 2024-03-08
Payer: COMMERCIAL

## 2024-03-08 ENCOUNTER — HOSPITAL ENCOUNTER (OUTPATIENT)
Age: 59
Discharge: HOME | End: 2024-03-08
Payer: COMMERCIAL

## 2024-03-08 ENCOUNTER — OFFICE VISIT (OUTPATIENT)
Dept: PRIMARY CARE CLINIC | Age: 59
End: 2024-03-08

## 2024-03-08 ENCOUNTER — HOSPITAL ENCOUNTER (OUTPATIENT)
Age: 59
Setting detail: SPECIMEN
Discharge: HOME | End: 2024-03-08

## 2024-03-08 ENCOUNTER — HOSPITAL ENCOUNTER (OUTPATIENT)
Age: 59
Discharge: HOME | End: 2024-03-10

## 2024-03-08 VITALS
SYSTOLIC BLOOD PRESSURE: 152 MMHG | RESPIRATION RATE: 14 BRPM | OXYGEN SATURATION: 98 % | HEART RATE: 74 BPM | WEIGHT: 254.4 LBS | HEIGHT: 71 IN | BODY MASS INDEX: 35.61 KG/M2 | DIASTOLIC BLOOD PRESSURE: 104 MMHG

## 2024-03-08 DIAGNOSIS — Z13.220 SCREENING FOR LIPID DISORDERS: ICD-10-CM

## 2024-03-08 DIAGNOSIS — I49.9 IRREGULAR HEART RATE: ICD-10-CM

## 2024-03-08 DIAGNOSIS — R06.02 SOB (SHORTNESS OF BREATH): ICD-10-CM

## 2024-03-08 DIAGNOSIS — Z13.1 SCREENING FOR DIABETES MELLITUS: ICD-10-CM

## 2024-03-08 DIAGNOSIS — I71.019 DISSECTION OF THORACIC AORTA, UNSPECIFIED PART (HCC): ICD-10-CM

## 2024-03-08 DIAGNOSIS — R00.2 PALPITATIONS: ICD-10-CM

## 2024-03-08 DIAGNOSIS — E11.9 TYPE 2 DIABETES MELLITUS WITHOUT COMPLICATION, WITHOUT LONG-TERM CURRENT USE OF INSULIN (HCC): ICD-10-CM

## 2024-03-08 DIAGNOSIS — E87.1 HYPONATREMIA: Primary | ICD-10-CM

## 2024-03-08 DIAGNOSIS — Z12.5 SCREENING FOR PROSTATE CANCER: ICD-10-CM

## 2024-03-08 DIAGNOSIS — Z13.29 SCREENING FOR THYROID DISORDER: ICD-10-CM

## 2024-03-08 DIAGNOSIS — I48.92 ATRIAL FLUTTER, UNSPECIFIED TYPE (HCC): ICD-10-CM

## 2024-03-08 DIAGNOSIS — Z13.0 SCREENING FOR DEFICIENCY ANEMIA: ICD-10-CM

## 2024-03-08 DIAGNOSIS — Z87.891 PERSONAL HISTORY OF TOBACCO USE: ICD-10-CM

## 2024-03-08 DIAGNOSIS — Z00.00 ENCOUNTER FOR GENERAL ADULT MEDICAL EXAMINATION W/O ABNORMAL FINDINGS: Primary | ICD-10-CM

## 2024-03-08 LAB
ALBUMIN SERPL-MCNC: 3.7 G/DL (ref 3.5–5.2)
ALBUMIN SERPL-MCNC: 3.8 G/DL (ref 3.5–5.2)
ALBUMIN/GLOB SERPL: 1 {RATIO} (ref 1–2.5)
ALBUMIN/GLOB SERPL: 1.1 {RATIO} (ref 1–2.5)
ALP SERPL-CCNC: 106 U/L (ref 40–129)
ALP SERPL-CCNC: 109 U/L (ref 40–129)
ALT SERPL-CCNC: 23 U/L (ref 5–41)
ALT SERPL-CCNC: 27 U/L (ref 10–50)
ANION GAP SERPL CALCULATED.3IONS-SCNC: 10 MMOL/L (ref 9–17)
ANION GAP SERPL CALCULATED.3IONS-SCNC: 12 MMOL/L (ref 9–16)
AST SERPL-CCNC: 22 U/L
AST SERPL-CCNC: 30 U/L (ref 10–50)
BASOPHILS # BLD: 0.1 K/UL (ref 0–0.2)
BASOPHILS NFR BLD: 1 % (ref 0–2)
BILIRUB SERPL-MCNC: 0.5 MG/DL (ref 0.3–1.2)
BILIRUB SERPL-MCNC: 0.5 MG/DL (ref 0–1.2)
BUN SERPL-MCNC: 11 MG/DL (ref 8–23)
BUN SERPL-MCNC: 11 MG/DL (ref 8–23)
CALCIUM SERPL-MCNC: 9 MG/DL (ref 8.6–10.4)
CALCIUM SERPL-MCNC: 9.1 MG/DL (ref 8.6–10.4)
CHLORIDE SERPL-SCNC: 97 MMOL/L (ref 98–107)
CHLORIDE SERPL-SCNC: 98 MMOL/L (ref 98–107)
CHOLEST SERPL-MCNC: 157 MG/DL (ref 0–199)
CHOLESTEROL/HDL RATIO: 6
CO2 SERPL-SCNC: 24 MMOL/L (ref 20–31)
CO2 SERPL-SCNC: 26 MMOL/L (ref 20–31)
CREAT SERPL-MCNC: 1.1 MG/DL (ref 0.7–1.2)
CREAT SERPL-MCNC: 1.1 MG/DL (ref 0.7–1.2)
D DIMER PPP FEU-MCNC: 2.12 UG/ML FEU
EOSINOPHIL # BLD: 0.1 K/UL (ref 0–0.4)
EOSINOPHILS RELATIVE PERCENT: 1 % (ref 1–4)
ERYTHROCYTE [DISTWIDTH] IN BLOOD BY AUTOMATED COUNT: 15.1 % (ref 11.8–14.4)
ERYTHROCYTE [DISTWIDTH] IN BLOOD BY AUTOMATED COUNT: 16.2 % (ref 12.5–15.4)
EST. AVERAGE GLUCOSE BLD GHB EST-MCNC: 128 MG/DL
GFR SERPL CREATININE-BSD FRML MDRD: >60 ML/MIN/1.73M2
GFR SERPL CREATININE-BSD FRML MDRD: >60 ML/MIN/1.73M2
GLUCOSE SERPL-MCNC: 85 MG/DL (ref 74–99)
GLUCOSE SERPL-MCNC: 88 MG/DL (ref 70–99)
HBA1C MFR BLD: 6.1 % (ref 4–6)
HCT VFR BLD AUTO: 41.2 % (ref 41–53)
HCT VFR BLD AUTO: 43.7 % (ref 40.7–50.3)
HDLC SERPL-MCNC: 28 MG/DL
HGB BLD-MCNC: 13.6 G/DL (ref 13–17)
HGB BLD-MCNC: 13.8 G/DL (ref 13.5–17.5)
INR PPP: 1
LDLC SERPL CALC-MCNC: 93 MG/DL (ref 0–100)
LYMPHOCYTES NFR BLD: 3.7 K/UL (ref 1–4.8)
LYMPHOCYTES RELATIVE PERCENT: 34 % (ref 24–44)
MCH RBC QN AUTO: 27 PG (ref 25.2–33.5)
MCH RBC QN AUTO: 27.6 PG (ref 26–34)
MCHC RBC AUTO-ENTMCNC: 31.1 G/DL (ref 28.4–34.8)
MCHC RBC AUTO-ENTMCNC: 33.4 G/DL (ref 31–37)
MCV RBC AUTO: 82.7 FL (ref 80–100)
MCV RBC AUTO: 86.7 FL (ref 82.6–102.9)
MONOCYTES NFR BLD: 0.8 K/UL (ref 0.1–1.2)
MONOCYTES NFR BLD: 7 % (ref 2–11)
NEUTROPHILS NFR BLD: 57 % (ref 36–66)
NEUTS SEG NFR BLD: 6.3 K/UL (ref 1.8–7.7)
NRBC BLD-RTO: 0 PER 100 WBC
PARTIAL THROMBOPLASTIN TIME: 25.2 SEC (ref 21.3–31.3)
PLATELET # BLD AUTO: 382 K/UL (ref 138–453)
PLATELET # BLD AUTO: 440 K/UL (ref 140–450)
PMV BLD AUTO: 7.6 FL (ref 6–12)
PMV BLD AUTO: 9.5 FL (ref 8.1–13.5)
POTASSIUM SERPL-SCNC: 4.4 MMOL/L (ref 3.7–5.3)
POTASSIUM SERPL-SCNC: 5.8 MMOL/L (ref 3.7–5.3)
PROT SERPL-MCNC: 7 G/DL (ref 6.4–8.3)
PROT SERPL-MCNC: 7.3 G/DL (ref 6.6–8.7)
PROTHROMBIN TIME: 10.3 SEC (ref 9.4–12.6)
PSA SERPL-MCNC: 0.7 NG/ML (ref 0–4)
RBC # BLD AUTO: 4.99 M/UL (ref 4.5–5.9)
RBC # BLD AUTO: 5.04 M/UL (ref 4.21–5.77)
SODIUM SERPL-SCNC: 133 MMOL/L (ref 135–144)
SODIUM SERPL-SCNC: 134 MMOL/L (ref 136–145)
TRIGL SERPL-MCNC: 181 MG/DL
TROPONIN I SERPL HS-MCNC: 25 NG/L (ref 0–22)
TROPONIN I SERPL HS-MCNC: 26 NG/L (ref 0–22)
TSH SERPL DL<=0.05 MIU/L-ACNC: 1.46 UIU/ML (ref 0.27–4.2)
VLDLC SERPL CALC-MCNC: 36 MG/DL
WBC OTHER # BLD: 11 K/UL (ref 3.5–11)
WBC OTHER # BLD: 11.2 K/UL (ref 3.5–11.3)

## 2024-03-08 PROCEDURE — 85730 THROMBOPLASTIN TIME PARTIAL: CPT

## 2024-03-08 PROCEDURE — 84484 ASSAY OF TROPONIN QUANT: CPT

## 2024-03-08 PROCEDURE — 2580000003 HC RX 258: Performed by: PHYSICIAN ASSISTANT

## 2024-03-08 PROCEDURE — 71046 X-RAY EXAM CHEST 2 VIEWS: CPT

## 2024-03-08 PROCEDURE — 85610 PROTHROMBIN TIME: CPT

## 2024-03-08 PROCEDURE — 36415 COLL VENOUS BLD VENIPUNCTURE: CPT

## 2024-03-08 PROCEDURE — 96372 THER/PROPH/DIAG INJ SC/IM: CPT

## 2024-03-08 PROCEDURE — 99222 1ST HOSP IP/OBS MODERATE 55: CPT | Performed by: STUDENT IN AN ORGANIZED HEALTH CARE EDUCATION/TRAINING PROGRAM

## 2024-03-08 PROCEDURE — 99285 EMERGENCY DEPT VISIT HI MDM: CPT

## 2024-03-08 PROCEDURE — 6370000000 HC RX 637 (ALT 250 FOR IP): Performed by: PHYSICIAN ASSISTANT

## 2024-03-08 PROCEDURE — 6360000004 HC RX CONTRAST MEDICATION: Performed by: EMERGENCY MEDICINE

## 2024-03-08 PROCEDURE — 93005 ELECTROCARDIOGRAM TRACING: CPT | Performed by: PHYSICIAN ASSISTANT

## 2024-03-08 PROCEDURE — 85025 COMPLETE CBC W/AUTO DIFF WBC: CPT

## 2024-03-08 PROCEDURE — 85379 FIBRIN DEGRADATION QUANT: CPT

## 2024-03-08 PROCEDURE — 71260 CT THORAX DX C+: CPT

## 2024-03-08 PROCEDURE — 80053 COMPREHEN METABOLIC PANEL: CPT

## 2024-03-08 PROCEDURE — 6360000002 HC RX W HCPCS: Performed by: PHYSICIAN ASSISTANT

## 2024-03-08 PROCEDURE — 2580000003 HC RX 258: Performed by: EMERGENCY MEDICINE

## 2024-03-08 PROCEDURE — 93005 ELECTROCARDIOGRAM TRACING: CPT | Performed by: STUDENT IN AN ORGANIZED HEALTH CARE EDUCATION/TRAINING PROGRAM

## 2024-03-08 PROCEDURE — 96361 HYDRATE IV INFUSION ADD-ON: CPT

## 2024-03-08 PROCEDURE — 96360 HYDRATION IV INFUSION INIT: CPT

## 2024-03-08 PROCEDURE — G0378 HOSPITAL OBSERVATION PER HR: HCPCS

## 2024-03-08 RX ORDER — LISINOPRIL 20 MG/1
40 TABLET ORAL DAILY
Status: DISCONTINUED | OUTPATIENT
Start: 2024-03-09 | End: 2024-03-11 | Stop reason: HOSPADM

## 2024-03-08 RX ORDER — SODIUM CHLORIDE 9 MG/ML
INJECTION, SOLUTION INTRAVENOUS PRN
Status: DISCONTINUED | OUTPATIENT
Start: 2024-03-08 | End: 2024-03-11 | Stop reason: HOSPADM

## 2024-03-08 RX ORDER — ONDANSETRON 4 MG/1
4 TABLET, ORALLY DISINTEGRATING ORAL EVERY 8 HOURS PRN
Status: DISCONTINUED | OUTPATIENT
Start: 2024-03-08 | End: 2024-03-11 | Stop reason: HOSPADM

## 2024-03-08 RX ORDER — 0.9 % SODIUM CHLORIDE 0.9 %
60 INTRAVENOUS SOLUTION INTRAVENOUS ONCE
Status: DISCONTINUED | OUTPATIENT
Start: 2024-03-08 | End: 2024-03-11 | Stop reason: HOSPADM

## 2024-03-08 RX ORDER — MAGNESIUM SULFATE IN WATER 40 MG/ML
2000 INJECTION, SOLUTION INTRAVENOUS PRN
Status: DISCONTINUED | OUTPATIENT
Start: 2024-03-08 | End: 2024-03-11 | Stop reason: HOSPADM

## 2024-03-08 RX ORDER — FLUTICASONE PROPIONATE 50 MCG
2 SPRAY, SUSPENSION (ML) NASAL DAILY
Status: DISCONTINUED | OUTPATIENT
Start: 2024-03-09 | End: 2024-03-11 | Stop reason: HOSPADM

## 2024-03-08 RX ORDER — ASPIRIN 81 MG/1
324 TABLET, CHEWABLE ORAL ONCE
Status: COMPLETED | OUTPATIENT
Start: 2024-03-08 | End: 2024-03-08

## 2024-03-08 RX ORDER — ATORVASTATIN CALCIUM 10 MG/1
10 TABLET, FILM COATED ORAL NIGHTLY
Status: DISCONTINUED | OUTPATIENT
Start: 2024-03-09 | End: 2024-03-11 | Stop reason: HOSPADM

## 2024-03-08 RX ORDER — POLYETHYLENE GLYCOL 3350 17 G/17G
17 POWDER, FOR SOLUTION ORAL DAILY PRN
Status: DISCONTINUED | OUTPATIENT
Start: 2024-03-08 | End: 2024-03-11 | Stop reason: HOSPADM

## 2024-03-08 RX ORDER — ONDANSETRON 2 MG/ML
4 INJECTION INTRAMUSCULAR; INTRAVENOUS EVERY 6 HOURS PRN
Status: DISCONTINUED | OUTPATIENT
Start: 2024-03-08 | End: 2024-03-11 | Stop reason: HOSPADM

## 2024-03-08 RX ORDER — METOPROLOL SUCCINATE 100 MG/1
100 TABLET, EXTENDED RELEASE ORAL DAILY
Status: DISCONTINUED | OUTPATIENT
Start: 2024-03-09 | End: 2024-03-11 | Stop reason: HOSPADM

## 2024-03-08 RX ORDER — PREDNISONE 20 MG/1
40 TABLET ORAL DAILY
Status: DISCONTINUED | OUTPATIENT
Start: 2024-03-09 | End: 2024-03-11 | Stop reason: HOSPADM

## 2024-03-08 RX ORDER — ACETAMINOPHEN 650 MG/1
650 SUPPOSITORY RECTAL EVERY 6 HOURS PRN
Status: DISCONTINUED | OUTPATIENT
Start: 2024-03-08 | End: 2024-03-11 | Stop reason: HOSPADM

## 2024-03-08 RX ORDER — SODIUM CHLORIDE 0.9 % (FLUSH) 0.9 %
10 SYRINGE (ML) INJECTION PRN
Status: DISCONTINUED | OUTPATIENT
Start: 2024-03-08 | End: 2024-03-11 | Stop reason: HOSPADM

## 2024-03-08 RX ORDER — POTASSIUM CHLORIDE 20 MEQ/1
40 TABLET, EXTENDED RELEASE ORAL PRN
Status: DISCONTINUED | OUTPATIENT
Start: 2024-03-08 | End: 2024-03-11 | Stop reason: HOSPADM

## 2024-03-08 RX ORDER — DILTIAZEM HYDROCHLORIDE 120 MG/1
120 CAPSULE, COATED, EXTENDED RELEASE ORAL DAILY
Status: DISCONTINUED | OUTPATIENT
Start: 2024-03-09 | End: 2024-03-09

## 2024-03-08 RX ORDER — AMOXICILLIN AND CLAVULANATE POTASSIUM 875; 125 MG/1; MG/1
1 TABLET, FILM COATED ORAL 2 TIMES DAILY
Qty: 20 TABLET | Refills: 0 | Status: ON HOLD | OUTPATIENT
Start: 2024-03-08 | End: 2024-03-18

## 2024-03-08 RX ORDER — 0.9 % SODIUM CHLORIDE 0.9 %
1000 INTRAVENOUS SOLUTION INTRAVENOUS ONCE
Status: COMPLETED | OUTPATIENT
Start: 2024-03-08 | End: 2024-03-08

## 2024-03-08 RX ORDER — POTASSIUM CHLORIDE 7.45 MG/ML
10 INJECTION INTRAVENOUS PRN
Status: DISCONTINUED | OUTPATIENT
Start: 2024-03-08 | End: 2024-03-11 | Stop reason: HOSPADM

## 2024-03-08 RX ORDER — ALBUTEROL SULFATE 90 UG/1
2 AEROSOL, METERED RESPIRATORY (INHALATION) EVERY 6 HOURS PRN
Status: DISCONTINUED | OUTPATIENT
Start: 2024-03-08 | End: 2024-03-11 | Stop reason: HOSPADM

## 2024-03-08 RX ORDER — ENOXAPARIN SODIUM 150 MG/ML
1 INJECTION SUBCUTANEOUS ONCE
Status: DISCONTINUED | OUTPATIENT
Start: 2024-03-08 | End: 2024-03-08

## 2024-03-08 RX ORDER — SODIUM CHLORIDE 0.9 % (FLUSH) 0.9 %
5-40 SYRINGE (ML) INJECTION EVERY 12 HOURS SCHEDULED
Status: DISCONTINUED | OUTPATIENT
Start: 2024-03-09 | End: 2024-03-11 | Stop reason: HOSPADM

## 2024-03-08 RX ORDER — VENLAFAXINE HYDROCHLORIDE 150 MG/1
150 CAPSULE, EXTENDED RELEASE ORAL
Status: DISCONTINUED | OUTPATIENT
Start: 2024-03-09 | End: 2024-03-10

## 2024-03-08 RX ORDER — ENOXAPARIN SODIUM 150 MG/ML
1 INJECTION SUBCUTANEOUS ONCE
Status: COMPLETED | OUTPATIENT
Start: 2024-03-08 | End: 2024-03-08

## 2024-03-08 RX ORDER — CYCLOBENZAPRINE HCL 10 MG
10 TABLET ORAL 3 TIMES DAILY PRN
Status: DISCONTINUED | OUTPATIENT
Start: 2024-03-08 | End: 2024-03-11 | Stop reason: HOSPADM

## 2024-03-08 RX ORDER — SODIUM CHLORIDE 0.9 % (FLUSH) 0.9 %
5-40 SYRINGE (ML) INJECTION PRN
Status: DISCONTINUED | OUTPATIENT
Start: 2024-03-08 | End: 2024-03-11 | Stop reason: HOSPADM

## 2024-03-08 RX ORDER — ACETAMINOPHEN 325 MG/1
650 TABLET ORAL EVERY 6 HOURS PRN
Status: DISCONTINUED | OUTPATIENT
Start: 2024-03-08 | End: 2024-03-11 | Stop reason: HOSPADM

## 2024-03-08 RX ORDER — PANTOPRAZOLE SODIUM 40 MG/1
40 TABLET, DELAYED RELEASE ORAL
Status: DISCONTINUED | OUTPATIENT
Start: 2024-03-09 | End: 2024-03-11 | Stop reason: HOSPADM

## 2024-03-08 RX ADMIN — SODIUM CHLORIDE, PRESERVATIVE FREE 10 ML: 5 INJECTION INTRAVENOUS at 16:33

## 2024-03-08 RX ADMIN — ENOXAPARIN SODIUM 120 MG: 150 INJECTION SUBCUTANEOUS at 20:57

## 2024-03-08 RX ADMIN — SODIUM CHLORIDE 1000 ML: 9 INJECTION, SOLUTION INTRAVENOUS at 18:50

## 2024-03-08 RX ADMIN — Medication 60 ML: at 16:33

## 2024-03-08 RX ADMIN — IOPAMIDOL 75 ML: 755 INJECTION, SOLUTION INTRAVENOUS at 16:33

## 2024-03-08 RX ADMIN — ASPIRIN 324 MG: 81 TABLET, CHEWABLE ORAL at 16:47

## 2024-03-08 SDOH — ECONOMIC STABILITY: FOOD INSECURITY: WITHIN THE PAST 12 MONTHS, THE FOOD YOU BOUGHT JUST DIDN'T LAST AND YOU DIDN'T HAVE MONEY TO GET MORE.: NEVER TRUE

## 2024-03-08 SDOH — ECONOMIC STABILITY: INCOME INSECURITY: HOW HARD IS IT FOR YOU TO PAY FOR THE VERY BASICS LIKE FOOD, HOUSING, MEDICAL CARE, AND HEATING?: NOT HARD AT ALL

## 2024-03-08 SDOH — ECONOMIC STABILITY: FOOD INSECURITY: WITHIN THE PAST 12 MONTHS, YOU WORRIED THAT YOUR FOOD WOULD RUN OUT BEFORE YOU GOT MONEY TO BUY MORE.: NEVER TRUE

## 2024-03-08 ASSESSMENT — PATIENT HEALTH QUESTIONNAIRE - PHQ9
9. THOUGHTS THAT YOU WOULD BE BETTER OFF DEAD, OR OF HURTING YOURSELF: 0
SUM OF ALL RESPONSES TO PHQ9 QUESTIONS 1 & 2: 0
8. MOVING OR SPEAKING SO SLOWLY THAT OTHER PEOPLE COULD HAVE NOTICED. OR THE OPPOSITE, BEING SO FIGETY OR RESTLESS THAT YOU HAVE BEEN MOVING AROUND A LOT MORE THAN USUAL: 0
SUM OF ALL RESPONSES TO PHQ QUESTIONS 1-9: 0
7. TROUBLE CONCENTRATING ON THINGS, SUCH AS READING THE NEWSPAPER OR WATCHING TELEVISION: 0
1. LITTLE INTEREST OR PLEASURE IN DOING THINGS: 0
10. IF YOU CHECKED OFF ANY PROBLEMS, HOW DIFFICULT HAVE THESE PROBLEMS MADE IT FOR YOU TO DO YOUR WORK, TAKE CARE OF THINGS AT HOME, OR GET ALONG WITH OTHER PEOPLE: 0
5. POOR APPETITE OR OVEREATING: 0
6. FEELING BAD ABOUT YOURSELF - OR THAT YOU ARE A FAILURE OR HAVE LET YOURSELF OR YOUR FAMILY DOWN: 0
2. FEELING DOWN, DEPRESSED OR HOPELESS: 0
SUM OF ALL RESPONSES TO PHQ QUESTIONS 1-9: 0
3. TROUBLE FALLING OR STAYING ASLEEP: 0
SUM OF ALL RESPONSES TO PHQ QUESTIONS 1-9: 0
4. FEELING TIRED OR HAVING LITTLE ENERGY: 0
SUM OF ALL RESPONSES TO PHQ QUESTIONS 1-9: 0

## 2024-03-08 ASSESSMENT — ENCOUNTER SYMPTOMS
BACK PAIN: 0
SHORTNESS OF BREATH: 1
ABDOMINAL PAIN: 0
COUGH: 1

## 2024-03-08 ASSESSMENT — LIFESTYLE VARIABLES
HOW MANY STANDARD DRINKS CONTAINING ALCOHOL DO YOU HAVE ON A TYPICAL DAY: 7 TO 9
HOW OFTEN DO YOU HAVE A DRINK CONTAINING ALCOHOL: 4 OR MORE TIMES A WEEK

## 2024-03-08 ASSESSMENT — PAIN - FUNCTIONAL ASSESSMENT: PAIN_FUNCTIONAL_ASSESSMENT: NONE - DENIES PAIN

## 2024-03-08 NOTE — H&P
Med/Surge      Darien Lui MD  3/8/2024  6:38 PM    Copy sent to Karine Christianson, BULMARO - CNP

## 2024-03-08 NOTE — PROGRESS NOTES
[facility]  [facility]  [address] [place] [address] [address]  [address]  [facility] [address]  Dept: [phone]  Dept Fax: [phone]    Torrey Joy is a 58 y.o. male who presentstoday for his medical conditions/complaints as noted below.  Torrey Joy is c/o of  Chief Complaint   Patient presents with    Cough     X 2-3 weeks     Congestion    Shortness of Breath     All the time     Fatigue     All the time     Annual Exam         HPI:     Presents for annual exam  BP elevated- he does not feel well  Has lost 13lb since LOV with me in 3/2023    Multiple  visits  States he has not been feeling well  Has been having to travel to [place] since his mother passed quite often  This has been stressful  Last time he was so ill/coughing he was not able to fly home  Had to have someone drive him  Was given steroid course 3/25/24 in [place]- no improvement  Last antibiotic use in January  Continues to smoke, down from 2 ppd to 1.5 ppd d/t not feeling well  Will update chest xray today  Willing to update CT lung    C/o palpitations, worse since being ill  Will update EKG/holter  Currently on beta blocker    Does not monitor BS at home  Willing to update annual labs    Denies any other problems/concerns        Hemoglobin A1C (%)   Date Value   03/03/2023 5.4   09/09/2022 5.7   08/20/2021 5.8             ( goal A1C is < 7)   No components found for: \"LABMICR\"  LDL Cholesterol (mg/dL)   Date Value   09/09/2022 88   08/20/2021 84   05/20/2020 71     LDL Calculated (mg/dL)   Date Value   12/02/2016 97       (goal LDL is <100)   AST (U/L)   Date Value   05/19/2023 25     ALT (U/L)   Date Value   05/19/2023 45 (H)     BUN (mg/dL)   Date Value   05/06/2023 15     BP Readings from Last 3 Encounters:   03/08/24 (!) 152/104   01/04/24 128/82   05/06/23 124/75          ([id number])    Past Medical History:   Diagnosis Date    Arthritis     Wilcox esophagus     Depression     DU (duodenal ulcer)

## 2024-03-08 NOTE — ED PROVIDER NOTES
[facility] Emergency Department  [address]  [facility] [address]  Phone: [phone]  Fax: [phone]      Attending Physician Attestation    I performed a history and physical examination of the patient and discussed management with the mid level provider. I reviewed the mid level provider's note and agree with the documented findings and plan of care. Any areas of disagreement are noted on the chart. I was personally present for the key portions of any procedures. I have documented in the chart those procedures where I was not present during the key portions. I have reviewed the emergency nurses triage note. I agree with the chief complaint, past medical history, past surgical history, allergies, medications, social and family history as documented unless otherwise noted below. Documentation of the HPI, Physical Exam and Medical Decision Making performed by mid level providers is based on my personal performance of the HPI, PE and MDM. For Physician Assistant/ Nurse Practitioner cases/documentation I have personally evaluated this patient and have completed at least one if not all key elements of the E/M (history, physical exam, and MDM). Additional findings are as noted.      CHIEF COMPLAINT       Chief Complaint   Patient presents with    Irregular Heart Beat     Just had an EKG done outpatient showed new onset of atrial flutter no history          HISTORY OF PRESENT ILLNESS    Torrey Joy is a 58 y.o. male who presents with dyspnea and palpitations.       PAST MEDICAL HISTORY    has a past medical history of Arthritis, Wilcox esophagus, Depression, DU (duodenal ulcer), Emphysema lung (HCC), [place] (hard of hearing), Hyperlipidemia, Hypersomnia with sleep apnea, unspecified, Hypertension, AGUS (obstructive sleep apnea), and Thoracic outlet syndrome.    SURGICAL HISTORY      has a past surgical history that includes Colonoscopy (5/27/2016); Upper gastrointestinal endoscopy 
aorta, unspecified part (HCC)          DISPOSITION / PLAN       PATIENT REFERRED TO:  No follow-up provider specified.    DISCHARGE MEDICATIONS:  New Prescriptions    No medications on file       Nadira Atkinson PA-C   Emergency Medicine Physician Assistant    (Please note that portions of this note were completed with a voice recognition program.  Efforts were made to edit the dictations but occasionally words aremis-transcribed.)      Nadira Atkinson PA-C  03/09/24 0156

## 2024-03-09 PROBLEM — R06.09 DYSPNEA ON EXERTION: Status: ACTIVE | Noted: 2024-03-09

## 2024-03-09 PROBLEM — E87.1 HYPONATREMIA: Status: ACTIVE | Noted: 2024-03-09

## 2024-03-09 PROBLEM — G47.30 SLEEP APNEA: Status: ACTIVE | Noted: 2024-03-09

## 2024-03-09 LAB
ANION GAP SERPL CALCULATED.3IONS-SCNC: 9 MMOL/L (ref 9–17)
BASOPHILS # BLD: 0.1 K/UL (ref 0–0.2)
BASOPHILS NFR BLD: 1 % (ref 0–2)
BUN SERPL-MCNC: 9 MG/DL (ref 8–23)
CALCIUM SERPL-MCNC: 9.1 MG/DL (ref 8.6–10.4)
CHLORIDE SERPL-SCNC: 102 MMOL/L (ref 98–107)
CO2 SERPL-SCNC: 25 MMOL/L (ref 20–31)
CREAT SERPL-MCNC: 1 MG/DL (ref 0.7–1.2)
EKG ATRIAL RATE: 326 BPM
EKG ATRIAL RATE: 375 BPM
EKG Q-T INTERVAL: 346 MS
EKG Q-T INTERVAL: 374 MS
EKG QRS DURATION: 82 MS
EKG QRS DURATION: 86 MS
EKG QTC CALCULATION (BAZETT): 436 MS
EKG QTC CALCULATION (BAZETT): 461 MS
EKG R AXIS: 56 DEGREES
EKG R AXIS: 57 DEGREES
EKG T AXIS: 10 DEGREES
EKG T AXIS: 13 DEGREES
EKG VENTRICULAR RATE: 107 BPM
EKG VENTRICULAR RATE: 82 BPM
EOSINOPHIL # BLD: 0.2 K/UL (ref 0–0.4)
EOSINOPHILS RELATIVE PERCENT: 2 % (ref 1–4)
ERYTHROCYTE [DISTWIDTH] IN BLOOD BY AUTOMATED COUNT: 16.2 % (ref 12.5–15.4)
GFR SERPL CREATININE-BSD FRML MDRD: >60 ML/MIN/1.73M2
GLUCOSE SERPL-MCNC: 114 MG/DL (ref 70–99)
HCT VFR BLD AUTO: 39.4 % (ref 41–53)
HGB BLD-MCNC: 13 G/DL (ref 13.5–17.5)
LYMPHOCYTES NFR BLD: 2.9 K/UL (ref 1–4.8)
LYMPHOCYTES RELATIVE PERCENT: 38 % (ref 24–44)
MCH RBC QN AUTO: 27.4 PG (ref 26–34)
MCHC RBC AUTO-ENTMCNC: 33.1 G/DL (ref 31–37)
MCV RBC AUTO: 82.8 FL (ref 80–100)
MONOCYTES NFR BLD: 0.6 K/UL (ref 0.1–1.2)
MONOCYTES NFR BLD: 8 % (ref 2–11)
NEUTROPHILS NFR BLD: 51 % (ref 36–66)
NEUTS SEG NFR BLD: 3.8 K/UL (ref 1.8–7.7)
PLATELET # BLD AUTO: 373 K/UL (ref 140–450)
PMV BLD AUTO: 6.7 FL (ref 6–12)
POTASSIUM SERPL-SCNC: 4.7 MMOL/L (ref 3.7–5.3)
RBC # BLD AUTO: 4.76 M/UL (ref 4.5–5.9)
SODIUM SERPL-SCNC: 136 MMOL/L (ref 135–144)
TROPONIN I SERPL HS-MCNC: 17 NG/L (ref 0–22)
TROPONIN I SERPL HS-MCNC: 19 NG/L (ref 0–22)
TROPONIN I SERPL HS-MCNC: 28 NG/L (ref 0–22)
TROPONIN I SERPL HS-MCNC: 28 NG/L (ref 0–22)
WBC OTHER # BLD: 7.7 K/UL (ref 3.5–11)

## 2024-03-09 PROCEDURE — 6370000000 HC RX 637 (ALT 250 FOR IP): Performed by: STUDENT IN AN ORGANIZED HEALTH CARE EDUCATION/TRAINING PROGRAM

## 2024-03-09 PROCEDURE — 6360000002 HC RX W HCPCS: Performed by: INTERNAL MEDICINE

## 2024-03-09 PROCEDURE — 6360000002 HC RX W HCPCS: Performed by: STUDENT IN AN ORGANIZED HEALTH CARE EDUCATION/TRAINING PROGRAM

## 2024-03-09 PROCEDURE — 99222 1ST HOSP IP/OBS MODERATE 55: CPT | Performed by: INTERNAL MEDICINE

## 2024-03-09 PROCEDURE — 96372 THER/PROPH/DIAG INJ SC/IM: CPT

## 2024-03-09 PROCEDURE — 85025 COMPLETE CBC W/AUTO DIFF WBC: CPT

## 2024-03-09 PROCEDURE — 2500000003 HC RX 250 WO HCPCS: Performed by: INTERNAL MEDICINE

## 2024-03-09 PROCEDURE — 36415 COLL VENOUS BLD VENIPUNCTURE: CPT

## 2024-03-09 PROCEDURE — 80048 BASIC METABOLIC PNL TOTAL CA: CPT

## 2024-03-09 PROCEDURE — 0202U NFCT DS 22 TRGT SARS-COV-2: CPT

## 2024-03-09 PROCEDURE — 99232 SBSQ HOSP IP/OBS MODERATE 35: CPT | Performed by: STUDENT IN AN ORGANIZED HEALTH CARE EDUCATION/TRAINING PROGRAM

## 2024-03-09 PROCEDURE — 2060000000 HC ICU INTERMEDIATE R&B

## 2024-03-09 PROCEDURE — 6370000000 HC RX 637 (ALT 250 FOR IP): Performed by: NURSE PRACTITIONER

## 2024-03-09 RX ORDER — DOXYCYCLINE HYCLATE 100 MG
100 TABLET ORAL EVERY 12 HOURS SCHEDULED
Status: DISCONTINUED | OUTPATIENT
Start: 2024-03-09 | End: 2024-03-11 | Stop reason: HOSPADM

## 2024-03-09 RX ORDER — ALPRAZOLAM 0.5 MG/1
0.5 TABLET ORAL 2 TIMES DAILY
COMMUNITY

## 2024-03-09 RX ORDER — ALPRAZOLAM 0.5 MG/1
0.5 TABLET ORAL ONCE
Status: COMPLETED | OUTPATIENT
Start: 2024-03-09 | End: 2024-03-09

## 2024-03-09 RX ORDER — ENOXAPARIN SODIUM 150 MG/ML
1 INJECTION SUBCUTANEOUS 2 TIMES DAILY
Status: DISCONTINUED | OUTPATIENT
Start: 2024-03-09 | End: 2024-03-11 | Stop reason: HOSPADM

## 2024-03-09 RX ORDER — ENOXAPARIN SODIUM 150 MG/ML
1 INJECTION SUBCUTANEOUS DAILY
Status: DISCONTINUED | OUTPATIENT
Start: 2024-03-09 | End: 2024-03-09

## 2024-03-09 RX ADMIN — DOXYCYCLINE HYCLATE 100 MG: 100 TABLET, COATED ORAL at 08:30

## 2024-03-09 RX ADMIN — CYCLOBENZAPRINE 10 MG: 10 TABLET, FILM COATED ORAL at 08:37

## 2024-03-09 RX ADMIN — AMIODARONE HYDROCHLORIDE 1 MG/MIN: 1.8 INJECTION, SOLUTION INTRAVENOUS at 07:52

## 2024-03-09 RX ADMIN — AMIODARONE HYDROCHLORIDE 0.5 MG/MIN: 1.8 INJECTION, SOLUTION INTRAVENOUS at 13:46

## 2024-03-09 RX ADMIN — AMIODARONE HYDROCHLORIDE 150 MG: 1.5 INJECTION, SOLUTION INTRAVENOUS at 07:39

## 2024-03-09 RX ADMIN — ENOXAPARIN SODIUM 120 MG: 120 INJECTION SUBCUTANEOUS at 08:30

## 2024-03-09 RX ADMIN — METOPROLOL SUCCINATE 100 MG: 100 TABLET, EXTENDED RELEASE ORAL at 08:30

## 2024-03-09 RX ADMIN — VENLAFAXINE HYDROCHLORIDE 150 MG: 150 CAPSULE, EXTENDED RELEASE ORAL at 08:37

## 2024-03-09 RX ADMIN — DOXYCYCLINE HYCLATE 100 MG: 100 TABLET, COATED ORAL at 21:31

## 2024-03-09 RX ADMIN — PANTOPRAZOLE SODIUM 40 MG: 40 TABLET, DELAYED RELEASE ORAL at 08:30

## 2024-03-09 RX ADMIN — PREDNISONE 40 MG: 20 TABLET ORAL at 08:30

## 2024-03-09 RX ADMIN — ALPRAZOLAM 0.5 MG: 0.5 TABLET ORAL at 21:31

## 2024-03-09 RX ADMIN — ATORVASTATIN CALCIUM 10 MG: 10 TABLET, FILM COATED ORAL at 21:31

## 2024-03-09 RX ADMIN — LISINOPRIL 40 MG: 20 TABLET ORAL at 08:30

## 2024-03-09 RX ADMIN — ENOXAPARIN SODIUM 120 MG: 120 INJECTION SUBCUTANEOUS at 21:30

## 2024-03-09 NOTE — CONSULTS
[facility]   Consult Note                 Date:   3/9/2024  Date of admission:  3/8/2024  2:03 PM  MRN:   [id number]  YOB: 1965    Reason for Consult:  A.Flutter     HISTORY OF PRESENT ILLNESS:    The patient is a 58 y.o.  male who is admitted to the hospital for for having shortness of breath and cough for the last 1 to 2 weeks patient was seen in urgent care patient was found to be in a flutter with slow ventricular rate.  Patient has been on beta-blocker and lisinopril for blood pressure  Lately patient has been feeling increased exertional shortness of breath which has been progressively.  At present patient is still in a flutter with very well rate controlled  Patient also had a sleep apnea using CPAP  Patient is a smoker  Patient also drinks 7-8 beers a day  Denies any chest pain or pressure any significant lightheaded dizzy    Past Medical History:   has a past medical history of Arthritis, Wilcox esophagus, Depression, DU (duodenal ulcer), Emphysema lung (HCC), [place] (hard of hearing), Hyperlipidemia, Hypersomnia with sleep apnea, unspecified, Hypertension, AGUS (obstructive sleep apnea), and Thoracic outlet syndrome.    Past Surgical History:   has a past surgical history that includes Colonoscopy (5/27/2016); Upper gastrointestinal endoscopy (N/A, 12/11/2018); joint replacement (Bilateral); Soft Tissue Tumor Resection; lymph node dissection; Cataract removal with implant (Right); Carpal tunnel release (Left, 12/17/2019); Carpal tunnel release (Right, 1/27/2020); Upper gastrointestinal endoscopy (N/A, 7/21/2020); and Cataract removal (2021).     Home Medications:    Prior to Admission medications    Medication Sig Start Date End Date Taking? Authorizing Provider   amoxicillin-clavulanate (AUGMENTIN) 875-125 MG per tablet Take 1 tablet by mouth 2 times daily for 10 days 3/8/24 3/18/24  Karine Cole APRN - CNP   lisinopril (PRINIVIL;ZESTRIL) 40 MG tablet TAKE 1

## 2024-03-09 NOTE — ED NOTES
Pt resting quietly - denies any chest pain or pressure. States sl SOB both at rest at times and/or w/ exertion. Pox 96% RA.

## 2024-03-09 NOTE — ED NOTES
Pt awake and sitting up in bed; denies any chest pain or pressure; monitor atrial flutter. Heart tones irregular. Amio gtt in place

## 2024-03-10 LAB
ANION GAP SERPL CALCULATED.3IONS-SCNC: 10 MMOL/L (ref 9–17)
B PARAP IS1001 DNA NPH QL NAA+NON-PROBE: NOT DETECTED
B PERT DNA SPEC QL NAA+PROBE: NOT DETECTED
BASOPHILS # BLD: 0.1 K/UL (ref 0–0.2)
BASOPHILS NFR BLD: 1 % (ref 0–2)
BUN SERPL-MCNC: 13 MG/DL (ref 8–23)
C PNEUM DNA NPH QL NAA+NON-PROBE: NOT DETECTED
CALCIUM SERPL-MCNC: 9.5 MG/DL (ref 8.6–10.4)
CHLORIDE SERPL-SCNC: 100 MMOL/L (ref 98–107)
CO2 SERPL-SCNC: 25 MMOL/L (ref 20–31)
CREAT SERPL-MCNC: 1 MG/DL (ref 0.7–1.2)
EOSINOPHIL # BLD: 0.1 K/UL (ref 0–0.4)
EOSINOPHILS RELATIVE PERCENT: 1 % (ref 1–4)
ERYTHROCYTE [DISTWIDTH] IN BLOOD BY AUTOMATED COUNT: 16.5 % (ref 12.5–15.4)
FLUAV H1 2009 PAN RNA NPH NAA+NON-PROBE: DETECTED
FLUAV RNA NPH QL NAA+NON-PROBE: DETECTED
FLUBV RNA NPH QL NAA+NON-PROBE: NOT DETECTED
GFR SERPL CREATININE-BSD FRML MDRD: >60 ML/MIN/1.73M2
GLUCOSE SERPL-MCNC: 127 MG/DL (ref 70–99)
HADV DNA NPH QL NAA+NON-PROBE: NOT DETECTED
HCOV 229E RNA NPH QL NAA+NON-PROBE: NOT DETECTED
HCOV HKU1 RNA NPH QL NAA+NON-PROBE: NOT DETECTED
HCOV NL63 RNA NPH QL NAA+NON-PROBE: NOT DETECTED
HCOV OC43 RNA NPH QL NAA+NON-PROBE: NOT DETECTED
HCT VFR BLD AUTO: 40 % (ref 41–53)
HGB BLD-MCNC: 13.3 G/DL (ref 13.5–17.5)
HMPV RNA NPH QL NAA+NON-PROBE: NOT DETECTED
HPIV1 RNA NPH QL NAA+NON-PROBE: NOT DETECTED
HPIV2 RNA NPH QL NAA+NON-PROBE: NOT DETECTED
HPIV3 RNA NPH QL NAA+NON-PROBE: NOT DETECTED
HPIV4 RNA NPH QL NAA+NON-PROBE: NOT DETECTED
LYMPHOCYTES NFR BLD: 3.9 K/UL (ref 1–4.8)
LYMPHOCYTES RELATIVE PERCENT: 37 % (ref 24–44)
M PNEUMO DNA NPH QL NAA+NON-PROBE: NOT DETECTED
MCH RBC QN AUTO: 27.6 PG (ref 26–34)
MCHC RBC AUTO-ENTMCNC: 33.3 G/DL (ref 31–37)
MCV RBC AUTO: 82.9 FL (ref 80–100)
MONOCYTES NFR BLD: 0.7 K/UL (ref 0.1–1.2)
MONOCYTES NFR BLD: 7 % (ref 2–11)
NEUTROPHILS NFR BLD: 54 % (ref 36–66)
NEUTS SEG NFR BLD: 5.8 K/UL (ref 1.8–7.7)
PLATELET # BLD AUTO: 320 K/UL (ref 140–450)
PMV BLD AUTO: 6.9 FL (ref 6–12)
POTASSIUM SERPL-SCNC: 4.1 MMOL/L (ref 3.7–5.3)
RBC # BLD AUTO: 4.83 M/UL (ref 4.5–5.9)
RSV RNA NPH QL NAA+NON-PROBE: NOT DETECTED
RV+EV RNA NPH QL NAA+NON-PROBE: NOT DETECTED
SARS-COV-2 RNA NPH QL NAA+NON-PROBE: DETECTED
SODIUM SERPL-SCNC: 135 MMOL/L (ref 135–144)
SPECIMEN DESCRIPTION: ABNORMAL
WBC OTHER # BLD: 10.7 K/UL (ref 3.5–11)

## 2024-03-10 PROCEDURE — 2580000003 HC RX 258: Performed by: STUDENT IN AN ORGANIZED HEALTH CARE EDUCATION/TRAINING PROGRAM

## 2024-03-10 PROCEDURE — 2060000000 HC ICU INTERMEDIATE R&B

## 2024-03-10 PROCEDURE — 2500000003 HC RX 250 WO HCPCS: Performed by: INTERNAL MEDICINE

## 2024-03-10 PROCEDURE — 80048 BASIC METABOLIC PNL TOTAL CA: CPT

## 2024-03-10 PROCEDURE — 85025 COMPLETE CBC W/AUTO DIFF WBC: CPT

## 2024-03-10 PROCEDURE — 6360000002 HC RX W HCPCS: Performed by: STUDENT IN AN ORGANIZED HEALTH CARE EDUCATION/TRAINING PROGRAM

## 2024-03-10 PROCEDURE — 36415 COLL VENOUS BLD VENIPUNCTURE: CPT

## 2024-03-10 PROCEDURE — 6370000000 HC RX 637 (ALT 250 FOR IP): Performed by: STUDENT IN AN ORGANIZED HEALTH CARE EDUCATION/TRAINING PROGRAM

## 2024-03-10 PROCEDURE — 99232 SBSQ HOSP IP/OBS MODERATE 35: CPT | Performed by: STUDENT IN AN ORGANIZED HEALTH CARE EDUCATION/TRAINING PROGRAM

## 2024-03-10 PROCEDURE — 96372 THER/PROPH/DIAG INJ SC/IM: CPT

## 2024-03-10 PROCEDURE — 6370000000 HC RX 637 (ALT 250 FOR IP): Performed by: INTERNAL MEDICINE

## 2024-03-10 RX ORDER — AMIODARONE HYDROCHLORIDE 200 MG/1
200 TABLET ORAL DAILY
Status: DISCONTINUED | OUTPATIENT
Start: 2024-03-16 | End: 2024-03-11

## 2024-03-10 RX ORDER — AMIODARONE HYDROCHLORIDE 200 MG/1
200 TABLET ORAL DAILY
Status: DISCONTINUED | OUTPATIENT
Start: 2024-03-16 | End: 2024-03-10

## 2024-03-10 RX ORDER — OSELTAMIVIR PHOSPHATE 75 MG/1
75 CAPSULE ORAL 2 TIMES DAILY
Status: DISCONTINUED | OUTPATIENT
Start: 2024-03-10 | End: 2024-03-11 | Stop reason: HOSPADM

## 2024-03-10 RX ORDER — IBUPROFEN 200 MG
400 TABLET ORAL EVERY 6 HOURS PRN
Status: DISCONTINUED | OUTPATIENT
Start: 2024-03-10 | End: 2024-03-11 | Stop reason: HOSPADM

## 2024-03-10 RX ORDER — ALPRAZOLAM 0.5 MG/1
0.5 TABLET ORAL 2 TIMES DAILY PRN
Status: DISCONTINUED | OUTPATIENT
Start: 2024-03-10 | End: 2024-03-11 | Stop reason: HOSPADM

## 2024-03-10 RX ORDER — AMIODARONE HYDROCHLORIDE 200 MG/1
200 TABLET ORAL DAILY
Status: DISCONTINUED | OUTPATIENT
Start: 2024-03-11 | End: 2024-03-10

## 2024-03-10 RX ORDER — AMIODARONE HYDROCHLORIDE 200 MG/1
200 TABLET ORAL 2 TIMES DAILY
Status: DISCONTINUED | OUTPATIENT
Start: 2024-03-11 | End: 2024-03-10

## 2024-03-10 RX ORDER — AMIODARONE HYDROCHLORIDE 200 MG/1
200 TABLET ORAL DAILY
Status: DISCONTINUED | OUTPATIENT
Start: 2024-03-10 | End: 2024-03-10

## 2024-03-10 RX ORDER — AMIODARONE HYDROCHLORIDE 200 MG/1
200 TABLET ORAL 2 TIMES DAILY
Status: DISCONTINUED | OUTPATIENT
Start: 2024-03-11 | End: 2024-03-11

## 2024-03-10 RX ORDER — VENLAFAXINE HYDROCHLORIDE 150 MG/1
150 CAPSULE, EXTENDED RELEASE ORAL NIGHTLY
Status: DISCONTINUED | OUTPATIENT
Start: 2024-03-11 | End: 2024-03-11 | Stop reason: HOSPADM

## 2024-03-10 RX ADMIN — VENLAFAXINE HYDROCHLORIDE 150 MG: 150 CAPSULE, EXTENDED RELEASE ORAL at 08:35

## 2024-03-10 RX ADMIN — METOPROLOL SUCCINATE 100 MG: 100 TABLET, EXTENDED RELEASE ORAL at 08:35

## 2024-03-10 RX ADMIN — DOXYCYCLINE HYCLATE 100 MG: 100 TABLET, COATED ORAL at 08:36

## 2024-03-10 RX ADMIN — IBUPROFEN 400 MG: 200 TABLET, FILM COATED ORAL at 20:32

## 2024-03-10 RX ADMIN — ATORVASTATIN CALCIUM 10 MG: 10 TABLET, FILM COATED ORAL at 20:32

## 2024-03-10 RX ADMIN — AMIODARONE HYDROCHLORIDE 0.5 MG/MIN: 1.8 INJECTION, SOLUTION INTRAVENOUS at 01:33

## 2024-03-10 RX ADMIN — SODIUM CHLORIDE, PRESERVATIVE FREE 10 ML: 5 INJECTION INTRAVENOUS at 20:33

## 2024-03-10 RX ADMIN — ALPRAZOLAM 0.5 MG: 0.5 TABLET ORAL at 12:39

## 2024-03-10 RX ADMIN — FLUTICASONE PROPIONATE 2 SPRAY: 50 SPRAY, METERED NASAL at 11:59

## 2024-03-10 RX ADMIN — IBUPROFEN 400 MG: 200 TABLET, FILM COATED ORAL at 12:39

## 2024-03-10 RX ADMIN — LISINOPRIL 40 MG: 20 TABLET ORAL at 08:35

## 2024-03-10 RX ADMIN — ALPRAZOLAM 0.5 MG: 0.5 TABLET ORAL at 20:32

## 2024-03-10 RX ADMIN — OSELTAMIVIR PHOSPHATE 75 MG: 75 CAPSULE ORAL at 20:32

## 2024-03-10 RX ADMIN — ENOXAPARIN SODIUM 120 MG: 120 INJECTION SUBCUTANEOUS at 08:36

## 2024-03-10 RX ADMIN — DOXYCYCLINE HYCLATE 100 MG: 100 TABLET, COATED ORAL at 20:32

## 2024-03-10 RX ADMIN — AMIODARONE HYDROCHLORIDE 200 MG: 200 TABLET ORAL at 14:59

## 2024-03-10 RX ADMIN — PANTOPRAZOLE SODIUM 40 MG: 40 TABLET, DELAYED RELEASE ORAL at 08:36

## 2024-03-10 RX ADMIN — PREDNISONE 40 MG: 20 TABLET ORAL at 08:36

## 2024-03-10 RX ADMIN — ENOXAPARIN SODIUM 120 MG: 120 INJECTION SUBCUTANEOUS at 20:32

## 2024-03-10 ASSESSMENT — PAIN SCALES - GENERAL
PAINLEVEL_OUTOF10: 4
PAINLEVEL_OUTOF10: 1

## 2024-03-10 ASSESSMENT — PAIN DESCRIPTION - LOCATION: LOCATION: NECK;BACK

## 2024-03-10 NOTE — PLAN OF CARE
Problem: Discharge Planning  Goal: Discharge to home or other facility with appropriate resources  3/10/2024 0651 by Tripp Saldaña, RN  Outcome: Progressing  3/10/2024 0651 by Tripp Saldaña, RN  Outcome: Progressing     Problem: ABCDS Injury Assessment  Goal: Absence of physical injury  3/10/2024 1114 by Brandee Han  Outcome: Progressing  Flowsheets (Taken 3/10/2024 1114)  Absence of Physical Injury: Implement safety measures based on patient assessment  3/10/2024 0651 by Tripp Saldaña, RN  Outcome: Progressing  3/10/2024 0651 by Tripp Saldaña, RN  Outcome: Progressing     Problem: Pain  Goal: Verbalizes/displays adequate comfort level or baseline comfort level  3/10/2024 1114 by Brandee Han  Outcome: Progressing  Flowsheets (Taken 3/10/2024 1114)  Verbalizes/displays adequate comfort level or baseline comfort level:   Assess pain using appropriate pain scale   Encourage patient to monitor pain and request assistance  3/10/2024 0651 by Tripp Saldaña, RN  Outcome: Progressing     Problem: Skin/Tissue Integrity  Goal: Absence of new skin breakdown  Description: 1.  Monitor for areas of redness and/or skin breakdown  2.  Assess vascular access sites hourly  3.  Every 4-6 hours minimum:  Change oxygen saturation probe site  4.  Every 4-6 hours:  If on nasal continuous positive airway pressure, respiratory therapy assess nares and determine need for appliance change or resting period.  3/10/2024 0651 by Tripp Saldaña, RN  Outcome: Progressing  3/10/2024 0651 by Tripp Saldaña, RN  Outcome: Progressing

## 2024-03-10 NOTE — PLAN OF CARE
Problem: Discharge Planning  Goal: Discharge to home or other facility with appropriate resources  3/10/2024 0651 by Tripp Saldaña, RN  Outcome: Progressing  3/10/2024 0651 by Tripp Saldaña, RN  Outcome: Progressing     Problem: ABCDS Injury Assessment  Goal: Absence of physical injury  3/10/2024 0651 by Tripp Saldaña, RN  Outcome: Progressing  3/10/2024 0651 by Tripp Saldaña, RN  Outcome: Progressing     Problem: Pain  Goal: Verbalizes/displays adequate comfort level or baseline comfort level  Outcome: Progressing     Problem: Skin/Tissue Integrity  Goal: Absence of new skin breakdown  Description: 1.  Monitor for areas of redness and/or skin breakdown  2.  Assess vascular access sites hourly  3.  Every 4-6 hours minimum:  Change oxygen saturation probe site  4.  Every 4-6 hours:  If on nasal continuous positive airway pressure, respiratory therapy assess nares and determine need for appliance change or resting period.  3/10/2024 0651 by Tripp Saldaña, RN  Outcome: Progressing  3/10/2024 0651 by Tripp Saldaña, RN  Outcome: Progressing

## 2024-03-11 ENCOUNTER — APPOINTMENT (OUTPATIENT)
Dept: NUCLEAR MEDICINE | Age: 59
DRG: 308 | End: 2024-03-11
Payer: COMMERCIAL

## 2024-03-11 ENCOUNTER — APPOINTMENT (OUTPATIENT)
Age: 59
DRG: 308 | End: 2024-03-11
Payer: COMMERCIAL

## 2024-03-11 ENCOUNTER — APPOINTMENT (OUTPATIENT)
Age: 59
DRG: 308 | End: 2024-03-11
Attending: STUDENT IN AN ORGANIZED HEALTH CARE EDUCATION/TRAINING PROGRAM
Payer: COMMERCIAL

## 2024-03-11 VITALS
RESPIRATION RATE: 16 BRPM | DIASTOLIC BLOOD PRESSURE: 95 MMHG | OXYGEN SATURATION: 98 % | SYSTOLIC BLOOD PRESSURE: 154 MMHG | BODY MASS INDEX: 35.7 KG/M2 | TEMPERATURE: 98.5 F | HEIGHT: 71 IN | HEART RATE: 81 BPM | WEIGHT: 255 LBS

## 2024-03-11 LAB
ANION GAP SERPL CALCULATED.3IONS-SCNC: 11 MMOL/L (ref 9–17)
BASOPHILS # BLD: 0.1 K/UL (ref 0–0.2)
BASOPHILS NFR BLD: 1 % (ref 0–2)
BUN SERPL-MCNC: 16 MG/DL (ref 8–23)
CALCIUM SERPL-MCNC: 9.3 MG/DL (ref 8.6–10.4)
CHLORIDE SERPL-SCNC: 99 MMOL/L (ref 98–107)
CO2 SERPL-SCNC: 23 MMOL/L (ref 20–31)
CREAT SERPL-MCNC: 0.9 MG/DL (ref 0.7–1.2)
ECHO AO ROOT DIAM: 3.4 CM
ECHO AO ROOT INDEX: 1.45 CM/M2
ECHO AV MEAN GRADIENT: 4 MMHG
ECHO AV MEAN VELOCITY: 1 M/S
ECHO AV PEAK GRADIENT: 8 MMHG
ECHO AV PEAK VELOCITY: 1.4 M/S
ECHO AV VELOCITY RATIO: 0.57
ECHO AV VTI: 30.8 CM
ECHO BSA: 2.41 M2
ECHO BSA: 2.41 M2
ECHO EST RA PRESSURE: 3 MMHG
ECHO IVC EXP: 2.1 CM
ECHO IVC INSP: 1 CM
ECHO LA AREA 2C: 22.9 CM2
ECHO LA AREA 4C: 24.1 CM2
ECHO LA DIAMETER INDEX: 2.09 CM/M2
ECHO LA DIAMETER: 4.9 CM
ECHO LA MAJOR AXIS: 6.3 CM
ECHO LA MINOR AXIS: 5.9 CM
ECHO LA TO AORTIC ROOT RATIO: 1.44
ECHO LA VOL BP: 75 ML (ref 18–58)
ECHO LA VOL MOD A2C: 75 ML (ref 18–58)
ECHO LA VOL MOD A4C: 72 ML (ref 18–58)
ECHO LA VOL/BSA BIPLANE: 32 ML/M2 (ref 16–34)
ECHO LA VOLUME INDEX MOD A2C: 32 ML/M2 (ref 16–34)
ECHO LA VOLUME INDEX MOD A4C: 31 ML/M2 (ref 16–34)
ECHO LV E' LATERAL VELOCITY: 12 CM/S
ECHO LV E' SEPTAL VELOCITY: 10 CM/S
ECHO LV FRACTIONAL SHORTENING: 25 % (ref 28–44)
ECHO LV INTERNAL DIMENSION DIASTOLE INDEX: 2.05 CM/M2
ECHO LV INTERNAL DIMENSION DIASTOLIC: 4.8 CM (ref 4.2–5.9)
ECHO LV INTERNAL DIMENSION SYSTOLIC INDEX: 1.54 CM/M2
ECHO LV INTERNAL DIMENSION SYSTOLIC: 3.6 CM
ECHO LV IVSD: 1 CM (ref 0.6–1)
ECHO LV MASS 2D: 158.8 G (ref 88–224)
ECHO LV MASS INDEX 2D: 67.9 G/M2 (ref 49–115)
ECHO LV POSTERIOR WALL DIASTOLIC: 0.9 CM (ref 0.6–1)
ECHO LV RELATIVE WALL THICKNESS RATIO: 0.38
ECHO LVOT AREA: 3.1 CM2
ECHO LVOT AV VTI INDEX: 0.58
ECHO LVOT DIAM: 2 CM
ECHO LVOT MEAN GRADIENT: 1 MMHG
ECHO LVOT PEAK GRADIENT: 3 MMHG
ECHO LVOT PEAK VELOCITY: 0.8 M/S
ECHO LVOT STROKE VOLUME INDEX: 24.2 ML/M2
ECHO LVOT SV: 56.5 ML
ECHO LVOT VTI: 18 CM
ECHO MV E DECELERATION TIME (DT): 153 MS
ECHO MV E VELOCITY: 0.84 M/S
ECHO MV E/E' LATERAL: 7
ECHO MV E/E' RATIO (AVERAGED): 7.7
ECHO RIGHT VENTRICULAR SYSTOLIC PRESSURE (RVSP): 32 MMHG
ECHO RV BASAL DIMENSION: 3.6 CM
ECHO RV FREE WALL PEAK S': 14 CM/S
ECHO TV REGURGITANT MAX VELOCITY: 2.67 M/S
ECHO TV REGURGITANT PEAK GRADIENT: 29 MMHG
EOSINOPHIL # BLD: 0 K/UL (ref 0–0.4)
EOSINOPHILS RELATIVE PERCENT: 0 % (ref 1–4)
ERYTHROCYTE [DISTWIDTH] IN BLOOD BY AUTOMATED COUNT: 16.5 % (ref 12.5–15.4)
GFR SERPL CREATININE-BSD FRML MDRD: >60 ML/MIN/1.73M2
GLUCOSE SERPL-MCNC: 89 MG/DL (ref 70–99)
HCT VFR BLD AUTO: 41.8 % (ref 41–53)
HGB BLD-MCNC: 13.5 G/DL (ref 13.5–17.5)
LYMPHOCYTES NFR BLD: 3.9 K/UL (ref 1–4.8)
LYMPHOCYTES RELATIVE PERCENT: 36 % (ref 24–44)
MCH RBC QN AUTO: 26.9 PG (ref 26–34)
MCHC RBC AUTO-ENTMCNC: 32.2 G/DL (ref 31–37)
MCV RBC AUTO: 83.6 FL (ref 80–100)
MONOCYTES NFR BLD: 0.6 K/UL (ref 0.1–1.2)
MONOCYTES NFR BLD: 6 % (ref 2–11)
NEUTROPHILS NFR BLD: 57 % (ref 36–66)
NEUTS SEG NFR BLD: 6.1 K/UL (ref 1.8–7.7)
NUC STRESS EJECTION FRACTION: 48 %
PLATELET # BLD AUTO: 337 K/UL (ref 140–450)
PMV BLD AUTO: 7.2 FL (ref 6–12)
POTASSIUM SERPL-SCNC: 4.1 MMOL/L (ref 3.7–5.3)
RBC # BLD AUTO: 5 M/UL (ref 4.5–5.9)
SODIUM SERPL-SCNC: 133 MMOL/L (ref 135–144)
STRESS BASELINE DIAS BP: 103 MMHG
STRESS BASELINE HR: 78 BPM
STRESS BASELINE SYS BP: 165 MMHG
STRESS ESTIMATED WORKLOAD: 1 METS
STRESS PEAK DIAS BP: 114 MMHG
STRESS PEAK SYS BP: 215 MMHG
STRESS PERCENT HR ACHIEVED: 86 %
STRESS POST PEAK HR: 137 BPM
STRESS RATE PRESSURE PRODUCT: NORMAL BPM*MMHG
STRESS TARGET HR: 160 BPM
TID: 0.99
WBC OTHER # BLD: 10.7 K/UL (ref 3.5–11)

## 2024-03-11 PROCEDURE — 99233 SBSQ HOSP IP/OBS HIGH 50: CPT | Performed by: INTERNAL MEDICINE

## 2024-03-11 PROCEDURE — A9500 TC99M SESTAMIBI: HCPCS | Performed by: STUDENT IN AN ORGANIZED HEALTH CARE EDUCATION/TRAINING PROGRAM

## 2024-03-11 PROCEDURE — 36415 COLL VENOUS BLD VENIPUNCTURE: CPT

## 2024-03-11 PROCEDURE — 2580000003 HC RX 258: Performed by: STUDENT IN AN ORGANIZED HEALTH CARE EDUCATION/TRAINING PROGRAM

## 2024-03-11 PROCEDURE — 93306 TTE W/DOPPLER COMPLETE: CPT

## 2024-03-11 PROCEDURE — 96372 THER/PROPH/DIAG INJ SC/IM: CPT

## 2024-03-11 PROCEDURE — 85025 COMPLETE CBC W/AUTO DIFF WBC: CPT

## 2024-03-11 PROCEDURE — 6370000000 HC RX 637 (ALT 250 FOR IP): Performed by: STUDENT IN AN ORGANIZED HEALTH CARE EDUCATION/TRAINING PROGRAM

## 2024-03-11 PROCEDURE — 3430000000 HC RX DIAGNOSTIC RADIOPHARMACEUTICAL: Performed by: STUDENT IN AN ORGANIZED HEALTH CARE EDUCATION/TRAINING PROGRAM

## 2024-03-11 PROCEDURE — 78452 HT MUSCLE IMAGE SPECT MULT: CPT

## 2024-03-11 PROCEDURE — 6360000002 HC RX W HCPCS: Performed by: INTERNAL MEDICINE

## 2024-03-11 PROCEDURE — 99232 SBSQ HOSP IP/OBS MODERATE 35: CPT | Performed by: STUDENT IN AN ORGANIZED HEALTH CARE EDUCATION/TRAINING PROGRAM

## 2024-03-11 PROCEDURE — 6360000002 HC RX W HCPCS: Performed by: STUDENT IN AN ORGANIZED HEALTH CARE EDUCATION/TRAINING PROGRAM

## 2024-03-11 PROCEDURE — 80048 BASIC METABOLIC PNL TOTAL CA: CPT

## 2024-03-11 PROCEDURE — 93306 TTE W/DOPPLER COMPLETE: CPT | Performed by: INTERNAL MEDICINE

## 2024-03-11 PROCEDURE — 6370000000 HC RX 637 (ALT 250 FOR IP): Performed by: INTERNAL MEDICINE

## 2024-03-11 PROCEDURE — 93016 CV STRESS TEST SUPVJ ONLY: CPT | Performed by: RADIOLOGY

## 2024-03-11 PROCEDURE — 93017 CV STRESS TEST TRACING ONLY: CPT

## 2024-03-11 RX ORDER — METOPROLOL TARTRATE 1 MG/ML
5 INJECTION, SOLUTION INTRAVENOUS EVERY 5 MIN PRN
Status: DISCONTINUED | OUTPATIENT
Start: 2024-03-11 | End: 2024-03-11 | Stop reason: ALTCHOICE

## 2024-03-11 RX ORDER — SODIUM CHLORIDE 0.9 % (FLUSH) 0.9 %
5-40 SYRINGE (ML) INJECTION PRN
Status: DISCONTINUED | OUTPATIENT
Start: 2024-03-11 | End: 2024-03-11 | Stop reason: ALTCHOICE

## 2024-03-11 RX ORDER — ALBUTEROL SULFATE 90 UG/1
2 AEROSOL, METERED RESPIRATORY (INHALATION) PRN
Status: DISCONTINUED | OUTPATIENT
Start: 2024-03-11 | End: 2024-03-11 | Stop reason: ALTCHOICE

## 2024-03-11 RX ORDER — AMIODARONE HYDROCHLORIDE 200 MG/1
200 TABLET ORAL DAILY
Qty: 30 TABLET | Refills: 2 | Status: SHIPPED | OUTPATIENT
Start: 2024-03-11 | End: 2024-06-09

## 2024-03-11 RX ORDER — OSELTAMIVIR PHOSPHATE 75 MG/1
75 CAPSULE ORAL 2 TIMES DAILY
Qty: 8 CAPSULE | Refills: 0 | Status: SHIPPED | OUTPATIENT
Start: 2024-03-11 | End: 2024-03-15

## 2024-03-11 RX ORDER — AMINOPHYLLINE 25 MG/ML
50 INJECTION, SOLUTION INTRAVENOUS PRN
Status: DISCONTINUED | OUTPATIENT
Start: 2024-03-11 | End: 2024-03-11 | Stop reason: ALTCHOICE

## 2024-03-11 RX ORDER — DOXYCYCLINE HYCLATE 100 MG
100 TABLET ORAL EVERY 12 HOURS SCHEDULED
Qty: 20 TABLET | Refills: 0 | Status: SHIPPED | OUTPATIENT
Start: 2024-03-11 | End: 2024-03-21

## 2024-03-11 RX ORDER — SODIUM CHLORIDE 9 MG/ML
500 INJECTION, SOLUTION INTRAVENOUS CONTINUOUS PRN
Status: DISCONTINUED | OUTPATIENT
Start: 2024-03-11 | End: 2024-03-11 | Stop reason: ALTCHOICE

## 2024-03-11 RX ORDER — TETRAKIS(2-METHOXYISOBUTYLISOCYANIDE)COPPER(I) TETRAFLUOROBORATE 1 MG/ML
36 INJECTION, POWDER, LYOPHILIZED, FOR SOLUTION INTRAVENOUS
Status: COMPLETED | OUTPATIENT
Start: 2024-03-11 | End: 2024-03-11

## 2024-03-11 RX ORDER — SODIUM CHLORIDE 0.9 % (FLUSH) 0.9 %
10 SYRINGE (ML) INJECTION PRN
Status: COMPLETED | OUTPATIENT
Start: 2024-03-11 | End: 2024-03-11

## 2024-03-11 RX ORDER — REGADENOSON 0.08 MG/ML
0.4 INJECTION, SOLUTION INTRAVENOUS
Status: COMPLETED | OUTPATIENT
Start: 2024-03-11 | End: 2024-03-11

## 2024-03-11 RX ORDER — ATROPINE SULFATE 0.1 MG/ML
0.5 INJECTION INTRAVENOUS EVERY 5 MIN PRN
Status: DISCONTINUED | OUTPATIENT
Start: 2024-03-11 | End: 2024-03-11 | Stop reason: ALTCHOICE

## 2024-03-11 RX ORDER — NITROGLYCERIN 0.4 MG/1
0.4 TABLET SUBLINGUAL EVERY 5 MIN PRN
Status: DISCONTINUED | OUTPATIENT
Start: 2024-03-11 | End: 2024-03-11 | Stop reason: ALTCHOICE

## 2024-03-11 RX ORDER — AMIODARONE HYDROCHLORIDE 200 MG/1
200 TABLET ORAL DAILY
Status: DISCONTINUED | OUTPATIENT
Start: 2024-03-11 | End: 2024-03-11 | Stop reason: HOSPADM

## 2024-03-11 RX ORDER — TETRAKIS(2-METHOXYISOBUTYLISOCYANIDE)COPPER(I) TETRAFLUOROBORATE 1 MG/ML
13.4 INJECTION, POWDER, LYOPHILIZED, FOR SOLUTION INTRAVENOUS
Status: COMPLETED | OUTPATIENT
Start: 2024-03-11 | End: 2024-03-11

## 2024-03-11 RX ADMIN — TETRAKIS(2-METHOXYISOBUTYLISOCYANIDE)COPPER(I) TETRAFLUOROBORATE 31.3 MILLICURIE: 1 INJECTION, POWDER, LYOPHILIZED, FOR SOLUTION INTRAVENOUS at 12:45

## 2024-03-11 RX ADMIN — SODIUM CHLORIDE, PRESERVATIVE FREE 10 ML: 5 INJECTION INTRAVENOUS at 11:45

## 2024-03-11 RX ADMIN — METOPROLOL SUCCINATE 100 MG: 100 TABLET, EXTENDED RELEASE ORAL at 13:45

## 2024-03-11 RX ADMIN — AMIODARONE HYDROCHLORIDE 200 MG: 200 TABLET ORAL at 13:45

## 2024-03-11 RX ADMIN — ENOXAPARIN SODIUM 120 MG: 120 INJECTION SUBCUTANEOUS at 09:30

## 2024-03-11 RX ADMIN — PANTOPRAZOLE SODIUM 40 MG: 40 TABLET, DELAYED RELEASE ORAL at 05:24

## 2024-03-11 RX ADMIN — LISINOPRIL 40 MG: 20 TABLET ORAL at 09:30

## 2024-03-11 RX ADMIN — ALPRAZOLAM 0.5 MG: 0.5 TABLET ORAL at 09:38

## 2024-03-11 RX ADMIN — DOXYCYCLINE HYCLATE 100 MG: 100 TABLET, COATED ORAL at 09:29

## 2024-03-11 RX ADMIN — PREDNISONE 40 MG: 20 TABLET ORAL at 09:30

## 2024-03-11 RX ADMIN — REGADENOSON 0.4 MG: 0.08 INJECTION, SOLUTION INTRAVENOUS at 11:41

## 2024-03-11 RX ADMIN — OSELTAMIVIR PHOSPHATE 75 MG: 75 CAPSULE ORAL at 09:38

## 2024-03-11 RX ADMIN — TETRAKIS(2-METHOXYISOBUTYLISOCYANIDE)COPPER(I) TETRAFLUOROBORATE 13.4 MILLICURIE: 1 INJECTION, POWDER, LYOPHILIZED, FOR SOLUTION INTRAVENOUS at 11:45

## 2024-03-11 RX ADMIN — SODIUM CHLORIDE, PRESERVATIVE FREE 10 ML: 5 INJECTION INTRAVENOUS at 12:45

## 2024-03-11 NOTE — CARE COORDINATION
Case Management Assessment  Initial Evaluation    Date/Time of Evaluation: 3/11/2024 3:03 PM  Assessment Completed by: Pamela Hammonds    If patient is discharged prior to next notation, then this note serves as note for discharge by case management.    Patient Name: Torrey Joy                   YOB: 1965  Diagnosis: Atrial flutter (HCC) [I48.92]  Hyponatremia [E87.1]  New onset atrial flutter (HCC) [I48.92]  Atrial flutter, unspecified type (HCC) [I48.92]  Dissection of thoracic aorta, unspecified part (HCC) [I71.019]                   Date / Time: 3/8/2024  2:03 PM    Patient Admission Status: Inpatient   Readmission Risk (Low < 19, Mod (19-27), High > 27): Readmission Risk Score: 8.5    Current PCP: Karine Cole APRN - CNP  PCP verified by CM? Yes    Chart Reviewed: Yes      History Provided by: Patient  Patient Orientation: Alert and Oriented    Patient Cognition: Alert    Hospitalization in the last 30 days (Readmission):  No    If yes, Readmission Assessment in CM Navigator will be completed.    Advance Directives:      Code Status: Full Code   Patient's Primary Decision Maker is: Legal Next of Kin (wife)      Discharge Planning:    Patient lives with: Spouse/Significant Other Type of Home: House  Primary Care Giver: Self  Patient Support Systems include: Spouse/Significant Other   Current Financial resources: Other (Comment) (commercial)  Current community resources: None  Current services prior to admission: C-pap            Current DME:              Type of Home Care services:  None    ADLS  Prior functional level: Independent in ADLs/IADLs  Current functional level: Independent in ADLs/IADLs    PT AM-PAC:   /24  OT AM-PAC:   /24    Family can provide assistance at DC: Yes  Would you like Case Management to discuss the discharge plan with any other family members/significant others, and if so, who? No  Plans to Return to Present Housing: Yes  Other Identified Issues/Barriers to

## 2024-03-11 NOTE — DISCHARGE SUMMARY
[facility]  Office: [phone]  Asim Mondragon DO, Rohit Sellers DO, Bandar Celestin DO, Georgi Shirley DO, Nathan Zhao MD, Jane Gastelum MD, Santana Aguirre MD, Asha Crenshaw MD,  Deuce Jones MD, Reyna Pickering MD, Timoteo Zavala MD,  Haritha Graham DO, Thelma Carlton MD, Darien Lui MD, Leeroy Mondragon DO, Kamille Wilson MD,  Anjel Miranda DO, Ramona Marrero MD, Moira Rubalcava MD, Sary De MD, Michael Martin MD,  Prakash Kimbrough MD, Tommy Wood MD, Mushtaq Delaney MD, Ar Giraldo MD, Luis Manuel Chawla MD, Gómez Albert MD, Zhen Fernandez DO, Rene Steiner DO, Siomara Jc MD,  Yonatan Mckeon MD, Shirley Waterhouse, CNP,  Amaya Barber, CNP, Chucky Lenz, CNP,  Naila Medina, ROSE, Shae Greco, CNP, Denice Vallecillo, CNP, Antonella Walters CNP, Kerry Erwin CNP, Johana Latif, CNP, Evi Negron, PA-C, Jacquelin Ferraro, PA-C, Magaly Carrillo, CNP, Mary Jane Rowe, CNP, Thuy Paz, CNP, Alyson Schwartz, CNS, Abeba Chavarria, CNP, Elli Hernandez, CNP, Tracy Schwab, CNP         [facility]   IN-PATIENT SERVICE   [facility]    Discharge Summary     Patient ID: Torrey Joy  :  1965   MRN: [id number]     ACCOUNT:  [id number]   Patient's PCP: Karine Cole APRN - CNP  Admit Date: 3/8/2024   Discharge Date: 3/11/2024  Length of Stay: 2  Code Status:  Full Code  Admitting Physician: Darien Lui MD  Discharge Physician: Darien Lui MD     Active Discharge Diagnoses:     Hospital Problem Lists:  Principal Problem:    New onset atrial flutter (HCC)  Active Problems:    Hyponatremia    Dyspnea on exertion    Sleep apnea    Anxiety and depression    Mixed hyperlipidemia    Primary hypertension    Atrial flutter (HCC)  Resolved Problems:    * No resolved hospital problems. *      Admission Condition:  fair     Discharged Condition: good    Hospital Stay:     Hospital Course:  Torrey Joy is a 58 y.o. male with a past medical history of thoracic

## 2024-03-11 NOTE — PROGRESS NOTES
[facility]  Office: [phone]  Asim Mondragon DO, Rohit [place] DO, Bandar Celestin DO, Georgi Shirley DO, Nathan Zhao MD, Jane Gastelum MD, Santana Aguirre MD, Asha Crenshaw MD,  Deuce Jones MD, Reyna Pickering MD, Timoteo Zavala MD,  Haritha Graham DO, Thelma Carlton MD, Darien Lui MD, Leeroy Mondragon DO, Kamille Wilson MD,  Anjel Miranda DO, Ramona Marrero MD, Moira Rubalcava MD, Sary De MD, Michael Martin MD,  Prakash Kimbrough MD, Tommy Wood MD, Mushtaq Delaney MD, Ar Giraldo MD, Luis Manuel Chawla MD, Gómez Albert MD, Zhen Fernandez DO, Rene Steiner DO, Siomara Jc MD,  Yonatan Mckeon MD, Shirley Waterhouse, CNP,  Amaya Barber CNP, Chucky Lenz, CNP,  Naila Medina, DNP, Shae Greco, CNP, Deniec Vallecillo, CNP, Antonella Walters CNP, Kerry Erwin CNP, Johana Latif, CNP, Evi Negron, PA-C, Jacquelin Ferraro, PA-C, Magaly Carrillo, CNP, Mar yJane Rowe, CNP, Thuy Paz, CNP, Alyson Schwartz, CNS, Abeba Chavarria, CNP, Elli Hernandez, CNP, Tracy Schwab, CNP         [facility]   IN-PATIENT SERVICE   [facility]    Progress Note    3/11/2024    7:28 AM    Name:   Torrey Joy  MRN:     [id number]     Acct:      [id number]   Room:   [address] [place] [address] Day:  2  Admit Date:  3/8/2024  2:03 PM    PCP:   Karine Cole APRN - CNP  Code Status:  Full Code    Subjective:     Patient was seen and examined at bedside this AM. He reports feeling much better and has no specific complaints this morning. Respiratory pathogen panel is positive for COVID-19 and influenza A. Cardiology is following; plan for stress and echo today. Anticipate discharge home if stress and echo are negative.     Medications:     Allergies:  No Known Allergies    Current Meds:   Scheduled Meds:    oseltamivir  75 mg Oral BID    venlafaxine  150 mg Oral Nightly    amiodarone  200 mg Oral BID    Followed by    [START ON 3/16/2024] amiodarone  200 mg Oral Daily    doxycycline hyclate  
Patient discharged home independently. Writer reviewed all discharge instructions with patient, all questions answered to satisfaction. PIV removed without issues. Patient taken to main lobby via wheelchair and staff assitance for discharge, all personal belongings sent with patient.   
lisinopril (PRINIVIL;ZESTRIL) tablet 40 mg, 40 mg, Oral, Daily, Darien Lui MD, 40 mg at 03/10/24 0835    metoprolol succinate (TOPROL XL) extended release tablet 100 mg, 100 mg, Oral, Daily, Darien Lui MD, 100 mg at 03/10/24 0835    pantoprazole (PROTONIX) tablet 40 mg, 40 mg, Oral, QAM AC, Darien Lui MD, 40 mg at 03/11/24 0524    sodium chloride flush 0.9 % injection 5-40 mL, 5-40 mL, IntraVENous, 2 times per day, Darien Lui MD, 10 mL at 03/10/24 2033    sodium chloride flush 0.9 % injection 5-40 mL, 5-40 mL, IntraVENous, PRN, Darien Lui MD    0.9 % sodium chloride infusion, , IntraVENous, PRN, Darien Lui MD    potassium chloride (KLOR-CON M) extended release tablet 40 mEq, 40 mEq, Oral, PRN **OR** potassium bicarb-citric acid (EFFER-K) effervescent tablet 40 mEq, 40 mEq, Oral, PRN **OR** potassium chloride 10 mEq/100 mL IVPB (Peripheral Line), 10 mEq, IntraVENous, PRN, Darien Lui MD    magnesium sulfate 2000 mg in 50 mL IVPB premix, 2,000 mg, IntraVENous, PRN, Darien Lui MD    ondansetron (ZOFRAN-ODT) disintegrating tablet 4 mg, 4 mg, Oral, Q8H PRN **OR** ondansetron (ZOFRAN) injection 4 mg, 4 mg, IntraVENous, Q6H PRN, Darien Lui MD    polyethylene glycol (GLYCOLAX) packet 17 g, 17 g, Oral, Daily PRN, Darien Lui MD    acetaminophen (TYLENOL) tablet 650 mg, 650 mg, Oral, Q6H PRN **OR** acetaminophen (TYLENOL) suppository 650 mg, 650 mg, Rectal, Q6H PRN, Darien Lui MD    predniSONE (DELTASONE) tablet 40 mg, 40 mg, Oral, Daily, Darien Lui MD, 40 mg at 03/10/24 0836   Continuous Infusions:   sodium chloride       CBC:   Recent Labs     03/09/24  0610 03/10/24  0551 03/11/24  0555   WBC 7.7 10.7 10.7   HGB 13.0* 13.3* 13.5    320 337     BMP:    Recent Labs     03/09/24  0610 03/10/24  0551 03/11/24  0555    135 133*   K 4.7 4.1 4.1    100 99   CO2 25 25 23   BUN 9 13 16   CREATININE 1.0 1.0 0.9   GLUCOSE 114* 127* 89     Hepatic:   Recent Labs     
10   LABGLOM >60 >60  --   --  >60  --   --  >60   CALCIUM 9.1 9.0  --   --  9.1  --   --  9.5   PSA 0.70  --   --   --   --   --   --   --    TROPHS  --  26*   < > 28*  --  17 19  --     < > = values in this interval not displayed.     Recent Labs     03/08/24  1225 03/08/24  1511   PROT 7.3 7.0   LABALBU 3.8 3.7   LABA1C 6.1*  --    TSH 1.46  --    AST 30 22   ALT 27 23   ALKPHOS 106 109   BILITOT 0.5 0.5   CHOL 157  --    HDL 28*  --    LDLCHOLESTEROL 93  --    CHOLHDLRATIO 6.0  --    TRIG 181*  --    VLDL 36  --      ABG:No results found for: \"POCPH\", \"PHART\", \"PH\", \"POCPCO2\", \"EWK2JGS\", \"PCO2\", \"POCPO2\", \"PO2ART\", \"PO2\", \"POCHCO3\", \"IZC1MHW\", \"HCO3\", \"NBEA\", \"PBEA\", \"BEART\", \"BE\", \"THGBART\", \"THB\", \"EET9EMI\", \"DCVQ0TYE\", \"S3CLTPVS\", \"O2SAT\", \"FIO2\"  No results found for: \"SPECIAL\"  Lab Results   Component Value Date/Time    CULTURE NORMAL RESPIRATORY CARLO MODERATE GROWTH 11/03/2022 10:06 AM       Radiology:  CT CHEST PULMONARY EMBOLISM W CONTRAST    Addendum Date: 3/8/2024    ADDENDUM: Compared to CTA chest from July 18, 2019, there has been slight progression in the limited intimal tear along the proximal descending thoracic aorta (series 5, image 82).  In the absence of surgical history, the adjacent hyperdensities which were felt to reflect surgical material actually represent dystrophic mural calcifications.  Vascular surgical consultation is advised. Findings were discussed with LISET MARIE at 7:23 pm on 3/8/2024.     Result Date: 3/8/2024  1. No evidence of pulmonary embolism. 2. Multiple subcentimeter nodular opacities within the lung bases may be infectious or inflammatory in etiology.  Continued attention on short-term follow-up imaging to ensure resolution and exclude metastasis. 3. Within limitations of study due to bolus timing, there is luminal irregularity and postsurgical material along the left proximal descending thoracic aorta just distal to the subclavian origin, although there is a small 
of Treatment:   Newly diagnosed A-fib flutter,  on beta-blockers  On amiodarone will DC IV  Will start on p.o. amiodarone 200 mg twice daily for 5 days and then once a day  Patient is on full dose Lovenox  Troponin has been negative  Patient is awaiting for the echocardiogram in a.m.  Will also do Lexiscan stress test in a.m.      Izaiah Weiss MD, MD  [facility]  [phone]       
subclavian artery origin. Adjacent mediastinal soft tissues are unremarkable.  Focal penetrating atherosclerotic ulcer or dissection is concerning.  Correlate with prior CTA exams, which were not available at time of dictation.     XR CHEST (2 VW)    Result Date: 3/8/2024  No radiographic evidence of acute cardiopulmonary disease. Chronic appearing coarse interstitial densities predominate perihilar regions and lung bases, typical of sequela from smoking or other previous infectious/inflammatory process.       Physical Examination:     General appearance:  alert, cooperative and no distress  Mental Status:  oriented to person, place and time and normal affect  Lungs:  clear to auscultation bilaterally, normal effort  Heart: Irregular rhythm appreciated.   Abdomen:  soft, nontender, nondistended, normal bowel sounds, no masses, hepatomegaly, splenomegaly  Extremities:  no edema, redness, tenderness in the calves  Skin:  no gross lesions, rashes, induration    Assessment:     Hospital Problems             Last Modified POA    * (Principal) New onset atrial flutter (HCC) 3/8/2024 Yes    Anxiety and depression 3/8/2024 Yes    Mixed hyperlipidemia 3/8/2024 Yes    Essential hypertension 3/8/2024 Yes    Atrial flutter (HCC) 3/8/2024 Yes       Plan:     Pneumonia   -CT chest showing diffuse pulmonary nodules concerning for an infectious versus inflammatory process   -Levofloxacin 750 mg daily   -The patient is not hypoxic and has not required supplemental oxygen      Atrial flutter   -Rate-controlled   -New-onset  -Therapeutic Lovenox   -Start diltiazem 120 mg daily   -Echocardiogram is pending (this can likely be done as an outpatient as the patient is rate-controlled)   -Cardiology has been consulted; appreciate recommendations      Hypertension   -Continue home metoprolol and lisinopril      Hyperlipidemia   -Continue home atorvastatin      Alcohol use disorder   -The patient states that he drinks 8-10 beers daily and

## 2024-03-11 NOTE — PLAN OF CARE
Problem: Discharge Planning  Goal: Discharge to home or other facility with appropriate resources  Outcome: Completed     Problem: ABCDS Injury Assessment  Goal: Absence of physical injury  Outcome: Completed     Problem: Pain  Goal: Verbalizes/displays adequate comfort level or baseline comfort level  Outcome: Completed     Problem: Skin/Tissue Integrity  Goal: Absence of new skin breakdown  Description: 1.  Monitor for areas of redness and/or skin breakdown  2.  Assess vascular access sites hourly  3.  Every 4-6 hours minimum:  Change oxygen saturation probe site  4.  Every 4-6 hours:  If on nasal continuous positive airway pressure, respiratory therapy assess nares and determine need for appliance change or resting period.  Outcome: Completed     Problem: Chronic Conditions and Co-morbidities  Goal: Patient's chronic conditions and co-morbidity symptoms are monitored and maintained or improved  Outcome: Completed

## 2024-03-12 ENCOUNTER — CARE COORDINATION (OUTPATIENT)
Dept: CASE MANAGEMENT | Age: 59
End: 2024-03-12

## 2024-03-12 ENCOUNTER — TELEPHONE (OUTPATIENT)
Dept: PRIMARY CARE CLINIC | Age: 59
End: 2024-03-12

## 2024-03-12 DIAGNOSIS — I48.92 ATRIAL FLUTTER, UNSPECIFIED TYPE (HCC): Primary | ICD-10-CM

## 2024-03-12 NOTE — CARE COORDINATION
Care Transitions Initial Follow Up Call    Call within 2 business days of discharge: Yes    Patient Current Location:  Home: [address] [place] [address]    Care Transition Nurse contacted the patient by telephone to perform post hospital discharge assessment. Verified name and  with patient as identifiers. Provided introduction to self, and explanation of the Care Transition Nurse role.     Patient: Torrey Joy Patient : 1965   MRN: [id number]  Reason for Admission: Atrial flutter, COVID 19, Influenza A  Discharge Date: 3/11/24 RARS: Readmission Risk Score: 9      Last Discharge Facility       Date Complaint Diagnosis Description Type Department Provider    3/8/24 Irregular Heart Beat Hyponatremia ... ED to Hosp-Admission (Discharged) (ADMITTED) [facility] Darien Toussaint MD; Nya Angeles ...            Was this an external facility discharge? No Discharge Facility: [facility]    Challenges to be reviewed by the provider   Additional needs identified to be addressed with provider: No  none               Method of communication with provider: none.    Spoke with Torreyyash adhikari for initial 24 hour call. Patient was seen by PCP last week, he was in atrial flutter, was advised to go to ED for evaluation.  Patient had been feeling poorly for last couple of weeks with cough, shortness of breath, has had a lot of stressors recently, has been traveling back and forth to [place] after his mother passed away.  He states he had to drive home from [place] last time because he was coughing so much and could not get on a plane.  His symptoms are not as intense as before but still has some PRIETO, occasional cough. He denies any f/c, n/v or other viral like symptoms.  He denies any chest pains, palpitations, edema today.  He was started on Eliquis and Amiodarone for the A. Flutter and was also started on Doxycycline and Tamiflu for viral illness.  Discharge instructions reviewed, 1111 F  done.  Patient

## 2024-03-12 NOTE — TELEPHONE ENCOUNTER
Care Transitions Initial Follow Up Call    Outreach made within 2 business days of discharge: Yes    Patient: Torrey Joy Patient : 1965   MRN: [id number]  Reason for Admission: There are no discharge diagnoses documented for the most recent discharge.  Discharge Date: 3/11/24       Spoke with: patient     Discharge department/facility: [facility] Patient Contact:  Was patient able to fill all prescriptions: No: Still waiting for some refills   Was patient instructed to bring all medications to the follow-up visit: Yes  Is patient taking all medications as directed in the discharge summary? Yes  Does patient understand their discharge instructions: Yes  Does patient have questions or concerns that need addressed prior to 7-14 day follow up office visit: no    Scheduled appointment with PCP within 7-14 days    Follow Up  Future Appointments   Date Time Provider Department Center   3/20/2024 11:30 AM Gerdeman, Karine, APRN - CNP [facility]   2024  9:30 AM [facility]       Carmen Warren MA

## 2024-03-19 ENCOUNTER — CARE COORDINATION (OUTPATIENT)
Dept: CASE MANAGEMENT | Age: 59
End: 2024-03-19

## 2024-03-19 NOTE — CARE COORDINATION
Care Transitions Follow Up Call    Patient Current Location:  Home: [address] [place] [id number]    [facility] Care Coordinator contacted the patient by telephone to follow up after admission on 3/8/24.  Verified name and  with patient as identifiers.    Patient: Torrey Joy  Patient : 1965   MRN: [id number]  Reason for Admission: Atrial Flutter Covid   Discharge Date: 3/11/24 RARS: Readmission Risk Score: 9      Needs to be reviewed by the provider   Additional needs identified to be addressed with provider: No  none             Method of communication with provider: none.      Subsequent transitional call. Spoke to Torrey today. He reports that he is feeling much better since coming home from the hospital. He stated that he was able to move the garbage cans yesterday. Writer advised to take things easy. He denies HA , dizziness , sob , occasional cough is better. He denies swelling in legs. Stated he is taking his medication and checking his /79 latest BP. Stated BP is better pt on amiodarone and Eliquis.  He is still taking Tamiflu and doxycycline. Advised to complete total course he denies fever/chills/ N/V/D. Appetite fine. B/B fine. Discussed HFU appointment 3/20/24 . He has not made cardiologist appointment yet. Writer advised that he make appointment. Offered information to make appointment. He declined stated he has the information.   Addressed changes since last contact:  medications-see above discussed HFU, cardio follow up   none  Discussed follow-up appointments. If no appointment was previously scheduled, appointment scheduling offered: Yes.   Is follow up appointment scheduled within 7 days of discharge? No.    Follow Up  Future Appointments   Date Time Provider Department Center   3/20/2024 11:30 AM [facility]   2024  9:30 AM Karine Cole APRN - CNP [facility]         N Care Coordinator reviewed discharge instructions, medical

## 2024-03-20 ENCOUNTER — OFFICE VISIT (OUTPATIENT)
Dept: PRIMARY CARE CLINIC | Age: 59
End: 2024-03-20

## 2024-03-20 VITALS
HEIGHT: 71 IN | RESPIRATION RATE: 16 BRPM | SYSTOLIC BLOOD PRESSURE: 152 MMHG | OXYGEN SATURATION: 97 % | WEIGHT: 252 LBS | HEART RATE: 73 BPM | BODY MASS INDEX: 35.28 KG/M2 | DIASTOLIC BLOOD PRESSURE: 98 MMHG

## 2024-03-20 DIAGNOSIS — E11.9 TYPE 2 DIABETES MELLITUS WITHOUT COMPLICATION, WITHOUT LONG-TERM CURRENT USE OF INSULIN (HCC): ICD-10-CM

## 2024-03-20 DIAGNOSIS — I10 ESSENTIAL HYPERTENSION: ICD-10-CM

## 2024-03-20 DIAGNOSIS — F32.A ANXIETY AND DEPRESSION: ICD-10-CM

## 2024-03-20 DIAGNOSIS — F41.9 ANXIETY AND DEPRESSION: ICD-10-CM

## 2024-03-20 DIAGNOSIS — I10 PRIMARY HYPERTENSION: ICD-10-CM

## 2024-03-20 DIAGNOSIS — I48.3 TYPICAL ATRIAL FLUTTER (HCC): Primary | ICD-10-CM

## 2024-03-20 DIAGNOSIS — E66.01 SEVERE OBESITY (BMI 35.0-39.9) WITH COMORBIDITY (HCC): ICD-10-CM

## 2024-03-20 RX ORDER — ALPRAZOLAM 0.5 MG/1
TABLET ORAL
Qty: 180 TABLET | Refills: 0 | Status: SHIPPED | OUTPATIENT
Start: 2024-03-20 | End: 2024-06-20

## 2024-03-20 RX ORDER — AMLODIPINE BESYLATE 5 MG/1
5 TABLET ORAL DAILY
Qty: 90 TABLET | Refills: 3 | Status: SHIPPED | OUTPATIENT
Start: 2024-03-20 | End: 2024-03-20 | Stop reason: SDUPTHER

## 2024-03-20 RX ORDER — AMLODIPINE BESYLATE 5 MG/1
5 TABLET ORAL DAILY
Qty: 30 TABLET | Refills: 3 | Status: SHIPPED | OUTPATIENT
Start: 2024-03-20

## 2024-03-20 RX ORDER — LISINOPRIL 40 MG/1
40 TABLET ORAL DAILY
Qty: 90 TABLET | Refills: 3 | Status: SHIPPED | OUTPATIENT
Start: 2024-03-20

## 2024-03-20 SDOH — ECONOMIC STABILITY: FOOD INSECURITY: WITHIN THE PAST 12 MONTHS, THE FOOD YOU BOUGHT JUST DIDN'T LAST AND YOU DIDN'T HAVE MONEY TO GET MORE.: NEVER TRUE

## 2024-03-20 SDOH — ECONOMIC STABILITY: INCOME INSECURITY: HOW HARD IS IT FOR YOU TO PAY FOR THE VERY BASICS LIKE FOOD, HOUSING, MEDICAL CARE, AND HEATING?: NOT HARD AT ALL

## 2024-03-20 SDOH — ECONOMIC STABILITY: FOOD INSECURITY: WITHIN THE PAST 12 MONTHS, YOU WORRIED THAT YOUR FOOD WOULD RUN OUT BEFORE YOU GOT MONEY TO BUY MORE.: NEVER TRUE

## 2024-03-20 ASSESSMENT — PATIENT HEALTH QUESTIONNAIRE - PHQ9
10. IF YOU CHECKED OFF ANY PROBLEMS, HOW DIFFICULT HAVE THESE PROBLEMS MADE IT FOR YOU TO DO YOUR WORK, TAKE CARE OF THINGS AT HOME, OR GET ALONG WITH OTHER PEOPLE: NOT DIFFICULT AT ALL
3. TROUBLE FALLING OR STAYING ASLEEP: NOT AT ALL
SUM OF ALL RESPONSES TO PHQ QUESTIONS 1-9: 0
9. THOUGHTS THAT YOU WOULD BE BETTER OFF DEAD, OR OF HURTING YOURSELF: NOT AT ALL
SUM OF ALL RESPONSES TO PHQ9 QUESTIONS 1 & 2: 0
SUM OF ALL RESPONSES TO PHQ QUESTIONS 1-9: 0
2. FEELING DOWN, DEPRESSED OR HOPELESS: NOT AT ALL
SUM OF ALL RESPONSES TO PHQ QUESTIONS 1-9: 0
8. MOVING OR SPEAKING SO SLOWLY THAT OTHER PEOPLE COULD HAVE NOTICED. OR THE OPPOSITE, BEING SO FIGETY OR RESTLESS THAT YOU HAVE BEEN MOVING AROUND A LOT MORE THAN USUAL: NOT AT ALL
5. POOR APPETITE OR OVEREATING: NOT AT ALL
1. LITTLE INTEREST OR PLEASURE IN DOING THINGS: NOT AT ALL
SUM OF ALL RESPONSES TO PHQ QUESTIONS 1-9: 0
4. FEELING TIRED OR HAVING LITTLE ENERGY: NOT AT ALL
7. TROUBLE CONCENTRATING ON THINGS, SUCH AS READING THE NEWSPAPER OR WATCHING TELEVISION: NOT AT ALL
6. FEELING BAD ABOUT YOURSELF - OR THAT YOU ARE A FAILURE OR HAVE LET YOURSELF OR YOUR FAMILY DOWN: NOT AT ALL

## 2024-03-20 NOTE — PROGRESS NOTES
160/98 (!) 152/98   Pulse: 73    Resp: 16    SpO2: 97%    Weight: 114.3 kg (252 lb)    Height: 1.803 m (5' 11\")      Body mass index is 35.15 kg/m².     Wt Readings from Last 3 Encounters:   03/20/24 114.3 kg (252 lb)   03/11/24 115.7 kg (255 lb)   03/08/24 115.4 kg (254 lb 6.4 oz)     BP Readings from Last 3 Encounters:   03/20/24 (!) 152/98   03/11/24 (!) 154/95   03/08/24 (!) 152/104        Patient was admitted to [facility] from 3/8/24 to 3/11/24 for Aflutter, covid, flu, pneumonia.    Inpatient course: Discharge summary reviewed- see chart.    Current status: fair    Review of Systems:  A comprehensive review of systems was negative except for what was noted in the HPI.    Physical Exam:  General Appearance: alert and oriented to person, place and time, well developed and well- nourished, in no acute distress  Skin: warm and dry, no rash or erythema  Head: normocephalic and atraumatic  Eyes: pupils equal, round, and reactive to light, extraocular eye movements intact, conjunctivae normal  ENT: tympanic membrane, external ear and ear canal normal bilaterally, nose without deformity, nasal mucosa and turbinates normal without polyps  Neck: supple and non-tender without mass, no thyromegaly or thyroid nodules, no cervical lymphadenopathy  Pulmonary/Chest: clear to auscultation bilaterally- no wheezes, rales or rhonchi, normal air movement, no respiratory distress  Cardiovascular: normal rate, regular rhythm, normal S1 and S2, no murmurs, rubs, clicks, or gallops, distal pulses intact, no carotid bruits  Abdomen: soft, non-tender, non-distended, normal bowel sounds, no masses or organomegaly  Extremities: no cyanosis, clubbing or edema  Musculoskeletal: normal range of motion, no joint swelling, deformity or tenderness  Neurologic: reflexes normal and symmetric, no cranial nerve deficit, gait, coordination and speech normal        Assessment/Plan:  Torrey was seen today for follow-up from hospital and atrial

## 2024-03-25 ENCOUNTER — CARE COORDINATION (OUTPATIENT)
Dept: CASE MANAGEMENT | Age: 59
End: 2024-03-25

## 2024-03-25 NOTE — CARE COORDINATION
Care Transitions Follow Up Call    Patient Current Location:  Home: [address] [place] [id number]    Care Transition Nurse contacted the patient by telephone to follow up after admission on 3/20.  Verified name and  with patient as identifiers.    Patient: Torrey Joy  Patient : 1965   MRN: [id number]  Reason for Admission: Atrial flutter  Discharge Date: 3/11/24 RARS: Readmission Risk Score: 9      Needs to be reviewed by the provider   Additional needs identified to be addressed with provider: No  none             Method of communication with provider: none.    Spoke with Torrey today for transitional follow up call. He states he is doing ok today, still has occasional palpitations, denies any swelling today but has some PRIETO.  He was seen by PCP last week, blood pressures were 150's/90's, Norvasc was added to regimen for better control.  Today, blood pressures running in the upper 130's systolic, diastolic pressures have been around 80, he feels good with no problems, denies any dizziness or lightheadedness.  He will follow up with Dr. Hughes on  and will see cardiology on .  He denies any further cough, denies any new needs or concerns at this time. We did discuss SE of Norvasc and what to monitor for.  Patient will check blood pressures in the evening to see how they are at the end of the day.  Expressed to call if any new needs or concerns.     Addressed changes since last contact:  none  Discussed follow-up appointments. If no appointment was previously scheduled, appointment scheduling offered: No.   Is follow up appointment scheduled within 7 days of discharge? Yes.    Follow Up  Future Appointments   Date Time Provider Department Center   2024  9:30 AM Karine Cole APRN - CNP [facility]   2024  2:15 PM Ronit Caballero APRN - CNP [facility]     External follow up appointment(s):  Dr. Hughes    Care Transition Nurse reviewed medical action

## 2024-04-03 ENCOUNTER — CARE COORDINATION (OUTPATIENT)
Dept: CASE MANAGEMENT | Age: 59
End: 2024-04-03

## 2024-04-03 NOTE — CARE COORDINATION
Care Transitions Follow Up Call    Patient Current Location:  Home: [address] [place] [id number]    [facility] Care Coordinator contacted the patient by telephone to follow up after admission on 3/20/24.  Verified name and  with patient as identifiers.    Patient: Torrey Joy  Patient : 1965   MRN: [id number]  Reason for Admission: Atrial flutter   Discharge Date: 3/11/24 RARS: Readmission Risk Score: 9      Needs to be reviewed by the provider   Additional needs identified to be addressed with provider: No  none             Method of communication with provider: none.    Subsequent transitional call. Spoke to Torrey today he reports that his BP has been good. 116/70 128/78. He reports he goes still has palpitations that are non worsening . He still has palpitation but stated they are here and there non worsening. He denies dizziness lightheadedness or cough. He reports some swelling to legs. Writer discussed daily weights and to keep a log. Instruction given on how and when to take weight. Also discussed added sodium in diet-accepted referral to dietician. He voiced no other needs or concerns. Reminded him of follow up appointments. Vascular 24 pcp 24 cardio 24.    Addressed changes since last contact:   discussed daily weights BP's, referral to dietician ,   Discussed follow-up appointments. If no appointment was previously scheduled, appointment scheduling offered: Yes.   Is follow up appointment scheduled within 7 days of discharge? Yes.    Follow Up  Future Appointments   Date Time Provider Department Center   2024  9:30 AM Karine Cole APRN - CNP [facility]   2024  2:15 PM Ronit Caballero APRN - CNP [facility]           LPN Care Coordinator reviewed discharge instructions, medical action plan, and red flags with patient and discussed any barriers to care and/or understanding of plan of care after discharge. Discussed appropriate site of care

## 2024-04-04 ENCOUNTER — CARE COORDINATION (OUTPATIENT)
Dept: CARE COORDINATION | Age: 59
End: 2024-04-04

## 2024-04-04 NOTE — CARE COORDINATION
Referral from CTN, Ria Herrera LPN-  Hi, this pt would like more information about added sodium in diet. Thank you     Will reach out to patient for consult.  LEÓN Doran

## 2024-04-04 NOTE — CARE COORDINATION
Registered Dietitian Initial Assessment for Care Coordination      Name-Torrey Joy  April 4, 2024    Initial Referral Reason: HTN/Afib    Patient Care Team:  Karine Cole APRN - CNP as PCP - General (Nurse Practitioner)  Karine Cole APRN - CNP as PCP - Empaneled Provider  Obinna Treviño MD as Consulting Physician (Internal Medicine)  Zhen Bedoya MD (Thoracic Surgery)  Nadira Mathur RN as Care Transitions Nurse  Solange Miller RD, LD as Dietitian    Patient Active Problem List   Diagnosis    Anxiety and depression    Mixed hyperlipidemia    Primary hypertension    Primary osteoarthritis involving multiple joints    Multiple lung nodules    Acquired trigger finger    Class 2 obesity    Type 2 diabetes mellitus    New onset atrial flutter (HCC)    Atrial flutter (HCC)    Hyponatremia    Dyspnea on exertion    Sleep apnea       Current Outpatient Medications   Medication Sig Dispense Refill    lisinopril (PRINIVIL;ZESTRIL) 40 MG tablet Take 1 tablet by mouth daily 90 tablet 3    ALPRAZolam (XANAX) 0.5 MG tablet Take 1 tablet by mouth 2 times daily as needed for anxiety. 180 tablet 0    amLODIPine (NORVASC) 5 MG tablet Take 1 tablet by mouth daily 30 tablet 3    amiodarone (CORDARONE) 200 MG tablet Take 1 tablet by mouth daily 30 tablet 2    apixaban (ELIQUIS) 5 MG TABS tablet Take 1 tablet by mouth 2 times daily 180 tablet 1    ALPRAZolam (XANAX) 0.5 MG tablet Take 1 tablet by mouth in the morning and at bedtime.      venlafaxine (EFFEXOR XR) 150 MG extended release capsule TAKE 1 CAPSULE DAILY 14 capsule 0    Diclofenac Sodium 100 MG TB24 TAKE 1 TABLET DAILY 90 tablet 3    metoprolol succinate (TOPROL XL) 100 MG extended release tablet TAKE 1 TABLET DAILY 90 tablet 3    pantoprazole (PROTONIX) 40 MG tablet TAKE 1 TABLET ONCE A DAY BEFORE MEAL 90 tablet 3    budesonide (PULMICORT) 0.5 MG/2ML nebulizer suspension       triamcinolone (NASACORT) 55 MCG/ACT nasal inhaler

## 2024-04-10 ENCOUNTER — CARE COORDINATION (OUTPATIENT)
Dept: CASE MANAGEMENT | Age: 59
End: 2024-04-10

## 2024-04-11 ENCOUNTER — CARE COORDINATION (OUTPATIENT)
Dept: CASE MANAGEMENT | Age: 59
End: 2024-04-11

## 2024-04-11 NOTE — CARE COORDINATION
Care Transitions Outreach Attempt #2       Attempted to reach patient for transitions of care follow up. Unable to reach patient. HIPAA compliant message left on  requesting a return call. Will end care transitions if no return call received.     Patient: Torrey Joy Patient : 1965 MRN: [id number]    Last Discharge Facility       Date Complaint Diagnosis Description Type Department Provider    3/8/24 Irregular Heart Beat Hyponatremia ... ED to Hosp-Admission (Discharged) (ADMITTED) [facility] Darien Toussaint MD; Nya Angeles ...              Was this an external facility discharge? No Discharge Facility: [facility]    Noted following upcoming appointments from discharge chart review:   [facility] follow up appointment(s):   Future Appointments   Date Time Provider Department Center   2024  9:30 AM Karine Cole APRN - CNP [facility]   2024  2:15 PM Ronit Caballero APRN - CNP [facility] Harrison LPN  Care Transition Nurse

## 2024-04-12 ENCOUNTER — OFFICE VISIT (OUTPATIENT)
Dept: PRIMARY CARE CLINIC | Age: 59
End: 2024-04-12
Payer: COMMERCIAL

## 2024-04-12 VITALS
WEIGHT: 263 LBS | OXYGEN SATURATION: 98 % | SYSTOLIC BLOOD PRESSURE: 126 MMHG | RESPIRATION RATE: 16 BRPM | HEART RATE: 81 BPM | BODY MASS INDEX: 36.82 KG/M2 | HEIGHT: 71 IN | DIASTOLIC BLOOD PRESSURE: 78 MMHG

## 2024-04-12 DIAGNOSIS — Z00.00 ENCOUNTER FOR GENERAL ADULT MEDICAL EXAMINATION W/O ABNORMAL FINDINGS: Primary | ICD-10-CM

## 2024-04-12 DIAGNOSIS — F32.A ANXIETY AND DEPRESSION: ICD-10-CM

## 2024-04-12 DIAGNOSIS — I48.3 TYPICAL ATRIAL FLUTTER (HCC): ICD-10-CM

## 2024-04-12 DIAGNOSIS — I10 PRIMARY HYPERTENSION: ICD-10-CM

## 2024-04-12 DIAGNOSIS — F41.9 ANXIETY AND DEPRESSION: ICD-10-CM

## 2024-04-12 DIAGNOSIS — I10 ESSENTIAL HYPERTENSION: ICD-10-CM

## 2024-04-12 PROCEDURE — 3074F SYST BP LT 130 MM HG: CPT | Performed by: NURSE PRACTITIONER

## 2024-04-12 PROCEDURE — 3078F DIAST BP <80 MM HG: CPT | Performed by: NURSE PRACTITIONER

## 2024-04-12 PROCEDURE — 99396 PREV VISIT EST AGE 40-64: CPT | Performed by: NURSE PRACTITIONER

## 2024-04-12 RX ORDER — VENLAFAXINE HYDROCHLORIDE 150 MG/1
CAPSULE, EXTENDED RELEASE ORAL
Qty: 90 CAPSULE | Refills: 3 | Status: SHIPPED | OUTPATIENT
Start: 2024-04-12

## 2024-04-12 SDOH — ECONOMIC STABILITY: FOOD INSECURITY: WITHIN THE PAST 12 MONTHS, THE FOOD YOU BOUGHT JUST DIDN'T LAST AND YOU DIDN'T HAVE MONEY TO GET MORE.: NEVER TRUE

## 2024-04-12 SDOH — ECONOMIC STABILITY: FOOD INSECURITY: WITHIN THE PAST 12 MONTHS, YOU WORRIED THAT YOUR FOOD WOULD RUN OUT BEFORE YOU GOT MONEY TO BUY MORE.: NEVER TRUE

## 2024-04-12 SDOH — ECONOMIC STABILITY: INCOME INSECURITY: HOW HARD IS IT FOR YOU TO PAY FOR THE VERY BASICS LIKE FOOD, HOUSING, MEDICAL CARE, AND HEATING?: NOT HARD AT ALL

## 2024-04-12 ASSESSMENT — PATIENT HEALTH QUESTIONNAIRE - PHQ9
SUM OF ALL RESPONSES TO PHQ9 QUESTIONS 1 & 2: 0
SUM OF ALL RESPONSES TO PHQ QUESTIONS 1-9: 0
3. TROUBLE FALLING OR STAYING ASLEEP: NOT AT ALL
7. TROUBLE CONCENTRATING ON THINGS, SUCH AS READING THE NEWSPAPER OR WATCHING TELEVISION: NOT AT ALL
10. IF YOU CHECKED OFF ANY PROBLEMS, HOW DIFFICULT HAVE THESE PROBLEMS MADE IT FOR YOU TO DO YOUR WORK, TAKE CARE OF THINGS AT HOME, OR GET ALONG WITH OTHER PEOPLE: NOT DIFFICULT AT ALL
2. FEELING DOWN, DEPRESSED OR HOPELESS: NOT AT ALL
4. FEELING TIRED OR HAVING LITTLE ENERGY: NOT AT ALL
5. POOR APPETITE OR OVEREATING: NOT AT ALL
8. MOVING OR SPEAKING SO SLOWLY THAT OTHER PEOPLE COULD HAVE NOTICED. OR THE OPPOSITE, BEING SO FIGETY OR RESTLESS THAT YOU HAVE BEEN MOVING AROUND A LOT MORE THAN USUAL: NOT AT ALL
SUM OF ALL RESPONSES TO PHQ QUESTIONS 1-9: 0
SUM OF ALL RESPONSES TO PHQ QUESTIONS 1-9: 0
9. THOUGHTS THAT YOU WOULD BE BETTER OFF DEAD, OR OF HURTING YOURSELF: NOT AT ALL
6. FEELING BAD ABOUT YOURSELF - OR THAT YOU ARE A FAILURE OR HAVE LET YOURSELF OR YOUR FAMILY DOWN: NOT AT ALL
SUM OF ALL RESPONSES TO PHQ QUESTIONS 1-9: 0
1. LITTLE INTEREST OR PLEASURE IN DOING THINGS: NOT AT ALL

## 2024-04-12 ASSESSMENT — ENCOUNTER SYMPTOMS
COUGH: 0
ABDOMINAL PAIN: 0
BACK PAIN: 0
SHORTNESS OF BREATH: 0

## 2024-04-12 NOTE — PROGRESS NOTES
[facility]  [facility] PRIMARY [facility]ARE  [address] [place] [address] [address]  [address]  [facility] [address]  Dept: [phone]  Dept Fax: [phone]    Torrey Joy is a 58 y.o. male who presentstoday for his medical conditions/complaints as noted below.  Torrey Joy is c/o of  Chief Complaint   Patient presents with    1 Month Follow-Up    Hypertension    Shortness of Breath     Heart Flutter @times     Annual Exam           HPI:     Presents for annual exam  BP well controlled  Has gained 8lb since LOV  Will be work on diet/exercise    Continued SOB with exertion  Compliant with meds  Has recheck with cardiology 4/18/24    Has met with vascular  Obtaining imaging for comparison and updating carotids  Has recheck scheduled    Labs up to date    Denies any other problems/concerns      Hemoglobin A1C (%)   Date Value   03/08/2024 6.1 (H)   03/03/2023 5.4   09/09/2022 5.7             ( goal A1C is < 7)   No components found for: \"LABMICR\"  LDL Cholesterol (mg/dL)   Date Value   03/08/2024 93   09/09/2022 88   08/20/2021 84     LDL Calculated (mg/dL)   Date Value   12/02/2016 97       (goal LDL is <100)   AST (U/L)   Date Value   03/08/2024 22     ALT (U/L)   Date Value   03/08/2024 23     BUN (mg/dL)   Date Value   03/11/2024 16     BP Readings from Last 3 Encounters:   04/12/24 126/78   03/20/24 (!) 152/98   03/11/24 (!) 154/95          ([id number])    Past Medical History:   Diagnosis Date    Arthritis     Wilcox esophagus     Depression     DU (duodenal ulcer)     Emphysema lung (HCC)     [place] (hard of hearing)     Hyperlipidemia     Hypersomnia with sleep apnea, unspecified     CPAP nightly    Hypertension     AGUS (obstructive sleep apnea)     wears CPAP    Thoracic outlet syndrome     Rt      Past Surgical History:   Procedure Laterality Date    CARPAL TUNNEL RELEASE Left 12/17/2019    CARPAL TUNNEL RELEASE performed by Carlos Hardin MD at [facility] OR    CARPAL TUNNEL RELEASE Right 1/27/2020

## 2024-04-18 ENCOUNTER — CARE COORDINATION (OUTPATIENT)
Dept: CARE COORDINATION | Age: 59
End: 2024-04-18

## 2024-04-18 NOTE — CARE COORDINATION
Nutrition Care Coordinator Follow-Up visit:    Food Recall:eating 3 meals/d    Activity Level:  Sedentary:X  Lightly Active:  Moderately Active:  Very Active:    Adult BMI:  Underweight (below 18.5)  Normal Weight (18.5-24.9)  Overweight (25-29.9)  Obese (30-39.9)X  Morbidly Obese (>40)    Plan:  Plan was established with patient:  Increase dietary fiber by consuming whole grains, fruits and vegetables:X  Limit dietary cholesterol to >200mg/day:  Increase water intake:  Avoid added sugar:  Avoid sweetened beverages:  Choose lean meats:X  Limit sodium intake: X    Monitoring:  Will monitor weight:  Will monitor adherence to meal plan:X  Will monitor adherence to exercise plan:  Will monitor HGA1c:    Handouts Provided :  Low Carb snacking:  Carb counting /individual meal plan:  Portion Control:  Food Labels:X  Physical Activity:  Low Fat/Cholesterol:  Hypo/Hyperglycemia:  Calorie Controlled Meal Plan:  DASH guidelines: X    Goals:  Increase water consumption to 8oz. 6-8 times daily:  Manage blood sugars by consuming 3 meals spaced every 4-5 hours with 2-3 snacks daily:  Increase fiber and decrease fat intake by consuming 1-2 fruit servings and 2-3 vegetable servings per day.discussed  Increase physical activity by:  Consume less than 2,000mg of sodium/day: discussed  Avoid consumption of sweetened beverages and added sugar by reading food labels:  Monitor blood sugars by using meter to check blood glucose before morning meal and 2 hours after a meal daily:  Decrease risk of coronary heart disease by consuming fish that contains omega-3 fatty acids at least twice a week, avoiding partially hydrogenated oil/trans fats and limiting saturated fat intake by reading food labels:discussed    Patient goals set:  1. Reviewed heart healthy, DASH guidelines- lowfat/cholesterol and low sodium. Reviewed reading food labels-what to look for on labels.  Encouraged to limit saturated fat, trans fat, cholesterol and sodium

## 2024-05-01 ENCOUNTER — HOSPITAL ENCOUNTER (OUTPATIENT)
Age: 59
Setting detail: OUTPATIENT SURGERY
Discharge: HOME | End: 2024-05-03
Attending: INTERNAL MEDICINE
Payer: COMMERCIAL

## 2024-05-01 ENCOUNTER — HOSPITAL ENCOUNTER (OUTPATIENT)
Age: 59
Discharge: HOME | End: 2024-05-03
Attending: INTERNAL MEDICINE
Payer: COMMERCIAL

## 2024-05-01 VITALS
WEIGHT: 262 LBS | TEMPERATURE: 98.4 F | BODY MASS INDEX: 36.68 KG/M2 | SYSTOLIC BLOOD PRESSURE: 146 MMHG | DIASTOLIC BLOOD PRESSURE: 91 MMHG | HEIGHT: 71 IN | OXYGEN SATURATION: 95 % | HEART RATE: 72 BPM | RESPIRATION RATE: 25 BRPM

## 2024-05-01 DIAGNOSIS — I48.91 ATRIAL FIBRILLATION (HCC): ICD-10-CM

## 2024-05-01 DIAGNOSIS — I48.92 ATRIAL FLUTTER (HCC): Primary | ICD-10-CM

## 2024-05-01 LAB
BUN BLD-MCNC: 14 MG/DL (ref 8–26)
CA-I BLD-SCNC: 1.15 MMOL/L (ref 1.15–1.33)
CHLORIDE BLD-SCNC: 104 MMOL/L (ref 98–107)
ECHO BSA: 2.44 M2
ECHO BSA: 2.44 M2
EGFR, POC: 69 ML/MIN/1.73M2
GLUCOSE BLD-MCNC: 104 MG/DL (ref 74–100)
HCT VFR BLD AUTO: 37 % (ref 41–53)
PLATELET # BLD AUTO: 328 K/UL (ref 140–450)
POC CREATININE: 1.2 MG/DL (ref 0.51–1.19)
POC HEMOGLOBIN (CALC): 12.5 G/DL (ref 13.5–17.5)
POTASSIUM BLD-SCNC: 4.4 MMOL/L (ref 3.5–4.5)
SODIUM BLD-SCNC: 135 MMOL/L (ref 138–146)

## 2024-05-01 PROCEDURE — 82565 ASSAY OF CREATININE: CPT

## 2024-05-01 PROCEDURE — 92960 CARDIOVERSION ELECTRIC EXT: CPT | Performed by: INTERNAL MEDICINE

## 2024-05-01 PROCEDURE — 2580000003 HC RX 258: Performed by: INTERNAL MEDICINE

## 2024-05-01 PROCEDURE — 6360000002 HC RX W HCPCS: Performed by: INTERNAL MEDICINE

## 2024-05-01 PROCEDURE — 85049 AUTOMATED PLATELET COUNT: CPT

## 2024-05-01 PROCEDURE — 82435 ASSAY OF BLOOD CHLORIDE: CPT

## 2024-05-01 PROCEDURE — 82330 ASSAY OF CALCIUM: CPT

## 2024-05-01 PROCEDURE — 7100000010 HC PHASE II RECOVERY - FIRST 15 MIN

## 2024-05-01 PROCEDURE — 36415 COLL VENOUS BLD VENIPUNCTURE: CPT

## 2024-05-01 PROCEDURE — 84295 ASSAY OF SERUM SODIUM: CPT

## 2024-05-01 PROCEDURE — 84520 ASSAY OF UREA NITROGEN: CPT

## 2024-05-01 PROCEDURE — 85014 HEMATOCRIT: CPT

## 2024-05-01 PROCEDURE — 7100000011 HC PHASE II RECOVERY - ADDTL 15 MIN

## 2024-05-01 PROCEDURE — 92960 CARDIOVERSION ELECTRIC EXT: CPT

## 2024-05-01 PROCEDURE — 84132 ASSAY OF SERUM POTASSIUM: CPT

## 2024-05-01 PROCEDURE — 82947 ASSAY GLUCOSE BLOOD QUANT: CPT

## 2024-05-01 RX ORDER — MIDAZOLAM HYDROCHLORIDE 1 MG/ML
INJECTION INTRAMUSCULAR; INTRAVENOUS PRN
Status: COMPLETED | OUTPATIENT
Start: 2024-05-01 | End: 2024-05-01

## 2024-05-01 RX ORDER — SODIUM CHLORIDE 9 MG/ML
INJECTION, SOLUTION INTRAVENOUS CONTINUOUS
Status: DISCONTINUED | OUTPATIENT
Start: 2024-05-01 | End: 2024-05-04 | Stop reason: HOSPADM

## 2024-05-01 RX ORDER — FENTANYL CITRATE 50 UG/ML
INJECTION, SOLUTION INTRAMUSCULAR; INTRAVENOUS PRN
Status: COMPLETED | OUTPATIENT
Start: 2024-05-01 | End: 2024-05-01

## 2024-05-01 RX ADMIN — MIDAZOLAM 2 MG: 1 INJECTION INTRAMUSCULAR; INTRAVENOUS at 13:22

## 2024-05-01 RX ADMIN — SODIUM CHLORIDE: 9 INJECTION, SOLUTION INTRAVENOUS at 12:58

## 2024-05-01 RX ADMIN — FENTANYL CITRATE 50 MCG: 50 INJECTION, SOLUTION INTRAMUSCULAR; INTRAVENOUS at 13:23

## 2024-05-01 RX ADMIN — PROPOFOL 20 MG: 10 INJECTION, EMULSION INTRAVENOUS at 13:28

## 2024-05-01 RX ADMIN — PROPOFOL 30 MG: 10 INJECTION, EMULSION INTRAVENOUS at 13:26

## 2024-05-01 RX ADMIN — PROPOFOL 30 MG: 10 INJECTION, EMULSION INTRAVENOUS at 13:25

## 2024-05-01 NOTE — PROCEDURES
[facility]  Cardioversion procedure Note         Today's Date: 5/1/2024    Primary/Ordering Cardiologist: Domonique Jauregui MD    Indication: Persistent atrial fibrillation    Pre Procedure Conscious Sedation Data:    ASA Class:    [] I [x] II [] III [] IV    Mallampati Class:  [] I [x] II [] III [] IV    Patient seen and examined. History and Physical reviewed. Labs reviewed.    After informed consent was obtained with explanation of the risks and benefits, the patient was prepared using standard tecqniques.     All Conscious Sedation was administered via the Cardiologist.     CARDIOVERSION:    After an adequate level of sedation was achieved  200J in biphasic synchronized delivery was administered.   conversion to normal sinus rhythm.     The patient awoke without complications. A post procedure 12 L ECG was ordered and reviewed.    The patient will continue with the same medications.   Long term care and cardiovascular management    Impression:  Successful Consious Sedation - safely  Successful Cardioversion    Complications:  There were no complications encountered.    Electronically signed by Domonique Jauregui MD on 5/1/2024 at 1:32 PM        [facility]  [phone]

## 2024-05-01 NOTE — DISCHARGE INSTRUCTIONS
Electrical Cardioversion: What to Expect at Home  Your recovery     Electrical cardioversion is a treatment for an abnormal heartbeat, such as atrial fibrillation, supraventricular tachycardia, or ventricular tachycardia (VT). Your doctor used a brief electrical shock to reset your heart's rhythm.  After the procedure, you may have redness, like a sunburn, where the patches were. The medicines you got to make you sleepy may make you feel drowsy for the rest of the day. You may feel soreness or discomfort in your chest wall for a few days.  Your doctor may have you take medicines to help the heart beat normally and to prevent blood clots.  This care sheet gives you a general idea about how long it will take for you to recover. But each person recovers at a different pace. Follow the steps below to feel better as quickly as possible.  How can you care for yourself at home?  Medicines    If the doctor gave you a sedative:  For 24 hours, don't do anything that requires attention to detail. It takes time for the medicine's effects to completely wear off.  For your safety, do not drive or operate any machinery that could be dangerous. Wait until the medicine wears off and you can think clearly and react easily.     Be safe with medicines. Take your medicines exactly as prescribed. Call your doctor if you think you are having a problem with your medicine. You may take one or more of the following medicines:  Rate-control medicines to slow the heart rate.  Rhythm-control medicines that help the heart keep a normal rhythm.  Blood thinners, also called anticoagulants, which help prevent blood clots.  You will get more details on the specific medicines your doctor prescribes. Be sure you know how to take your medicines safely.     Do not take any vitamins, over-the-counter medicines, or herbal products without talking to your doctor first.   Exercise    Talk to your doctor about what type and level of exercise are safe for

## 2024-05-01 NOTE — H&P
[facility]  Progress Note            Cardiology Follow Up.      Torrey Joy  1965  [id number]    Today: 4/18/24    CC: Patient is here for scheduled cardioversion    HPI:   Torrey Joy   States he still \"feels it\" every now and then. States it is not a pain but just a \"stronger beat.\" States sometimes he can get short of breath at rest but mostly when he is up walking/doing activity. Compliant with all medications. States once in a great while he will have a stomach ache but nothing speciif. When he gets up too fast he gest dizzy which he states resolves after a min of \"just waiting for it to go by.\" Denies any bleeding    Past Medical:  Past Medical History:   Diagnosis Date    Arthritis     Wilcox esophagus     Depression     DU (duodenal ulcer)     Emphysema lung (HCC)     [place] (hard of hearing)     Hyperlipidemia     Hypersomnia with sleep apnea, unspecified     CPAP nightly    Hypertension     AGUS (obstructive sleep apnea)     wears CPAP    Thoracic outlet syndrome     Rt         Past Surgical:  Past Surgical History:   Procedure Laterality Date    CARPAL TUNNEL RELEASE Left 12/17/2019    CARPAL TUNNEL RELEASE performed by Carlos Hardin MD at [facility] OR    CARPAL TUNNEL RELEASE Right 1/27/2020    CARPAL TUNNEL RELEASE performed by Carlos Hardin MD at [facility] OR    CATARACT REMOVAL  2021    CATARACT REMOVAL WITH IMPLANT Right     COLONOSCOPY  5/27/2016    JOINT REPLACEMENT Bilateral     hips    LYMPH NODE DISSECTION      neck    SOFT TISSUE TUMOR RESECTION      back    UPPER GASTROINTESTINAL ENDOSCOPY N/A 12/11/2018    EGD ESOPHAGOGASTRODUODENOSCOPY performed by Tripp Solitario DO at [facility] End[facility]    UPPER GASTROINTESTINAL ENDOSCOPY N/A 7/21/2020    EGD BIOPSY performed by Leeroy Jarquin MD at [facility]         Family History:  Family History   Problem Relation Age of Onset    Mult Sclerosis Father     Heart Disease Sister        Social History:  Social History

## 2024-05-01 NOTE — PROGRESS NOTES
Patient taken out to car with all personal belongings in a wheel chair.  Wife Driving patient home.

## 2024-05-03 LAB
EKG ATRIAL RATE: 288 BPM
EKG Q-T INTERVAL: 478 MS
EKG QRS DURATION: 88 MS
EKG QTC CALCULATION (BAZETT): 489 MS
EKG R AXIS: 56 DEGREES
EKG T AXIS: 49 DEGREES
EKG VENTRICULAR RATE: 63 BPM

## 2024-05-04 ENCOUNTER — HOSPITAL ENCOUNTER (EMERGENCY)
Age: 59
Discharge: HOME | End: 2024-05-04
Attending: EMERGENCY MEDICINE
Payer: COMMERCIAL

## 2024-05-04 ENCOUNTER — APPOINTMENT (OUTPATIENT)
Dept: GENERAL RADIOLOGY | Age: 59
End: 2024-05-04
Payer: COMMERCIAL

## 2024-05-04 VITALS
HEIGHT: 72 IN | SYSTOLIC BLOOD PRESSURE: 147 MMHG | WEIGHT: 262.35 LBS | BODY MASS INDEX: 35.53 KG/M2 | DIASTOLIC BLOOD PRESSURE: 93 MMHG | TEMPERATURE: 97.9 F | RESPIRATION RATE: 20 BRPM | HEART RATE: 63 BPM | OXYGEN SATURATION: 97 %

## 2024-05-04 DIAGNOSIS — I48.91 ATRIAL FIBRILLATION, UNSPECIFIED TYPE (HCC): Primary | ICD-10-CM

## 2024-05-04 DIAGNOSIS — R79.89 ELEVATED BRAIN NATRIURETIC PEPTIDE (BNP) LEVEL: ICD-10-CM

## 2024-05-04 DIAGNOSIS — E87.1 HYPONATREMIA: ICD-10-CM

## 2024-05-04 LAB
ALBUMIN SERPL-MCNC: 4.4 G/DL (ref 3.5–5.2)
ALBUMIN/GLOB SERPL: 1.4 {RATIO} (ref 1–2.5)
ALP SERPL-CCNC: 132 U/L (ref 40–129)
ALT SERPL-CCNC: 34 U/L (ref 5–41)
ANION GAP SERPL CALCULATED.3IONS-SCNC: 14 MMOL/L (ref 9–17)
AST SERPL-CCNC: 34 U/L
BASOPHILS # BLD: 0 K/UL (ref 0–0.2)
BASOPHILS NFR BLD: 0 % (ref 0–2)
BILIRUB SERPL-MCNC: 0.3 MG/DL (ref 0.3–1.2)
BNP SERPL-MCNC: 1856 PG/ML
BUN SERPL-MCNC: 14 MG/DL (ref 8–23)
CALCIUM SERPL-MCNC: 9.1 MG/DL (ref 8.6–10.4)
CHLORIDE SERPL-SCNC: 97 MMOL/L (ref 98–107)
CO2 SERPL-SCNC: 22 MMOL/L (ref 20–31)
CREAT SERPL-MCNC: 1.2 MG/DL (ref 0.7–1.2)
EOSINOPHIL # BLD: 0.18 K/UL (ref 0–0.4)
EOSINOPHILS RELATIVE PERCENT: 2 % (ref 1–4)
ERYTHROCYTE [DISTWIDTH] IN BLOOD BY AUTOMATED COUNT: 16.3 % (ref 12.5–15.4)
GFR, ESTIMATED: 69 ML/MIN/1.73M2
GLUCOSE SERPL-MCNC: 101 MG/DL (ref 70–99)
HCT VFR BLD AUTO: 34.5 % (ref 41–53)
HGB BLD-MCNC: 11.4 G/DL (ref 13.5–17.5)
LYMPHOCYTES NFR BLD: 4.5 K/UL (ref 1–4.8)
LYMPHOCYTES RELATIVE PERCENT: 50 % (ref 24–44)
MCH RBC QN AUTO: 27.2 PG (ref 26–34)
MCHC RBC AUTO-ENTMCNC: 32.9 G/DL (ref 31–37)
MCV RBC AUTO: 82.8 FL (ref 80–100)
MONOCYTES NFR BLD: 0.27 K/UL (ref 0.1–0.8)
MONOCYTES NFR BLD: 3 % (ref 1–7)
MORPHOLOGY: NORMAL
NEUTROPHILS NFR BLD: 45 % (ref 36–66)
NEUTS SEG NFR BLD: 4.05 K/UL (ref 1.8–7.7)
PLATELET # BLD AUTO: 350 K/UL (ref 140–450)
PMV BLD AUTO: 7.4 FL (ref 6–12)
POTASSIUM SERPL-SCNC: 4.9 MMOL/L (ref 3.7–5.3)
PROT SERPL-MCNC: 7.6 G/DL (ref 6.4–8.3)
RBC # BLD AUTO: 4.17 M/UL (ref 4.5–5.9)
SODIUM SERPL-SCNC: 133 MMOL/L (ref 135–144)
TROPONIN I SERPL HS-MCNC: 17 NG/L (ref 0–22)
TROPONIN I SERPL HS-MCNC: 18 NG/L (ref 0–22)
WBC OTHER # BLD: 9 K/UL (ref 3.5–11)

## 2024-05-04 PROCEDURE — 84484 ASSAY OF TROPONIN QUANT: CPT

## 2024-05-04 PROCEDURE — 96374 THER/PROPH/DIAG INJ IV PUSH: CPT

## 2024-05-04 PROCEDURE — 2580000003 HC RX 258: Performed by: PHYSICIAN ASSISTANT

## 2024-05-04 PROCEDURE — 85025 COMPLETE CBC W/AUTO DIFF WBC: CPT

## 2024-05-04 PROCEDURE — 71045 X-RAY EXAM CHEST 1 VIEW: CPT

## 2024-05-04 PROCEDURE — 6360000002 HC RX W HCPCS: Performed by: PHYSICIAN ASSISTANT

## 2024-05-04 PROCEDURE — 83880 ASSAY OF NATRIURETIC PEPTIDE: CPT

## 2024-05-04 PROCEDURE — 93005 ELECTROCARDIOGRAM TRACING: CPT

## 2024-05-04 PROCEDURE — 99284 EMERGENCY DEPT VISIT MOD MDM: CPT

## 2024-05-04 PROCEDURE — 80053 COMPREHEN METABOLIC PANEL: CPT

## 2024-05-04 PROCEDURE — 36415 COLL VENOUS BLD VENIPUNCTURE: CPT

## 2024-05-04 RX ORDER — FUROSEMIDE 10 MG/ML
20 INJECTION INTRAMUSCULAR; INTRAVENOUS ONCE
Status: COMPLETED | OUTPATIENT
Start: 2024-05-04 | End: 2024-05-04

## 2024-05-04 RX ORDER — 0.9 % SODIUM CHLORIDE 0.9 %
500 INTRAVENOUS SOLUTION INTRAVENOUS ONCE
Status: COMPLETED | OUTPATIENT
Start: 2024-05-04 | End: 2024-05-04

## 2024-05-04 RX ADMIN — SODIUM CHLORIDE 500 ML: 9 INJECTION, SOLUTION INTRAVENOUS at 20:06

## 2024-05-04 RX ADMIN — FUROSEMIDE 20 MG: 10 INJECTION, SOLUTION INTRAMUSCULAR; INTRAVENOUS at 20:55

## 2024-05-04 ASSESSMENT — ENCOUNTER SYMPTOMS
SHORTNESS OF BREATH: 1
EYE PAIN: 0
SORE THROAT: 0
BACK PAIN: 0
EYE ITCHING: 0
EYE DISCHARGE: 0
WHEEZING: 0
VOMITING: 0
NAUSEA: 0
RHINORRHEA: 0
COUGH: 0

## 2024-05-04 ASSESSMENT — HEART SCORE: ECG: NORMAL

## 2024-05-04 ASSESSMENT — PAIN - FUNCTIONAL ASSESSMENT: PAIN_FUNCTIONAL_ASSESSMENT: NONE - DENIES PAIN

## 2024-05-05 NOTE — DISCHARGE INSTRUCTIONS
Please continue to take your medications as directed    Please return to the emergency department for worsening shortness of breath chest pain or any other emergent concerns

## 2024-05-06 NOTE — ED PROVIDER NOTES
NATRIURETIC PEPTIDE - Abnormal; Notable for the following components:    Pro-BNP 1,856 (*)     All other components within normal limits   TROPONIN   TROPONIN         PERTINENT ATTENDING PHYSICIAN COMMENTS:    58-year-old male here with complaints of dyspnea with exertion.  Recently underwent cardioversion re:atrial flutter.  Noticed while walking today he was unable to tolerate much more than short distances.  Denies any pain with this.  On exam here at rest he looks to be in no distress.  His twelve-lead EKG showing atrial flutter with a controlled ventricular rate in the 50s.  Blood pressures have been stable.  Laboratory workup showing an elevated BNP.  Troponins are unchanged.  X-ray of the chest showing a bit of vascular congestion which I think is consistent, overall he looks a bit volume overloaded.  We spoke with cardiology.  Planning to undergo ablation early next week.  He was given a bit of diuretic here.  He would strongly prefer to be discharged home today.  Rate control will continue with amiodarone.  He is anticoagulated on Eliquis.         Chucky Mccain MD  05/06/24 2496    
Pending [facility] to [facility]  [address]  [facility] [address]  [phone]    Schedule an appointment as soon as possible for a visit   Please call the cardiology office on Monday to write arrangements for the [facility] Emergency Department  [id number] [facility] [address]  [facility] [id number] [phone]    As needed, If symptoms worsen      BAILEY Hendrix Jennifer, PA-C  05/04/24 [id number]

## 2024-05-07 LAB
EKG ATRIAL RATE: 277 BPM
EKG Q-T INTERVAL: 468 MS
EKG QRS DURATION: 92 MS
EKG QTC CALCULATION (BAZETT): 463 MS
EKG R AXIS: 59 DEGREES
EKG T AXIS: 48 DEGREES
EKG VENTRICULAR RATE: 59 BPM

## 2024-05-13 ENCOUNTER — CARE COORDINATION (OUTPATIENT)
Dept: CARE COORDINATION | Age: 59
End: 2024-05-13

## 2024-05-13 NOTE — CARE COORDINATION
avoiding canned, prepackaged foods, processed meats and cheese. Patient states is eating canned vegetables, discussed switching to frozen vegetables, patient agreed. Discussed processed meats in detail to avoid/limit- sausage, vines, bologna, ham, ect- patient is eating some of these  states will cut back.  Goal is <2,300gm of sodium/day.  3. Reviewed best ways to cook foods-baking, broiling, grilling or steaming foods. Discussed healthy fats- using olive or canola oil if adding oil and encouraged to use butter/margarine sparingly. Discussed the different types of fat in diet- trans/saturated fat and cholesterol-encouraged to limit. Discussed foods high in each- patient will be sent a list of foods to avoid/limit.  4. Reviewed shopping the outer rim of the grocery store where fresher foods are found. Discussed seasonings that can be used that do not contain salt. Patient states stopped adding salt to foods. We also discussed eating out and making healthier choices- encouraged to avoid fast food. Patient states he does eat fast food- encouraged to limit to once a week or less.   5. Reviewed portion control and using plate method at meals to increase intake of non-starchy vegetables. Goal is 1/2 of plate to be non-starchy vegetables at meals, 1/4 lean protein, 1/4 carbs. Discussed portion control and what a serving size is.   Spoke with patient who relays he is doing well. He states he is reading food labels more and is now buying frozen vegetables in place of canned.  Reviewed goals.  Will follow up with patient in 3-4 weeks to review and answer questions.    LEÓN Gibson

## 2024-05-20 ENCOUNTER — INITIAL CONSULT (OUTPATIENT)
Age: 59
End: 2024-05-20
Payer: COMMERCIAL

## 2024-05-20 VITALS
HEIGHT: 71 IN | BODY MASS INDEX: 36.73 KG/M2 | HEART RATE: 71 BPM | WEIGHT: 262.4 LBS | OXYGEN SATURATION: 95 % | SYSTOLIC BLOOD PRESSURE: 131 MMHG | DIASTOLIC BLOOD PRESSURE: 61 MMHG | TEMPERATURE: 97.9 F

## 2024-05-20 DIAGNOSIS — E66.9 CLASS 2 OBESITY: ICD-10-CM

## 2024-05-20 DIAGNOSIS — G47.33 OSA (OBSTRUCTIVE SLEEP APNEA): ICD-10-CM

## 2024-05-20 DIAGNOSIS — I48.92 ATRIAL FLUTTER, UNSPECIFIED TYPE (HCC): ICD-10-CM

## 2024-05-20 DIAGNOSIS — I10 PRIMARY HYPERTENSION: ICD-10-CM

## 2024-05-20 DIAGNOSIS — F10.20 ALCOHOLISM (HCC): ICD-10-CM

## 2024-05-20 DIAGNOSIS — I48.19 PERSISTENT ATRIAL FIBRILLATION WITH RAPID VENTRICULAR RESPONSE (HCC): Primary | ICD-10-CM

## 2024-05-20 DIAGNOSIS — I50.22 CHRONIC SYSTOLIC (CONGESTIVE) HEART FAILURE (HCC): ICD-10-CM

## 2024-05-20 PROCEDURE — 3078F DIAST BP <80 MM HG: CPT | Performed by: SPECIALIST

## 2024-05-20 PROCEDURE — 99215 OFFICE O/P EST HI 40 MIN: CPT | Performed by: SPECIALIST

## 2024-05-20 PROCEDURE — 3075F SYST BP GE 130 - 139MM HG: CPT | Performed by: SPECIALIST

## 2024-05-20 RX ORDER — SPIRONOLACTONE 25 MG/1
25 TABLET ORAL DAILY
Qty: 30 TABLET | Refills: 3 | Status: SHIPPED | OUTPATIENT
Start: 2024-05-20

## 2024-05-20 ASSESSMENT — ENCOUNTER SYMPTOMS
SHORTNESS OF BREATH: 1
GASTROINTESTINAL NEGATIVE: 1
EYES NEGATIVE: 1
ALLERGIC/IMMUNOLOGIC NEGATIVE: 1

## 2024-05-20 NOTE — PROGRESS NOTES
Physical Exam  Alert oriented cooperative very nice gentleman in no distress.  BMI of 36 kg/m².    Definitely a big belly.    JVP is not elevated.  Decreased air entry bilaterally.  Distant heart sounds.  No gallop rub or murmur.  He is in atrial fibrillation today.  Protuberant abdomen.  Traces of pedal edema.  ASSESSMENT/PLAN     There are no diagnoses linked to this encounter.  Persistent atrial fibrillation/atrial flutter.  Nonvalvular nonthyroid related.    Failed prior DC cardioversion.    Alcoholism    Obesity    Obstructive sleep apnea    All of the above was discussed with the patient in details.  Will going to add Aldactone 25 mg p.o. daily, will attempt DC cardioversion 1 more time hopefully that we will put him back into sinus rhythm and give him some relief and the healing process to start.  To be followed at 1 point with ablation of his/atrial flutter/atrial fibrillation.    Risk modification including losing weight, stop alcohol, stop smoking were all discussed with the patient.    I stressed repeatedly the importance of his oral anticoagulation he seems to be compliant with his medications.    The procedure of DC cardioversion was discussed again with the patient he has had it done before questions were answered and we will proceed from there.    Return in about 4 weeks (around 6/17/2024).    COMMUNICATION:       Electronically signed by Ata Harp MD on 5/20/2024 at 1:02 PM

## 2024-05-24 ENCOUNTER — HOSPITAL ENCOUNTER (OUTPATIENT)
Age: 59
Discharge: HOME | End: 2024-05-24
Payer: COMMERCIAL

## 2024-05-24 VITALS
WEIGHT: 257 LBS | TEMPERATURE: 98.6 F | DIASTOLIC BLOOD PRESSURE: 99 MMHG | OXYGEN SATURATION: 95 % | SYSTOLIC BLOOD PRESSURE: 149 MMHG | HEIGHT: 71 IN | RESPIRATION RATE: 24 BRPM | HEART RATE: 69 BPM | BODY MASS INDEX: 35.98 KG/M2

## 2024-05-24 DIAGNOSIS — I48.19 PERSISTENT ATRIAL FIBRILLATION (HCC): Primary | ICD-10-CM

## 2024-05-24 DIAGNOSIS — I48.0 PAF (PAROXYSMAL ATRIAL FIBRILLATION) (HCC): ICD-10-CM

## 2024-05-24 LAB
BUN BLD-MCNC: 22 MG/DL (ref 8–26)
CHLORIDE BLD-SCNC: 103 MMOL/L (ref 98–107)
ECHO BSA: 2.42 M2
EGFR, POC: 63 ML/MIN/1.73M2
EKG ATRIAL RATE: 288 BPM
EKG ATRIAL RATE: 69 BPM
EKG P AXIS: 42 DEGREES
EKG P-R INTERVAL: 188 MS
EKG Q-T INTERVAL: 452 MS
EKG Q-T INTERVAL: 468 MS
EKG QRS DURATION: 86 MS
EKG QRS DURATION: 92 MS
EKG QTC CALCULATION (BAZETT): 459 MS
EKG QTC CALCULATION (BAZETT): 484 MS
EKG R AXIS: 53 DEGREES
EKG R AXIS: 61 DEGREES
EKG T AXIS: 52 DEGREES
EKG T AXIS: 61 DEGREES
EKG VENTRICULAR RATE: 58 BPM
EKG VENTRICULAR RATE: 69 BPM
GLUCOSE BLD-MCNC: 107 MG/DL (ref 74–100)
HCT VFR BLD AUTO: 33 % (ref 41–53)
POC CREATININE: 1.3 MG/DL (ref 0.51–1.19)
POC HEMOGLOBIN (CALC): 11.4 G/DL (ref 13.5–17.5)
POTASSIUM BLD-SCNC: 4.9 MMOL/L (ref 3.5–4.5)
SODIUM BLD-SCNC: 136 MMOL/L (ref 138–146)

## 2024-05-24 PROCEDURE — 85014 HEMATOCRIT: CPT

## 2024-05-24 PROCEDURE — 99152 MOD SED SAME PHYS/QHP 5/>YRS: CPT | Performed by: SPECIALIST

## 2024-05-24 PROCEDURE — 82565 ASSAY OF CREATININE: CPT

## 2024-05-24 PROCEDURE — 82435 ASSAY OF BLOOD CHLORIDE: CPT

## 2024-05-24 PROCEDURE — 92960 CARDIOVERSION ELECTRIC EXT: CPT

## 2024-05-24 PROCEDURE — 93005 ELECTROCARDIOGRAM TRACING: CPT | Performed by: SPECIALIST

## 2024-05-24 PROCEDURE — 84132 ASSAY OF SERUM POTASSIUM: CPT

## 2024-05-24 PROCEDURE — 82947 ASSAY GLUCOSE BLOOD QUANT: CPT

## 2024-05-24 PROCEDURE — 7100000011 HC PHASE II RECOVERY - ADDTL 15 MIN: Performed by: SPECIALIST

## 2024-05-24 PROCEDURE — 2580000003 HC RX 258: Performed by: SPECIALIST

## 2024-05-24 PROCEDURE — 92960 CARDIOVERSION ELECTRIC EXT: CPT | Performed by: SPECIALIST

## 2024-05-24 PROCEDURE — 6360000002 HC RX W HCPCS: Performed by: SPECIALIST

## 2024-05-24 PROCEDURE — 99221 1ST HOSP IP/OBS SF/LOW 40: CPT | Performed by: SPECIALIST

## 2024-05-24 PROCEDURE — 84295 ASSAY OF SERUM SODIUM: CPT

## 2024-05-24 PROCEDURE — 7100000010 HC PHASE II RECOVERY - FIRST 15 MIN: Performed by: SPECIALIST

## 2024-05-24 PROCEDURE — 84520 ASSAY OF UREA NITROGEN: CPT

## 2024-05-24 RX ORDER — SODIUM CHLORIDE 9 MG/ML
INJECTION, SOLUTION INTRAVENOUS CONTINUOUS
Status: DISCONTINUED | OUTPATIENT
Start: 2024-05-24 | End: 2024-05-27 | Stop reason: HOSPADM

## 2024-05-24 RX ADMIN — SODIUM CHLORIDE: 9 INJECTION, SOLUTION INTRAVENOUS at 09:38

## 2024-05-24 RX ADMIN — PROPOFOL 70 MG: 10 INJECTION, EMULSION INTRAVENOUS at 11:28

## 2024-05-24 NOTE — PROGRESS NOTES
TRANSFER - OUT REPORT:    Verbal report given to Shahnaz on Torrey Joy being transferred to [facility] for routine post-op       Report consisted of patient's Situation, Background, Assessment and   Recommendations(SBAR).     Information from the following report(s) Nurse Handoff Report was reviewed with the receiving nurse.    Opportunity for questions and clarification was provided.      Patient transported with:   Registered Nurse

## 2024-05-24 NOTE — PROGRESS NOTES
All discharge instructions reviewed with family and patient, questions answered.  Patient discharged per wheelchair with spouse and belongings.

## 2024-05-24 NOTE — DISCHARGE INSTRUCTIONS
foods.  Limit alcohol to 2 drinks a day for men and 1 drink a day for women.  Activity  If your doctor recommends it, get more exercise. Walking is a good choice. Bit by bit, increase the amount you walk every day. Try for 30 minutes on most days of the week. You also may want to swim, bike, or do other activities.  When you exercise, watch for signs that your heart is working too hard. You are pushing too hard if you cannot talk while you are exercising. If you become short of breath or dizzy or have chest pain, sit down and rest immediately.  Check your pulse regularly. Place two fingers on the artery at the palm side of your wrist in line with your thumb. If your heartbeat seems uneven or fast, talk to your doctor.  When should you call for help?  Call 911 anytime you think you may need emergency care. For example, call if:  You have trouble breathing.  You passed out (lost consciousness).  You cough up pink, foamy mucus and you have trouble breathing.  You have symptoms of a heart attack. These may include:  Chest pain or pressure, or a strange feeling in the chest.  Sweating.  Shortness of breath.  Nausea or vomiting.  Pain, pressure, or a strange feeling in the back, neck, jaw, or upper belly or in one or both shoulders or arms.  Lightheadedness or sudden weakness.  A fast or irregular heartbeat.  After you call 911, the  may tell you to chew 1 adult-strength or 2 to 4 low-dose aspirin. Wait for an ambulance. Do not try to drive yourself.  You have symptoms of a stroke. These may include:  Sudden numbness, tingling, weakness, or loss of movement in your face, arm, or leg, especially on only one side of your body.  Sudden vision changes.  Sudden trouble speaking.  Sudden confusion or trouble understanding simple statements.  Sudden problems with walking or balance.  A sudden, severe headache that is different from past headaches.  Call your doctor now or seek immediate medical care if:  You have new or  tolerated (unless you have received specific instructions from your doctor).  \" If you feel nauseated, continue with liquids until the nausea is gone.  \" Notify your physician if you have not urinated within 8 hours after the procedure.  \" Resume your medications unless otherwise instructed.

## 2024-05-24 NOTE — PROGRESS NOTES
Patient admitted, consent signed and questions answered. Patient ready for procedure. Call light to reach with side rails up 2 of 2. .  family at bedside with patient.  History and physical needed.

## 2024-05-24 NOTE — PROGRESS NOTES
Patient returned to room. Post CV recovery initiated. Patient awake and alert, denies any complaints.    Cardiac monitor shows NSR.    Family at bedside, call light with in reach. Side rails up times 2.

## 2024-05-28 ENCOUNTER — TELEPHONE (OUTPATIENT)
Dept: PRIMARY CARE CLINIC | Age: 59
End: 2024-05-28

## 2024-05-28 NOTE — TELEPHONE ENCOUNTER
The pharmacy called stating that the patient has recently requested a refill of the Diclofenac, but they have on record that the patient was given Eliquis and is taking this by another provider.    The pharmacy wants to verify with PCP that it is ok to fill, as the medications have interactions with each other.    The Reference # is [id number]

## 2024-05-28 NOTE — H&P
Torrey Joy is a 58 y.o. male who presents today for an general visit to be evaluated for the following condition(s):       Chief Complaint   Patient presents with    Consultation       For Afib   58 years old gentleman patient of Dr. Jauregui was referred for atrial fibrillation management.  Sometime in March of this year he started not feeling well significant symptoms to the degree that he was denied boarding on the plane and he drove himself to to [place] from [place].  Finally when he ended up being evaluated was found to be in atrial fibrillation/flutter with fast ventricular response.  He was started at that point on amiodarone and now he continues to be on it of 200 mg p.o. daily, Eliquis among other medications.  A month later he was cardioverted into sinus rhythm and he felt significant improvement but that was short-lived and he flipped back into the atrial flutter/fibrillation from that point on.     There is no known structural heart disease, thallium stress test was reported negative for ischemia, no valvular heart disease and no thyroid disease.     The gentleman is obese, he is a chronic smoker, he drinks 60 beers per week or about show that averages him to about 8-10 beers every day.  He is diagnosed to have obstructive sleep apnea but he uses his CPAP machine.  There is no family history known to have with atrial fibrillation he has a sister with a valve surgery.     No history of thromboembolic phenomenon's.     He tells me that he is serious about his medications specially and particularly his anticoagulants.     He was told at 1 point he is borderline diabetic.  When I reviewed his available blood sugars there is no significant rises of 8 blood sugar.  He is hypertensive.  His list of medications were reviewed.     He is a  and lately he has been on disability and he does not think he is capable of going back to do his job.     The most recent echo describes the left atrium to

## 2024-05-30 ENCOUNTER — TELEPHONE (OUTPATIENT)
Dept: PRIMARY CARE CLINIC | Age: 59
End: 2024-05-30

## 2024-05-30 NOTE — TELEPHONE ENCOUNTER
Pt called stating he is not able to get his diclofenac sodium refilled reporting he was told it counteracts the eliquis.     Please advise

## 2024-05-31 NOTE — TELEPHONE ENCOUNTER
Patient notified and verbalized understanding    Patient requesting if another medication could possibly be sent in to replace the diclofenac sodium that doesn't counteract with the eliquis    Please advise

## 2024-06-03 ENCOUNTER — CARE COORDINATION (OUTPATIENT)
Dept: CARE COORDINATION | Age: 59
End: 2024-06-03

## 2024-06-03 NOTE — CARE COORDINATION
avoiding canned, prepackaged foods, processed meats and cheese. Patient states is eating canned vegetables, discussed switching to frozen vegetables, patient agreed. Discussed processed meats in detail to avoid/limit- sausage, vines, bologna, ham, ect- patient is eating some of these  states will cut back.  Goal is <2,300gm of sodium/day.  3. Reviewed best ways to cook foods-baking, broiling, grilling or steaming foods. Discussed healthy fats- using olive or canola oil if adding oil and encouraged to use butter/margarine sparingly. Discussed the different types of fat in diet- trans/saturated fat and cholesterol-encouraged to limit. Discussed foods high in each- patient will be sent a list of foods to avoid/limit.  4. Reviewed shopping the outer rim of the grocery store where fresher foods are found. Discussed seasonings that can be used that do not contain salt. Patient states stopped adding salt to foods. We also discussed eating out and making healthier choices- encouraged to avoid fast food. Patient states he does eat fast food- encouraged to limit to once a week or less.   5. Reviewed portion control and using plate method at meals to increase intake of non-starchy vegetables. Goal is 1/2 of plate to be non-starchy vegetables at meals, 1/4 lean protein, 1/4 carbs. Discussed portion control and what a serving size is.   Spoke with patient who relays he is doing well. He continues to read food labels more and is much more aware of foods to avoid/limit. Reviewed goals.  Will follow up with patient in 3-4 weeks to review and answer questions.    LEÓN Gibson

## 2024-06-10 ENCOUNTER — HOSPITAL ENCOUNTER (OUTPATIENT)
Age: 59
Discharge: HOME | End: 2024-06-10
Payer: COMMERCIAL

## 2024-06-10 ENCOUNTER — OFFICE VISIT (OUTPATIENT)
Age: 59
End: 2024-06-10
Payer: COMMERCIAL

## 2024-06-10 VITALS
BODY MASS INDEX: 35.81 KG/M2 | OXYGEN SATURATION: 98 % | HEART RATE: 55 BPM | WEIGHT: 255.8 LBS | SYSTOLIC BLOOD PRESSURE: 124 MMHG | HEIGHT: 71 IN | DIASTOLIC BLOOD PRESSURE: 80 MMHG

## 2024-06-10 DIAGNOSIS — I48.92 ATRIAL FIB/FLUTTER, TRANSIENT (HCC): Primary | ICD-10-CM

## 2024-06-10 DIAGNOSIS — I48.91 ATRIAL FIB/FLUTTER, TRANSIENT (HCC): Primary | ICD-10-CM

## 2024-06-10 LAB
EKG ATRIAL RATE: 72 BPM
EKG Q-T INTERVAL: 454 MS
EKG QRS DURATION: 84 MS
EKG QTC CALCULATION (BAZETT): 445 MS
EKG R AXIS: 62 DEGREES
EKG T AXIS: 65 DEGREES
EKG VENTRICULAR RATE: 58 BPM

## 2024-06-10 PROCEDURE — 99213 OFFICE O/P EST LOW 20 MIN: CPT | Performed by: SPECIALIST

## 2024-06-10 PROCEDURE — 93005 ELECTROCARDIOGRAM TRACING: CPT | Performed by: SPECIALIST

## 2024-06-10 PROCEDURE — 3079F DIAST BP 80-89 MM HG: CPT | Performed by: SPECIALIST

## 2024-06-10 PROCEDURE — 3074F SYST BP LT 130 MM HG: CPT | Performed by: SPECIALIST

## 2024-06-10 RX ORDER — SPIRONOLACTONE 25 MG/1
25 TABLET ORAL 2 TIMES DAILY
Qty: 30 TABLET | Refills: 3 | Status: SHIPPED | OUTPATIENT
Start: 2024-06-10

## 2024-06-10 RX ORDER — ACETAMINOPHEN 500 MG
500 TABLET ORAL EVERY 6 HOURS PRN
COMMUNITY

## 2024-06-10 NOTE — PROGRESS NOTES
[facility] CARDIAC ELECTROPHYSIOLOGY  [address], [address]  [facility]  [address]    Date of Visit:  6/10/2024  Patient Name: Torrey Joy   Patient :  1965   Referring By:  No ref. provider found     CHIEF COMPLAINT/HPI:     Torrey Joy is a 58 y.o. male who presents today for an general visit to be evaluated for the following condition(s):  Chief Complaint   Patient presents with    Follow-up     follow up cardio version     Patient did have DC cardioversion done and he thinks as it was short-lived for 2 to 3 days during those 2 3 days he felt significantly better.    Today on examination his rhythm looks to be back in atrial fibrillation.    We had lengthy discussion about the condition.  I do think he is definitely a candidate for both #1 implantable loop and for ablation.  It seems A-fib is the most symptomatic rhythm issues.    All the above was discussed with the patient in the presence of his wife.    They both tell me that he is taking his medications like he supposed to including amiodarone and the oral anticoagulants of Eliquis.    The procedure of implantable loop was discussed with all benefits and complications.      REVIEW OF SYSTEM      Review of Systems noncontributory otherwise.    REVIEWED INFORMATION      No Known Allergies    Current Outpatient Medications   Medication Sig Note Dispense Refill    acetaminophen (TYLENOL) 500 MG tablet Take 1 tablet by mouth every 6 hours as needed for Pain       spironolactone (ALDACTONE) 25 MG tablet Take 1 tablet by mouth 2 times daily  30 tablet 3    venlafaxine (EFFEXOR XR) 150 MG extended release capsule TAKE 1 CAPSULE DAILY  90 capsule 3    lisinopril (PRINIVIL;ZESTRIL) 40 MG tablet Take 1 tablet by mouth daily  90 tablet 3    ALPRAZolam (XANAX) 0.5 MG tablet Take 1 tablet by mouth 2 times daily as needed for anxiety.  180 tablet 0    amLODIPine (NORVASC) 5 MG tablet Take 1 tablet by mouth daily  30 tablet 3    apixaban (ELIQUIS) 5

## 2024-06-13 RX ORDER — AMIODARONE HYDROCHLORIDE 200 MG/1
200 TABLET ORAL DAILY
Qty: 30 TABLET | Refills: 5 | Status: SHIPPED | OUTPATIENT
Start: 2024-06-13 | End: 2024-12-10

## 2024-06-17 ENCOUNTER — HOSPITAL ENCOUNTER (OUTPATIENT)
Age: 59
Setting detail: OUTPATIENT SURGERY
Discharge: HOME | End: 2024-06-17
Attending: SPECIALIST | Admitting: SPECIALIST
Payer: COMMERCIAL

## 2024-06-17 VITALS
RESPIRATION RATE: 14 BRPM | OXYGEN SATURATION: 95 % | BODY MASS INDEX: 35.28 KG/M2 | WEIGHT: 252 LBS | HEART RATE: 62 BPM | TEMPERATURE: 98 F | SYSTOLIC BLOOD PRESSURE: 129 MMHG | HEIGHT: 71 IN | DIASTOLIC BLOOD PRESSURE: 76 MMHG

## 2024-06-17 DIAGNOSIS — I48.91 ATRIAL FIBRILLATION, UNSPECIFIED TYPE (HCC): ICD-10-CM

## 2024-06-17 PROBLEM — I48.92 ATRIAL FIB/FLUTTER, TRANSIENT (HCC): Status: ACTIVE | Noted: 2024-06-17

## 2024-06-17 LAB — ECHO BSA: 2.39 M2

## 2024-06-17 PROCEDURE — 2709999900 HC NON-CHARGEABLE SUPPLY: Performed by: SPECIALIST

## 2024-06-17 PROCEDURE — 7100000010 HC PHASE II RECOVERY - FIRST 15 MIN: Performed by: SPECIALIST

## 2024-06-17 PROCEDURE — 7100000011 HC PHASE II RECOVERY - ADDTL 15 MIN: Performed by: SPECIALIST

## 2024-06-17 PROCEDURE — 33285 INSJ SUBQ CAR RHYTHM MNTR: CPT | Performed by: SPECIALIST

## 2024-06-17 PROCEDURE — C1764 EVENT RECORDER, CARDIAC: HCPCS | Performed by: SPECIALIST

## 2024-06-17 PROCEDURE — 2500000003 HC RX 250 WO HCPCS: Performed by: SPECIALIST

## 2024-06-17 PROCEDURE — C1892 INTRO/SHEATH,FIXED,PEEL-AWAY: HCPCS | Performed by: SPECIALIST

## 2024-06-17 PROCEDURE — 99234 HOSP IP/OBS SM DT SF/LOW 45: CPT | Performed by: SPECIALIST

## 2024-06-17 DEVICE — ICM LNQ22 LINQ II USA
Type: IMPLANTABLE DEVICE | Status: FUNCTIONAL
Brand: LINQ II™

## 2024-06-17 NOTE — H&P
58 years old gentleman patient of Dr. Jauregui was referred for atrial fibrillation management.  Sometime in March of this year he started not feeling well significant symptoms to the degree that he was denied boarding on the plane and he drove himself to to [place] from [place].  Finally when he ended up being evaluated was found to be in atrial fibrillation/flutter with fast ventricular response.  He was started at that point on amiodarone and now he continues to be on it of 200 mg p.o. daily, Eliquis among other medications.  A month later he was cardioverted into sinus rhythm and he felt significant improvement but that was short-lived and he flipped back into the atrial flutter/fibrillation from that point on.     There is no known structural heart disease, thallium stress test was reported negative for ischemia, no valvular heart disease and no thyroid disease.     The gentleman is obese, he is a chronic smoker, he drinks 60 beers per week or about show that averages him to about 8-10 beers every day.  He is diagnosed to have obstructive sleep apnea but he uses his CPAP machine.  There is no family history known to have with atrial fibrillation he has a sister with a valve surgery.     No history of thromboembolic phenomenon's.     He tells me that he is serious about his medications specially and particularly his anticoagulants.     He was told at 1 point he is borderline diabetic.  When I reviewed his available blood sugars there is no significant rises of 8 blood sugar.  He is hypertensive.  His list of medications were reviewed.     He is a  and lately he has been on disability and he does not think he is capable of going back to do his job.     The most recent echo describes the left atrium to be mildly dilated ejection fraction to be preserved.           REVIEW OF SYSTEM                                                                       Review of Systems   Constitutional:  Positive for

## 2024-06-17 NOTE — PROCEDURES
PROCEDURE NOTE  Date: 6/17/2024   Name: Torrey Joy  YOB: 1965    Procedures        Procedure is implantable loop.    Preoperative diagnosis atrial fibrillation despite amiodarone and failed DC cardioversion.    Postoperative diagnosis is the same.    Patient was brought to the EP lab in the postabsorptive state.  Vitals were stable.  Informed consent was obtained.  The left hemithorax was prepared and draped in the usual manner.  The fourth intercostal space was infiltrated with 2% Xylocaine.  Medtronic implantable loop model number L and Q22 serial number of [id number] was implanted subcutaneously.  Acceptable numbers were obtained.  Hemostasis was evident.  3-0 Vicryl suture was placed.  The wound was bandaged in the usual fashion.  Patient tolerated the procedure well was discharged from the EP lab in stable condition.

## 2024-06-17 NOTE — DISCHARGE SUMMARY
Discharge Summary    Date: 6/17/2024  Patient Name: Torrey Joy    YOB: 1965     Age: 58 y.o.    Admit Date: 6/17/2024  Discharge Date: 6/17/2024  Discharge Condition: Good    Admission Diagnosis  Atrial fibrillation, unspecified type (HCC) [I48.91]      Discharge Diagnosis  Active Problems:    Atrial fib/flutter, transient (HCC)  Resolved Problems:    * No resolved hospital problems. *      Hospital Stay  Narrative of Hospital Course:  Implantable loop    Consultants:  None    Surgeries/procedures Performed:      Treatments:    Procedures        Discharge Plan/Disposition:  Home    Hospital/Incidental Findings Requiring Follow Up:    Patient Instructions:    Diet: Low Fat, Low Cholesterol Diet    Activity:Activity as Tolerated  For number of days (if applicable):      Other Instructions:    Provider Follow-Up:   No follow-ups on file.     Significant Diagnostic Studies:    Recent Labs:  Admission on 06/17/2024  Body Surface Area                             Date: 06/17/2024  Value: 2.39        Ref range: m2                 Status: In process  ------------    Radiology last 7 days:  No results found.     [unfilled]    Discharge Medications    Current Discharge Medication List        Current Discharge Medication List        Current Discharge Medication List    CONTINUE these medications which have NOT CHANGED    apixaban (ELIQUIS) 5 MG TABS tablet  Take 1 tablet by mouth 2 times daily  Qty: 60 tablet Refills: 5    amiodarone (CORDARONE) 200 MG tablet  Take 1 tablet by mouth daily  Qty: 30 tablet Refills: 5    acetaminophen (TYLENOL) 500 MG tablet  Take 1 tablet by mouth every 6 hours as needed for Pain    spironolactone (ALDACTONE) 25 MG tablet  Take 1 tablet by mouth 2 times daily  Qty: 30 tablet Refills: 3    !! venlafaxine (EFFEXOR XR) 150 MG extended release capsule  TAKE 1 CAPSULE DAILY  Qty: 90 capsule Refills: 3  Associated Diagnoses:Anxiety and depression    lisinopril (PRINIVIL;ZESTRIL)

## 2024-06-17 NOTE — PROGRESS NOTES
Received post Loop Recorder procedure to PCC room 10. Assessment obtained. Restrictions reviewed with patient. Post procedure pathway initiated.  Patient without complaints.

## 2024-06-17 NOTE — DISCHARGE INSTRUCTIONS
INTERNAL LOOP RECORDER /       - NO DRIVING 24 HOURS IF SEDATION RECEIVED    - DRESSING TO BE REMOVED *** AND LEFT OPEN TO THE AIR    -GLUE USED TO CLOSE INCISION AND WILL PEEL OFF DO NOT PICK AT IT     -OK TO SHOWER THE FOLLOWING DAY BUT DO NOT LET WATER HIT DIRECTLY ON SITE FOR ANY LENGTH OF TIME FOR FIRST WEEK    -DO NOT RUB/SCRUB SITE    -NO SWIMMING IN POOLS/HOT TUBS FOR 7 TO 10 DAYS     -AVOID CLOTHING THAT MIGHT RUB ON INCISION    -WATCH FOR SIGNS OF INFECTION - REDNESS, SWELLING, DRAINAGE AT SITE AND /OR TEMPERATURE GREATER THAN 101    NOTIFY YOUR PHYSICIAN IMMEDIATELY IF SIGNS OF INFECTION OTHERWISE  -CALL FOR FURTHER FOLLOW UP ***                      WHAT IS AN INSERTABLE CARDIAC MONITOR    An insertable cardiac monitor is a small device that continuously monitors heart rhythms and records them automatically or by using a hand-held patient assistant. The device is inserted just beneath the skin, during a short outpatient procedure.    HOW DOES AN INSERTABLE CARDIAC MONITOR WORK?  An implantable loop recorder, like a Holter monitor and event monitor, reads your heart’s electrical activity. Your heart has an electrical system that functions like a natural pacemaker. Electrical signals travel through the chambers of the heart to cause it to beat. If the heart’s natural electrical system is not functioning correctly, an abnormal heartbeat (arrhythmia) may result.  The implantable loop recorder continuously monitors the electrical activity of the heart and automatically begins recording information when the heart’s patterns change. Alternately, you may be instructed by your physician to turn on the device’s recording function by using an external“activator,” a hand-held device that you hold over the site on your chest where the recorder has been implanted.    Once the cardiac monitor is inserted, it is programmed to continuously monitor your heart’s activity.     IF I GET AN INSERTABLE CARDIAC MONITOR WILL I

## 2024-06-24 ENCOUNTER — OFFICE VISIT (OUTPATIENT)
Age: 59
End: 2024-06-24

## 2024-06-24 VITALS
OXYGEN SATURATION: 97 % | WEIGHT: 255 LBS | DIASTOLIC BLOOD PRESSURE: 80 MMHG | SYSTOLIC BLOOD PRESSURE: 119 MMHG | HEART RATE: 54 BPM | HEIGHT: 71 IN | BODY MASS INDEX: 35.7 KG/M2

## 2024-06-24 DIAGNOSIS — R94.2 ABNORMAL PFT: ICD-10-CM

## 2024-06-24 DIAGNOSIS — I48.92 ATRIAL FIBRILLATION AND FLUTTER (HCC): Primary | ICD-10-CM

## 2024-06-24 DIAGNOSIS — E66.9 CLASS 2 OBESITY: ICD-10-CM

## 2024-06-24 DIAGNOSIS — I48.91 ATRIAL FIBRILLATION AND FLUTTER (HCC): Primary | ICD-10-CM

## 2024-06-24 DIAGNOSIS — F10.20 ALCOHOLISM (HCC): ICD-10-CM

## 2024-06-24 RX ORDER — SPIRONOLACTONE 25 MG/1
25 TABLET ORAL 2 TIMES DAILY
Qty: 30 TABLET | Refills: 5 | Status: SHIPPED | OUTPATIENT
Start: 2024-06-24

## 2024-06-24 NOTE — PROGRESS NOTES
[facility] CARDIAC ELECTROPHYSIOLOGY  [address], [address]  [facility]  [address]    Date of Visit:  2024  Patient Name: Torrey Joy   Patient :  1965   Referring By:  No ref. provider found     CHIEF COMPLAINT/HPI:     Torrey Joy is a 58 y.o. male who presents today for an general visit to be evaluated for the following condition(s):  Chief Complaint   Patient presents with    Follow-up     s/p Loop recorder Implant *Medtronic*     Implantable loop was done.  It is by Medtronic Linq 2.  It went uncomplicated.  Patient remains in atrial fibrillation the whole time.    Patient has been amiodarone, pulmonary function test suggesting mixed issues.  There is no clear diagnosis of amiodarone induced lung issues at this point but definitely abnormal pulmonary function test.    Patient is a candidate for PVI.  The procedure was discussed in details with the patient today the indication for it, the weights done, all possible risks and complications were discussed.  The success rate, the recurrence rate, the need for more than 1 ablation procedure, all were discussed in details with the patient.    He understands atrial fibrillation is not in his condition a curable situation and its we are attempting controlling the condition.      The patient has been on amiodarone started by his referring physician, has been cardioverted more than once, despite that he continues to be in atrial fibrillation that is symptomatic.    There is increased alcohol intake.  He is obese.    He has been consistent with his medications specially his Eliquis.    The procedure of PVI was discussed the patient understands and agrees for it.    REVIEW OF SYSTEM      Review of Systems  Noncontributory.  REVIEWED INFORMATION      No Known Allergies    Current Outpatient Medications   Medication Sig Note Dispense Refill    spironolactone (ALDACTONE) 25 MG tablet Take 1 tablet by mouth 2 times daily  30 tablet 5    apixaban

## 2024-06-26 ENCOUNTER — CARE COORDINATION (OUTPATIENT)
Dept: CARE COORDINATION | Age: 59
End: 2024-06-26

## 2024-06-26 NOTE — CARE COORDINATION
Patient goals reviewed:  1. Reviewed heart healthy, DASH guidelines- lowfat/cholesterol and low sodium. Reviewed reading food labels-what to look for on labels.  Encouraged to limit saturated fat, trans fat, cholesterol and sodium intake.  2. Reviewed avoiding canned, prepackaged foods, processed meats and cheese. Patient states is eating canned vegetables, discussed switching to frozen vegetables, patient agreed. Discussed processed meats in detail to avoid/limit- sausage, vines, bologna, ham, ect- patient is eating some of these  states will cut back.  Goal is <2,300gm of sodium/day.  3. Reviewed best ways to cook foods-baking, broiling, grilling or steaming foods. Discussed healthy fats- using olive or canola oil if adding oil and encouraged to use butter/margarine sparingly. Discussed the different types of fat in diet- trans/saturated fat and cholesterol-encouraged to limit. Discussed foods high in each- patient will be sent a list of foods to avoid/limit.  4. Reviewed shopping the outer rim of the grocery store where fresher foods are found. Discussed seasonings that can be used that do not contain salt. Patient states stopped adding salt to foods. We also discussed eating out and making healthier choices- encouraged to avoid fast food. Patient states he does eat fast food- encouraged to limit to once a week or less.   5. Reviewed portion control and using plate method at meals to increase intake of non-starchy vegetables. Goal is 1/2 of plate to be non-starchy vegetables at meals, 1/4 lean protein, 1/4 carbs. Discussed portion control and what a serving size is.   Spoke with patient who relays he is doing well. He continues to read food labels often which is helping him make better choices. Patient state no further nutrition questions. Provided patient contact information to call with questions. Removed from panel.  LEÓN Doran

## 2024-07-30 ENCOUNTER — PATIENT MESSAGE (OUTPATIENT)
Dept: PRIMARY CARE CLINIC | Age: 59
End: 2024-07-30

## 2024-07-30 DIAGNOSIS — F41.9 ANXIETY AND DEPRESSION: ICD-10-CM

## 2024-07-30 DIAGNOSIS — F32.A ANXIETY AND DEPRESSION: ICD-10-CM

## 2024-07-30 DIAGNOSIS — E78.2 MIXED HYPERLIPIDEMIA: ICD-10-CM

## 2024-07-30 RX ORDER — PANTOPRAZOLE SODIUM 40 MG/1
TABLET, DELAYED RELEASE ORAL
Qty: 90 TABLET | Refills: 3 | Status: SHIPPED | OUTPATIENT
Start: 2024-07-30

## 2024-07-30 RX ORDER — ATORVASTATIN CALCIUM 10 MG/1
TABLET, FILM COATED ORAL
Qty: 90 TABLET | Refills: 3 | Status: SHIPPED | OUTPATIENT
Start: 2024-07-30

## 2024-07-30 RX ORDER — ALPRAZOLAM 0.5 MG/1
TABLET ORAL
Qty: 180 TABLET | Refills: 0 | Status: SHIPPED | OUTPATIENT
Start: 2024-07-30 | End: 2024-10-30

## 2024-07-30 NOTE — TELEPHONE ENCOUNTER
From: Torrey Joy  To: Karine Cole  Sent: 7/30/2024 11:18 AM EDT  Subject: Refills    I need refills on my lipitor(atorvastatine) and my xynex. Hope your doing well.-TY

## 2024-08-07 ENCOUNTER — ANESTHESIA EVENT (OUTPATIENT)
Age: 59
End: 2024-08-07
Payer: COMMERCIAL

## 2024-08-08 ENCOUNTER — HOSPITAL ENCOUNTER (OUTPATIENT)
Age: 59
Setting detail: OUTPATIENT SURGERY
Discharge: HOME | End: 2024-08-08
Attending: SPECIALIST | Admitting: SPECIALIST
Payer: COMMERCIAL

## 2024-08-08 ENCOUNTER — ANESTHESIA (OUTPATIENT)
Age: 59
End: 2024-08-08
Payer: COMMERCIAL

## 2024-08-08 VITALS
WEIGHT: 250 LBS | SYSTOLIC BLOOD PRESSURE: 136 MMHG | BODY MASS INDEX: 35.79 KG/M2 | TEMPERATURE: 97.5 F | RESPIRATION RATE: 21 BRPM | HEART RATE: 65 BPM | HEIGHT: 70 IN | OXYGEN SATURATION: 98 % | DIASTOLIC BLOOD PRESSURE: 72 MMHG

## 2024-08-08 DIAGNOSIS — I48.91 ATRIAL FIBRILLATION, UNSPECIFIED TYPE (HCC): ICD-10-CM

## 2024-08-08 LAB
ABO + RH BLD: NORMAL
ALBUMIN SERPL-MCNC: 4.3 G/DL (ref 3.5–5.2)
ALBUMIN/GLOB SERPL: 1 {RATIO} (ref 1–2.5)
ALP SERPL-CCNC: 102 U/L (ref 40–129)
ALT SERPL-CCNC: 18 U/L (ref 10–50)
ANION GAP SERPL CALCULATED.3IONS-SCNC: 11 MMOL/L (ref 9–16)
ARM BAND NUMBER: NORMAL
AST SERPL-CCNC: 28 U/L (ref 10–50)
BASOPHILS # BLD: 0.08 K/UL (ref 0–0.2)
BASOPHILS NFR BLD: 1 % (ref 0–2)
BILIRUB SERPL-MCNC: 0.4 MG/DL (ref 0–1.2)
BLOOD BANK SAMPLE EXPIRATION: NORMAL
BLOOD GROUP ANTIBODIES SERPL: NEGATIVE
BUN BLD-MCNC: 28 MG/DL (ref 8–26)
BUN SERPL-MCNC: 22 MG/DL (ref 8–23)
CA-I BLD-SCNC: 1.25 MMOL/L (ref 1.15–1.33)
CALCIUM SERPL-MCNC: 9.3 MG/DL (ref 8.6–10.4)
CHLORIDE BLD-SCNC: 98 MMOL/L (ref 98–107)
CHLORIDE SERPL-SCNC: 97 MMOL/L (ref 98–107)
CO2 SERPL-SCNC: 22 MMOL/L (ref 20–31)
CREAT SERPL-MCNC: 1.2 MG/DL (ref 0.7–1.2)
ECHO BSA: 2.37 M2
EGFR, POC: 77 ML/MIN/1.73M2
EKG ATRIAL RATE: 294 BPM
EKG P AXIS: 119 DEGREES
EKG Q-T INTERVAL: 434 MS
EKG QRS DURATION: 78 MS
EKG QTC CALCULATION (BAZETT): 451 MS
EKG R AXIS: 64 DEGREES
EKG T AXIS: 75 DEGREES
EKG VENTRICULAR RATE: 65 BPM
EOSINOPHIL # BLD: 0.23 K/UL (ref 0–0.44)
EOSINOPHILS RELATIVE PERCENT: 3 % (ref 1–4)
ERYTHROCYTE [DISTWIDTH] IN BLOOD BY AUTOMATED COUNT: 16.5 % (ref 11.8–14.4)
GFR, ESTIMATED: 67 ML/MIN/1.73M2
GLUCOSE BLD-MCNC: 124 MG/DL (ref 74–100)
GLUCOSE SERPL-MCNC: 114 MG/DL (ref 74–99)
HCT VFR BLD AUTO: 32.1 % (ref 40.7–50.3)
HCT VFR BLD AUTO: 36 % (ref 41–53)
HGB BLD-MCNC: 9.7 G/DL (ref 13–17)
IMM GRANULOCYTES # BLD AUTO: 0.03 K/UL (ref 0–0.3)
IMM GRANULOCYTES NFR BLD: 0 %
LYMPHOCYTES NFR BLD: 1.95 K/UL (ref 1.1–3.7)
LYMPHOCYTES RELATIVE PERCENT: 24 % (ref 24–43)
MCH RBC QN AUTO: 22.7 PG (ref 25.2–33.5)
MCHC RBC AUTO-ENTMCNC: 30.2 G/DL (ref 28.4–34.8)
MCV RBC AUTO: 75 FL (ref 82.6–102.9)
MONOCYTES NFR BLD: 0.49 K/UL (ref 0.1–1.2)
MONOCYTES NFR BLD: 6 % (ref 3–12)
NEUTROPHILS NFR BLD: 66 % (ref 36–65)
NEUTS SEG NFR BLD: 5.33 K/UL (ref 1.5–8.1)
NRBC BLD-RTO: 0 PER 100 WBC
PLATELET # BLD AUTO: 323 K/UL (ref 138–453)
PMV BLD AUTO: 8.9 FL (ref 8.1–13.5)
POC CREATININE: 1.1 MG/DL (ref 0.51–1.19)
POC HEMOGLOBIN (CALC): 12.2 G/DL (ref 13.5–17.5)
POTASSIUM BLD-SCNC: 6.2 MMOL/L (ref 3.5–4.5)
POTASSIUM SERPL-SCNC: 5.7 MMOL/L (ref 3.7–5.3)
PROT SERPL-MCNC: 7.4 G/DL (ref 6.6–8.7)
RBC # BLD AUTO: 4.28 M/UL (ref 4.21–5.77)
RBC # BLD: ABNORMAL 10*6/UL
SODIUM BLD-SCNC: 130 MMOL/L (ref 138–146)
SODIUM SERPL-SCNC: 130 MMOL/L (ref 136–145)
WBC OTHER # BLD: 8.1 K/UL (ref 3.5–11.3)

## 2024-08-08 PROCEDURE — 86901 BLOOD TYPING SEROLOGIC RH(D): CPT

## 2024-08-08 PROCEDURE — 82947 ASSAY GLUCOSE BLOOD QUANT: CPT

## 2024-08-08 PROCEDURE — 2580000003 HC RX 258: Performed by: SPECIALIST

## 2024-08-08 PROCEDURE — 86900 BLOOD TYPING SEROLOGIC ABO: CPT

## 2024-08-08 PROCEDURE — 82435 ASSAY OF BLOOD CHLORIDE: CPT

## 2024-08-08 PROCEDURE — 82330 ASSAY OF CALCIUM: CPT

## 2024-08-08 PROCEDURE — 82565 ASSAY OF CREATININE: CPT

## 2024-08-08 PROCEDURE — 85025 COMPLETE CBC W/AUTO DIFF WBC: CPT

## 2024-08-08 PROCEDURE — 84520 ASSAY OF UREA NITROGEN: CPT

## 2024-08-08 PROCEDURE — 84132 ASSAY OF SERUM POTASSIUM: CPT

## 2024-08-08 PROCEDURE — 86850 RBC ANTIBODY SCREEN: CPT

## 2024-08-08 PROCEDURE — 99221 1ST HOSP IP/OBS SF/LOW 40: CPT | Performed by: SPECIALIST

## 2024-08-08 PROCEDURE — 84295 ASSAY OF SERUM SODIUM: CPT

## 2024-08-08 PROCEDURE — 2709999900 HC NON-CHARGEABLE SUPPLY: Performed by: SPECIALIST

## 2024-08-08 PROCEDURE — 80053 COMPREHEN METABOLIC PANEL: CPT

## 2024-08-08 PROCEDURE — 36415 COLL VENOUS BLD VENIPUNCTURE: CPT

## 2024-08-08 PROCEDURE — 85014 HEMATOCRIT: CPT

## 2024-08-08 PROCEDURE — 93005 ELECTROCARDIOGRAM TRACING: CPT | Performed by: SPECIALIST

## 2024-08-08 RX ORDER — SODIUM CHLORIDE 9 MG/ML
INJECTION, SOLUTION INTRAVENOUS CONTINUOUS
Status: DISCONTINUED | OUTPATIENT
Start: 2024-08-08 | End: 2024-08-08 | Stop reason: HOSPADM

## 2024-08-08 RX ADMIN — SODIUM CHLORIDE: 9 INJECTION, SOLUTION INTRAVENOUS at 07:42

## 2024-08-08 ASSESSMENT — COPD QUESTIONNAIRES: CAT_SEVERITY: NO INTERVAL CHANGE

## 2024-08-08 ASSESSMENT — ENCOUNTER SYMPTOMS: SHORTNESS OF BREATH: 1

## 2024-08-08 NOTE — H&P
Torrey Joy is a 58 y.o. male who presents today for an general visit to be evaluated for the following condition(s):       Chief Complaint   Patient presents with    Follow-up       s/p Loop recorder Implant *Medtronic*      Implantable loop was done.  It is by Medtronic Linq 2.  It went uncomplicated.  Patient remains in atrial fibrillation the whole time.     Patient has been amiodarone, pulmonary function test suggesting mixed issues.  There is no clear diagnosis of amiodarone induced lung issues at this point but definitely abnormal pulmonary function test.     Patient is a candidate for PVI.  The procedure was discussed in details with the patient today the indication for it, the weights done, all possible risks and complications were discussed.  The success rate, the recurrence rate, the need for more than 1 ablation procedure, all were discussed in details with the patient.     He understands atrial fibrillation is not in his condition a curable situation and its we are attempting controlling the condition.        The patient has been on amiodarone started by his referring physician, has been cardioverted more than once, despite that he continues to be in atrial fibrillation that is symptomatic.     There is increased alcohol intake.  He is obese.     He has been consistent with his medications specially his Eliquis.     The procedure of PVI was discussed the patient understands and agrees for it.     REVIEW OF SYSTEM                                                                       Review of Systems  Noncontributory.  REVIEWED INFORMATION                                                                       No Known Allergies     Current Facility-Administered Medications           Current Outpatient Medications   Medication Sig Note Dispense Refill    spironolactone (ALDACTONE) 25 MG tablet Take 1 tablet by mouth 2 times daily   30 tablet 5    apixaban (ELIQUIS) 5 MG TABS tablet Take 1 tablet by  Insecurity: No Food Insecurity (4/12/2024)     Hunger Vital Sign      Worried About Running Out of Food in the Last Year: Never true      Ran Out of Food in the Last Year: Never true   Transportation Needs: No Transportation Needs (4/12/2024)     PRAPARE - Transportation      Lack of Transportation (Medical): No      Lack of Transportation (Non-Medical): No   Housing Stability: Low Risk  (4/12/2024)     Housing Stability Vital Sign      Unable to Pay for Housing in the Last Year: No      Number of Places Lived in the Last Year: 1      Unstable Housing in the Last Year: No            Interpretation: Medtronic Linq 2 RLB [id number], patient had 2 pauses of more than 5 seconds.  Has been in atrial fibrillation and 94.2% of the time and I do think it is even more.           PHYSICAL EXAM      /80   Pulse 54   Ht 1.803 m (5' 10.98\")   Wt 115.7 kg (255 lb)   SpO2 97%   BMI 35.58 kg/m²    Physical Exam alert oriented cooperative nice gentleman.  BMI of 36 kg/m².     Decreased air entry bilaterally.  Remains in atrial fibrillation.  Distant heart sounds.  Protuberant abdomen.  Traces of pedal edema.     ASSESSMENT/PLAN   Plans for pulmonary vein isolation procedure as mentioned above.     He will be seen in the office after the ablation procedure.  There are no diagnoses linked to this encounter.

## 2024-08-08 NOTE — PROGRESS NOTES
ADMITTED TO [facility] 7 PER AMBULATION.  ASSESSMENT AND VITALS AS CHARTED.  ALL PROCEDURES EXPLAINED PRIOR TO IMPLEMENTATION.  BILATERAL GROINS SHAVED PER PRISCILLA WITH WRITER IN ATTENDANCE.  CONSENTS SIGNED AND WITNESSED.  PATIENT READY FOR PROCEDURE.

## 2024-08-08 NOTE — PROGRESS NOTES
Blood test, repeat of the blood test today showed that potassium is high as high as 6.2.  The most recent one was 5.7.    Anesthesia and myself did not feel comfortable proceeding with the ablation procedure with this high potassium.    Although patient is on Aldactone which could potentially cause high potassium, the patient history even when he was not on Aldactone showed repeated bouts of potassium level higher than normal and that history goes back to 2019.    Patient is also on ACE inhibitor's which could explain also or add to his hyperkalemia    The patient tells me when he had his hip surgery it has to be rescheduled for the same reason before.    Also noted is hyponatremia.    His electrolyte issues need to be addressed for workup, he needs to be referred to nephrologist and/or endocrinologist for workup.    Aldactone is going to be discontinued today.    Procedure of ablation is canceled for today.    We will repeat the blood tests serially and reschedule him for the pulmonary vein ablation procedure to be done soon and go from there.    Patient today tells me that he is being worked up for possible sarcoidosis and pulmonologist are discussing with him biopsy.    All of the above was discussed with the patient in the presence of his wife.    Decisions of canceling the case was done with anesthesia and family and patients are aware.  End of dictation

## 2024-08-08 NOTE — ANESTHESIA PRE PROCEDURE
Department of Anesthesiology  Preprocedure Note       Name:  Torrey Joy   Age:  58 y.o.  :  1965                                          MRN:  [id number]         Date:  2024      Surgeon: Surgeon(s):  Ata Harp MD    Procedure: Procedure(s):  el-atassi / Ablation A-fib w/anes / wang, ice    Medications prior to admission:   Prior to Admission medications    Medication Sig Start Date End Date Taking? Authorizing Provider   pantoprazole (PROTONIX) 40 MG tablet TAKE 1 TABLET ONCE A DAY BEFORE MEAL 24   Karine Cole APRN - CNP   atorvastatin (LIPITOR) 10 MG tablet Take 1 tablet daily 24   Karine Cole APRN - CNP   atorvastatin (LIPITOR) 10 MG tablet Take 1 tablet daily 24   Karine Cole APRN - CNP   ALPRAZolam (XANAX) 0.5 MG tablet Take 1 tablet by mouth 2 times daily as needed for anxiety. 7/30/24 10/30/24  Karine Cole APRN - CNP   spironolactone (ALDACTONE) 25 MG tablet Take 1 tablet by mouth 2 times daily 24   Ata Harp MD   apixaban (ELIQUIS) 5 MG TABS tablet Take 1 tablet by mouth 2 times daily 24   Ata Harp MD   amiodarone (CORDARONE) 200 MG tablet Take 1 tablet by mouth daily 6/13/24 12/10/24  Ata Harp MD   acetaminophen (TYLENOL) 500 MG tablet Take 1 tablet by mouth every 6 hours as needed for Pain    Provider, MD Bruno   venlafaxine (EFFEXOR XR) 150 MG extended release capsule TAKE 1 CAPSULE DAILY 24   Karine Cole APRN - CNP   lisinopril (PRINIVIL;ZESTRIL) 40 MG tablet Take 1 tablet by mouth daily 3/20/24   Karine Cole APRN - CNP   amLODIPine (NORVASC) 5 MG tablet Take 1 tablet by mouth daily 3/20/24   Karine Cole APRN - CNP   venlafaxine (EFFEXOR XR) 150 MG extended release capsule TAKE 1 CAPSULE DAILY 23   Karine Cole, APRN - CNP   Diclofenac Sodium 100 MG TB24 TAKE 1 TABLET DAILY  Patient not taking: Reported on 6/10/2024 6/26/23   Karine Cole,  Plan      general     ASA 3       Induction: intravenous.  arterial line    Anesthetic plan and risks discussed with patient.      Plan discussed with CRNA.                Dai Ramey MD   8/8/2024

## 2024-08-13 ENCOUNTER — TELEPHONE (OUTPATIENT)
Dept: FAMILY MEDICINE CLINIC | Age: 59
End: 2024-08-13

## 2024-08-13 DIAGNOSIS — E87.5 HYPERKALEMIA: Primary | ICD-10-CM

## 2024-08-13 NOTE — TELEPHONE ENCOUNTER
----- Message from Bety REYEZ sent at 8/13/2024  2:20 PM EDT -----  Regarding: ECC Message to Provider  ECC Message to Provider    Relationship to Patient: Self     Additional Information pt is calling to see if Karine Cole can refer him a kidney doctor to re-check his potassium level.    --------------------------------------------------------------------------------------------------------------------------    Call Back Information: OK to leave message on voicemail  Preferred Call Back Number: Phone [phone] (home) [phone] (work)

## 2024-08-15 DIAGNOSIS — Z01.818 PRE-OP TESTING: ICD-10-CM

## 2024-08-15 DIAGNOSIS — I50.22 CHRONIC SYSTOLIC (CONGESTIVE) HEART FAILURE (HCC): Primary | ICD-10-CM

## 2024-08-15 PROBLEM — I48.92 ATRIAL FIB/FLUTTER, TRANSIENT (HCC): Status: RESOLVED | Noted: 2024-06-17 | Resolved: 2024-08-15

## 2024-08-15 PROBLEM — I10 PRIMARY HYPERTENSION: Status: RESOLVED | Noted: 2017-03-01 | Resolved: 2024-08-15

## 2024-08-15 PROBLEM — I48.92 ATRIAL FLUTTER (HCC): Status: RESOLVED | Noted: 2024-03-08 | Resolved: 2024-08-15

## 2024-08-15 PROBLEM — I48.92 NEW ONSET ATRIAL FLUTTER (HCC): Status: RESOLVED | Noted: 2024-03-08 | Resolved: 2024-08-15

## 2024-08-15 PROBLEM — I48.91 ATRIAL FIB/FLUTTER, TRANSIENT (HCC): Status: RESOLVED | Noted: 2024-06-17 | Resolved: 2024-08-15

## 2024-08-30 ENCOUNTER — HOSPITAL ENCOUNTER (OUTPATIENT)
Age: 59
Discharge: HOME | End: 2024-08-30
Payer: COMMERCIAL

## 2024-08-30 DIAGNOSIS — Z01.818 PRE-OP TESTING: ICD-10-CM

## 2024-08-30 DIAGNOSIS — I50.22 CHRONIC SYSTOLIC (CONGESTIVE) HEART FAILURE (HCC): ICD-10-CM

## 2024-08-30 LAB
ANION GAP SERPL CALCULATED.3IONS-SCNC: 13 MMOL/L (ref 9–16)
BASOPHILS # BLD: 0.03 K/UL (ref 0–0.2)
BASOPHILS NFR BLD: 0 % (ref 0–2)
BUN SERPL-MCNC: 18 MG/DL (ref 8–23)
CALCIUM SERPL-MCNC: 9.2 MG/DL (ref 8.6–10.4)
CHLORIDE SERPL-SCNC: 87 MMOL/L (ref 98–107)
CO2 SERPL-SCNC: 21 MMOL/L (ref 20–31)
CREAT SERPL-MCNC: 1.2 MG/DL (ref 0.7–1.2)
EKG ATRIAL RATE: 294 BPM
EKG P AXIS: 119 DEGREES
EKG Q-T INTERVAL: 434 MS
EKG QRS DURATION: 78 MS
EKG QTC CALCULATION (BAZETT): 451 MS
EKG R AXIS: 64 DEGREES
EKG T AXIS: 75 DEGREES
EKG VENTRICULAR RATE: 65 BPM
EOSINOPHIL # BLD: 0.07 K/UL (ref 0–0.44)
EOSINOPHILS RELATIVE PERCENT: 1 % (ref 1–4)
ERYTHROCYTE [DISTWIDTH] IN BLOOD BY AUTOMATED COUNT: 17.3 % (ref 11.8–14.4)
GFR, ESTIMATED: 73 ML/MIN/1.73M2
GLUCOSE SERPL-MCNC: 89 MG/DL (ref 74–99)
HCT VFR BLD AUTO: 26.3 % (ref 40.7–50.3)
HGB BLD-MCNC: 8 G/DL (ref 13–17)
IMM GRANULOCYTES # BLD AUTO: 0.05 K/UL (ref 0–0.3)
IMM GRANULOCYTES NFR BLD: 0 %
LYMPHOCYTES NFR BLD: 2.05 K/UL (ref 1.1–3.7)
LYMPHOCYTES RELATIVE PERCENT: 18 % (ref 24–43)
MCH RBC QN AUTO: 22 PG (ref 25.2–33.5)
MCHC RBC AUTO-ENTMCNC: 30.4 G/DL (ref 28.4–34.8)
MCV RBC AUTO: 72.5 FL (ref 82.6–102.9)
MONOCYTES NFR BLD: 1.03 K/UL (ref 0.1–1.2)
MONOCYTES NFR BLD: 9 % (ref 3–12)
NEUTROPHILS NFR BLD: 72 % (ref 36–65)
NEUTS SEG NFR BLD: 8 K/UL (ref 1.5–8.1)
NRBC BLD-RTO: 0.2 PER 100 WBC
PLATELET # BLD AUTO: 302 K/UL (ref 138–453)
PMV BLD AUTO: 9.6 FL (ref 8.1–13.5)
POTASSIUM SERPL-SCNC: 5.1 MMOL/L (ref 3.7–5.3)
RBC # BLD AUTO: 3.63 M/UL (ref 4.21–5.77)
RBC # BLD: ABNORMAL 10*6/UL
SODIUM SERPL-SCNC: 121 MMOL/L (ref 136–145)
WBC OTHER # BLD: 11.2 K/UL (ref 3.5–11.3)

## 2024-08-30 PROCEDURE — 85025 COMPLETE CBC W/AUTO DIFF WBC: CPT

## 2024-08-30 PROCEDURE — 80048 BASIC METABOLIC PNL TOTAL CA: CPT

## 2024-08-30 PROCEDURE — 36415 COLL VENOUS BLD VENIPUNCTURE: CPT

## 2024-09-04 DIAGNOSIS — E87.5 HYPERKALEMIA: ICD-10-CM

## 2024-09-04 DIAGNOSIS — E87.1 HYPONATREMIA: Primary | ICD-10-CM

## 2024-09-05 ENCOUNTER — TELEPHONE (OUTPATIENT)
Dept: VASCULAR SURGERY | Age: 59
End: 2024-09-05

## 2024-09-05 NOTE — TELEPHONE ENCOUNTER
Spoke to patient to let him know that at this time his Ablation is being cancelled and that he needs to see Nephrology due to come of his blood work, number was provided for them and patient expressed understanding

## 2024-09-05 NOTE — TELEPHONE ENCOUNTER
Per Niesha OREILLY for Dr Erwin Lujan - patients surgery for Monday 9/9/2024 needs to be canceled due to his lab results.  Dr Harp wants patient to see Nephrology, and has put a referral in the system.  I called and left message for patient to call our office because his surgery is being canceled.  Surgery was canceled with the hospital.

## 2024-09-06 DIAGNOSIS — I10 ESSENTIAL HYPERTENSION: ICD-10-CM

## 2024-09-06 RX ORDER — METOPROLOL SUCCINATE 100 MG/1
TABLET, EXTENDED RELEASE ORAL
Qty: 90 TABLET | Refills: 3 | Status: SHIPPED | OUTPATIENT
Start: 2024-09-06

## 2024-09-06 NOTE — TELEPHONE ENCOUNTER
Last Visit Date: 4/12/2024   Next Visit Date: 10/25/2024   Pharmacy:   [id number] - [place] - [address] [phone] - [phone]  [address] [place] [address]  Phone: [phone] Fax: [phone]

## 2024-09-17 ENCOUNTER — HOSPITAL ENCOUNTER (INPATIENT)
Age: 59
LOS: 6 days | Discharge: HOME | DRG: 291 | End: 2024-09-23
Attending: STUDENT IN AN ORGANIZED HEALTH CARE EDUCATION/TRAINING PROGRAM | Admitting: STUDENT IN AN ORGANIZED HEALTH CARE EDUCATION/TRAINING PROGRAM
Payer: COMMERCIAL

## 2024-09-17 ENCOUNTER — APPOINTMENT (OUTPATIENT)
Dept: GENERAL RADIOLOGY | Age: 59
DRG: 291 | End: 2024-09-17
Payer: COMMERCIAL

## 2024-09-17 DIAGNOSIS — D64.9 ANEMIA, UNSPECIFIED TYPE: ICD-10-CM

## 2024-09-17 DIAGNOSIS — I50.9 ACUTE ON CHRONIC CONGESTIVE HEART FAILURE, UNSPECIFIED HEART FAILURE TYPE (HCC): Primary | ICD-10-CM

## 2024-09-17 DIAGNOSIS — R06.02 SOB (SHORTNESS OF BREATH): ICD-10-CM

## 2024-09-17 PROBLEM — F17.210 TOBACCO DEPENDENCE DUE TO CIGARETTES: Status: ACTIVE | Noted: 2024-09-17

## 2024-09-17 PROBLEM — G47.33 OSA ON CPAP: Status: ACTIVE | Noted: 2024-03-09

## 2024-09-17 PROBLEM — F10.29 ALCOHOL DEPENDENCE WITH UNSPECIFIED ALCOHOL-INDUCED DISORDER (HCC): Status: ACTIVE | Noted: 2024-09-17

## 2024-09-17 LAB
ALBUMIN SERPL-MCNC: 4.2 G/DL (ref 3.5–5.2)
ALBUMIN/GLOB SERPL: 1.3 {RATIO} (ref 1–2.5)
ALP SERPL-CCNC: 168 U/L (ref 40–129)
ALT SERPL-CCNC: 18 U/L (ref 5–41)
ANION GAP SERPL CALCULATED.3IONS-SCNC: 15 MMOL/L (ref 9–17)
AST SERPL-CCNC: 25 U/L
BACTERIA URNS QL MICRO: ABNORMAL
BASOPHILS # BLD: 0.09 K/UL (ref 0–0.2)
BASOPHILS NFR BLD: 1 % (ref 0–2)
BILIRUB SERPL-MCNC: 0.7 MG/DL (ref 0.3–1.2)
BILIRUB UR QL STRIP: NEGATIVE
BNP SERPL-MCNC: 2587 PG/ML
BUN SERPL-MCNC: 18 MG/DL (ref 8–23)
CALCIUM SERPL-MCNC: 9.2 MG/DL (ref 8.6–10.4)
CHARACTER UR: ABNORMAL
CHLORIDE SERPL-SCNC: 89 MMOL/L (ref 98–107)
CLARITY UR: CLEAR
CO2 SERPL-SCNC: 22 MMOL/L (ref 20–31)
COLOR UR: YELLOW
CREAT SERPL-MCNC: 1.3 MG/DL (ref 0.7–1.2)
EOSINOPHIL # BLD: 0.09 K/UL (ref 0–0.4)
EOSINOPHILS RELATIVE PERCENT: 1 % (ref 1–4)
EPI CELLS #/AREA URNS HPF: ABNORMAL /HPF (ref 0–5)
ERYTHROCYTE [DISTWIDTH] IN BLOOD BY AUTOMATED COUNT: 19.2 % (ref 12.5–15.4)
GFR, ESTIMATED: 63 ML/MIN/1.73M2
GLUCOSE SERPL-MCNC: 84 MG/DL (ref 70–99)
GLUCOSE UR STRIP-MCNC: NEGATIVE MG/DL
HCT VFR BLD AUTO: 25.8 % (ref 41–53)
HGB BLD-MCNC: 8.2 G/DL (ref 13.5–17.5)
HGB UR QL STRIP.AUTO: NEGATIVE
KETONES UR STRIP-MCNC: NEGATIVE MG/DL
LEUKOCYTE ESTERASE UR QL STRIP: NEGATIVE
LYMPHOCYTES NFR BLD: 2.07 K/UL (ref 1–4.8)
LYMPHOCYTES RELATIVE PERCENT: 22 % (ref 24–44)
MCH RBC QN AUTO: 22 PG (ref 26–34)
MCHC RBC AUTO-ENTMCNC: 31.6 G/DL (ref 31–37)
MCV RBC AUTO: 69.6 FL (ref 80–100)
MONOCYTES NFR BLD: 0.85 K/UL (ref 0.1–1.2)
MONOCYTES NFR BLD: 9 % (ref 2–11)
MORPHOLOGY: ABNORMAL
MORPHOLOGY: ABNORMAL
NEUTROPHILS NFR BLD: 67 % (ref 36–66)
NEUTS SEG NFR BLD: 6.3 K/UL (ref 1.8–7.7)
NITRITE UR QL STRIP: NEGATIVE
PH UR STRIP: 6 [PH] (ref 5–8)
PLATELET # BLD AUTO: 353 K/UL (ref 140–450)
PMV BLD AUTO: 7.1 FL (ref 6–12)
POTASSIUM SERPL-SCNC: 4.7 MMOL/L (ref 3.7–5.3)
PROT SERPL-MCNC: 7.5 G/DL (ref 6.4–8.3)
PROT UR STRIP-MCNC: ABNORMAL MG/DL
RBC # BLD AUTO: 3.71 M/UL (ref 4.5–5.9)
RBC #/AREA URNS HPF: ABNORMAL /HPF (ref 0–2)
SODIUM SERPL-SCNC: 126 MMOL/L (ref 135–144)
SP GR UR STRIP: 1.01 (ref 1–1.03)
TROPONIN I SERPL HS-MCNC: 24 NG/L (ref 0–22)
UROBILINOGEN UR STRIP-ACNC: NORMAL EU/DL (ref 0–1)
WBC #/AREA URNS HPF: ABNORMAL /HPF (ref 0–5)
WBC OTHER # BLD: 9.4 K/UL (ref 3.5–11)

## 2024-09-17 PROCEDURE — 83935 ASSAY OF URINE OSMOLALITY: CPT

## 2024-09-17 PROCEDURE — 99285 EMERGENCY DEPT VISIT HI MDM: CPT

## 2024-09-17 PROCEDURE — 85025 COMPLETE CBC W/AUTO DIFF WBC: CPT

## 2024-09-17 PROCEDURE — 83880 ASSAY OF NATRIURETIC PEPTIDE: CPT

## 2024-09-17 PROCEDURE — 81001 URINALYSIS AUTO W/SCOPE: CPT

## 2024-09-17 PROCEDURE — 99223 1ST HOSP IP/OBS HIGH 75: CPT

## 2024-09-17 PROCEDURE — 84300 ASSAY OF URINE SODIUM: CPT

## 2024-09-17 PROCEDURE — 93005 ELECTROCARDIOGRAM TRACING: CPT | Performed by: STUDENT IN AN ORGANIZED HEALTH CARE EDUCATION/TRAINING PROGRAM

## 2024-09-17 PROCEDURE — 80053 COMPREHEN METABOLIC PANEL: CPT

## 2024-09-17 PROCEDURE — 71045 X-RAY EXAM CHEST 1 VIEW: CPT

## 2024-09-17 PROCEDURE — 2060000000 HC ICU INTERMEDIATE R&B

## 2024-09-17 PROCEDURE — 36415 COLL VENOUS BLD VENIPUNCTURE: CPT

## 2024-09-17 PROCEDURE — 84484 ASSAY OF TROPONIN QUANT: CPT

## 2024-09-17 RX ORDER — MAGNESIUM SULFATE 1 G/100ML
1000 INJECTION INTRAVENOUS PRN
Status: DISCONTINUED | OUTPATIENT
Start: 2024-09-17 | End: 2024-09-23 | Stop reason: HOSPADM

## 2024-09-17 RX ORDER — LORAZEPAM 1 MG/1
2 TABLET ORAL
Status: DISCONTINUED | OUTPATIENT
Start: 2024-09-17 | End: 2024-09-18

## 2024-09-17 RX ORDER — SODIUM CHLORIDE 0.9 % (FLUSH) 0.9 %
5-40 SYRINGE (ML) INJECTION EVERY 12 HOURS SCHEDULED
Status: DISCONTINUED | OUTPATIENT
Start: 2024-09-18 | End: 2024-09-23 | Stop reason: HOSPADM

## 2024-09-17 RX ORDER — VENLAFAXINE HYDROCHLORIDE 150 MG/1
150 CAPSULE, EXTENDED RELEASE ORAL
Status: DISCONTINUED | OUTPATIENT
Start: 2024-09-18 | End: 2024-09-23 | Stop reason: HOSPADM

## 2024-09-17 RX ORDER — PANTOPRAZOLE SODIUM 40 MG/1
40 TABLET, DELAYED RELEASE ORAL
Status: DISCONTINUED | OUTPATIENT
Start: 2024-09-18 | End: 2024-09-18

## 2024-09-17 RX ORDER — LORAZEPAM 2 MG/ML
1 INJECTION INTRAMUSCULAR
Status: DISCONTINUED | OUTPATIENT
Start: 2024-09-17 | End: 2024-09-18

## 2024-09-17 RX ORDER — ACETAMINOPHEN 650 MG/1
650 SUPPOSITORY RECTAL EVERY 6 HOURS PRN
Status: DISCONTINUED | OUTPATIENT
Start: 2024-09-17 | End: 2024-09-23 | Stop reason: HOSPADM

## 2024-09-17 RX ORDER — POLYETHYLENE GLYCOL 3350 17 G/17G
17 POWDER, FOR SOLUTION ORAL DAILY PRN
Status: DISCONTINUED | OUTPATIENT
Start: 2024-09-17 | End: 2024-09-23 | Stop reason: HOSPADM

## 2024-09-17 RX ORDER — ONDANSETRON 4 MG/1
4 TABLET, ORALLY DISINTEGRATING ORAL EVERY 8 HOURS PRN
Status: DISCONTINUED | OUTPATIENT
Start: 2024-09-17 | End: 2024-09-23 | Stop reason: HOSPADM

## 2024-09-17 RX ORDER — LORAZEPAM 1 MG/1
3 TABLET ORAL
Status: DISCONTINUED | OUTPATIENT
Start: 2024-09-17 | End: 2024-09-18

## 2024-09-17 RX ORDER — POTASSIUM CHLORIDE 1500 MG/1
40 TABLET, EXTENDED RELEASE ORAL PRN
Status: DISCONTINUED | OUTPATIENT
Start: 2024-09-17 | End: 2024-09-23 | Stop reason: HOSPADM

## 2024-09-17 RX ORDER — LORAZEPAM 2 MG/ML
3 INJECTION INTRAMUSCULAR
Status: DISCONTINUED | OUTPATIENT
Start: 2024-09-17 | End: 2024-09-18

## 2024-09-17 RX ORDER — POTASSIUM CHLORIDE 7.45 MG/ML
10 INJECTION INTRAVENOUS PRN
Status: DISCONTINUED | OUTPATIENT
Start: 2024-09-17 | End: 2024-09-23 | Stop reason: HOSPADM

## 2024-09-17 RX ORDER — LORAZEPAM 1 MG/1
1 TABLET ORAL
Status: DISCONTINUED | OUTPATIENT
Start: 2024-09-17 | End: 2024-09-18

## 2024-09-17 RX ORDER — LORAZEPAM 2 MG/ML
2 INJECTION INTRAMUSCULAR
Status: DISCONTINUED | OUTPATIENT
Start: 2024-09-17 | End: 2024-09-18

## 2024-09-17 RX ORDER — ATORVASTATIN CALCIUM 10 MG/1
10 TABLET, FILM COATED ORAL NIGHTLY
Status: DISCONTINUED | OUTPATIENT
Start: 2024-09-18 | End: 2024-09-23 | Stop reason: HOSPADM

## 2024-09-17 RX ORDER — NICOTINE 21 MG/24HR
1 PATCH, TRANSDERMAL 24 HOURS TRANSDERMAL DAILY
Status: DISCONTINUED | OUTPATIENT
Start: 2024-09-18 | End: 2024-09-23 | Stop reason: HOSPADM

## 2024-09-17 RX ORDER — LORAZEPAM 2 MG/ML
4 INJECTION INTRAMUSCULAR
Status: DISCONTINUED | OUTPATIENT
Start: 2024-09-17 | End: 2024-09-18

## 2024-09-17 RX ORDER — LISINOPRIL 20 MG/1
40 TABLET ORAL DAILY
Status: DISCONTINUED | OUTPATIENT
Start: 2024-09-18 | End: 2024-09-23 | Stop reason: HOSPADM

## 2024-09-17 RX ORDER — SODIUM CHLORIDE 9 MG/ML
INJECTION, SOLUTION INTRAVENOUS PRN
Status: DISCONTINUED | OUTPATIENT
Start: 2024-09-17 | End: 2024-09-23 | Stop reason: HOSPADM

## 2024-09-17 RX ORDER — AMLODIPINE BESYLATE 5 MG/1
5 TABLET ORAL DAILY
Status: DISCONTINUED | OUTPATIENT
Start: 2024-09-18 | End: 2024-09-18

## 2024-09-17 RX ORDER — ACETAMINOPHEN 325 MG/1
650 TABLET ORAL EVERY 6 HOURS PRN
Status: DISCONTINUED | OUTPATIENT
Start: 2024-09-17 | End: 2024-09-23 | Stop reason: HOSPADM

## 2024-09-17 RX ORDER — ONDANSETRON 2 MG/ML
4 INJECTION INTRAMUSCULAR; INTRAVENOUS EVERY 6 HOURS PRN
Status: DISCONTINUED | OUTPATIENT
Start: 2024-09-17 | End: 2024-09-23 | Stop reason: HOSPADM

## 2024-09-17 RX ORDER — AMIODARONE HYDROCHLORIDE 200 MG/1
200 TABLET ORAL DAILY
Status: DISCONTINUED | OUTPATIENT
Start: 2024-09-18 | End: 2024-09-23 | Stop reason: HOSPADM

## 2024-09-17 RX ORDER — LORAZEPAM 1 MG/1
4 TABLET ORAL
Status: DISCONTINUED | OUTPATIENT
Start: 2024-09-17 | End: 2024-09-18

## 2024-09-17 RX ORDER — METOPROLOL SUCCINATE 100 MG/1
100 TABLET, EXTENDED RELEASE ORAL DAILY
Status: DISCONTINUED | OUTPATIENT
Start: 2024-09-18 | End: 2024-09-23 | Stop reason: HOSPADM

## 2024-09-17 RX ORDER — SODIUM CHLORIDE 0.9 % (FLUSH) 0.9 %
10 SYRINGE (ML) INJECTION PRN
Status: DISCONTINUED | OUTPATIENT
Start: 2024-09-17 | End: 2024-09-23 | Stop reason: HOSPADM

## 2024-09-17 ASSESSMENT — PAIN - FUNCTIONAL ASSESSMENT: PAIN_FUNCTIONAL_ASSESSMENT: NONE - DENIES PAIN

## 2024-09-18 ENCOUNTER — APPOINTMENT (OUTPATIENT)
Age: 59
DRG: 291 | End: 2024-09-18
Attending: INTERNAL MEDICINE
Payer: COMMERCIAL

## 2024-09-18 ENCOUNTER — APPOINTMENT (OUTPATIENT)
Dept: ULTRASOUND IMAGING | Age: 59
DRG: 291 | End: 2024-09-18
Payer: COMMERCIAL

## 2024-09-18 PROBLEM — D64.9 ANEMIA: Status: ACTIVE | Noted: 2024-09-18

## 2024-09-18 PROBLEM — I48.20 CHRONIC A-FIB (HCC): Status: ACTIVE | Noted: 2024-09-18

## 2024-09-18 PROBLEM — R74.8 ELEVATED ALKALINE PHOSPHATASE LEVEL: Status: ACTIVE | Noted: 2024-09-18

## 2024-09-18 PROBLEM — E66.812 CLASS 2 SEVERE OBESITY DUE TO EXCESS CALORIES WITH SERIOUS COMORBIDITY AND BODY MASS INDEX (BMI) OF 39.0 TO 39.9 IN ADULT: Status: ACTIVE | Noted: 2024-09-18

## 2024-09-18 PROBLEM — R18.8 OTHER ASCITES: Status: ACTIVE | Noted: 2024-09-18

## 2024-09-18 PROBLEM — E66.01 CLASS 2 SEVERE OBESITY DUE TO EXCESS CALORIES WITH SERIOUS COMORBIDITY AND BODY MASS INDEX (BMI) OF 39.0 TO 39.9 IN ADULT: Status: ACTIVE | Noted: 2024-09-18

## 2024-09-18 PROBLEM — D50.9 MICROCYTIC ANEMIA: Status: ACTIVE | Noted: 2024-09-18

## 2024-09-18 PROBLEM — R06.02 SHORTNESS OF BREATH: Status: ACTIVE | Noted: 2024-03-09

## 2024-09-18 PROBLEM — E87.70 HYPERVOLEMIA: Status: ACTIVE | Noted: 2024-09-18

## 2024-09-18 PROBLEM — F10.90 ALCOHOL USE DISORDER: Status: ACTIVE | Noted: 2024-09-18

## 2024-09-18 LAB
ALBUMIN SERPL-MCNC: 3.8 G/DL (ref 3.5–5.2)
ALBUMIN/GLOB SERPL: 1.3 {RATIO} (ref 1–2.5)
ALP SERPL-CCNC: 147 U/L (ref 40–129)
ALT SERPL-CCNC: 15 U/L (ref 5–41)
ANION GAP SERPL CALCULATED.3IONS-SCNC: 12 MMOL/L (ref 9–17)
ANION GAP SERPL CALCULATED.3IONS-SCNC: 12 MMOL/L (ref 9–17)
ANION GAP SERPL CALCULATED.3IONS-SCNC: 13 MMOL/L (ref 9–17)
AST SERPL-CCNC: 21 U/L
BASOPHILS # BLD: 0.1 K/UL (ref 0–0.2)
BASOPHILS NFR BLD: 1 % (ref 0–2)
BILIRUB DIRECT SERPL-MCNC: 0.3 MG/DL
BILIRUB INDIRECT SERPL-MCNC: 0.3 MG/DL (ref 0–1)
BILIRUB SERPL-MCNC: 0.6 MG/DL (ref 0.3–1.2)
BUN SERPL-MCNC: 17 MG/DL (ref 8–23)
CALCIUM SERPL-MCNC: 9.1 MG/DL (ref 8.6–10.4)
CHLORIDE SERPL-SCNC: 90 MMOL/L (ref 98–107)
CHLORIDE SERPL-SCNC: 91 MMOL/L (ref 98–107)
CHLORIDE SERPL-SCNC: 91 MMOL/L (ref 98–107)
CO2 SERPL-SCNC: 24 MMOL/L (ref 20–31)
CO2 SERPL-SCNC: 25 MMOL/L (ref 20–31)
CO2 SERPL-SCNC: 25 MMOL/L (ref 20–31)
CREAT SERPL-MCNC: 1.2 MG/DL (ref 0.7–1.2)
ECHO BSA: 2.53 M2
ECHO EST RA PRESSURE: 8 MMHG
ECHO IVC EXP: 2.5 CM
ECHO LA AREA 2C: 29.1 CM2
ECHO LA AREA 4C: 23.9 CM2
ECHO LA DIAMETER INDEX: 1.76 CM/M2
ECHO LA DIAMETER: 4.3 CM
ECHO LA MAJOR AXIS: 6.5 CM
ECHO LA MINOR AXIS: 6.1 CM
ECHO LA VOL BP: 91 ML (ref 18–58)
ECHO LA VOL MOD A2C: 110 ML (ref 18–58)
ECHO LA VOL MOD A4C: 70 ML (ref 18–58)
ECHO LA VOL/BSA BIPLANE: 37 ML/M2 (ref 16–34)
ECHO LA VOLUME INDEX MOD A2C: 45 ML/M2 (ref 16–34)
ECHO LA VOLUME INDEX MOD A4C: 29 ML/M2 (ref 16–34)
ECHO LV EF PHYSICIAN: 65 %
ECHO LV FRACTIONAL SHORTENING: 40 % (ref 28–44)
ECHO LV INTERNAL DIMENSION DIASTOLE INDEX: 2.17 CM/M2
ECHO LV INTERNAL DIMENSION DIASTOLIC: 5.3 CM (ref 4.2–5.9)
ECHO LV INTERNAL DIMENSION SYSTOLIC INDEX: 1.31 CM/M2
ECHO LV INTERNAL DIMENSION SYSTOLIC: 3.2 CM
ECHO LV IVSD: 1.1 CM (ref 0.6–1)
ECHO LV MASS 2D: 242 G (ref 88–224)
ECHO LV MASS INDEX 2D: 99.2 G/M2 (ref 49–115)
ECHO LV POSTERIOR WALL DIASTOLIC: 1.2 CM (ref 0.6–1)
ECHO LV RELATIVE WALL THICKNESS RATIO: 0.45
ECHO LVOT AREA: 3.1 CM2
ECHO LVOT DIAM: 2 CM
ECHO RA AREA 4C: 24.4 CM2
ECHO RA END SYSTOLIC VOLUME APICAL 4 CHAMBER INDEX BSA: 28 ML/M2
ECHO RA VOLUME: 68 ML
ECHO RIGHT VENTRICULAR SYSTOLIC PRESSURE (RVSP): 37 MMHG
ECHO TV REGURGITANT MAX VELOCITY: 2.71 M/S
ECHO TV REGURGITANT PEAK GRADIENT: 29 MMHG
EOSINOPHIL # BLD: 0.1 K/UL (ref 0–0.4)
EOSINOPHILS RELATIVE PERCENT: 2 % (ref 1–4)
ERYTHROCYTE [DISTWIDTH] IN BLOOD BY AUTOMATED COUNT: 18.9 % (ref 12.5–15.4)
ETHANOL PERCENT: <0.01 %
ETHANOLAMINE SERPL-MCNC: <10 MG/DL (ref 0–0.08)
GFR, ESTIMATED: 69 ML/MIN/1.73M2
GLUCOSE SERPL-MCNC: 90 MG/DL (ref 70–99)
HCT VFR BLD AUTO: 23.2 % (ref 41–53)
HGB BLD-MCNC: 7.1 G/DL (ref 13.5–17.5)
IMM RETICS NFR: 24 % (ref 2.7–18.3)
LYMPHOCYTES NFR BLD: 1.8 K/UL (ref 1–4.8)
LYMPHOCYTES RELATIVE PERCENT: 21 % (ref 24–44)
MCH RBC QN AUTO: 21.5 PG (ref 26–34)
MCHC RBC AUTO-ENTMCNC: 30.7 G/DL (ref 31–37)
MCV RBC AUTO: 70 FL (ref 80–100)
MONOCYTES NFR BLD: 0.7 K/UL (ref 0.1–1.2)
MONOCYTES NFR BLD: 8 % (ref 2–11)
NEUTROPHILS NFR BLD: 68 % (ref 36–66)
NEUTS SEG NFR BLD: 5.7 K/UL (ref 1.8–7.7)
OSMOLALITY UR: 242 MOSM/KG (ref 80–1300)
PLATELET # BLD AUTO: 272 K/UL (ref 140–450)
PMV BLD AUTO: 6.9 FL (ref 6–12)
POTASSIUM SERPL-SCNC: 4.4 MMOL/L (ref 3.7–5.3)
POTASSIUM SERPL-SCNC: 4.7 MMOL/L (ref 3.7–5.3)
POTASSIUM SERPL-SCNC: 4.8 MMOL/L (ref 3.7–5.3)
PROT SERPL-MCNC: 6.7 G/DL (ref 6.4–8.3)
RBC # BLD AUTO: 3.31 M/UL (ref 4.5–5.9)
RETIC HEMOGLOBIN: 20.5 PG (ref 28.2–35.7)
RETICS # AUTO: 0.09 M/UL (ref 0.03–0.08)
RETICS/RBC NFR AUTO: 2.6 % (ref 0.5–1.9)
SODIUM SERPL-SCNC: 127 MMOL/L (ref 135–144)
SODIUM SERPL-SCNC: 128 MMOL/L (ref 135–144)
SODIUM SERPL-SCNC: 128 MMOL/L (ref 135–144)
SODIUM UR-SCNC: 30 MMOL/L
WBC OTHER # BLD: 8.5 K/UL (ref 3.5–11)

## 2024-09-18 PROCEDURE — 6370000000 HC RX 637 (ALT 250 FOR IP): Performed by: INTERNAL MEDICINE

## 2024-09-18 PROCEDURE — APPNB60 APP NON BILLABLE TIME 46-60 MINS: Performed by: NURSE PRACTITIONER

## 2024-09-18 PROCEDURE — 84156 ASSAY OF PROTEIN URINE: CPT

## 2024-09-18 PROCEDURE — 76705 ECHO EXAM OF ABDOMEN: CPT

## 2024-09-18 PROCEDURE — 93308 TTE F-UP OR LMTD: CPT

## 2024-09-18 PROCEDURE — 99223 1ST HOSP IP/OBS HIGH 75: CPT | Performed by: INTERNAL MEDICINE

## 2024-09-18 PROCEDURE — 83516 IMMUNOASSAY NONANTIBODY: CPT

## 2024-09-18 PROCEDURE — 6360000002 HC RX W HCPCS: Performed by: INTERNAL MEDICINE

## 2024-09-18 PROCEDURE — 82570 ASSAY OF URINE CREATININE: CPT

## 2024-09-18 PROCEDURE — 6370000000 HC RX 637 (ALT 250 FOR IP)

## 2024-09-18 PROCEDURE — 82746 ASSAY OF FOLIC ACID SERUM: CPT

## 2024-09-18 PROCEDURE — 80048 BASIC METABOLIC PNL TOTAL CA: CPT

## 2024-09-18 PROCEDURE — 82784 ASSAY IGA/IGD/IGG/IGM EACH: CPT

## 2024-09-18 PROCEDURE — 84080 ASSAY ALKALINE PHOSPHATASES: CPT

## 2024-09-18 PROCEDURE — 2580000003 HC RX 258

## 2024-09-18 PROCEDURE — 6370000000 HC RX 637 (ALT 250 FOR IP): Performed by: STUDENT IN AN ORGANIZED HEALTH CARE EDUCATION/TRAINING PROGRAM

## 2024-09-18 PROCEDURE — 2060000000 HC ICU INTERMEDIATE R&B

## 2024-09-18 PROCEDURE — 80051 ELECTROLYTE PANEL: CPT

## 2024-09-18 PROCEDURE — 99232 SBSQ HOSP IP/OBS MODERATE 35: CPT | Performed by: INTERNAL MEDICINE

## 2024-09-18 PROCEDURE — 83930 ASSAY OF BLOOD OSMOLALITY: CPT

## 2024-09-18 PROCEDURE — 36415 COLL VENOUS BLD VENIPUNCTURE: CPT

## 2024-09-18 PROCEDURE — 83540 ASSAY OF IRON: CPT

## 2024-09-18 PROCEDURE — 85025 COMPLETE CBC W/AUTO DIFF WBC: CPT

## 2024-09-18 PROCEDURE — 85045 AUTOMATED RETICULOCYTE COUNT: CPT

## 2024-09-18 PROCEDURE — 93308 TTE F-UP OR LMTD: CPT | Performed by: INTERNAL MEDICINE

## 2024-09-18 PROCEDURE — 2580000003 HC RX 258: Performed by: INTERNAL MEDICINE

## 2024-09-18 PROCEDURE — G0480 DRUG TEST DEF 1-7 CLASSES: HCPCS

## 2024-09-18 PROCEDURE — 82728 ASSAY OF FERRITIN: CPT

## 2024-09-18 PROCEDURE — 84075 ASSAY ALKALINE PHOSPHATASE: CPT

## 2024-09-18 PROCEDURE — 80076 HEPATIC FUNCTION PANEL: CPT

## 2024-09-18 PROCEDURE — 83550 IRON BINDING TEST: CPT

## 2024-09-18 PROCEDURE — 82607 VITAMIN B-12: CPT

## 2024-09-18 RX ORDER — GABAPENTIN 300 MG/1
300 CAPSULE ORAL 2 TIMES DAILY
Status: DISCONTINUED | OUTPATIENT
Start: 2024-09-20 | End: 2024-09-18

## 2024-09-18 RX ORDER — THIAMINE HYDROCHLORIDE 100 MG/ML
200 INJECTION, SOLUTION INTRAMUSCULAR; INTRAVENOUS DAILY
Status: DISCONTINUED | OUTPATIENT
Start: 2024-09-18 | End: 2024-09-18

## 2024-09-18 RX ORDER — GABAPENTIN 300 MG/1
300 CAPSULE ORAL 4 TIMES DAILY
Status: DISCONTINUED | OUTPATIENT
Start: 2024-09-18 | End: 2024-09-18

## 2024-09-18 RX ORDER — ALPRAZOLAM 0.5 MG
0.5 TABLET ORAL 3 TIMES DAILY PRN
Status: DISCONTINUED | OUTPATIENT
Start: 2024-09-18 | End: 2024-09-22

## 2024-09-18 RX ORDER — GABAPENTIN 300 MG/1
300 CAPSULE ORAL 3 TIMES DAILY
Status: DISCONTINUED | OUTPATIENT
Start: 2024-09-19 | End: 2024-09-18

## 2024-09-18 RX ORDER — PANTOPRAZOLE SODIUM 40 MG/1
40 TABLET, DELAYED RELEASE ORAL
Status: DISCONTINUED | OUTPATIENT
Start: 2024-09-18 | End: 2024-09-20

## 2024-09-18 RX ORDER — FUROSEMIDE 10 MG/ML
40 INJECTION INTRAMUSCULAR; INTRAVENOUS 2 TIMES DAILY
Status: DISCONTINUED | OUTPATIENT
Start: 2024-09-18 | End: 2024-09-20

## 2024-09-18 RX ORDER — GABAPENTIN 300 MG/1
300 CAPSULE ORAL NIGHTLY
Status: DISCONTINUED | OUTPATIENT
Start: 2024-09-21 | End: 2024-09-18

## 2024-09-18 RX ADMIN — GABAPENTIN 300 MG: 300 CAPSULE ORAL at 09:02

## 2024-09-18 RX ADMIN — AMIODARONE HYDROCHLORIDE 200 MG: 200 TABLET ORAL at 09:02

## 2024-09-18 RX ADMIN — ALPRAZOLAM 0.5 MG: 0.5 TABLET ORAL at 09:07

## 2024-09-18 RX ADMIN — THIAMINE HYDROCHLORIDE 200 MG: 100 INJECTION, SOLUTION INTRAMUSCULAR; INTRAVENOUS at 09:12

## 2024-09-18 RX ADMIN — SODIUM CHLORIDE, PRESERVATIVE FREE 10 ML: 5 INJECTION INTRAVENOUS at 20:14

## 2024-09-18 RX ADMIN — PANTOPRAZOLE SODIUM 40 MG: 40 TABLET, DELAYED RELEASE ORAL at 09:02

## 2024-09-18 RX ADMIN — METOPROLOL SUCCINATE 100 MG: 100 TABLET, EXTENDED RELEASE ORAL at 09:02

## 2024-09-18 RX ADMIN — FUROSEMIDE 40 MG: 10 INJECTION, SOLUTION INTRAMUSCULAR; INTRAVENOUS at 09:07

## 2024-09-18 RX ADMIN — FUROSEMIDE 40 MG: 10 INJECTION, SOLUTION INTRAMUSCULAR; INTRAVENOUS at 20:14

## 2024-09-18 RX ADMIN — ATORVASTATIN CALCIUM 10 MG: 10 TABLET, FILM COATED ORAL at 20:14

## 2024-09-18 NOTE — CONSULTS
Gastroenterology Consult Note    Patient:   Torrey Joy   Admit date:  9/17/2024  Facility:   [facility]  Referring/PCP: Karine Cole APRN - JENIFER  Date:     9/18/2024  Consultant:   BULMARO Damico - NP, Quin Seay MD    Subjective:     This 58 y.o. male was admitted 9/17/2024 with a diagnosis of \"Hyponatremia [E87.1]\" and is seen in consultation regarding   Chief Complaint   Patient presents with    abnormal labs     Low Na++ result from todays lab draw.  Pt complains of shortness of breath and weight gain recently.      58-year-old male with past medical history of Wilcox's esophagus, depression, hyperlipidemia, sleep apnea, hypertension, A-fib on Eliquis status post ablation presents to the ED with abnormal labs and is admitted to the hospital for management of hyponatremia.  Patient reports progressive shortness of breath over the last several weeks and a weight gain of over 25 pounds.  Patient was previously on Aldactone per his cardiologist which was discontinued due to hyperkalemia.  Was also on Lasix however this was decreased as he was hyponatremic.  He reports the weight gain happened after adjustment of his diuretics.  In the ER sodium 126, creatinine 1.3, proBNP was 2587.  Hemoglobin 8.2 MCV 69.6.  Platelets 353.  Alk phos 168  UA negative.  Chest x-ray revealed cardiomegaly with pulmonary vascular congestion/interstitial edema.  Abdomen was notably distended on admission.  Limited ultrasound of the abdomen showed small volume ascites.  Liver was homogeneous.    GI consulted for anemia.  This morning his hemoglobin was 7.1.  Patient denies any overt GI bleeding.  No melena, hematochezia, hematuria, hemoptysis, hematemesis.  He denies any abdominal pain, nausea, vomiting, change in bowel habits, weight loss, dysphagia, dyne aphasia, dyspeptic symptoms.  Has good appetite.  Patient has history of Wilcox's.  His last EGD was in 2020; no dysplasia or malignancy.  Patient      Assessment:   Principal Problem:    Chronic hyponatremia  Active Problems:    Shortness of breath    AGUS on CPAP    Persistent atrial fibrillation (HCC)    Alcohol dependence with unspecified alcohol-induced disorder (HCC)    Tobacco dependence due to cigarettes    Microcytic anemia    Chronic a-fib (HCC)    Class 2 severe obesity due to excess calories with serious comorbidity and body mass index (BMI) of 39.0 to 39.9 in adult (HCC)  Resolved Problems:    * No resolved hospital problems. *    # Hyponatremia, likely secondary to beer potomania  -Nephrology consulted  -Diuretics on hold  # A-fib  -On Eliquis  # AGUS  -On CPAP  # Anemia, microcytic  # Elevated alk phos  # Pulmonary vascular congestion, elevated proBNP  # Ascites per imaging    Plan:   Check stool for occult blood  Anemia profile, iron replacement as needed  Will need EGD as he has history of Wilcox's last EGD 2020  Also will need colonoscopy, inpatient vs outpatient (can be done with [facility])  Trend HH  Transfuse for hgb < 7  PPI  CIWA  Paracentesis tomorrow with fluid analysis  Alp isoenzymes   Thiamine, folic acid, MVI    This plan was formulated in collaboration with Dr. Seay    Electronically signed by BULMARO Damico NP on 9/18/2024 at 11:05 AM     Note is dictated utilizing voice recognition software. Unfortunately this leads to occasional typographical errors. Please contact our office if you have any questions.    Attending Physician Statement  I have discussed the care of Torrey Joy and I have examined the patient myselft and taken ros and hpi , including pertinent history and exam findings,  with the author of this note . I have reviewed the key elements of all parts of the encounter with the nurse practitioner/resident.  I agree with the assessment, plan and orders as documented by the above health care provider.  I have made necessary changes as deemed appropriate directly in the note.     More than 50% of the time was

## 2024-09-18 NOTE — PROGRESS NOTES
[facility]  Office: [phone]  Asim Mondragon, DO, Rohit Sellers, DO, Bandar Celestin DO, Georgi Shirley, DO, Nathan Zhao MD, Jane Gastelum MD, Santana Aguirre MD, Asha Crenshaw MD,  Deuce Jones MD, Reyna Pickering MD, Timoteo Zavala MD,  Haritha Graham DO, Thelma Carlton MD, Darien Lui MD, Leeroy Mondragon DO, Kamille Wilson MD,  Anjel Miranda DO, Ramona Marrero MD, Moira Rubalcava MD, Sary De MD, Michael Martin MD,  Prakash Kimbrough MD, Tommy Wood MD, Mushtaq Delaney MD, Ar Giraldo MD, Luis Manuel Chawla MD, Gómez Albert MD, Chucky Banda, DO, Zhen Fernandez DO, Rene Steiner DO, Yonatan Mckeon MD, Shirley Waterhouse, CNP,  Amaya Barber CNP, Chucky Lenz, CNP,  Naila Medina, DNP, Shae Greco, CNP, Denice Vallecillo, CNP, Kerry Erwin, CNP, Johana Latif, CNP, Evi Negron, PA-C, Jacquelin Ferraro PA-C, Magaly Carrillo, CNP, Cindy Conrad, CNP,  Lenora Michel, CNP, Thuy Paz, CNP, Antonella Walters, CNP, Alyson Schwartz, CNS, Abeba Chavarria, CNP, Elli Hernandez, CNP, Tracy Schwab, CNP         [facility]   IN-PATIENT SERVICE   [facility]    Progress Note    9/18/2024    3:23 PM    Name:   Torrey Joy  MRN:     [id number]     Acct:      [id number]   Room:   [address] [place] [address] Day:  1  Admit Date:  9/17/2024  8:34 PM    PCP:   Karine Cole APRN - CNP  Code Status:  Full Code    Subjective:     Patient presents to the hospital for worsening lower extremity edema, abdominal distention as well as a 26 pound weight gain.  He generally drinks between 3 and 4 L of fluid a day.  His appetite is extremely diminished which he attributes to his distention.  He drinks 8 beers a day and tells me he does not take much protein.  Diet is high in sodium.  Denies any history of heart failure.  Denies any history of kidney disease or cirrhosis.      Brief History:     This is a 58-year-old male who presents to the hospital for worsening edema    Medications:  Yes    Elevated alkaline phosphatase level 9/18/2024 Yes    Other ascites 9/18/2024 Yes       Plan:     Chronic hypotonic hyponatremia likely due to increased total body water ,decreased solute intake , increased free water intake and SSRI use  1200 cc fluid restriction.  Ensure supplements ordered, recommend high-protein diet.  IV diuretic.  Nephrology consulted.  Would hold off on salt tabs given concern for heart failure.  Alcohol cessation encouraged.  Shortness of breath, lower extremity edema and 25 pound weight gain, ?  New onset heart failure  proBNP elevated at 2587.  Chest x-ray consistent with pulmonary vascular congestion.  Start Lasix 40 mg twice daily, strict intake and output recommended  Microcytic anemia  Check iron studies  Alcohol use disorder  Discussed importance of smoking cessation  Daily mvi, thiamine, high protein diet  Persistent hyperkalemia  Was taken off of Aldactone in the past.  Will also hold lisinopril for now  Longstanding atrial fibrillation  Continue Eliquis, amiodarone and metoprolol.  Do not hold.  Reviewed cardiology note from April 18, 2024 in June 2024  Obesity BMI of 39 due to excess calories   Recommend weight loss lifestyle modification  Dyslipidemia  Hx of thoracic aortic ulcer  Being followed by CT surgery  Wilcox's esophagus continue PPI  Restart home benzodiazepine    Medical Decision Making: High Haritha Graham DO  9/18/2024  3:23 PM

## 2024-09-18 NOTE — CARE COORDINATION
Case Management Assessment  Initial Evaluation    Date/Time of Evaluation: 9/18/2024 3:41 PM  Assessment Completed by: Rubina Chow RN    If patient is discharged prior to next notation, then this note serves as note for discharge by case management.    Patient Name: Torrey Joy                   YOB: 1965  Diagnosis: Hyponatremia [E87.1]  SOB (shortness of breath) [R06.02]  Acute on chronic congestive heart failure, unspecified heart failure type (HCC) [I50.9]                   Date / Time: 9/17/2024  8:34 PM    Patient Admission Status: Inpatient   Readmission Risk (Low < 19, Mod (19-27), High > 27): Readmission Risk Score: 14.1    Current PCP: Karine Cole APRN - CNP  PCP verified by CM? (P) Yes    Chart Reviewed: Yes      History Provided by: (P) Patient  Patient Orientation: (P) Alert and Oriented    Patient Cognition: (P) Alert    Hospitalization in the last 30 days (Readmission):  No    If yes, Readmission Assessment in  Navigator will be completed.    Advance Directives:      Code Status: Full Code   Patient's Primary Decision Maker is: (P) Legal Next of Kin      Discharge Planning:    Patient lives with: (P) Spouse/Significant Other Type of Home: (P) House  Primary Care Giver: (P) Self  Patient Support Systems include: (P) Family Members   Current Financial resources: (P) None  Current community resources: (P) None  Current services prior to admission: (P) C-pap            Current DME:              Type of Home Care services:  (P) None    ADLS  Prior functional level: (P) Independent in ADLs/IADLs  Current functional level: (P) Independent in ADLs/IADLs    PT AM-PAC:   /24  OT AM-PAC:   /24    Family can provide assistance at DC: (P) Yes  Would you like Case Management to discuss the discharge plan with any other family members/significant others, and if so, who? (P) No  Plans to Return to Present Housing: (P) Yes  Other Identified Issues/Barriers to RETURNING to current  housing:None noted.  Potential Assistance needed at discharge: (P) N/A            Potential DME:    Patient expects to discharge to: (P) House  Plan for transportation at discharge: (P) Family    Financial    Payor: BCBS / Plan: BCBS - [place] PPO / Product Type: *No Product type* /     Does insurance require precert for SNF: Yes    Potential assistance Purchasing Medications: (P) No  Meds-to-Beds request: No      [id number] - [place], [place] - [address] - P [phone] - F [phone]  [address] [place] [address]  Phone: [phone] Fax: [phone]    [facility] - [address] -  [phone] - F [phone]  [address]  [address]  [facility] [place] [address]  Phone: [phone] Fax: [phone]    [id number] - [place], [place] - [address] - P [phone] - F [phone]  [facility]  [place] [address]  Phone: [phone] Fax: [phone]      Notes:    Factors facilitating achievement of predicted outcomes: Cooperative and Pleasant    Barriers to discharge: Medical complications    Additional Case Management Notes: Patient plans to return home with wife. Patient independent, no DME. Has a ride at discharge. Denies needs.     The Plan for Transition of Care is related to the following treatment goals of Hyponatremia [E87.1]  SOB (shortness of breath) [R06.02]  Acute on chronic congestive heart failure, unspecified heart failure type (HCC) [I50.9]    IF APPLICABLE: The Patient and/or patient representative Torrey and his family were provided with a choice of provider and agrees with the discharge plan. Freedom of choice list with basic dialogue that supports the patient's individualized plan of care/goals and shares the quality data associated with the providers was provided to:     Patient Representative Name:       The Patient and/or Patient Representative Agree with the Discharge Plan?  Yes    Rubina Chow RN  Case Management Department

## 2024-09-18 NOTE — ED PROVIDER NOTES
[facility] Emergency Department  [address]  [place] [address]  Phone: [phone]  Fax: [phone]        [facility] EMERGENCY DEPARTMENT  EMERGENCY DEPARTMENT ENCOUNTER      Pt Name: Torrey Joy  MRN: [id number]  Birthdate 1965  Date of evaluation: 9/17/2024  Provider: Ayana Soto DO    CHIEF COMPLAINT       Chief Complaint   Patient presents with    abnormal labs     Low Na++ result from todays lab draw.  Pt complains of shortness of breath and weight gain recently.        HISTORY OF PRESENT ILLNESS   (Location/Symptom, Timing/Onset,Context/Setting, Quality, Duration, Modifying Factors, Severity)  Note limiting factors.     Torrey Joy is a 58 y.o. male who presents to the emergency department with wife at the request of his primary doctor.  Patient was found to be hyponatremic today, sodium of 124.  Patient is complaining of shortness of breath.  Patient states that the beginning of August he was post have an ablation for A-fib however the procedure had to be canceled because he was having issues with hyperkalemia.  Cardiology took him off spironolactone at that time.  Patient has been having significant issues with fluid retention since.  Gained approximately 27 pounds since the beginning of August.  Noting swelling in his legs, scrotum, abdomen.  Patient has been on Lasix at the direction of his pulmonologist however was concerned that this was not working and was dropping his sodium.  Patient now trying Lasix every third day however continues to retain fluid and gain weight and sodium continues to be low so doctor recommended evaluation in the ER.    Nursing Notes were reviewed.    REVIEW OF SYSTEMS       Review of Systems   Constitutional:  Negative for chills and fever.   HENT:  Negative for congestion.    Respiratory:  Positive for shortness of breath.    Cardiovascular:  Positive for leg swelling. Negative for chest pain.

## 2024-09-18 NOTE — H&P
[facility]  Office: [phone]  Asim Mondragon DO, Rohit Sellers DO, Bandar Celestin DO, Georgi Shirley DO, Nathan Zhao MD, Jane Gastelum MD, Santana Aguirre MD, Asha Crenshaw MD,  Deuce Jones MD, Reyna Pickering MD, Timoteo Zavala MD,  Haritha Graham DO, Thelma Carlton MD, Darien Lui MD, Leeroy Mondragon DO, Kamille Wilson MD,  Anjel Miranda DO, Ramona Marrero MD, Moira Rubalcava MD, Sary De MD, Michael Martin MD,  Prakash Kimbrough MD, Tommy Wood MD, Mushtaq Delaney MD, Ar Giraldo MD, Luis Manuel Chawla MD, Gómez Albert MD, Zhen Fernandez DO, Rene Steiner DO, Siomara Jc MD,  Yonatan Mckeon MD, Shirley Waterhouse, CNP,  Amaya Barber, CNP, Chucky Lenz, CNP,  Naila Medina, DNP, Shae Greco, CNP, Denice Vallecillo, CNP, Antonella Walters CNP, Kerry Erwin, CNP, Johana Latif, CNP, Evi Negron, PA-C, Jacquelin Ferraro, PA-C, Magaly Carrillo, CNP, Mary Jane Rowe, CNP, Thuy Paz, CNP, Alyson Schwartz, CNS, Abeba Chavarria, CNP, Elli Hernandez, CNP, Tracy Schwab, CNP         [facility]   IN-PATIENT SERVICE   [facility]    HISTORY AND PHYSICAL EXAMINATION            Date:   9/17/2024  Patient name:  Torrey Joy  Date of admission:  9/17/2024  8:34 PM  MRN:   [id number]  Account:  [id number]  YOB: 1965  PCP:    Karine Cole APRN - CNP  Room:   1TRAUMA/1TRAUMA  Code Status:    Prior    Chief Complaint:     Chief Complaint   Patient presents with    abnormal labs     Low Na++ result from todays lab draw.  Pt complains of shortness of breath and weight gain recently.        History Obtained From:     patient, spouse    History of Present Illness:     Torrey Joy is a 58 y.o. Non- / non  male who presents with abnormal labs (Low Na++ result from todays lab draw.  Pt complains of shortness of breath and weight gain recently. )   and is admitted to the hospital for the management of Hyponatremia.    Patient  Troponin, High Sensitivity 24 (H) 0 - 22 ng/L   Brain Natriuretic Peptide    Collection Time: 09/17/24  8:47 PM   Result Value Ref Range    NT Pro-BNP 2,587 (H) <300 pg/mL   Urinalysis with Reflex to Culture    Collection Time: 09/17/24  9:50 PM    Specimen: Urine   Result Value Ref Range    Color, UA Yellow Yellow    Turbidity UA Clear Clear    Glucose, Ur NEGATIVE NEGATIVE mg/dL    Bilirubin, Urine NEGATIVE NEGATIVE    Ketones, Urine NEGATIVE NEGATIVE mg/dL    Specific Gravity, UA 1.010 1.005 - 1.030    Urine Hgb NEGATIVE NEGATIVE    pH, Urine 6.0 5.0 - 8.0    Protein, UA TRACE (A) NEGATIVE mg/dL    Urobilinogen, Urine Normal 0.0 - 1.0 EU/dL    Nitrite, Urine NEGATIVE NEGATIVE    Leukocyte Esterase, Urine NEGATIVE NEGATIVE   Microscopic Urinalysis    Collection Time: 09/17/24  9:50 PM   Result Value Ref Range    WBC, UA 0 TO 2 0 - 5 /HPF    RBC, UA None 0 - 2 /HPF    Epithelial Cells, UA 0 TO 2 0 - 5 /HPF    Bacteria, UA FEW (A) None    Other Observations UA (A) NOT REQ.     Utilizing a urinalysis as the only screening method to exclude a potential uropathogen can be unreliable in many patient populations.  Rapid screening tests are less sensitive than culture and if UTI is a clinical possibility, culture should be considered despite a negative urinalysis.         Imaging/Diagnostics:  XR CHEST PORTABLE    Result Date: 9/17/2024  Cardiomegaly with pulmonary vascular congestion/interstitial edema.       Assessment :      Hospital Problems             Last Modified POA    * (Principal) Hyponatremia 9/17/2024 Yes    AGUS on CPAP 9/17/2024 Yes    Persistent atrial fibrillation (HCC) 9/17/2024 Yes    Alcohol dependence with unspecified alcohol-induced disorder (HCC) 9/17/2024 Yes    Tobacco dependence due to cigarettes 9/17/2024 Yes       Plan:     Patient status inpatient in the  Progressive Unit/Step down    Hyponatremia in the setting of alcohol dependence  Admit to progressive level of care  Consult nephrology to

## 2024-09-18 NOTE — PLAN OF CARE
Problem: Discharge Planning  Goal: Discharge to home or other facility with appropriate resources  Outcome: Progressing  Flowsheets (Taken 9/18/2024 1505)  Discharge to home or other facility with appropriate resources:   Identify barriers to discharge with patient and caregiver   Identify discharge learning needs (meds, wound care, etc)   Refer to discharge planning if patient needs post-hospital services based on physician order or complex needs related to functional status, cognitive ability or social support system   Arrange for needed discharge resources and transportation as appropriate     Problem: Safety - Adult  Goal: Free from fall injury  Outcome: Progressing

## 2024-09-18 NOTE — CONSULTS
Nephrology Consult Note    Reason for Consult:  fluid overload, hyponatremia  Requesting Physician:  Santos    Chief Complaint:  swelling with over 25 pound weight gain over the past month or so  History Obtained From:  patient, spouse    History of Present Illness:              This is a 58 y.o. male who presents with fluid overload and low serum sodium level.  History received from the patient in the ER and also his wife by phone.  The patient has a history of new onset atrial fibrillation going back to February/March of 2024 after the patient developed coated.  He also notes a history of obesity and obstructive sleep apnea and does see Dr. Obinna Treviño of pulmonary medicine.  Dr. Trveiño manages his sleep apnea.  Also, there was some mention by Dr. Treviño that she might need to do a biopsy because of concern for sarcoidosis.  Interestingly, the patient's serum calcium is within normal limits.patient is noting edema in his belly, scrotum and bilateral lower extremities.    The patient states he has gained over 25 pounds of fluid over the last month or so.  He has significant swelling in the legs and in the belly area and notes that it is difficult to take deep breaths because of fluid on his belly.  It was also noted when the patient was in the ER that his serum sodium level on presentation was 126 and almost a liter yesterday, 9/17/2024 8:47 PM.  Going back to May of 2024 the patient had a serum sodium of 133 at that time.  Medications the patient takes include amlodipine 5 mg daily which he states he has been on since the diagnosis of atrial fibrillation and also gabapentin which is being weaned.  Both those medications may be contributing to his edema.  Serum sodium was a bit better this morning 127 mmol per liter at 3 AM.  Creatinine is 1.2 which is her usual baseline serum creatinine for the patient.  His liver panel this morning showed albumin 3.8 without phosphatase 147 and all other liver function

## 2024-09-19 ENCOUNTER — APPOINTMENT (OUTPATIENT)
Age: 59
DRG: 291 | End: 2024-09-19
Payer: COMMERCIAL

## 2024-09-19 PROBLEM — R60.0 BILATERAL LOWER EXTREMITY EDEMA: Status: ACTIVE | Noted: 2024-09-19

## 2024-09-19 PROBLEM — I50.9 ACUTE ON CHRONIC CONGESTIVE HEART FAILURE (HCC): Status: ACTIVE | Noted: 2024-09-19

## 2024-09-19 LAB
ANION GAP SERPL CALCULATED.3IONS-SCNC: 13 MMOL/L (ref 9–17)
ANION GAP SERPL CALCULATED.3IONS-SCNC: 14 MMOL/L (ref 9–17)
BASOPHILS # BLD: 0.07 K/UL (ref 0–0.2)
BASOPHILS NFR BLD: 1 % (ref 0–2)
BUN SERPL-MCNC: 19 MG/DL (ref 8–23)
CALCIUM SERPL-MCNC: 9.4 MG/DL (ref 8.6–10.4)
CHLORIDE SERPL-SCNC: 90 MMOL/L (ref 98–107)
CHLORIDE SERPL-SCNC: 91 MMOL/L (ref 98–107)
CHLORIDE SERPL-SCNC: 91 MMOL/L (ref 98–107)
CHLORIDE SERPL-SCNC: 92 MMOL/L (ref 98–107)
CO2 SERPL-SCNC: 24 MMOL/L (ref 20–31)
CO2 SERPL-SCNC: 25 MMOL/L (ref 20–31)
CO2 SERPL-SCNC: 25 MMOL/L (ref 20–31)
CO2 SERPL-SCNC: 27 MMOL/L (ref 20–31)
CREAT SERPL-MCNC: 1.3 MG/DL (ref 0.7–1.2)
CREAT UR-MCNC: 27.9 MG/DL (ref 39–259)
EOSINOPHIL # BLD: 0.13 K/UL (ref 0–0.4)
EOSINOPHILS RELATIVE PERCENT: 2 % (ref 1–4)
ERYTHROCYTE [DISTWIDTH] IN BLOOD BY AUTOMATED COUNT: 18.4 % (ref 11.8–14.4)
FERRITIN SERPL-MCNC: 30 NG/ML (ref 30–400)
FOLATE SERPL-MCNC: 14.1 NG/ML (ref 4.8–24.2)
GFR, ESTIMATED: 63 ML/MIN/1.73M2
GLUCOSE SERPL-MCNC: 105 MG/DL (ref 70–99)
HCT VFR BLD AUTO: 26.9 % (ref 40.7–50.3)
HGB BLD-MCNC: 8 G/DL (ref 13–17)
IMM GRANULOCYTES # BLD AUTO: 0 K/UL (ref 0–0.3)
IMM GRANULOCYTES NFR BLD: 0 %
IMM RETICS NFR: 19.2 % (ref 2.7–18.3)
IRON SATN MFR SERPL: 4 % (ref 20–55)
IRON SERPL-MCNC: 19 UG/DL (ref 61–157)
LYMPHOCYTES NFR BLD: 1.21 K/UL (ref 1–4.8)
LYMPHOCYTES RELATIVE PERCENT: 18 % (ref 24–44)
MCH RBC QN AUTO: 21.2 PG (ref 25.2–33.5)
MCHC RBC AUTO-ENTMCNC: 29.7 G/DL (ref 28.4–34.8)
MCV RBC AUTO: 71.4 FL (ref 82.6–102.9)
MONOCYTES NFR BLD: 0.54 K/UL (ref 0.1–0.8)
MONOCYTES NFR BLD: 8 % (ref 1–7)
MORPHOLOGY: ABNORMAL
MORPHOLOGY: ABNORMAL
NEUTROPHILS NFR BLD: 71 % (ref 36–66)
NEUTS SEG NFR BLD: 4.75 K/UL (ref 1.8–7.7)
NRBC BLD-RTO: 0 PER 100 WBC
OSMOLALITY SERPL: 273 MOSM/KG (ref 275–295)
PLATELET # BLD AUTO: 303 K/UL (ref 138–453)
PMV BLD AUTO: 9.5 FL (ref 8.1–13.5)
POTASSIUM SERPL-SCNC: 3.9 MMOL/L (ref 3.7–5.3)
POTASSIUM SERPL-SCNC: 4 MMOL/L (ref 3.7–5.3)
POTASSIUM SERPL-SCNC: 4.1 MMOL/L (ref 3.7–5.3)
POTASSIUM SERPL-SCNC: 4.4 MMOL/L (ref 3.7–5.3)
RBC # BLD AUTO: 3.77 M/UL (ref 4.21–5.77)
RETIC HEMOGLOBIN: 20.1 PG (ref 28.2–35.7)
RETICS # AUTO: 0.08 M/UL (ref 0.03–0.08)
RETICS/RBC NFR AUTO: 2.1 % (ref 0.5–1.9)
SODIUM SERPL-SCNC: 129 MMOL/L (ref 135–144)
SODIUM SERPL-SCNC: 129 MMOL/L (ref 135–144)
SODIUM SERPL-SCNC: 130 MMOL/L (ref 135–144)
SODIUM SERPL-SCNC: 130 MMOL/L (ref 135–144)
TIBC SERPL-MCNC: 509 UG/DL (ref 250–450)
TOTAL PROTEIN, URINE: 16 MG/DL
UNSATURATED IRON BINDING CAPACITY: 490 UG/DL (ref 112–347)
URINE TOTAL PROTEIN CREATININE RATIO: 0.58
VIT B12 SERPL-MCNC: 1204 PG/ML (ref 232–1245)
WBC OTHER # BLD: 6.7 K/UL (ref 3.5–11.3)

## 2024-09-19 PROCEDURE — 6370000000 HC RX 637 (ALT 250 FOR IP): Performed by: INTERNAL MEDICINE

## 2024-09-19 PROCEDURE — 2580000003 HC RX 258: Performed by: NURSE PRACTITIONER

## 2024-09-19 PROCEDURE — 6370000000 HC RX 637 (ALT 250 FOR IP)

## 2024-09-19 PROCEDURE — 6360000002 HC RX W HCPCS: Performed by: NURSE PRACTITIONER

## 2024-09-19 PROCEDURE — 85025 COMPLETE CBC W/AUTO DIFF WBC: CPT

## 2024-09-19 PROCEDURE — 2580000003 HC RX 258: Performed by: INTERNAL MEDICINE

## 2024-09-19 PROCEDURE — 36415 COLL VENOUS BLD VENIPUNCTURE: CPT

## 2024-09-19 PROCEDURE — 80048 BASIC METABOLIC PNL TOTAL CA: CPT

## 2024-09-19 PROCEDURE — 6360000002 HC RX W HCPCS: Performed by: INTERNAL MEDICINE

## 2024-09-19 PROCEDURE — 80051 ELECTROLYTE PANEL: CPT

## 2024-09-19 PROCEDURE — 99232 SBSQ HOSP IP/OBS MODERATE 35: CPT | Performed by: INTERNAL MEDICINE

## 2024-09-19 PROCEDURE — 2060000000 HC ICU INTERMEDIATE R&B

## 2024-09-19 PROCEDURE — 85045 AUTOMATED RETICULOCYTE COUNT: CPT

## 2024-09-19 PROCEDURE — 2580000003 HC RX 258

## 2024-09-19 RX ADMIN — FUROSEMIDE 40 MG: 10 INJECTION, SOLUTION INTRAMUSCULAR; INTRAVENOUS at 17:56

## 2024-09-19 RX ADMIN — ALPRAZOLAM 0.5 MG: 0.5 TABLET ORAL at 17:59

## 2024-09-19 RX ADMIN — THIAMINE HYDROCHLORIDE 200 MG: 100 INJECTION, SOLUTION INTRAMUSCULAR; INTRAVENOUS at 08:23

## 2024-09-19 RX ADMIN — ALPRAZOLAM 0.5 MG: 0.5 TABLET ORAL at 08:14

## 2024-09-19 RX ADMIN — FUROSEMIDE 40 MG: 10 INJECTION, SOLUTION INTRAMUSCULAR; INTRAVENOUS at 08:17

## 2024-09-19 RX ADMIN — AMIODARONE HYDROCHLORIDE 200 MG: 200 TABLET ORAL at 08:14

## 2024-09-19 RX ADMIN — METOPROLOL SUCCINATE 100 MG: 100 TABLET, EXTENDED RELEASE ORAL at 08:14

## 2024-09-19 RX ADMIN — SODIUM CHLORIDE 125 MG: 9 INJECTION, SOLUTION INTRAVENOUS at 10:49

## 2024-09-19 RX ADMIN — PANTOPRAZOLE SODIUM 40 MG: 40 TABLET, DELAYED RELEASE ORAL at 06:10

## 2024-09-19 RX ADMIN — SODIUM CHLORIDE, PRESERVATIVE FREE 5 ML: 5 INJECTION INTRAVENOUS at 21:00

## 2024-09-19 NOTE — PROGRESS NOTES
Patient off unit for paracentesis.  Follow fluid analysis  Anemia workup ongoing  Iron replacement  Will need EGD/colonoscopy given his DARIUS, timing TBD.    Electronically signed by BULMARO Damico NP on 9/19/2024 at 9:44 AM

## 2024-09-19 NOTE — CARE COORDINATION
[facility] Quality Flow/Interdisciplinary Rounds Progress Note    Quality Flow Rounds held on September 19, 2024 at 0930    Disciplines Attending:  Bedside Nurse, , and Nursing Unit Leadership    Barriers to Discharge: Clinical status    Anticipated Discharge Date:   9/20/24    Anticipated Discharge Disposition: Home    Readmission Risk              Risk of Unplanned Readmission:  18           Discussed patient goal for the day, patient clinical progression, and barriers to discharge. Paracentesis today.       Rubina Chow RN  September 19, 2024

## 2024-09-19 NOTE — PROGRESS NOTES
Pupils equal, round and reactive to light, EOMI.  Neck:   Supple  Chest:   Bilateral vesicular breath sounds, no rales or wheezes.  Cardiac:  S1 S2 RR, no murmurs, gallops or rubs.  Abdomen: Soft, obese, non-tender, no masses or organomegaly, BS audible.  :   No suprapubic or flank tenderness.  Neuro:  AAO x 3, No FND.  SKIN:  No rashes, good skin turgor.  Extremities:  +ve 2-3+ edema.    Labs:       Recent Labs     09/17/24 2047 09/18/24  0300   WBC 9.4 8.5   RBC 3.71* 3.31*   HGB 8.2* 7.1*   HCT 25.8* 23.2*   MCV 69.6* 70.0*   MCH 22.0* 21.5*   MCHC 31.6 30.7*   RDW 19.2* 18.9*    272   MPV 7.1 6.9      BMP:   Recent Labs     09/17/24 2047 09/18/24  0300 09/18/24  1154 09/18/24  1741 09/19/24  0008 09/19/24  0621   * 127*   < > 128* 129* 130*   K 4.7 4.4   < > 4.7 4.4 4.0   CL 89* 91*   < > 90* 91* 92*   CO2 22 24   < > 25 25 24   BUN 18 17  --   --   --  19   CREATININE 1.3* 1.2  --   --   --  1.3*   GLUCOSE 84 90  --   --   --  105*   CALCIUM 9.2 9.1  --   --   --  9.4    < > = values in this interval not displayed.      Hep C AB:          Lab Results   Component Value Date/Time    HEPCAB NONREACTIVE 08/20/2021 08:45 AM       Urinalysis/Chemistries:      Lab Results   Component Value Date/Time    NITRU NEGATIVE 09/17/2024 09:50 PM    COLORU Yellow 09/17/2024 09:50 PM    PHUR 6.0 09/17/2024 09:50 PM    PHUR 6.0 05/06/2023 05:00 PM    WBCUA 0 TO 2 09/17/2024 09:50 PM    RBCUA None 09/17/2024 09:50 PM    MUCUS 1+ 05/10/2021 09:30 AM    TRICHOMONAS NOT REPORTED 05/10/2021 09:30 AM    YEAST NOT REPORTED 05/10/2021 09:30 AM    BACTERIA FEW 09/17/2024 09:50 PM    LEUKOCYTESUR NEGATIVE 09/17/2024 09:50 PM    UROBILINOGEN Normal 09/17/2024 09:50 PM    BILIRUBINUR NEGATIVE 09/17/2024 09:50 PM    GLUCOSEU NEGATIVE 09/17/2024 09:50 PM    KETUA NEGATIVE 09/17/2024 09:50 PM    AMORPHOUS NOT REPORTED 05/10/2021 09:30 AM     Urine Osmolarity:   Lab Results   Component Value Date/Time    OSMOU 242 09/17/2024  09:50 PM     Radiology:     Reviewed.     Assessment:     Hyponatremia likely due to volume overload now improving. 9/17/2024 UA trace protein, RBC none.  Urine osmolarity 242, urine sodium 30.  9/18/2024 serum osmolarity 273.  Volume overload.  A-fib.  AGUS/obesity.  Chronic alcohol usage drinks about 8 beers per day.  Microcytic anemia-GI on board.  Positive smoking history.  Patient had prior history of hyperkalemia in the setting of Aldactone and lisinopril usage.  Aldactone was later discontinued due to elevated potassium levels.  CKD 2 with baseline creatinine of around 1-1.3 mg/dl.  Bilateral lower extremity edema.    Plan:   Continue Lasix 40 mg IV twice daily.  Keep fluid restrictions to 1200 cc/day.  Monitor strict I's and O's and renal function.  Follow-up GI plan.  BMP in AM.  Will follow.  Case was discussed with primary and patient's nurse was updated.  Nutrition   Please ensure that patient is on a renal diet/TF. Avoid nephrotoxic drugs/contrast exposure.    Jorge Zhao MD  [facility]     This note is created with the assistance of a speech-recognition program. While intending to generate a document that actually reflects the content of the visit, no guarantees can be provided that every mistake has been identified and corrected by editing.

## 2024-09-19 NOTE — PLAN OF CARE
Problem: Discharge Planning  Goal: Discharge to home or other facility with appropriate resources  9/19/2024 1125 by Siddhartha Christine RN  Outcome: Progressing  Flowsheets (Taken 9/18/2024 2214 by Justina Staley RN)  Discharge to home or other facility with appropriate resources:   Arrange for needed discharge resources and transportation as appropriate   Identify discharge learning needs (meds, wound care, etc)   Arrange for interpreters to assist at discharge as needed  9/18/2024 2214 by Justina Staley RN  Outcome: Progressing  Flowsheets (Taken 9/18/2024 2214)  Discharge to home or other facility with appropriate resources:   Arrange for needed discharge resources and transportation as appropriate   Identify discharge learning needs (meds, wound care, etc)   Arrange for interpreters to assist at discharge as needed     Problem: Safety - Adult  Goal: Free from fall injury  9/19/2024 1125 by Siddhartha Christine RN  Outcome: Progressing  Flowsheets (Taken 9/18/2024 2214 by Justina Staley RN)  Free From Fall Injury:   Instruct family/caregiver on patient safety   Based on caregiver fall risk screen, instruct family/caregiver to ask for assistance with transferring infant if caregiver noted to have fall risk factors  9/18/2024 2214 by Justina Staley RN  Outcome: Progressing  Flowsheets (Taken 9/18/2024 2214)  Free From Fall Injury:   Instruct family/caregiver on patient safety   Based on caregiver fall risk screen, instruct family/caregiver to ask for assistance with transferring infant if caregiver noted to have fall risk factors     Problem: Chronic Conditions and Co-morbidities  Goal: Patient's chronic conditions and co-morbidity symptoms are monitored and maintained or improved  Outcome: Progressing  Flowsheets (Taken 9/18/2024 1345 by Meseret Mathew RN)  Care Plan - Patient's Chronic Conditions and Co-Morbidity Symptoms are Monitored and Maintained or Improved:   Monitor and assess patient's chronic conditions and

## 2024-09-19 NOTE — PLAN OF CARE
Problem: Discharge Planning  Goal: Discharge to home or other facility with appropriate resources  9/18/2024 2214 by Justina Staley, RN  Outcome: Progressing  Flowsheets (Taken 9/18/2024 2214)  Discharge to home or other facility with appropriate resources:   Arrange for needed discharge resources and transportation as appropriate   Identify discharge learning needs (meds, wound care, etc)   Arrange for interpreters to assist at discharge as needed     Problem: Safety - Adult  Goal: Free from fall injury  9/18/2024 2214 by Justina Staley, RN  Outcome: Progressing  Flowsheets (Taken 9/18/2024 2214)  Free From Fall Injury:   Instruct family/caregiver on patient safety   Based on caregiver fall risk screen, instruct family/caregiver to ask for assistance with transferring infant if caregiver noted to have fall risk factors

## 2024-09-19 NOTE — PROGRESS NOTES
[facility]  Office: [phone]  Asim Mondragon, DO, Rohit Sellers, DO, Bandar Celestin DO, Georgi Shirley, DO, Nathan Zhao MD, Jane Gastelum MD, Santana Aguirre MD, Asha Crenshaw MD,  Deuce Jones MD, Reyna Pickering MD, Timoteo Zavala MD,  Haritha Graham DO, Thelma Carlton MD, Darien Lui MD, Leeroy Mondragon DO, Kamille Wilson MD,  Anjel Miranda DO, Ramona Marrero MD, Moira Rubalcava MD, Sary De MD, Michael Martin MD,  Prakash Kimbrough MD, Tommy Wood MD, Mushtaq Delaney MD, Ar Giraldo MD, Luis Manuel Chawla MD, Gómez Albert MD, Chucky Banda, DO, Zhen Fernandez DO, Rene Steiner DO, Yonatan Mckeon MD, Shirley Waterhouse, CNP,  Amaya Barber CNP, Chucky Lenz, CNP,  Naila Medina, DNP, Shae Greco, CNP, Denice Vallecillo, CNP, Kerry Erwin, CNP, Johana Latif, CNP, Evi Negron, PA-C, Jacquelin Ferraro PA-C, Magaly Carrillo, CNP, Cindy Conrad, CNP,  Lenora Michel, CNP, Thuy Paz, CNP, Antonella Walters, CNP, Alyson Schwartz, CNS, Abeba Chavarria, CNP, Elli Hernandez, CNP, Tracy Schwab, CNP         [facility]   IN-PATIENT SERVICE   [facility]    Progress Note    9/19/2024    12:47 PM    Name:   Torrey Joy  MRN:     [id number]     Acct:      [id number]   Room:   [address] [place] [address] Day:  2  Admit Date:  9/17/2024  8:34 PM    PCP:   Karine Cole APRN - CNP  Code Status:  Full Code    Subjective:     He feels much better today.  Abdomen softer and feels like legs are less swollen compared to yesterday.  He is urinating and is -4.7 L since admission.  No chest pain fevers or chills.  Patient was bradycardic overnight with a heart rate range between 30 and 50 still in atrial fibrillation.  He is asymptomatic.  We did give home metoprolol this morning    Brief History:     This is a 58-year-old male who presents to the hospital for worsening edema    Medications:     Allergies:  No Known Allergies    Current Meds:   Scheduled Meds:    ferric gluconate  125    ANIONGAP 15 12   < > 13 14 13   LABGLOM 63 69  --   --  63  --    CALCIUM 9.2 9.1  --   --  9.4  --    PROBNP 2,587*  --   --   --   --   --    TROPHS 24*  --   --   --   --   --     < > = values in this interval not displayed.     Recent Labs     09/17/24 2047 09/18/24  0300   AST 25 21   ALT 18 15   ALKPHOS 168* 147*   BILITOT 0.7 0.6   BILIDIR  --  0.3*     ABG:No results found for: \"POCPH\", \"PHART\", \"PH\", \"POCPCO2\", \"YOS4ADA\", \"PCO2\", \"POCPO2\", \"PO2ART\", \"PO2\", \"POCHCO3\", \"KFH1GAU\", \"HCO3\", \"NBEA\", \"PBEA\", \"BEART\", \"BE\", \"THGBART\", \"THB\", \"GHH9JRO\", \"JTFZ1TQB\", \"G5DENCHX\", \"O2SAT\", \"FIO2\"  No results found for: \"SPECIAL\"  Lab Results   Component Value Date/Time    CULTURE NORMAL RESPIRATORY CARLO MODERATE GROWTH 11/03/2022 10:06 AM       Radiology:  XR CHEST PORTABLE    Result Date: 9/17/2024  Cardiomegaly with pulmonary vascular congestion/interstitial edema.       Physical Examination:     General appearance:  alert, cooperative and no distress  Mental Status:  oriented to person, place and time and normal affect  Lungs:  clear to auscultation bilaterally, normal effort, crackles at the bases  Heart:  regular rate and rhythm, no murmur  Abdomen:  soft, nontender, distended with edema in the back, normal bowel sounds, no masses, hepatomegaly, splenomegaly  Extremities: +2 pitting edema in lower extremities bilaterally, pulses palpable.  Extremities warm to touch.  Skin:  no gross lesions, rashes, induration    Assessment:     Hospital Problems             Last Modified POA    * (Principal) Hyponatremia 9/19/2024 Yes    Shortness of breath 9/18/2024 Yes    AGUS on CPAP 9/17/2024 Yes    Persistent atrial fibrillation (HCC) 9/17/2024 Yes    Alcohol dependence with unspecified alcohol-induced disorder (HCC) 9/17/2024 Yes    Chronic a-fib (HCC) 9/18/2024 Yes    Tobacco dependence due to cigarettes 9/17/2024 Yes    Anemia 9/18/2024 Yes    Class 2 severe obesity due to excess calories with serious comorbidity and

## 2024-09-20 LAB
ANION GAP SERPL CALCULATED.3IONS-SCNC: 14 MMOL/L (ref 9–17)
ANION GAP SERPL CALCULATED.3IONS-SCNC: 16 MMOL/L (ref 9–17)
BASOPHILS # BLD: 0 K/UL (ref 0–0.2)
BASOPHILS NFR BLD: 0 % (ref 0–2)
BUN SERPL-MCNC: 18 MG/DL (ref 8–23)
CALCIUM SERPL-MCNC: 9.5 MG/DL (ref 8.6–10.4)
CHLORIDE SERPL-SCNC: 89 MMOL/L (ref 98–107)
CHLORIDE SERPL-SCNC: 92 MMOL/L (ref 98–107)
CHLORIDE SERPL-SCNC: 92 MMOL/L (ref 98–107)
CHLORIDE SERPL-SCNC: 93 MMOL/L (ref 98–107)
CO2 SERPL-SCNC: 25 MMOL/L (ref 20–31)
CO2 SERPL-SCNC: 26 MMOL/L (ref 20–31)
CO2 SERPL-SCNC: 27 MMOL/L (ref 20–31)
CO2 SERPL-SCNC: 28 MMOL/L (ref 20–31)
CREAT SERPL-MCNC: 1.2 MG/DL (ref 0.7–1.2)
EKG ATRIAL RATE: 214 BPM
EKG Q-T INTERVAL: 446 MS
EKG QRS DURATION: 84 MS
EKG QTC CALCULATION (BAZETT): 498 MS
EKG R AXIS: 90 DEGREES
EKG T AXIS: 98 DEGREES
EKG VENTRICULAR RATE: 75 BPM
EOSINOPHIL # BLD: 0.13 K/UL (ref 0–0.4)
EOSINOPHILS RELATIVE PERCENT: 2 % (ref 1–4)
ERYTHROCYTE [DISTWIDTH] IN BLOOD BY AUTOMATED COUNT: 19.1 % (ref 12.5–15.4)
GFR, ESTIMATED: 69 ML/MIN/1.73M2
GLUCOSE SERPL-MCNC: 109 MG/DL (ref 70–99)
HCT VFR BLD AUTO: 27.8 % (ref 41–53)
HGB BLD-MCNC: 8.5 G/DL (ref 13.5–17.5)
LYMPHOCYTES NFR BLD: 1.12 K/UL (ref 1–4.8)
LYMPHOCYTES RELATIVE PERCENT: 17 % (ref 24–44)
MCH RBC QN AUTO: 21.4 PG (ref 26–34)
MCHC RBC AUTO-ENTMCNC: 30.6 G/DL (ref 31–37)
MCV RBC AUTO: 69.9 FL (ref 80–100)
MONOCYTES NFR BLD: 0.46 K/UL (ref 0.1–0.8)
MONOCYTES NFR BLD: 7 % (ref 1–7)
MORPHOLOGY: ABNORMAL
NEUTROPHILS NFR BLD: 74 % (ref 36–66)
NEUTS SEG NFR BLD: 4.89 K/UL (ref 1.8–7.7)
PLATELET # BLD AUTO: 336 K/UL (ref 140–450)
PMV BLD AUTO: 6.8 FL (ref 6–12)
POTASSIUM SERPL-SCNC: 3.8 MMOL/L (ref 3.7–5.3)
POTASSIUM SERPL-SCNC: 3.9 MMOL/L (ref 3.7–5.3)
RBC # BLD AUTO: 3.97 M/UL (ref 4.5–5.9)
SODIUM SERPL-SCNC: 131 MMOL/L (ref 135–144)
SODIUM SERPL-SCNC: 133 MMOL/L (ref 135–144)
SURGICAL PATHOLOGY REPORT: NORMAL
WBC OTHER # BLD: 6.6 K/UL (ref 3.5–11)

## 2024-09-20 PROCEDURE — 2060000000 HC ICU INTERMEDIATE R&B

## 2024-09-20 PROCEDURE — 6370000000 HC RX 637 (ALT 250 FOR IP): Performed by: INTERNAL MEDICINE

## 2024-09-20 PROCEDURE — 99223 1ST HOSP IP/OBS HIGH 75: CPT | Performed by: INTERNAL MEDICINE

## 2024-09-20 PROCEDURE — 99232 SBSQ HOSP IP/OBS MODERATE 35: CPT | Performed by: INTERNAL MEDICINE

## 2024-09-20 PROCEDURE — 2580000003 HC RX 258: Performed by: NURSE PRACTITIONER

## 2024-09-20 PROCEDURE — 99222 1ST HOSP IP/OBS MODERATE 55: CPT | Performed by: SPECIALIST

## 2024-09-20 PROCEDURE — 80048 BASIC METABOLIC PNL TOTAL CA: CPT

## 2024-09-20 PROCEDURE — 2580000003 HC RX 258: Performed by: INTERNAL MEDICINE

## 2024-09-20 PROCEDURE — 6370000000 HC RX 637 (ALT 250 FOR IP)

## 2024-09-20 PROCEDURE — 99232 SBSQ HOSP IP/OBS MODERATE 35: CPT | Performed by: NURSE PRACTITIONER

## 2024-09-20 PROCEDURE — 6360000002 HC RX W HCPCS: Performed by: NURSE PRACTITIONER

## 2024-09-20 PROCEDURE — 80051 ELECTROLYTE PANEL: CPT

## 2024-09-20 PROCEDURE — 6360000002 HC RX W HCPCS: Performed by: INTERNAL MEDICINE

## 2024-09-20 PROCEDURE — 99232 SBSQ HOSP IP/OBS MODERATE 35: CPT | Performed by: STUDENT IN AN ORGANIZED HEALTH CARE EDUCATION/TRAINING PROGRAM

## 2024-09-20 PROCEDURE — 2580000003 HC RX 258

## 2024-09-20 PROCEDURE — 85025 COMPLETE CBC W/AUTO DIFF WBC: CPT

## 2024-09-20 PROCEDURE — 6370000000 HC RX 637 (ALT 250 FOR IP): Performed by: SPECIALIST

## 2024-09-20 PROCEDURE — 36415 COLL VENOUS BLD VENIPUNCTURE: CPT

## 2024-09-20 RX ORDER — ATORVASTATIN CALCIUM 10 MG/1
10 TABLET, FILM COATED ORAL NIGHTLY
OUTPATIENT
Start: 2024-09-20

## 2024-09-20 RX ORDER — TORSEMIDE 20 MG/1
40 TABLET ORAL DAILY
Status: DISCONTINUED | OUTPATIENT
Start: 2024-09-20 | End: 2024-09-23 | Stop reason: HOSPADM

## 2024-09-20 RX ORDER — POTASSIUM CHLORIDE 7.45 MG/ML
10 INJECTION INTRAVENOUS PRN
OUTPATIENT
Start: 2024-09-20

## 2024-09-20 RX ORDER — POTASSIUM CHLORIDE 1500 MG/1
40 TABLET, EXTENDED RELEASE ORAL PRN
OUTPATIENT
Start: 2024-09-20

## 2024-09-20 RX ORDER — FUROSEMIDE 10 MG/ML
40 INJECTION INTRAMUSCULAR; INTRAVENOUS 2 TIMES DAILY
Status: CANCELLED | OUTPATIENT
Start: 2024-09-20

## 2024-09-20 RX ORDER — AMIODARONE HYDROCHLORIDE 200 MG/1
200 TABLET ORAL DAILY
OUTPATIENT
Start: 2024-09-21

## 2024-09-20 RX ORDER — MAGNESIUM SULFATE 1 G/100ML
1000 INJECTION INTRAVENOUS PRN
OUTPATIENT
Start: 2024-09-20

## 2024-09-20 RX ORDER — PANTOPRAZOLE SODIUM 40 MG/1
40 TABLET, DELAYED RELEASE ORAL
Status: DISCONTINUED | OUTPATIENT
Start: 2024-09-21 | End: 2024-09-23 | Stop reason: HOSPADM

## 2024-09-20 RX ORDER — SODIUM CHLORIDE 0.9 % (FLUSH) 0.9 %
10 SYRINGE (ML) INJECTION PRN
OUTPATIENT
Start: 2024-09-20

## 2024-09-20 RX ORDER — ONDANSETRON 4 MG/1
4 TABLET, ORALLY DISINTEGRATING ORAL EVERY 8 HOURS PRN
OUTPATIENT
Start: 2024-09-20

## 2024-09-20 RX ORDER — METOPROLOL SUCCINATE 100 MG/1
100 TABLET, EXTENDED RELEASE ORAL DAILY
OUTPATIENT
Start: 2024-09-21

## 2024-09-20 RX ORDER — ONDANSETRON 2 MG/ML
4 INJECTION INTRAMUSCULAR; INTRAVENOUS EVERY 6 HOURS PRN
OUTPATIENT
Start: 2024-09-20

## 2024-09-20 RX ORDER — SODIUM CHLORIDE 0.9 % (FLUSH) 0.9 %
5-40 SYRINGE (ML) INJECTION EVERY 12 HOURS SCHEDULED
OUTPATIENT
Start: 2024-09-20

## 2024-09-20 RX ORDER — POLYETHYLENE GLYCOL 3350 17 G/17G
17 POWDER, FOR SOLUTION ORAL DAILY PRN
OUTPATIENT
Start: 2024-09-20

## 2024-09-20 RX ORDER — PANTOPRAZOLE SODIUM 40 MG/1
40 TABLET, DELAYED RELEASE ORAL
Status: CANCELLED | OUTPATIENT
Start: 2024-09-21

## 2024-09-20 RX ORDER — ALPRAZOLAM 0.5 MG
0.5 TABLET ORAL 3 TIMES DAILY PRN
Status: CANCELLED | OUTPATIENT
Start: 2024-09-20

## 2024-09-20 RX ORDER — ACETAMINOPHEN 325 MG/1
650 TABLET ORAL EVERY 6 HOURS PRN
OUTPATIENT
Start: 2024-09-20

## 2024-09-20 RX ORDER — ACETAMINOPHEN 650 MG/1
650 SUPPOSITORY RECTAL EVERY 6 HOURS PRN
OUTPATIENT
Start: 2024-09-20

## 2024-09-20 RX ORDER — SODIUM CHLORIDE 9 MG/ML
INJECTION, SOLUTION INTRAVENOUS PRN
OUTPATIENT
Start: 2024-09-20

## 2024-09-20 RX ORDER — NICOTINE 21 MG/24HR
1 PATCH, TRANSDERMAL 24 HOURS TRANSDERMAL DAILY
OUTPATIENT
Start: 2024-09-21

## 2024-09-20 RX ADMIN — ALPRAZOLAM 0.5 MG: 0.5 TABLET ORAL at 20:12

## 2024-09-20 RX ADMIN — TORSEMIDE 40 MG: 20 TABLET ORAL at 14:25

## 2024-09-20 RX ADMIN — AMIODARONE HYDROCHLORIDE 200 MG: 200 TABLET ORAL at 09:00

## 2024-09-20 RX ADMIN — ALPRAZOLAM 0.5 MG: 0.5 TABLET ORAL at 09:36

## 2024-09-20 RX ADMIN — PANTOPRAZOLE SODIUM 40 MG: 40 TABLET, DELAYED RELEASE ORAL at 05:27

## 2024-09-20 RX ADMIN — SODIUM CHLORIDE 125 MG: 9 INJECTION, SOLUTION INTRAVENOUS at 11:18

## 2024-09-20 RX ADMIN — SODIUM CHLORIDE, PRESERVATIVE FREE 10 ML: 5 INJECTION INTRAVENOUS at 20:14

## 2024-09-20 RX ADMIN — METOPROLOL SUCCINATE 100 MG: 100 TABLET, EXTENDED RELEASE ORAL at 09:00

## 2024-09-20 RX ADMIN — THIAMINE HYDROCHLORIDE 200 MG: 100 INJECTION, SOLUTION INTRAMUSCULAR; INTRAVENOUS at 09:16

## 2024-09-20 RX ADMIN — ATORVASTATIN CALCIUM 10 MG: 10 TABLET, FILM COATED ORAL at 20:12

## 2024-09-20 RX ADMIN — SODIUM CHLORIDE, PRESERVATIVE FREE 10 ML: 5 INJECTION INTRAVENOUS at 09:17

## 2024-09-20 RX ADMIN — EMPAGLIFLOZIN 10 MG: 10 TABLET, FILM COATED ORAL at 09:00

## 2024-09-20 RX ADMIN — FUROSEMIDE 40 MG: 10 INJECTION, SOLUTION INTRAMUSCULAR; INTRAVENOUS at 09:00

## 2024-09-20 NOTE — CONSULTS
Well-known to me from previous encounters.  Patient was seen yesterday around 6 PM.  Note is being dictated this morning.    58 years old gentleman who is known to have atrial fibrillation atrial flutter has failed in the past cardioversions.  Has been on amiodarone as started by his cardiologist before my involvement with him.  Apparently amiodarone has failed in controlling or maintaining sinus rhythm.,  Patient was scheduled to have pulmonary vein isolation procedure done the day of the procedure his potassium was significantly elevated and started to have hyponatremia so the ablation procedure was canceled with the agreement of the anesthesiology.  Because of persistent hyponatremia and concerns about the etiology of that we scheduled him as an outpatient will order the nephrology consult for evaluation.  The patient ended up in the hospital with significant swelling of the lower extremities, testicles daily, and the patient was found to be according to the nephrology notes loaded with fluids and has been diuresed since then he started feeling better.    The patient has been on oral anticoagulation because of the atrial fibrillation with Eliquis, GI held his Eliquis now trying to sort out the etiology of his anemia.    He continues to be in atrial fibrillation/atrial flutter with better ventricular response.    The concern is what to do with his atrial fibrillation at this point.    We did have a lengthy discussion with the patient yesterday that we have to control his volume status and adjust the hemodynamics before we can safely take him to a an elective procedure that is done under general anesthesia and expose him to more risks than needed.    It sounds like the gentleman has significant diastolic dysfunction given his presentation.    At 1 point concerns about sarcoidosis was raised by his pulmonologist and biopsy was discussed and they were concerned that they do not want to interrupt the Eliquis so we do

## 2024-09-20 NOTE — PLAN OF CARE
Problem: Discharge Planning  Goal: Discharge to home or other facility with appropriate resources  Outcome: Progressing  Flowsheets  Taken 9/20/2024 0420  Discharge to home or other facility with appropriate resources:   Identify barriers to discharge with patient and caregiver   Arrange for needed discharge resources and transportation as appropriate   Identify discharge learning needs (meds, wound care, etc)   Refer to discharge planning if patient needs post-hospital services based on physician order or complex needs related to functional status, cognitive ability or social support system  Taken 9/20/2024 0015  Discharge to home or other facility with appropriate resources:   Identify barriers to discharge with patient and caregiver   Arrange for needed discharge resources and transportation as appropriate   Identify discharge learning needs (meds, wound care, etc)   Refer to discharge planning if patient needs post-hospital services based on physician order or complex needs related to functional status, cognitive ability or social support system     Problem: Safety - Adult  Goal: Free from fall injury  9/20/2024 0553 by Annette Izquierdo RN  Outcome: Progressing     Problem: Chronic Conditions and Co-morbidities  Goal: Patient's chronic conditions and co-morbidity symptoms are monitored and maintained or improved  9/20/2024 0553 by Annette Izquierdo RN  Outcome: Progressing  Flowsheets  Taken 9/20/2024 0420  Care Plan - Patient's Chronic Conditions and Co-Morbidity Symptoms are Monitored and Maintained or Improved: Monitor and assess patient's chronic conditions and comorbid symptoms for stability, deterioration, or improvement  Taken 9/20/2024 0015  Care Plan - Patient's Chronic Conditions and Co-Morbidity Symptoms are Monitored and Maintained or Improved:   Monitor and assess patient's chronic conditions and comorbid symptoms for stability, deterioration, or improvement   Collaborate with multidisciplinary team to

## 2024-09-20 NOTE — CARE COORDINATION
[facility] Quality Flow/Interdisciplinary Rounds Progress Note    Quality Flow Rounds held on September 20, 2024 at 0930    Disciplines Attending:  Bedside Nurse, , and Nursing Unit Leadership    Barriers to Discharge: Clinical Status    Anticipated Discharge Date:   9/23/24    Anticipated Discharge Disposition: Home    Readmission Risk              Risk of Unplanned Readmission:  17           Discussed patient goal for the day, patient clinical progression, and barriers to discharge. Patient plans to be transferred to 's today.      Rubina Cohw RN  September 20, 2024

## 2024-09-20 NOTE — PROGRESS NOTES
GI Progress notes    9/20/2024   9:53 AM    Name:  Torrey Joy  MRN:    [id number]     Acct:     [id number]   Room:  [address] [place] [address] Day: 3     Admit Date: 9/17/2024  8:34 PM  PCP: Karine Cole APRN - CNP    Subjective:     C/C:   Chief Complaint   Patient presents with    abnormal labs     Low Na++ result from todays lab draw.  Pt complains of shortness of breath and weight gain recently.        Interval History: Status: not changed.     Patient seen and examined  No acute events overnight  No overt GI bleeding  Patient reports hx of hemorrhoids for years.  States he typically has BRBPR with each bowel movement; mostly on tissue.  Last colonoscopy 2016.    Hx of Wilcox's. Last EGD 2020.  No dysplasia.  Given iron replacement  Plans for lung biopsy r/o sarcoidosis    ROS:  Constitutional: negative for chills, fevers and sweats  Respiratory: negative for cough, dyspnea on exertion, hemoptysis, shortness of breath and wheezing  Cardiovascular: negative for chest pain, chest pressure/discomfort, dyspnea, lower extremity edema and palpitations  Gastrointestinal: negative for abdominal pain, constipation, diarrhea, nausea and vomiting  Neurological: negative for dizziness and headaches    Medications:     Allergies: No Known Allergies    Current Meds: ferric gluconate (FERRLECIT) 125 mg in sodium chloride 0.9 % 100 mL IVPB, Daily  empagliflozin (JARDIANCE) tablet 10 mg, Daily  pantoprazole (PROTONIX) tablet 40 mg, QAM AC  ALPRAZolam (XANAX) tablet 0.5 mg, TID PRN  furosemide (LASIX) injection 40 mg, BID  thiamine (B-1) 200 mg in sodium chloride 0.9 % 100 mL IVPB, Q24H  amiodarone (CORDARONE) tablet 200 mg, Daily  [Held by provider] apixaban (ELIQUIS) tablet 5 mg, BID  atorvastatin (LIPITOR) tablet 10 mg, Nightly  [Held by provider] lisinopril (PRINIVIL;ZESTRIL) tablet 40 mg, Daily  [Held by provider] venlafaxine (EFFEXOR XR) extended release capsule 150 mg, Daily with breakfast  metoprolol succinate (TOPROL

## 2024-09-20 NOTE — PROGRESS NOTES
found for: \"SPECIAL\"  Lab Results   Component Value Date/Time    CULTURE NORMAL RESPIRATORY CARLO MODERATE GROWTH 11/03/2022 10:06 AM       Radiology:  IR US GUIDED PARACENTESIS    Result Date: 9/19/2024  Trace perihepatic ascites.  No intervention/paracentesis given insufficient amount of fluid.     US ABDOMEN LIMITED Specify organ? LIVER    Result Date: 9/18/2024  Small volume ascites.     XR CHEST PORTABLE    Result Date: 9/17/2024  Cardiomegaly with pulmonary vascular congestion/interstitial edema.       Physical Examination:       General appearance:  alert, cooperative and no distress  Mental Status:  oriented to person, place and time and normal affect  Lungs:  clear to auscultation bilaterally, normal effort  Heart:  regular rate and rhythm, no murmur  Abdomen:  soft, nontender, nondistended, normal bowel sounds, no masses, hepatomegaly, splenomegaly  Extremities:  no edema, redness, tenderness in the calves  Skin:  no gross lesions, rashes, induration    Assessment:     Hospital Problems             Last Modified POA    * (Principal) Chronic hyponatremia 9/19/2024 Yes    Shortness of breath 9/19/2024 Yes    AGUS on CPAP 9/17/2024 Yes    Persistent atrial fibrillation (HCC) 9/17/2024 Yes    Alcohol dependence with unspecified alcohol-induced disorder (HCC) 9/17/2024 Yes    Tobacco dependence due to cigarettes 9/17/2024 Yes    Anemia 9/18/2024 Yes    Chronic a-fib (HCC) 9/18/2024 Yes    Class 2 severe obesity due to excess calories with serious comorbidity and body mass index (BMI) of 39.0 to 39.9 in adult (HCC) 9/18/2024 Yes    Elevated alkaline phosphatase level 9/18/2024 Yes    Other ascites 9/18/2024 Yes    Hypervolemia 9/18/2024 Yes    Bilateral lower extremity edema 9/19/2024 Yes    Acute on chronic congestive heart failure (HCC) 9/19/2024 Yes       Plan:     Hyponatremia likely due to volume overload, SSRI, beer potomania-improving  Nephrology following  Continue IV Lasix 40 mg twice daily  Fluid  MD  9/20/2024  12:29 PM

## 2024-09-20 NOTE — PROGRESS NOTES
Renal Progress Note    Patient :  Torrey Joy; 58 y.o. MRN# [id number]  Location:  343/343-01  Attending:  Gómez Albert MD  Admit Date:  9/17/2024   Hospital Day: 3      Subjective:     PO intake good. UO good, responding well to IV Lasix.  Lower extremity edema better.  Shortness of breath better.  Appetite good.  Fluid restriction continues.  Sodium of 133.  Overall negative fluid balance of 9 L since admission.  Plan for transfer to [facility] for EGD and colonoscopy due to drop in hemoglobin, also plan to have lymph node biopsy by IR.  Cardiology evaluation noted.  Renal function at baseline creatinine 1.2.  Hemodynamically stable.  Currently off ACE inhibitors.  Labs today show sodium 133 potassium 3.9 chloride 92 bicarb 27 calcium 9.5 hemoglobin 8.5 white count 6.6 platelets 336    History reviewed  Known history of morbid obesity, obstructive sleep apnea on CPAP, pulmonary hypertension, essential hypertension, chronic alcohol abuse and tobacco use, A-fib on Eliquis, chronic diastolic heart failure.  He follows up with Dr. Treviño and has been some suspicion for sarcoidosis.  Lymph node biopsy is being considered. She was noticing onset of lower extremity edema and volume overload, had put him on Lasix which then causes creatinine to go up which was changed to 3 times a week Lasix which did not help improve his volume status.    He came into the hospital with worsening lower extremity edema, weight gain of 15 pounds, shortness of breath difficulty in ambulation.  He was found to have sodium of 126.  Clinical exam showed decompensated biventricular failure.  He was diuresed appropriately, edema improved, sodium was 233.  Nephrology consulted for hyponatremia.  He does have element of chronic kidney disease baseline 1.2.  He has an appointment to see Dr. Zhao in the office in the next couple of weeks.  He does drink 8-10 beers a day also smokes 1 to 1/2 packs of cigarettes a day  Outpatient Medications:       KETUA NEGATIVE 09/17/2024 09:50 PM    AMORPHOUS NOT REPORTED 05/10/2021 09:30 AM     Urine Sodium:   No components found for: \"GM\"  Urine Potassium:  No results found for: \"KUR\"  Urine Chloride:  No results found for: \"CLUR\"  Urine Osmolarity:   Lab Results   Component Value Date/Time    SHAMIKA 242 09/17/2024 09:50 PM     Urine Protein:   No components found for: \"TOTALPROTEIN\", \"URINE\"   Urine Creatinine:   No results found for: \"LABCREA\"  Urine Eosinophils:  No components found for: \"UEOS\"    Radiology:     CXR:     Assessment:     1.  Hypoosmolar hypervolemic hyponatremia: Secondary to decompensated biventricular failure.  Sodium was 126 on presentation, baseline sodium 133-1 35.  Patient admits that he was drinking many glasses of water a day along with 8-10 beers a day  2.  Decompensated biventricular failure likely from diastolic dysfunction, excessive fluid intake, inadequate dietary restrictions.  Improving with IV Lasix, volume status appears to be much better.  Has -0.9 L and fluid balance.  3.  Chronic kidney disease stage IIIa likely ischemic nephrosclerosis baseline 1.2-1.3 due to see Dr. Zhao soon  4.  Morbid obesity  5.  Obstructive sleep apnea, noncompliant with CPAP  6.  Alcohol dependence and abuse drinks about 8-10 beers a day  7.  Persistent smoking  8.  Anemia suspected to be from blood loss GI workup planned  9.  Prominent perihilar lymph nodes plan for lymph node biopsy  10.  Atrial fibrillation, failed cardioversion, plan for pulmonary vein isolation procedure    Plan:   1.  Discontinue IV Lasix  2.  Start Demadex 40 mg daily, hold lisinopril till patient is seen in the office  3.  Fluid restriction 1.5 L a day  4.  Continue monitoring intake output record and daily weights  5.  Agree with transfer to [facility]  6.  Outpatient follow-up with Dr. Zhao as previously arranged  7.  Nothing else to add from nephrology standpoint, will sign off please call for questions    Nutrition   Please

## 2024-09-20 NOTE — CONSULTS
PULMONARY & CRITICAL CARE MEDICINE CONSULT NOTE     Patient:  Torrey Joy  MRN: [id number]  Admit date: 9/17/2024  Primary Care Physician: Karine Cole APRN - CNP  CODE Status: Full Code  LOS: 3  Inpatient consult to Pulmonology  Consult performed by: Oli Brown MD  Consult ordered by: Gómez Albert MD         SUBJECTIVE     CHIEF COMPLAINT/REASON FOR CONSULT: abnormal labs (Low Na++ result from todays lab draw.  Pt complains of shortness of breath and weight gain recently. )    HISTORY OF PRESENT ILLNESS:  The patient is a 58 y.o. male presented to ED with progressive shortness of breath and weight gain. He does have a past medical history significant for atrial fibrillation status post ablation, hyperkalemia and hyponatremia.  He was previously on Aldactone per his cardiologist which was discontinued after potassium remained elevated.  He was then found to be hyponatremic and his Lasix dosage was adjusted to 3 times weekly.  On presentation his sodium 126, creatinine 1.3, proBNP 2587, troponin 24 and hemoglobin 8.2.  Urinalysis showed bacteria and trace protein.  A chest x-ray revealed cardiomegaly with pulmonary vascular congestion and interstitial edema.  Nephrology was consulted and patient has been on diuretics.     He follows with pulmonologist Dr. Treviño and has been suspected to have sarcoidosis due presence of mediastinal adenopathy. EBUS was planned but it was deferred due to him being on anticoagulation with Eliquis. His anticoagulation is currently on hold and pulmonary service was approached for bronchoscopy/EBUS.    He is currently on room air, reports improvement in SOB & anasarca. He has been using his CPAP.    PAST MEDICAL HISTORY:        Diagnosis Date    Arthritis     Wilcox esophagus     Depression     DU (duodenal ulcer)     Emphysema lung (HCC)     [place] (hard of hearing)     Hyperlipidemia     Hypersomnia with sleep apnea, unspecified     CPAP nightly    Hypertension     AGUS

## 2024-09-20 NOTE — PROGRESS NOTES
Spiritual Health History and Assessment/Progress Note  [facility]    (P) Spiritual/Emotional Needs,  ,  ,      Name: Torrey Joy MRN: [id number]    Age: 58 y.o.     Sex: male   Language: English   Islam: Non-Yarsanism   Chronic hyponatremia     Date: 9/20/2024            Total Time Calculated: (P) 15 min              Spiritual Assessment began in [facility]        Referral/Consult From: (P) Rounding   Encounter Overview/Reason: (P) Spiritual/Emotional Needs  Service Provided For: (P) Patient and family together    Writer met w/ pt and pt's spouse in pt's room. Pt and spouse shared that pt will be transferred to [facility] for more testing. Pt shared feelings of disappointment and regret concerning past choices regarding his health. Pt and spouse also shared about their daughter and her life challenges. Pt and spouse welcomed this writer to pray with them.     Bonnie, Belief, Meaning:   Patient identifies as spiritual  Family/Friends identify as spiritual      Importance and Influence:  Patient has spiritual/personal beliefs that influence decisions regarding their health  Family/Friends have spiritual/personal beliefs that influence decisions regarding the patient's health    Community:  Patient feels well-supported. Support system includes: Spouse/Partner  Family/Friends feel well-supported. Support system includes: Extended family    Assessment and Plan of Care:     Patient Interventions include: Facilitated expression of thoughts and feelings and Provided sacramental/Mormonism ritual  Family/Friends Interventions include: Facilitated expression of thoughts and feelings and Provided sacramental/Mormonism ritual    Patient Plan of Care: Spiritual Care available upon further referral  Family/Friends Plan of Care: Spiritual Care available upon further referral    Electronically signed by Chaplain Sandy on 9/20/2024 at 2:12 PM

## 2024-09-20 NOTE — PLAN OF CARE
Problem: Discharge Planning  Goal: Discharge to home or other facility with appropriate resources  Problem: Chronic Conditions and Co-morbidities  Goal: Patient's chronic conditions and co-morbidity symptoms are monitored and maintained or improved  9/19/2024 2133 by Justina Staley, RN  Outcome: Progressing  Flowsheets (Taken 9/19/2024 2133)  Care Plan - Patient's Chronic Conditions and Co-Morbidity Symptoms are Monitored and Maintained or Improved:   Collaborate with multidisciplinary team to address chronic and comorbid conditions and prevent exacerbation or deterioration   Update acute care plan with appropriate goals if chronic or comorbid symptoms are exacerbated and prevent overall improvement and discharge     Outcome: Progressing  Flowsheets (Taken 9/18/2024 2214 by Justina Staley, RN)  Discharge to home or other facility with appropriate resources:   Arrange for needed discharge resources and transportation as appropriate   Identify discharge learning needs (meds, wound care, etc)   Arrange for interpreters to assist at discharge as needed

## 2024-09-21 LAB
ANION GAP SERPL CALCULATED.3IONS-SCNC: 13 MMOL/L (ref 9–17)
ANION GAP SERPL CALCULATED.3IONS-SCNC: 15 MMOL/L (ref 9–17)
BASOPHILS # BLD: 0.1 K/UL (ref 0–0.2)
BASOPHILS NFR BLD: 1 % (ref 0–2)
CHLORIDE SERPL-SCNC: 90 MMOL/L (ref 98–107)
CHLORIDE SERPL-SCNC: 92 MMOL/L (ref 98–107)
CO2 SERPL-SCNC: 29 MMOL/L (ref 20–31)
CO2 SERPL-SCNC: 29 MMOL/L (ref 20–31)
EOSINOPHIL # BLD: 0.2 K/UL (ref 0–0.4)
EOSINOPHILS RELATIVE PERCENT: 3 % (ref 1–4)
ERYTHROCYTE [DISTWIDTH] IN BLOOD BY AUTOMATED COUNT: 19.4 % (ref 12.5–15.4)
GLIADIN IGA SER IA-ACNC: 5.5 U/ML
GLIADIN IGG SER IA-ACNC: 3 U/ML
HCT VFR BLD AUTO: 30.1 % (ref 41–53)
HGB BLD-MCNC: 9.3 G/DL (ref 13.5–17.5)
IGA SERPL-MCNC: 603 MG/DL (ref 70–400)
LYMPHOCYTES NFR BLD: 1.6 K/UL (ref 1–4.8)
LYMPHOCYTES RELATIVE PERCENT: 21 % (ref 24–44)
MCH RBC QN AUTO: 21.8 PG (ref 26–34)
MCHC RBC AUTO-ENTMCNC: 31.1 G/DL (ref 31–37)
MCV RBC AUTO: 70 FL (ref 80–100)
MONOCYTES NFR BLD: 0.7 K/UL (ref 0.1–1.2)
MONOCYTES NFR BLD: 10 % (ref 2–11)
NEUTROPHILS NFR BLD: 65 % (ref 36–66)
NEUTS SEG NFR BLD: 5.2 K/UL (ref 1.8–7.7)
PATH REV BLD -IMP: NORMAL
PLATELET # BLD AUTO: 351 K/UL (ref 140–450)
PMV BLD AUTO: 6.8 FL (ref 6–12)
POTASSIUM SERPL-SCNC: 3.8 MMOL/L (ref 3.7–5.3)
POTASSIUM SERPL-SCNC: 3.8 MMOL/L (ref 3.7–5.3)
RBC # BLD AUTO: 4.3 M/UL (ref 4.5–5.9)
SODIUM SERPL-SCNC: 134 MMOL/L (ref 135–144)
SODIUM SERPL-SCNC: 134 MMOL/L (ref 135–144)
TTG IGA SER IA-ACNC: 21 U/ML
WBC OTHER # BLD: 7.7 K/UL (ref 3.5–11)

## 2024-09-21 PROCEDURE — 6370000000 HC RX 637 (ALT 250 FOR IP)

## 2024-09-21 PROCEDURE — 99232 SBSQ HOSP IP/OBS MODERATE 35: CPT | Performed by: STUDENT IN AN ORGANIZED HEALTH CARE EDUCATION/TRAINING PROGRAM

## 2024-09-21 PROCEDURE — 99232 SBSQ HOSP IP/OBS MODERATE 35: CPT | Performed by: INTERNAL MEDICINE

## 2024-09-21 PROCEDURE — 80051 ELECTROLYTE PANEL: CPT

## 2024-09-21 PROCEDURE — 6370000000 HC RX 637 (ALT 250 FOR IP): Performed by: SPECIALIST

## 2024-09-21 PROCEDURE — 6370000000 HC RX 637 (ALT 250 FOR IP): Performed by: INTERNAL MEDICINE

## 2024-09-21 PROCEDURE — APPSS30 APP SPLIT SHARED TIME 16-30 MINUTES: Performed by: NURSE PRACTITIONER

## 2024-09-21 PROCEDURE — 85025 COMPLETE CBC W/AUTO DIFF WBC: CPT

## 2024-09-21 PROCEDURE — 6370000000 HC RX 637 (ALT 250 FOR IP): Performed by: NURSE PRACTITIONER

## 2024-09-21 PROCEDURE — 36415 COLL VENOUS BLD VENIPUNCTURE: CPT

## 2024-09-21 PROCEDURE — 2580000003 HC RX 258: Performed by: NURSE PRACTITIONER

## 2024-09-21 PROCEDURE — 2060000000 HC ICU INTERMEDIATE R&B

## 2024-09-21 PROCEDURE — 6360000002 HC RX W HCPCS: Performed by: NURSE PRACTITIONER

## 2024-09-21 PROCEDURE — 2580000003 HC RX 258: Performed by: INTERNAL MEDICINE

## 2024-09-21 PROCEDURE — 6360000002 HC RX W HCPCS: Performed by: INTERNAL MEDICINE

## 2024-09-21 PROCEDURE — 2580000003 HC RX 258

## 2024-09-21 RX ADMIN — AMIODARONE HYDROCHLORIDE 200 MG: 200 TABLET ORAL at 08:47

## 2024-09-21 RX ADMIN — SODIUM CHLORIDE, PRESERVATIVE FREE 10 ML: 5 INJECTION INTRAVENOUS at 20:29

## 2024-09-21 RX ADMIN — TORSEMIDE 40 MG: 20 TABLET ORAL at 08:47

## 2024-09-21 RX ADMIN — ATORVASTATIN CALCIUM 10 MG: 10 TABLET, FILM COATED ORAL at 20:29

## 2024-09-21 RX ADMIN — THIAMINE HYDROCHLORIDE 200 MG: 100 INJECTION, SOLUTION INTRAMUSCULAR; INTRAVENOUS at 08:53

## 2024-09-21 RX ADMIN — EMPAGLIFLOZIN 10 MG: 10 TABLET, FILM COATED ORAL at 08:48

## 2024-09-21 RX ADMIN — SODIUM CHLORIDE 125 MG: 9 INJECTION, SOLUTION INTRAVENOUS at 12:08

## 2024-09-21 RX ADMIN — METOPROLOL SUCCINATE 100 MG: 100 TABLET, EXTENDED RELEASE ORAL at 08:48

## 2024-09-21 RX ADMIN — ALPRAZOLAM 0.5 MG: 0.5 TABLET ORAL at 08:53

## 2024-09-21 RX ADMIN — PANTOPRAZOLE SODIUM 40 MG: 40 TABLET, DELAYED RELEASE ORAL at 06:55

## 2024-09-21 RX ADMIN — SODIUM CHLORIDE, PRESERVATIVE FREE 10 ML: 5 INJECTION INTRAVENOUS at 08:53

## 2024-09-21 RX ADMIN — PANTOPRAZOLE SODIUM 40 MG: 40 TABLET, DELAYED RELEASE ORAL at 16:49

## 2024-09-21 RX ADMIN — ALPRAZOLAM 0.5 MG: 0.5 TABLET ORAL at 20:29

## 2024-09-21 NOTE — PLAN OF CARE
Problem: Discharge Planning  Goal: Discharge to home or other facility with appropriate resources  9/21/2024 1657 by Siddhartha Christine, RN  Outcome: Progressing  Flowsheets (Taken 9/21/2024 0400 by Annette Izquierdo, RN)  Discharge to home or other facility with appropriate resources:   Identify barriers to discharge with patient and caregiver   Arrange for needed discharge resources and transportation as appropriate   Identify discharge learning needs (meds, wound care, etc)   Refer to discharge planning if patient needs post-hospital services based on physician order or complex needs related to functional status, cognitive ability or social support system  9/21/2024 0530 by Annette Izquierdo, RN  Outcome: Progressing  Flowsheets  Taken 9/21/2024 0400  Discharge to home or other facility with appropriate resources:   Identify barriers to discharge with patient and caregiver   Arrange for needed discharge resources and transportation as appropriate   Identify discharge learning needs (meds, wound care, etc)   Refer to discharge planning if patient needs post-hospital services based on physician order or complex needs related to functional status, cognitive ability or social support system  Taken 9/21/2024 0000  Discharge to home or other facility with appropriate resources:   Identify barriers to discharge with patient and caregiver   Arrange for needed discharge resources and transportation as appropriate   Identify discharge learning needs (meds, wound care, etc)   Refer to discharge planning if patient needs post-hospital services based on physician order or complex needs related to functional status, cognitive ability or social support system  Taken 9/20/2024 2000  Discharge to home or other facility with appropriate resources:   Identify barriers to discharge with patient and caregiver   Arrange for needed discharge resources and transportation as appropriate   Identify discharge learning needs (meds, wound care, etc)   Refer

## 2024-09-21 NOTE — PROGRESS NOTES
[facility]    Gastroenterology Daily Progress Note      Patient:   Torrey Joy   :    1965   Facility:   [facility]  Date:     2024  Consultant:   BULMARO Leblanc - CNP, CNP      SUBJECTIVE  58 y.o. male admitted 2024 with Hyponatremia [E87.1]  SOB (shortness of breath) [R06.02]  Acute on chronic congestive heart failure, unspecified heart failure type (HCC) [I50.9] and seen for anemia, hematochezia. The pt was seen and examined. Hgb 9.3 no bm overnight, states he has hx of hemorrhoids. Denies abdominal pain.         OBJECTIVE  Scheduled Meds:   torsemide  40 mg Oral Daily    pantoprazole  40 mg Oral BID AC    ferric gluconate  125 mg IntraVENous Daily    empagliflozin  10 mg Oral Daily    thiamine (B-1) 200 mg in sodium chloride 0.9 % 100 mL IVPB  200 mg IntraVENous Q24H    amiodarone  200 mg Oral Daily    [Held by provider] apixaban  5 mg Oral BID    atorvastatin  10 mg Oral Nightly    [Held by provider] lisinopril  40 mg Oral Daily    [Held by provider] venlafaxine  150 mg Oral Daily with breakfast    metoprolol succinate  100 mg Oral Daily    sodium chloride flush  5-40 mL IntraVENous 2 times per day    nicotine  1 patch TransDERmal Daily       Vital Signs:  /81   Pulse 58   Temp 98.2 °F (36.8 °C) (Oral)   Resp 16   Ht 1.8 m (5' 10.87\")   Wt 127 kg (279 lb 15.8 oz)   SpO2 100%   BMI 39.20 kg/m²    Review of Systems - History obtained from chart review and the patient  General ROS: negative  Respiratory ROS: no cough, shortness of breath, or wheezing  Cardiovascular ROS: no chest pain or dyspnea on exertion  Gastrointestinal ROS: positive for - blood in stools   Physical Exam:     General Appearance: alert and oriented to person, place and time, well-developed and well-nourished, in no acute distress  Skin: warm and dry, no rash or erythema  Head: normocephalic and atraumatic  Eyes: pupils equal, round, and reactive to light, extraocular eye

## 2024-09-21 NOTE — PLAN OF CARE
Problem: Discharge Planning  Goal: Discharge to home or other facility with appropriate resources  Outcome: Progressing  Flowsheets  Taken 9/21/2024 0400  Discharge to home or other facility with appropriate resources:   Identify barriers to discharge with patient and caregiver   Arrange for needed discharge resources and transportation as appropriate   Identify discharge learning needs (meds, wound care, etc)   Refer to discharge planning if patient needs post-hospital services based on physician order or complex needs related to functional status, cognitive ability or social support system  Taken 9/21/2024 0000  Discharge to home or other facility with appropriate resources:   Identify barriers to discharge with patient and caregiver   Arrange for needed discharge resources and transportation as appropriate   Identify discharge learning needs (meds, wound care, etc)   Refer to discharge planning if patient needs post-hospital services based on physician order or complex needs related to functional status, cognitive ability or social support system  Taken 9/20/2024 2000  Discharge to home or other facility with appropriate resources:   Identify barriers to discharge with patient and caregiver   Arrange for needed discharge resources and transportation as appropriate   Identify discharge learning needs (meds, wound care, etc)   Refer to discharge planning if patient needs post-hospital services based on physician order or complex needs related to functional status, cognitive ability or social support system     Problem: Safety - Adult  Goal: Free from fall injury  Outcome: Progressing     Problem: Chronic Conditions and Co-morbidities  Goal: Patient's chronic conditions and co-morbidity symptoms are monitored and maintained or improved  Outcome: Progressing  Flowsheets  Taken 9/21/2024 0400  Care Plan - Patient's Chronic Conditions and Co-Morbidity Symptoms are Monitored and Maintained or Improved: Monitor and

## 2024-09-21 NOTE — PROGRESS NOTES
male with PMHx AGUS on CPAP, HTN, obesity, chronic alcohol use, tobacco abuse and A-fib on Eliquis who presents to the hospital for worsening lower extremity edema, and low serum sodium levels.  Cardiology and nephrology consulted on admission.  Patient also noted to have anemia and GI was consulted.  Sodium on admission was 126 and improved to 133 with Lasix 40 mg IV twice daily, and fluid restriction of 1200 cc/day.  Cardiology/EP recommended controlling the ventricular response.  Plan for pulmonary vein isolation will be arranged as outpatient.  Patient was noted to follow-up with pulmonology at [facility], Dr. Treviño, who recommended biopsy of enlarged lymph nodes to rule out sarcoidosis.  Procedure has been delayed due to patient being unable to be off Eliquis.  Cardiology/EP recommended consulting pulmonology for possible biopsy as patient's Eliquis is currently being held.  Discussed with pulmonology and recommended inpatient biopsy once acute issues have resolved and transferred to [facility] for the procedure.  Discussed with the patient and his wife who are agreeable to transfer.  GI is also planning for EGD/colonoscopy on 9/23/2024 for his anemia.    Review of Systems:     Constitutional:  negative for chills, fevers, sweats  Respiratory:  negative for cough, dyspnea on exertion, shortness of breath, wheezing  Cardiovascular:  negative for chest pain, chest pressure/discomfort, lower extremity edema, palpitations  Gastrointestinal:  negative for abdominal pain, constipation, diarrhea, nausea, vomiting  Neurological:  negative for dizziness, headache    Medications:     Allergies:  No Known Allergies    Current Meds:   Scheduled Meds:    torsemide  40 mg Oral Daily    pantoprazole  40 mg Oral BID AC    ferric gluconate  125 mg IntraVENous Daily    empagliflozin  10 mg Oral Daily    thiamine (B-1) 200 mg in sodium chloride 0.9 % 100 mL IVPB  200 mg IntraVENous Q24H    amiodarone  200 mg Oral Daily     congestion/interstitial edema.       Physical Examination:     General appearance:  alert, cooperative and no distress  Mental Status:  oriented to person, place and time and normal affect  Lungs:  clear to auscultation bilaterally, normal effort  Heart:  regular rate and rhythm, no murmur  Abdomen:  soft, nontender, nondistended, normal bowel sounds, no masses, hepatomegaly, splenomegaly  Extremities:  no edema, redness, tenderness in the calves  Skin:  no gross lesions, rashes, induration    Assessment:     Hospital Problems             Last Modified POA    * (Principal) Chronic hyponatremia 9/19/2024 Yes    Shortness of breath 9/19/2024 Yes    AGUS on CPAP 9/17/2024 Yes    Persistent atrial fibrillation (HCC) 9/17/2024 Yes    Alcohol dependence with unspecified alcohol-induced disorder (HCC) 9/17/2024 Yes    Tobacco dependence due to cigarettes 9/17/2024 Yes    Anemia 9/18/2024 Yes    Chronic a-fib (HCC) 9/18/2024 Yes    Class 2 severe obesity due to excess calories with serious comorbidity and body mass index (BMI) of 39.0 to 39.9 in adult (HCC) 9/18/2024 Yes    Elevated alkaline phosphatase level 9/18/2024 Yes    Other ascites 9/18/2024 Yes    Hypervolemia 9/18/2024 Yes    Bilateral lower extremity edema 9/19/2024 Yes    Acute on chronic congestive heart failure (HCC) 9/19/2024 Yes       Plan:     Hyponatremia likely due to volume overload, SSRI, beer potomania-improving  Nephrology following  Continue IV Lasix 40 mg twice daily  Fluid restrict to 1200 cc/day  Effexor on hold as it may be contributing to hyponatremia  BMP daily    Shortness of breath 2/2 volume overload and diastolic dysfunction-improved  Cardiology/EP following  proBNP elevated at 2587  Chest x-ray consistent with pulmonary vascular congestion  Continue Lasix 40 mg twice daily  Strict intake and output recommended  Echo shows LVEF 65%, indeterminate diastolic function due to A-fib    Enlarged lymph nodes, concern for sarcoidosis  Follows with

## 2024-09-22 ENCOUNTER — ANESTHESIA EVENT (OUTPATIENT)
Dept: OPERATING ROOM | Age: 59
DRG: 291 | End: 2024-09-22
Payer: COMMERCIAL

## 2024-09-22 LAB
A1AT SERPL-MCNC: 226 MG/DL (ref 90–200)
AFP SERPL-MCNC: <1.8 UG/L
ALK PHOS BONE SPECIFIC: 40 U/L (ref 0–55)
ALK PHOS OTHER CALC: 0 U/L
ALK PHOSPHATASE: 158 U/L (ref 40–120)
ALKALINE PHOSPHATASE LIVER FRACTION: 119 U/L (ref 0–94)
CERULOPLASMIN SERPL-MCNC: 42 MG/DL (ref 15–30)
HCT VFR BLD AUTO: 30.2 % (ref 41–53)
HGB BLD-MCNC: 9.5 G/DL (ref 13.5–17.5)

## 2024-09-22 PROCEDURE — 82103 ALPHA-1-ANTITRYPSIN TOTAL: CPT

## 2024-09-22 PROCEDURE — 6370000000 HC RX 637 (ALT 250 FOR IP): Performed by: NURSE PRACTITIONER

## 2024-09-22 PROCEDURE — 86376 MICROSOMAL ANTIBODY EACH: CPT

## 2024-09-22 PROCEDURE — 82390 ASSAY OF CERULOPLASMIN: CPT

## 2024-09-22 PROCEDURE — 85018 HEMOGLOBIN: CPT

## 2024-09-22 PROCEDURE — 6370000000 HC RX 637 (ALT 250 FOR IP): Performed by: INTERNAL MEDICINE

## 2024-09-22 PROCEDURE — 6370000000 HC RX 637 (ALT 250 FOR IP)

## 2024-09-22 PROCEDURE — 36415 COLL VENOUS BLD VENIPUNCTURE: CPT

## 2024-09-22 PROCEDURE — 82105 ALPHA-FETOPROTEIN SERUM: CPT

## 2024-09-22 PROCEDURE — 6360000002 HC RX W HCPCS: Performed by: INTERNAL MEDICINE

## 2024-09-22 PROCEDURE — APPSS30 APP SPLIT SHARED TIME 16-30 MINUTES: Performed by: NURSE PRACTITIONER

## 2024-09-22 PROCEDURE — 99232 SBSQ HOSP IP/OBS MODERATE 35: CPT | Performed by: STUDENT IN AN ORGANIZED HEALTH CARE EDUCATION/TRAINING PROGRAM

## 2024-09-22 PROCEDURE — 83516 IMMUNOASSAY NONANTIBODY: CPT

## 2024-09-22 PROCEDURE — 85014 HEMATOCRIT: CPT

## 2024-09-22 PROCEDURE — 99232 SBSQ HOSP IP/OBS MODERATE 35: CPT | Performed by: INTERNAL MEDICINE

## 2024-09-22 PROCEDURE — 2580000003 HC RX 258

## 2024-09-22 PROCEDURE — 2060000000 HC ICU INTERMEDIATE R&B

## 2024-09-22 PROCEDURE — 86038 ANTINUCLEAR ANTIBODIES: CPT

## 2024-09-22 PROCEDURE — 2580000003 HC RX 258: Performed by: INTERNAL MEDICINE

## 2024-09-22 PROCEDURE — 6370000000 HC RX 637 (ALT 250 FOR IP): Performed by: SPECIALIST

## 2024-09-22 PROCEDURE — 86225 DNA ANTIBODY NATIVE: CPT

## 2024-09-22 PROCEDURE — 6370000000 HC RX 637 (ALT 250 FOR IP): Performed by: STUDENT IN AN ORGANIZED HEALTH CARE EDUCATION/TRAINING PROGRAM

## 2024-09-22 RX ORDER — ALPRAZOLAM 0.5 MG
0.5 TABLET ORAL 3 TIMES DAILY PRN
Status: DISCONTINUED | OUTPATIENT
Start: 2024-09-22 | End: 2024-09-23 | Stop reason: HOSPADM

## 2024-09-22 RX ORDER — BISACODYL 5 MG/1
20 TABLET, DELAYED RELEASE ORAL ONCE
Status: COMPLETED | OUTPATIENT
Start: 2024-09-22 | End: 2024-09-22

## 2024-09-22 RX ADMIN — PANTOPRAZOLE SODIUM 40 MG: 40 TABLET, DELAYED RELEASE ORAL at 06:01

## 2024-09-22 RX ADMIN — POLYETHYLENE GLYCOL-3350 AND ELECTROLYTES 4000 ML: 236; 6.74; 5.86; 2.97; 22.74 POWDER, FOR SOLUTION ORAL at 13:29

## 2024-09-22 RX ADMIN — TORSEMIDE 40 MG: 20 TABLET ORAL at 08:30

## 2024-09-22 RX ADMIN — AMIODARONE HYDROCHLORIDE 200 MG: 200 TABLET ORAL at 08:30

## 2024-09-22 RX ADMIN — ATORVASTATIN CALCIUM 10 MG: 10 TABLET, FILM COATED ORAL at 20:01

## 2024-09-22 RX ADMIN — BISACODYL 20 MG: 5 TABLET, COATED ORAL at 13:29

## 2024-09-22 RX ADMIN — SODIUM CHLORIDE, PRESERVATIVE FREE 10 ML: 5 INJECTION INTRAVENOUS at 20:03

## 2024-09-22 RX ADMIN — PANTOPRAZOLE SODIUM 40 MG: 40 TABLET, DELAYED RELEASE ORAL at 15:30

## 2024-09-22 RX ADMIN — SODIUM CHLORIDE, PRESERVATIVE FREE 10 ML: 5 INJECTION INTRAVENOUS at 08:30

## 2024-09-22 RX ADMIN — EMPAGLIFLOZIN 10 MG: 10 TABLET, FILM COATED ORAL at 08:30

## 2024-09-22 RX ADMIN — ALPRAZOLAM 0.5 MG: 0.5 TABLET ORAL at 20:01

## 2024-09-22 RX ADMIN — ALPRAZOLAM 0.5 MG: 0.5 TABLET ORAL at 09:08

## 2024-09-22 RX ADMIN — THIAMINE HYDROCHLORIDE 200 MG: 100 INJECTION, SOLUTION INTRAMUSCULAR; INTRAVENOUS at 08:35

## 2024-09-22 RX ADMIN — METOPROLOL SUCCINATE 100 MG: 100 TABLET, EXTENDED RELEASE ORAL at 08:30

## 2024-09-22 ASSESSMENT — PAIN DESCRIPTION - LOCATION
LOCATION: ARM
LOCATION: ARM

## 2024-09-22 ASSESSMENT — PAIN DESCRIPTION - DESCRIPTORS
DESCRIPTORS: TENDER
DESCRIPTORS: TENDER

## 2024-09-22 ASSESSMENT — PAIN DESCRIPTION - ORIENTATION
ORIENTATION: RIGHT
ORIENTATION: RIGHT

## 2024-09-22 ASSESSMENT — PAIN SCALES - GENERAL
PAINLEVEL_OUTOF10: 1
PAINLEVEL_OUTOF10: 1

## 2024-09-22 ASSESSMENT — PAIN DESCRIPTION - PAIN TYPE: TYPE: ACUTE PAIN

## 2024-09-22 ASSESSMENT — PAIN - FUNCTIONAL ASSESSMENT: PAIN_FUNCTIONAL_ASSESSMENT: ACTIVITIES ARE NOT PREVENTED

## 2024-09-22 NOTE — PROGRESS NOTES
[facility]  Office: [phone]  Asim Mondragon, DO, Rohit Sellers, DO, Bandar Celestin DO, Georgi Shirley, DO, Nathan Zhao MD, Jane Gastelum MD, Santana Aguirre MD, Asha Crenshaw MD,  Deuce Jones MD, Reyna Pickering MD, Timoteo Zavala MD,  Haritha Graham DO, Thelma Carlton MD, Darien Lui MD, Leeroy Mondragon DO, Kamille Wilson MD,  Anjel Miranda DO, Ramona Marrero MD, Moira Rubalcava MD, Sary De MD, Michael Martin MD,  Prakash Kimbrough MD, Tommy Wood MD, Mushtaq Delaney MD, Ar Giraldo MD, Luis Manuel Chawla MD, Gómez Albert MD, Chucky Banda, DO, Zhen Fernandez DO, Rene Steiner DO, Yonatan Mckeon MD, Shirley Waterhouse, CNP,  Amaya Barber CNP, Chucky Lenz, CNP,  Naila Medina, DNP, Shae Greco, CNP, Denice Vallecillo, CNP, Kerry Erwin, CNP, Johana Latif, CNP, Evi Negron, PA-C, Jaqcuelin Ferraro, PA-C, Magaly Carrillo, CNP, Cindy Conrad, CNP,  Lenora Michel, CNP, Thuy Paz, CNP, Antonella Walters, CNP, Alyson Schwartz, CNS, Abeba Chavarria, CNP, Elli Hernandez, CNP, Tracy Schwab, CNP         [facility]   IN-PATIENT SERVICE   [facility]    Progress Note    9/22/2024    9:23 AM    Name:   Torrey Joy  MRN:     [id number]     Acct:      [id number]   Room:   [address] [place] [address] Day:  5  Admit Date:  9/17/2024  8:34 PM    PCP:   Karine Cole APRN - CNP  Code Status:  Full Code    Subjective:     C/C:   Chief Complaint   Patient presents with    abnormal labs     Low Na++ result from todays lab draw.  Pt complains of shortness of breath and weight gain recently.      Interval History Status: improved.     Patient seen and examined at bedside this morning.  Patient had some right arm pain with infiltrated IV.  Denies having any chest pain, shortness of breath, lightheadedness, dizziness, fever or chills.  Awaiting EGD/colonoscopy tomorrow.  Per GI, needs cardiology clearance.  [employer] has been sent    Brief History:     This is a

## 2024-09-22 NOTE — PLAN OF CARE
Problem: Discharge Planning  Goal: Discharge to home or other facility with appropriate resources  9/22/2024 0616 by Annette Izquierdo, RN  Outcome: Progressing  Flowsheets  Taken 9/22/2024 0415  Discharge to home or other facility with appropriate resources:   Identify barriers to discharge with patient and caregiver   Arrange for needed discharge resources and transportation as appropriate   Identify discharge learning needs (meds, wound care, etc)   Refer to discharge planning if patient needs post-hospital services based on physician order or complex needs related to functional status, cognitive ability or social support system  Taken 9/21/2024 2345  Discharge to home or other facility with appropriate resources:   Identify barriers to discharge with patient and caregiver   Arrange for needed discharge resources and transportation as appropriate   Identify discharge learning needs (meds, wound care, etc)   Refer to discharge planning if patient needs post-hospital services based on physician order or complex needs related to functional status, cognitive ability or social support system  Taken 9/21/2024 2000  Discharge to home or other facility with appropriate resources:   Identify barriers to discharge with patient and caregiver   Arrange for needed discharge resources and transportation as appropriate   Identify discharge learning needs (meds, wound care, etc)   Refer to discharge planning if patient needs post-hospital services based on physician order or complex needs related to functional status, cognitive ability or social support system     Problem: Safety - Adult  Goal: Free from fall injury  9/21/2024 1657 by Siddhartha Christine, RN  Outcome: Progressing  Flowsheets (Taken 9/19/2024 2133 by Justina Staley, RN)  Free From Fall Injury:   Based on caregiver fall risk screen, instruct family/caregiver to ask for assistance with transferring infant if caregiver noted to have fall risk factors   Instruct family/caregiver  Practitioner if interventions unsuccessful or patient reports new pain

## 2024-09-22 NOTE — PROGRESS NOTES
[facility]    Gastroenterology Daily Progress Note      Patient:   Torrey Joy   :    1965   Facility:   [facility]  Date:     2024  Consultant:   BULMARO Leblanc - CNP, CNP      SUBJECTIVE  58 y.o. male admitted 2024 with Hyponatremia [E87.1]  SOB (shortness of breath) [R06.02]  Acute on chronic congestive heart failure, unspecified heart failure type (HCC) [I50.9] and seen for anemia and .hematochezia. The pt was seen and examined. Hgb 9.5 no BM overnight denies abdominal pain. Isoenzymes elevated for a liver source.        OBJECTIVE  Scheduled Meds:   polyethylene glycol  4,000 mL Oral Once    bisacodyl  20 mg Oral Once    torsemide  40 mg Oral Daily    pantoprazole  40 mg Oral BID AC    empagliflozin  10 mg Oral Daily    amiodarone  200 mg Oral Daily    [Held by provider] apixaban  5 mg Oral BID    atorvastatin  10 mg Oral Nightly    [Held by provider] lisinopril  40 mg Oral Daily    [Held by provider] venlafaxine  150 mg Oral Daily with breakfast    metoprolol succinate  100 mg Oral Daily    sodium chloride flush  5-40 mL IntraVENous 2 times per day    nicotine  1 patch TransDERmal Daily       Vital Signs:  /82   Pulse 78   Temp 98.1 °F (36.7 °C) (Oral)   Resp 17   Ht 1.8 m (5' 10.87\")   Wt 127 kg (279 lb 15.8 oz)   SpO2 99%   BMI 39.20 kg/m²    Review of Systems - History obtained from chart review and the patient  General ROS: negative  Respiratory ROS: no cough, shortness of breath, or wheezing  Cardiovascular ROS: no chest pain or dyspnea on exertion  Gastrointestinal ROS: no abdominal pain, change in bowel habits, or black or bloody stools   Physical Exam:     General Appearance: alert and oriented to person, place and time, well-developed and well-nourished, in no acute distress  Skin: warm and dry, no rash or erythema  Head: normocephalic and atraumatic  Eyes: pupils equal, round, and reactive to light, extraocular eye movements intact,  conjunctivae normal  ENT: hearing grossly normal bilaterally  Neck: neck supple and non tender without mass, no thyromegaly or thyroid nodules, no cervical lymphadenopathy   Pulmonary/Chest: clear to auscultation bilaterally- no wheezes, rales or rhonchi, normal air movement, no respiratory distress  Cardiovascular: normal rate, irregular rhythm, normal S1 and S2, no murmurs, rubs, clicks or gallops, distal pulses intact, no carotid bruits  Abdomen: soft, non-tender, non-distended, normal bowel sounds, no masses or organomegaly  Extremities: no cyanosis, clubbing or edema  Musculoskeletal: normal range of motion, no joint swelling, deformity or tenderness  Neurologic: no cranial nerve deficit and muscle strength normal    Lab and Imaging Review     CBC  Recent Labs     09/20/24  1137 09/21/24  0654 09/22/24  0810   WBC 6.6 7.7  --    HGB 8.5* 9.3* 9.5*   HCT 27.8* 30.1* 30.2*   MCV 69.9* 70.0*  --     351  --        BMP  Recent Labs     09/20/24  0606 09/20/24  1137 09/20/24  1749 09/21/24  0016 09/21/24  0654   *   < > 133* 134* 134*   K 3.8   < > 3.8 3.8 3.8   CL 93*   < > 89* 90* 92*   CO2 26   < > 28 29 29   BUN 18  --   --   --   --    CREATININE 1.2  --   --   --   --    GLUCOSE 109*  --   --   --   --    CALCIUM 9.5  --   --   --   --     < > = values in this interval not displayed.      Latest Reference Range & Units 09/18/24 11:54   Alk Phos,Bone Specific 0 - 55 U/L 40      Latest Reference Range & Units 09/18/24 11:54   Alk Phos Other Calc U/L 0      Latest Reference Range & Units 09/18/24 11:54   Alk Phos Liver Fract 0 - 94 U/L 119 (H)   (H): Data is abnormally high  Abd us 9/18/24  FINDINGS:  LIVER:  The liver demonstrates a homogeneous echotexture.  Slightly lobular  liver contour without discrete lesion.     BILIARY SYSTEM: Gallbladder is unremarkable without evidence of  pericholecystic fluid, wall thickening or stones. Negative sonographic  Guerrero's sign.  Common bile duct is within

## 2024-09-22 NOTE — PROGRESS NOTES
Secure message between this RN and Dr Harp. Physician is okay with the patient having an EGD/Colonoscopy tomorrow.

## 2024-09-23 ENCOUNTER — ANESTHESIA (OUTPATIENT)
Dept: OPERATING ROOM | Age: 59
DRG: 291 | End: 2024-09-23
Payer: COMMERCIAL

## 2024-09-23 VITALS
TEMPERATURE: 100 F | HEART RATE: 74 BPM | HEIGHT: 71 IN | OXYGEN SATURATION: 100 % | DIASTOLIC BLOOD PRESSURE: 85 MMHG | WEIGHT: 279.98 LBS | RESPIRATION RATE: 19 BRPM | BODY MASS INDEX: 39.2 KG/M2 | SYSTOLIC BLOOD PRESSURE: 129 MMHG

## 2024-09-23 LAB
ALBUMIN SERPL-MCNC: 4.3 G/DL (ref 3.5–5.2)
ALBUMIN/GLOB SERPL: 1 {RATIO} (ref 1–2.5)
ALP SERPL-CCNC: 162 U/L (ref 40–129)
ALT SERPL-CCNC: 23 U/L (ref 5–41)
ANA SER QL IA: NEGATIVE
ANION GAP SERPL CALCULATED.3IONS-SCNC: 16 MMOL/L (ref 9–17)
AST SERPL-CCNC: 37 U/L
BASOPHILS # BLD: 0.1 K/UL (ref 0–0.2)
BASOPHILS NFR BLD: 1 % (ref 0–2)
BILIRUB DIRECT SERPL-MCNC: 0.3 MG/DL
BILIRUB INDIRECT SERPL-MCNC: 0.8 MG/DL (ref 0–1)
BILIRUB SERPL-MCNC: 1.1 MG/DL (ref 0.3–1.2)
BUN SERPL-MCNC: 15 MG/DL (ref 8–23)
CALCIUM SERPL-MCNC: 10.1 MG/DL (ref 8.6–10.4)
CHLORIDE SERPL-SCNC: 91 MMOL/L (ref 98–107)
CO2 SERPL-SCNC: 28 MMOL/L (ref 20–31)
CREAT SERPL-MCNC: 1.4 MG/DL (ref 0.7–1.2)
DSDNA IGG SER QL IA: 0.8 IU/ML
EOSINOPHIL # BLD: 0.1 K/UL (ref 0–0.4)
EOSINOPHILS RELATIVE PERCENT: 1 % (ref 1–4)
ERYTHROCYTE [DISTWIDTH] IN BLOOD BY AUTOMATED COUNT: 20.9 % (ref 12.5–15.4)
GFR, ESTIMATED: 58 ML/MIN/1.73M2
GLUCOSE SERPL-MCNC: 128 MG/DL (ref 70–99)
HCT VFR BLD AUTO: 34.7 % (ref 41–53)
HGB BLD-MCNC: 10.5 G/DL (ref 13.5–17.5)
LYMPHOCYTES NFR BLD: 1.25 K/UL (ref 1–4.8)
LYMPHOCYTES RELATIVE PERCENT: 13 % (ref 24–44)
MCH RBC QN AUTO: 21.7 PG (ref 26–34)
MCHC RBC AUTO-ENTMCNC: 30.3 G/DL (ref 31–37)
MCV RBC AUTO: 71.8 FL (ref 80–100)
MITOCHONDRIA M2 IGG SER-ACNC: 0.6 U/ML (ref 0–4)
MONOCYTES NFR BLD: 0.77 K/UL (ref 0.1–0.8)
MONOCYTES NFR BLD: 8 % (ref 1–7)
MORPHOLOGY: ABNORMAL
NEUTROPHILS NFR BLD: 77 % (ref 36–66)
NEUTS SEG NFR BLD: 7.38 K/UL (ref 1.8–7.7)
NUCLEAR IGG SER IA-RTO: 0.1 U/ML
PLATELET # BLD AUTO: 411 K/UL (ref 140–450)
PMV BLD AUTO: 8.5 FL (ref 8–14)
POTASSIUM SERPL-SCNC: 4.4 MMOL/L (ref 3.7–5.3)
PROT SERPL-MCNC: 8.4 G/DL (ref 6.4–8.3)
RBC # BLD AUTO: 4.83 M/UL (ref 4.5–5.9)
SODIUM SERPL-SCNC: 135 MMOL/L (ref 135–144)
WBC OTHER # BLD: 9.6 K/UL (ref 3.5–11)

## 2024-09-23 PROCEDURE — 88305 TISSUE EXAM BY PATHOLOGIST: CPT

## 2024-09-23 PROCEDURE — 85025 COMPLETE CBC W/AUTO DIFF WBC: CPT

## 2024-09-23 PROCEDURE — 45380 COLONOSCOPY AND BIOPSY: CPT | Performed by: INTERNAL MEDICINE

## 2024-09-23 PROCEDURE — 36415 COLL VENOUS BLD VENIPUNCTURE: CPT

## 2024-09-23 PROCEDURE — 2709999900 HC NON-CHARGEABLE SUPPLY: Performed by: INTERNAL MEDICINE

## 2024-09-23 PROCEDURE — 2500000003 HC RX 250 WO HCPCS

## 2024-09-23 PROCEDURE — 6370000000 HC RX 637 (ALT 250 FOR IP): Performed by: INTERNAL MEDICINE

## 2024-09-23 PROCEDURE — 99239 HOSP IP/OBS DSCHRG MGMT >30: CPT | Performed by: STUDENT IN AN ORGANIZED HEALTH CARE EDUCATION/TRAINING PROGRAM

## 2024-09-23 PROCEDURE — 6360000002 HC RX W HCPCS

## 2024-09-23 PROCEDURE — 2580000003 HC RX 258

## 2024-09-23 PROCEDURE — 7100000001 HC PACU RECOVERY - ADDTL 15 MIN: Performed by: INTERNAL MEDICINE

## 2024-09-23 PROCEDURE — 0DBP8ZZ EXCISION OF RECTUM, VIA NATURAL OR ARTIFICIAL OPENING ENDOSCOPIC: ICD-10-PCS | Performed by: INTERNAL MEDICINE

## 2024-09-23 PROCEDURE — 0DB68ZX EXCISION OF STOMACH, VIA NATURAL OR ARTIFICIAL OPENING ENDOSCOPIC, DIAGNOSTIC: ICD-10-PCS | Performed by: INTERNAL MEDICINE

## 2024-09-23 PROCEDURE — 6370000000 HC RX 637 (ALT 250 FOR IP): Performed by: STUDENT IN AN ORGANIZED HEALTH CARE EDUCATION/TRAINING PROGRAM

## 2024-09-23 PROCEDURE — 2580000003 HC RX 258: Performed by: ANESTHESIOLOGY

## 2024-09-23 PROCEDURE — 43239 EGD BIOPSY SINGLE/MULTIPLE: CPT | Performed by: INTERNAL MEDICINE

## 2024-09-23 PROCEDURE — 3609012400 HC EGD TRANSORAL BIOPSY SINGLE/MULTIPLE: Performed by: INTERNAL MEDICINE

## 2024-09-23 PROCEDURE — 80076 HEPATIC FUNCTION PANEL: CPT

## 2024-09-23 PROCEDURE — 3700000000 HC ANESTHESIA ATTENDED CARE: Performed by: INTERNAL MEDICINE

## 2024-09-23 PROCEDURE — 3700000001 HC ADD 15 MINUTES (ANESTHESIA): Performed by: INTERNAL MEDICINE

## 2024-09-23 PROCEDURE — 3609010600 HC COLONOSCOPY POLYPECTOMY SNARE/COLD BIOPSY: Performed by: INTERNAL MEDICINE

## 2024-09-23 PROCEDURE — 0DB58ZX EXCISION OF ESOPHAGUS, VIA NATURAL OR ARTIFICIAL OPENING ENDOSCOPIC, DIAGNOSTIC: ICD-10-PCS | Performed by: INTERNAL MEDICINE

## 2024-09-23 PROCEDURE — 0DB98ZX EXCISION OF DUODENUM, VIA NATURAL OR ARTIFICIAL OPENING ENDOSCOPIC, DIAGNOSTIC: ICD-10-PCS | Performed by: INTERNAL MEDICINE

## 2024-09-23 PROCEDURE — 7100000000 HC PACU RECOVERY - FIRST 15 MIN: Performed by: INTERNAL MEDICINE

## 2024-09-23 PROCEDURE — 80048 BASIC METABOLIC PNL TOTAL CA: CPT

## 2024-09-23 PROCEDURE — 2580000003 HC RX 258: Performed by: INTERNAL MEDICINE

## 2024-09-23 PROCEDURE — 6370000000 HC RX 637 (ALT 250 FOR IP)

## 2024-09-23 PROCEDURE — 6370000000 HC RX 637 (ALT 250 FOR IP): Performed by: NURSE PRACTITIONER

## 2024-09-23 RX ORDER — SODIUM CHLORIDE 9 MG/ML
INJECTION, SOLUTION INTRAVENOUS PRN
Status: DISCONTINUED | OUTPATIENT
Start: 2024-09-23 | End: 2024-09-23 | Stop reason: HOSPADM

## 2024-09-23 RX ORDER — SODIUM CHLORIDE 0.9 % (FLUSH) 0.9 %
5-40 SYRINGE (ML) INJECTION EVERY 12 HOURS SCHEDULED
Status: DISCONTINUED | OUTPATIENT
Start: 2024-09-23 | End: 2024-09-23 | Stop reason: HOSPADM

## 2024-09-23 RX ORDER — HYDROCORTISONE ACETATE 25 MG/1
25 SUPPOSITORY RECTAL 2 TIMES DAILY
Status: DISCONTINUED | OUTPATIENT
Start: 2024-09-23 | End: 2024-09-23 | Stop reason: HOSPADM

## 2024-09-23 RX ORDER — MEPERIDINE HYDROCHLORIDE 50 MG/ML
12.5 INJECTION INTRAMUSCULAR; INTRAVENOUS; SUBCUTANEOUS ONCE
Status: DISCONTINUED | OUTPATIENT
Start: 2024-09-23 | End: 2024-09-23 | Stop reason: HOSPADM

## 2024-09-23 RX ORDER — OXYCODONE HYDROCHLORIDE 5 MG/1
10 TABLET ORAL PRN
Status: DISCONTINUED | OUTPATIENT
Start: 2024-09-23 | End: 2024-09-23 | Stop reason: HOSPADM

## 2024-09-23 RX ORDER — METOCLOPRAMIDE HYDROCHLORIDE 5 MG/ML
10 INJECTION INTRAMUSCULAR; INTRAVENOUS
Status: DISCONTINUED | OUTPATIENT
Start: 2024-09-23 | End: 2024-09-23 | Stop reason: HOSPADM

## 2024-09-23 RX ORDER — HYDRALAZINE HYDROCHLORIDE 20 MG/ML
10 INJECTION INTRAMUSCULAR; INTRAVENOUS
Status: DISCONTINUED | OUTPATIENT
Start: 2024-09-23 | End: 2024-09-23 | Stop reason: HOSPADM

## 2024-09-23 RX ORDER — SODIUM CHLORIDE, SODIUM LACTATE, POTASSIUM CHLORIDE, CALCIUM CHLORIDE 600; 310; 30; 20 MG/100ML; MG/100ML; MG/100ML; MG/100ML
INJECTION, SOLUTION INTRAVENOUS CONTINUOUS
Status: DISCONTINUED | OUTPATIENT
Start: 2024-09-23 | End: 2024-09-23 | Stop reason: HOSPADM

## 2024-09-23 RX ORDER — LABETALOL HYDROCHLORIDE 5 MG/ML
10 INJECTION, SOLUTION INTRAVENOUS
Status: DISCONTINUED | OUTPATIENT
Start: 2024-09-23 | End: 2024-09-23 | Stop reason: HOSPADM

## 2024-09-23 RX ORDER — HYDROCORTISONE 25 MG/G
1 CREAM TOPICAL 2 TIMES DAILY
Qty: 1 EACH | Refills: 1 | Status: SHIPPED | OUTPATIENT
Start: 2024-09-23 | End: 2024-10-08

## 2024-09-23 RX ORDER — OXYCODONE HYDROCHLORIDE 5 MG/1
5 TABLET ORAL PRN
Status: DISCONTINUED | OUTPATIENT
Start: 2024-09-23 | End: 2024-09-23 | Stop reason: HOSPADM

## 2024-09-23 RX ORDER — MIDAZOLAM HYDROCHLORIDE 2 MG/2ML
2 INJECTION, SOLUTION INTRAMUSCULAR; INTRAVENOUS
Status: DISCONTINUED | OUTPATIENT
Start: 2024-09-23 | End: 2024-09-23 | Stop reason: HOSPADM

## 2024-09-23 RX ORDER — LIDOCAINE HYDROCHLORIDE 10 MG/ML
1 INJECTION, SOLUTION EPIDURAL; INFILTRATION; INTRACAUDAL; PERINEURAL
Status: DISCONTINUED | OUTPATIENT
Start: 2024-09-23 | End: 2024-09-23 | Stop reason: HOSPADM

## 2024-09-23 RX ORDER — METOPROLOL TARTRATE 1 MG/ML
INJECTION, SOLUTION INTRAVENOUS
Status: COMPLETED
Start: 2024-09-23 | End: 2024-09-23

## 2024-09-23 RX ORDER — SODIUM CHLORIDE 0.9 % (FLUSH) 0.9 %
5-40 SYRINGE (ML) INJECTION PRN
Status: DISCONTINUED | OUTPATIENT
Start: 2024-09-23 | End: 2024-09-23 | Stop reason: HOSPADM

## 2024-09-23 RX ORDER — PROPOFOL 10 MG/ML
INJECTION, EMULSION INTRAVENOUS
Status: DISCONTINUED | OUTPATIENT
Start: 2024-09-23 | End: 2024-09-23 | Stop reason: SDUPTHER

## 2024-09-23 RX ORDER — NALOXONE HYDROCHLORIDE 0.4 MG/ML
INJECTION, SOLUTION INTRAMUSCULAR; INTRAVENOUS; SUBCUTANEOUS PRN
Status: DISCONTINUED | OUTPATIENT
Start: 2024-09-23 | End: 2024-09-23 | Stop reason: HOSPADM

## 2024-09-23 RX ORDER — DIPHENHYDRAMINE HYDROCHLORIDE 50 MG/ML
12.5 INJECTION INTRAMUSCULAR; INTRAVENOUS
Status: DISCONTINUED | OUTPATIENT
Start: 2024-09-23 | End: 2024-09-23 | Stop reason: HOSPADM

## 2024-09-23 RX ORDER — LIDOCAINE HYDROCHLORIDE 10 MG/ML
INJECTION, SOLUTION INFILTRATION; PERINEURAL
Status: DISCONTINUED | OUTPATIENT
Start: 2024-09-23 | End: 2024-09-23 | Stop reason: SDUPTHER

## 2024-09-23 RX ORDER — ONDANSETRON 2 MG/ML
4 INJECTION INTRAMUSCULAR; INTRAVENOUS
Status: DISCONTINUED | OUTPATIENT
Start: 2024-09-23 | End: 2024-09-23 | Stop reason: HOSPADM

## 2024-09-23 RX ORDER — SODIUM CHLORIDE, SODIUM LACTATE, POTASSIUM CHLORIDE, CALCIUM CHLORIDE 600; 310; 30; 20 MG/100ML; MG/100ML; MG/100ML; MG/100ML
INJECTION, SOLUTION INTRAVENOUS
Status: DISCONTINUED | OUTPATIENT
Start: 2024-09-23 | End: 2024-09-23 | Stop reason: SDUPTHER

## 2024-09-23 RX ORDER — MORPHINE SULFATE 2 MG/ML
1 INJECTION, SOLUTION INTRAMUSCULAR; INTRAVENOUS EVERY 5 MIN PRN
Status: DISCONTINUED | OUTPATIENT
Start: 2024-09-23 | End: 2024-09-23 | Stop reason: HOSPADM

## 2024-09-23 RX ORDER — METOPROLOL TARTRATE 1 MG/ML
5 INJECTION, SOLUTION INTRAVENOUS ONCE
Status: COMPLETED | OUTPATIENT
Start: 2024-09-23 | End: 2024-09-23

## 2024-09-23 RX ADMIN — SODIUM CHLORIDE, PRESERVATIVE FREE 10 ML: 5 INJECTION INTRAVENOUS at 09:00

## 2024-09-23 RX ADMIN — EMPAGLIFLOZIN 10 MG: 10 TABLET, FILM COATED ORAL at 12:46

## 2024-09-23 RX ADMIN — PROPOFOL 50 MG: 10 INJECTION, EMULSION INTRAVENOUS at 10:33

## 2024-09-23 RX ADMIN — ATORVASTATIN CALCIUM 10 MG: 10 TABLET, FILM COATED ORAL at 12:45

## 2024-09-23 RX ADMIN — METOPROLOL TARTRATE 5 MG: 1 INJECTION, SOLUTION INTRAVENOUS at 11:15

## 2024-09-23 RX ADMIN — PANTOPRAZOLE SODIUM 40 MG: 40 TABLET, DELAYED RELEASE ORAL at 05:33

## 2024-09-23 RX ADMIN — AMIODARONE HYDROCHLORIDE 200 MG: 200 TABLET ORAL at 12:51

## 2024-09-23 RX ADMIN — PROPOFOL 100 MG: 10 INJECTION, EMULSION INTRAVENOUS at 10:31

## 2024-09-23 RX ADMIN — METOPROLOL TARTRATE 5 MG: 5 INJECTION INTRAVENOUS at 11:15

## 2024-09-23 RX ADMIN — PROPOFOL 100 MCG/KG/MIN: 10 INJECTION, EMULSION INTRAVENOUS at 10:32

## 2024-09-23 RX ADMIN — SODIUM CHLORIDE, POTASSIUM CHLORIDE, SODIUM LACTATE AND CALCIUM CHLORIDE: 600; 310; 30; 20 INJECTION, SOLUTION INTRAVENOUS at 15:37

## 2024-09-23 RX ADMIN — SODIUM CHLORIDE, POTASSIUM CHLORIDE, SODIUM LACTATE AND CALCIUM CHLORIDE: 600; 310; 30; 20 INJECTION, SOLUTION INTRAVENOUS at 10:27

## 2024-09-23 RX ADMIN — TORSEMIDE 40 MG: 20 TABLET ORAL at 12:45

## 2024-09-23 RX ADMIN — ALPRAZOLAM 0.5 MG: 0.5 TABLET ORAL at 12:46

## 2024-09-23 RX ADMIN — LIDOCAINE HYDROCHLORIDE 40 MG: 10 INJECTION, SOLUTION INFILTRATION; PERINEURAL at 10:31

## 2024-09-23 RX ADMIN — METOPROLOL SUCCINATE 100 MG: 100 TABLET, EXTENDED RELEASE ORAL at 12:51

## 2024-09-23 ASSESSMENT — COPD QUESTIONNAIRES: CAT_SEVERITY: NO INTERVAL CHANGE

## 2024-09-23 ASSESSMENT — ENCOUNTER SYMPTOMS: SHORTNESS OF BREATH: 1

## 2024-09-23 ASSESSMENT — PAIN - FUNCTIONAL ASSESSMENT: PAIN_FUNCTIONAL_ASSESSMENT: NONE - DENIES PAIN

## 2024-09-23 ASSESSMENT — LIFESTYLE VARIABLES: SMOKING_STATUS: 1

## 2024-09-23 NOTE — CARE COORDINATION
Met with patient regarding discharge plans. He had an EGD/colonoscopy today. Plans remain to return home independently with wife. Denies needs from CM.

## 2024-09-23 NOTE — PROGRESS NOTES
Pt and wife are agreeable to discharge. Pt and wife demonstrate understanding of discharge instructions with teach back, including holding Eliquis x 48 hours, then resuming previous dose. Pt teaches back understanding of when to seek emergency attention and s/s of bleeding. Pt to follow up with colorectal surgery.

## 2024-09-23 NOTE — DISCHARGE SUMMARY
[facility]  Office: [phone]  Asim Mondragon DO, Rohit Sellers DO, Bandar Celestin DO, Georgi Shirley DO, Nathan Zhao MD, Jane Gastelum MD, Santana Aguirre MD, Asha Crenshaw MD,  Deuce Jones MD, Reyna Pickering MD, Timoteo Zavala MD,  Haritha Graham DO, Thelma Carlton MD, Darien Lui MD, Leeroy Mondragon DO, Kamille Wilson MD,  Anjel Miranda DO, Ramona Marrero MD, Moira Rubalcava MD, Sary De MD, Michael Martin MD,  Prakash Kimbrough MD, Tommy Wood MD, Mushtaq Delaney MD, Ar Giraldo MD, Luis Manuel Chawla MD, Gómez Albert MD, Chucky Banda, DO, Zhen Fernandez DO, Rene Steiner DO, Yonatan Mckeon MD, Shirley Waterhouse, CNP,  Amaya Barber CNP, Chucky Lenz, CNP,  Naila Medina, DNP, Shae Greco, CNP, Denice Vallecillo, CNP, Kerry Erwin, CNP, Johana Latif, CNP, Evi Negron, PA-C, Jacquelin Ferraro PA-C, Magaly Carrillo, CNP, Cindy Conrad, CNP,  Lenora Michel, CNP, Thuy Paz, CNP, Antonella Walters, CNP, Alyson Schwartz, CNS, Abeba Chavarria, CNP, Elli Hernandez, CNP, Tracy Schwab, CNP         [facility]   IN-PATIENT SERVICE   [facility]    Discharge Summary     Patient ID: Torrey Joy  :  1965   MRN: [id number]     ACCOUNT:  [id number]   Patient's PCP: Karine Cole APRN - CNP  Admit Date: 2024   Discharge Date: 2024     Length of Stay: 6  Code Status:  Full Code  Admitting Physician: Gómez Albert MD  Discharge Physician: Gómez Albert MD     Active Discharge Diagnoses:     Hospital Problem Lists:  Principal Problem:    Chronic hyponatremia  Active Problems:    Shortness of breath    AGUS on CPAP    Persistent atrial fibrillation (HCC)    Alcohol dependence with unspecified alcohol-induced disorder ([facility])    Tobacco dependence due to cigarettes    Anemia    Chronic a-fib ([facility])    Class 2 severe obesity due to excess calories with serious comorbidity and body mass index (BMI) of 39.0 to 39.9 in adult ([facility])     into the lungs every 6 hours as needed for Wheezing     ALPRAZolam 0.5 MG tablet  Commonly known as: XANAX  Take 1 tablet by mouth 2 times daily as needed for anxiety.     amiodarone 200 MG tablet  Commonly known as: CORDARONE  Take 1 tablet by mouth daily     apixaban 5 MG Tabs tablet  Commonly known as: Eliquis  Take 1 tablet by mouth 2 times daily     atorvastatin 10 MG tablet  Commonly known as: LIPITOR  Take 1 tablet daily     glucose monitoring kit  1 kit by Does not apply route daily     Handicap Placard Misc  Expires 2/2027     Lancets Misc  1 each by Does not apply route 3 times daily     metoprolol succinate 100 MG extended release tablet  Commonly known as: TOPROL XL  TAKE 1 TABLET DAILY     pantoprazole 40 MG tablet  Commonly known as: PROTONIX  TAKE 1 TABLET ONCE A DAY BEFORE MEAL            STOP taking these medications      amLODIPine 5 MG tablet  Commonly known as: NORVASC     lisinopril 40 MG tablet  Commonly known as: PRINIVIL;ZESTRIL               Where to Get Your Medications        These medications were sent to [facility] #[facility] - [place], [place] - [address] [place] [address] [phone] - [phone]  [address] [place] [id number]      Phone: [phone]   empagliflozin 10 MG tablet  hydrocortisone 2.5 % Crea rectal cream  Torsemide 40 MG Tabs         No discharge procedures on file.    Time Spent on discharge is  38 mins in patient examination, evaluation, counseling as well as medication reconciliation, prescriptions for required medications, discharge plan and follow up.    Electronically signed by   Gómez Albert MD  9/23/2024  5:17 PM      Thank you Karine Christianson, APRN - CNP for the opportunity to be involved in this patient's care.

## 2024-09-23 NOTE — PLAN OF CARE
Endoscopy results d/w dr davila, ok to restart AC in 48 hours, will need follow up with gi for bx results after discharge, and colorectal surgery ok to discharge from gi standpoint, d/w dr karimi  gi signing off..Trang Freire, APRN - CNP

## 2024-09-23 NOTE — PLAN OF CARE
Problem: Discharge Planning  Goal: Discharge to home or other facility with appropriate resources  Outcome: Progressing  Flowsheets  Taken 9/23/2024 0445  Discharge to home or other facility with appropriate resources:   Identify barriers to discharge with patient and caregiver   Arrange for needed discharge resources and transportation as appropriate   Identify discharge learning needs (meds, wound care, etc)   Refer to discharge planning if patient needs post-hospital services based on physician order or complex needs related to functional status, cognitive ability or social support system  Taken 9/23/2024 0013  Discharge to home or other facility with appropriate resources:   Identify barriers to discharge with patient and caregiver   Arrange for needed discharge resources and transportation as appropriate   Identify discharge learning needs (meds, wound care, etc)   Refer to discharge planning if patient needs post-hospital services based on physician order or complex needs related to functional status, cognitive ability or social support system  Taken 9/22/2024 2000  Discharge to home or other facility with appropriate resources:   Identify barriers to discharge with patient and caregiver   Arrange for needed discharge resources and transportation as appropriate   Identify discharge learning needs (meds, wound care, etc)   Refer to discharge planning if patient needs post-hospital services based on physician order or complex needs related to functional status, cognitive ability or social support system     Problem: Safety - Adult  Goal: Free from fall injury  Outcome: Progressing     Problem: Chronic Conditions and Co-morbidities  Goal: Patient's chronic conditions and co-morbidity symptoms are monitored and maintained or improved  Outcome: Progressing  Flowsheets  Taken 9/23/2024 0445  Care Plan - Patient's Chronic Conditions and Co-Morbidity Symptoms are Monitored and Maintained or Improved: Monitor and

## 2024-09-23 NOTE — DISCHARGE INSTRUCTIONS
Follow-up with  for hemorrhoids.  Per the GI team (Dr. William) Restart your Eliquis in 48 hours from 9/23/24, then follow up in the office for biopsy results.  Follow-up with pulmonology for lymph node biopsy.  Continue with fluid restriction of 1200 mL/day

## 2024-09-23 NOTE — OP NOTE
PROCEDURE NOTE    DATE OF PROCEDURE: 9/23/2024    SURGEON: David William MD  Facility : [facility]   ASSISTANT: None  Anesthesia: MAC  PREOPERATIVE DIAGNOSIS:   GI bleed    POSTOPERATIVE DIAGNOSIS: as described below    OPERATION: Total colonoscopy     ANESTHESIA: Moderate Sedation    ESTIMATED BLOOD LOSS: less than 50     COMPLICATIONS: None.     SPECIMENS:  Was Obtained:         HISTORY: The patient is a 58 y.o. year old male with history of above preop diagnosis.  I recommended colonoscopy with possible biopsy or polypectomy and I explained the risk, benefits, expected outcome, and alternatives to the procedure.  Risks included but are not limited to bleeding, infection, respiratory distress, hypotension, and perforation of the colon and possibility of missing a lesion.  The patient understands and is in agreement.        The patient was counseled at length about the risks of jen Covid-19 during their perioperative period and any recovery window from their procedure.  The patient was made aware that jen Covid-19  may worsen their prognosis for recovering from their procedure  and lend to a higher morbidity and/or mortality risk.  All material risks, benefits, and reasonable alternatives including postponing the procedure were discussed. The patient does wish to proceed with the procedure at this time.       PROCEDURE: The patient was given IV conscious sedation.  The patient's SPO2 remained above 90% throughout the procedure.     The colonoscope was inserted per rectum and advanced under direct vision to the cecum without difficulty.      Post sedation note :The patient's SPO2 remained above 90% throughout the procedure.the vital signs remained stable , and no immediate complication form the procedure noted, patient will be ready for d/c when criteria is met .        The prep was fair.      Findings:  Terminal ileum: normal    Cecum/Ascending colon: normal    Transverse colon:

## 2024-09-23 NOTE — OP NOTE
PROCEDURE NOTE    DATE OF PROCEDURE: 9/23/2024     SURGEON: David William MD  Facility: [facility]   ASSISTANT: None  Anesthesia: MAC  PREOPERATIVE DIAGNOSIS: Anemia, GI bleed    Diagnosis:    Small circular islands of salmon-colored mucosa suggestive of a start of Wilcox's esophagus biopsies were taken    Small hiatal hernia      Stomach is very thickened and edematous with nodular mucosa basically in the body and the cardia, felt soft when we took the biopsies, multiple biopsies were taken    Pyloric orifice tiny deformity but benign looking    Edema and swelling of the mucosa of the duodenum with tiny erosions biopsies were taken    No stigmata of bleeding anywhere in this upper GI tract  There is no blood old or fresh      POSTOPERATIVE DIAGNOSIS: As described below    OPERATION: Upper GI endoscopy with Biopsy    ANESTHESIA: Moderate Sedation     ESTIMATED BLOOD LOSS: Less than 50 ml    COMPLICATIONS: None.     SPECIMENS:  Was Obtained: As below    HISTORY: The patient is a 58 y.o. year old male with history of above preop diagnosis.  I recommended esophagogastroduodenoscopy with possible biopsy and I explained the risk, benefits, expected outcome, and alternatives to the procedure.  Risks included but are not limited to bleeding, infection, respiratory distress, hypotension, and perforation of the esophagus, stomach, or duodenum.  Patient understands and is in agreement.      The patient was counseled at length about the risks of jen Covid-19 during their perioperative period and any recovery window from their procedure.  The patient was made aware that jen Covid-19  may worsen their prognosis for recovering from their procedure  and lend to a higher morbidity and/or mortality risk.  All material risks, benefits, and reasonable alternatives including postponing the procedure were discussed. The patient does wish to proceed with the procedure at this time.         PROCEDURE: The patient was  given IV conscious sedation.  The patient's SPO2 remained above 90% throughout the procedure.The gastroscope was inserted orally and advanced under direct vision through the esophagus, through the stomach, through the pylorus, and into the descending duodenum.      Post sedation note :The patient's SPO2 remained above 90% throughout the procedure.the vital signs remained stable , and no immediate complication form the procedure noted, patient will be ready for d/c when criteria is met .      Findings:    Retropharyngeal area was grossly normal appearing    Small circular islands of salmon-colored mucosa suggestive of a start of Wilcox's esophagus biopsies were taken    Small hiatal hernia      Stomach is very thickened and edematous with nodular mucosa basically in the body and the cardia, felt soft when we took the biopsies, multiple biopsies were taken    Pyloric orifice tiny deformity but benign looking    Edema and swelling of the mucosa of the duodenum with tiny erosions biopsies were taken    No stigmata of bleeding anywhere in this upper GI tract  There is no blood old or fresh      The scope was removed and the patient tolerated the procedure well.     Recommendations/Plan:   F/U Biopsies  PPI  Anemia workup  Colonoscopy today  Electronically signed by David William MD  on 9/23/2024 at 10:43 AM

## 2024-09-23 NOTE — PROGRESS NOTES
[facility]  Office: [phone]  Asim Mondragon, DO, Rohit Sellers, DO, Bandar Celestin DO, Georgi Shirley, DO, Nathan Zhao MD, Jane Gastelum MD, Santana Aguirre MD, Asha Crenshaw MD,  Deuce Jones MD, Reyna Pickering MD, Timoteo Zavala MD,  Haritha Graham DO, Thelma Carlton MD, Darien Lui MD, Leeroy Mondragon DO, Kamille Wilson MD,  Anjel Miranda DO, Ramona Marrero MD, Moira Rubalcava MD, Sary De MD, Michael Martin MD,  Prakash Kimbrough MD, Tommy Wood MD, Mushtaq Delaney MD, Ar Giraldo MD, Luis Manuel Chawla MD, Gómez Albert MD, Chucky Banda, DO, Zhen Fernandez DO, Rene Steiner DO, Yonatan Mckeon MD, Shirley Waterhouse, CNP,  Amaya Barber CNP, Chucky Lenz, CNP,  Naila Medina, DNP, Shae Greco, CNP, Denice Vallecillo, CNP, Kerry Erwin, CNP, Johana Latif, CNP, Evi Negron, PA-C, Jacquelin Ferraro, PA-C, Magaly Carrillo, CNP, Cindy Conrad, CNP,  Lenora Michel, CNP, Thuy Paz, CNP, Antonella Walters, CNP, Alyson Schwartz, CNS, Abeba Chavarria, CNP, Elli Hernandez, CNP, Tracy Schwab, CNP         [facility]   IN-PATIENT SERVICE   [facility]    Progress Note    9/23/2024    8:47 AM    Name:   Torrey Joy  MRN:     [id number]     Acct:      [id number]   Room:   [address] [place] [address] Day:  6  Admit Date:  9/17/2024  8:34 PM    PCP:   Karine Cole APRN - CNP  Code Status:  Full Code    Subjective:     C/C:   Chief Complaint   Patient presents with    abnormal labs     Low Na++ result from todays lab draw.  Pt complains of shortness of breath and weight gain recently.      Interval History Status: improved.     Patient seen and examined at bedside this morning.  Patient to have EGD and colonoscopy today with GI.  Tolerated prep well.  Patient does state he had a little bit of bright red blood per rectum, likely from his hemorrhoids this morning.  Denies any chest pain, shortness of breath, lightheadedness, dizziness, fever or chills.  Otherwise has

## 2024-09-24 ENCOUNTER — TELEPHONE (OUTPATIENT)
Dept: PRIMARY CARE CLINIC | Age: 59
End: 2024-09-24

## 2024-09-24 ENCOUNTER — CARE COORDINATION (OUTPATIENT)
Dept: CARE COORDINATION | Age: 59
End: 2024-09-24

## 2024-09-24 DIAGNOSIS — I50.23 ACUTE ON CHRONIC SYSTOLIC CONGESTIVE HEART FAILURE (HCC): Primary | ICD-10-CM

## 2024-09-25 ENCOUNTER — CARE COORDINATION (OUTPATIENT)
Dept: CARE COORDINATION | Age: 59
End: 2024-09-25

## 2024-09-25 LAB
LKM AB TITR SER IF: NORMAL {TITER}
SMOOTH MUSCLE ANTIBODY: 8 UNITS (ref 0–19)
SURGICAL PATHOLOGY REPORT: NORMAL

## 2024-10-02 ENCOUNTER — CARE COORDINATION (OUTPATIENT)
Dept: CARE COORDINATION | Age: 59
End: 2024-10-02

## 2024-10-02 ENCOUNTER — TELEPHONE (OUTPATIENT)
Dept: PRIMARY CARE CLINIC | Age: 59
End: 2024-10-02

## 2024-10-02 ENCOUNTER — HOSPITAL ENCOUNTER (OUTPATIENT)
Age: 59
Setting detail: SPECIMEN
Discharge: HOME | End: 2024-10-02

## 2024-10-02 ENCOUNTER — OFFICE VISIT (OUTPATIENT)
Dept: PRIMARY CARE CLINIC | Age: 59
End: 2024-10-02

## 2024-10-02 ENCOUNTER — OFFICE VISIT (OUTPATIENT)
Dept: SURGERY | Age: 59
End: 2024-10-02
Payer: COMMERCIAL

## 2024-10-02 VITALS
HEART RATE: 67 BPM | HEIGHT: 70 IN | WEIGHT: 234.4 LBS | DIASTOLIC BLOOD PRESSURE: 84 MMHG | RESPIRATION RATE: 16 BRPM | BODY MASS INDEX: 33.56 KG/M2 | SYSTOLIC BLOOD PRESSURE: 136 MMHG | OXYGEN SATURATION: 99 %

## 2024-10-02 VITALS
WEIGHT: 236 LBS | HEIGHT: 70 IN | BODY MASS INDEX: 33.79 KG/M2 | HEART RATE: 50 BPM | DIASTOLIC BLOOD PRESSURE: 69 MMHG | SYSTOLIC BLOOD PRESSURE: 107 MMHG | OXYGEN SATURATION: 98 %

## 2024-10-02 DIAGNOSIS — I48.20 CHRONIC A-FIB (HCC): ICD-10-CM

## 2024-10-02 DIAGNOSIS — E87.1 HYPONATREMIA: ICD-10-CM

## 2024-10-02 DIAGNOSIS — I50.9 ACUTE ON CHRONIC CONGESTIVE HEART FAILURE, UNSPECIFIED HEART FAILURE TYPE (HCC): Primary | ICD-10-CM

## 2024-10-02 DIAGNOSIS — D64.9 ANEMIA, UNSPECIFIED TYPE: ICD-10-CM

## 2024-10-02 DIAGNOSIS — K64.3 FOURTH DEGREE HEMORRHOIDS: Primary | ICD-10-CM

## 2024-10-02 LAB
ALBUMIN SERPL-MCNC: 4.4 G/DL (ref 3.5–5.2)
ALBUMIN/GLOB SERPL: 1 {RATIO} (ref 1–2.5)
ALP SERPL-CCNC: 139 U/L (ref 40–129)
ALT SERPL-CCNC: 44 U/L (ref 10–50)
ANION GAP SERPL CALCULATED.3IONS-SCNC: 14 MMOL/L (ref 9–16)
AST SERPL-CCNC: 51 U/L (ref 10–50)
BILIRUB SERPL-MCNC: 0.4 MG/DL (ref 0–1.2)
BUN SERPL-MCNC: 31 MG/DL (ref 8–23)
CALCIUM SERPL-MCNC: 9.7 MG/DL (ref 8.6–10.4)
CHLORIDE SERPL-SCNC: 93 MMOL/L (ref 98–107)
CO2 SERPL-SCNC: 28 MMOL/L (ref 20–31)
CREAT SERPL-MCNC: 1.6 MG/DL (ref 0.7–1.2)
ERYTHROCYTE [DISTWIDTH] IN BLOOD BY AUTOMATED COUNT: 21.6 % (ref 11.8–14.4)
GFR, ESTIMATED: 50 ML/MIN/1.73M2
GLUCOSE SERPL-MCNC: 108 MG/DL (ref 74–99)
HCT VFR BLD AUTO: 40 % (ref 40.7–50.3)
HGB BLD-MCNC: 11.3 G/DL (ref 13–17)
MCH RBC QN AUTO: 22 PG (ref 25.2–33.5)
MCHC RBC AUTO-ENTMCNC: 28.3 G/DL (ref 28.4–34.8)
MCV RBC AUTO: 77.8 FL (ref 82.6–102.9)
NRBC BLD-RTO: 0 PER 100 WBC
PLATELET # BLD AUTO: 507 K/UL (ref 138–453)
PMV BLD AUTO: 8.8 FL (ref 8.1–13.5)
POTASSIUM SERPL-SCNC: 4.9 MMOL/L (ref 3.7–5.3)
PROT SERPL-MCNC: 8.4 G/DL (ref 6.6–8.7)
RBC # BLD AUTO: 5.14 M/UL (ref 4.21–5.77)
SODIUM SERPL-SCNC: 135 MMOL/L (ref 136–145)
WBC OTHER # BLD: 9.5 K/UL (ref 3.5–11.3)

## 2024-10-02 PROCEDURE — 46600 DIAGNOSTIC ANOSCOPY SPX: CPT | Performed by: COLON & RECTAL SURGERY

## 2024-10-02 PROCEDURE — 99203 OFFICE O/P NEW LOW 30 MIN: CPT | Performed by: COLON & RECTAL SURGERY

## 2024-10-02 RX ORDER — FLUTICASONE PROPIONATE AND SALMETEROL 250; 50 UG/1; UG/1
1 POWDER RESPIRATORY (INHALATION) EVERY 12 HOURS
COMMUNITY

## 2024-10-02 SDOH — ECONOMIC STABILITY: FOOD INSECURITY: WITHIN THE PAST 12 MONTHS, YOU WORRIED THAT YOUR FOOD WOULD RUN OUT BEFORE YOU GOT MONEY TO BUY MORE.: NEVER TRUE

## 2024-10-02 SDOH — ECONOMIC STABILITY: INCOME INSECURITY: HOW HARD IS IT FOR YOU TO PAY FOR THE VERY BASICS LIKE FOOD, HOUSING, MEDICAL CARE, AND HEATING?: NOT HARD AT ALL

## 2024-10-02 SDOH — ECONOMIC STABILITY: FOOD INSECURITY: WITHIN THE PAST 12 MONTHS, THE FOOD YOU BOUGHT JUST DIDN'T LAST AND YOU DIDN'T HAVE MONEY TO GET MORE.: NEVER TRUE

## 2024-10-02 ASSESSMENT — PATIENT HEALTH QUESTIONNAIRE - PHQ9
4. FEELING TIRED OR HAVING LITTLE ENERGY: NOT AT ALL
9. THOUGHTS THAT YOU WOULD BE BETTER OFF DEAD, OR OF HURTING YOURSELF: NOT AT ALL
7. TROUBLE CONCENTRATING ON THINGS, SUCH AS READING THE NEWSPAPER OR WATCHING TELEVISION: NOT AT ALL
5. POOR APPETITE OR OVEREATING: NOT AT ALL
SUM OF ALL RESPONSES TO PHQ9 QUESTIONS 1 & 2: 0
8. MOVING OR SPEAKING SO SLOWLY THAT OTHER PEOPLE COULD HAVE NOTICED. OR THE OPPOSITE, BEING SO FIGETY OR RESTLESS THAT YOU HAVE BEEN MOVING AROUND A LOT MORE THAN USUAL: NOT AT ALL
SUM OF ALL RESPONSES TO PHQ QUESTIONS 1-9: 0
1. LITTLE INTEREST OR PLEASURE IN DOING THINGS: NOT AT ALL
SUM OF ALL RESPONSES TO PHQ QUESTIONS 1-9: 0
3. TROUBLE FALLING OR STAYING ASLEEP: NOT AT ALL
SUM OF ALL RESPONSES TO PHQ QUESTIONS 1-9: 0
2. FEELING DOWN, DEPRESSED OR HOPELESS: NOT AT ALL
10. IF YOU CHECKED OFF ANY PROBLEMS, HOW DIFFICULT HAVE THESE PROBLEMS MADE IT FOR YOU TO DO YOUR WORK, TAKE CARE OF THINGS AT HOME, OR GET ALONG WITH OTHER PEOPLE: NOT DIFFICULT AT ALL
6. FEELING BAD ABOUT YOURSELF - OR THAT YOU ARE A FAILURE OR HAVE LET YOURSELF OR YOUR FAMILY DOWN: NOT AT ALL
SUM OF ALL RESPONSES TO PHQ QUESTIONS 1-9: 0

## 2024-10-02 ASSESSMENT — ENCOUNTER SYMPTOMS
BACK PAIN: 0
ANAL BLEEDING: 1
BLOOD IN STOOL: 1
DIARRHEA: 0
WHEEZING: 0
COLOR CHANGE: 0
COUGH: 0
APNEA: 0
CHEST TIGHTNESS: 0
NAUSEA: 0
SHORTNESS OF BREATH: 0
ABDOMINAL DISTENTION: 0
CONSTIPATION: 0
RECTAL PAIN: 0
VOMITING: 0
ABDOMINAL PAIN: 0
STRIDOR: 0

## 2024-10-02 NOTE — PROGRESS NOTES
Post-Discharge Transitional Care Management Services      Torrey Joy   YOB: 1965    Date of Visit:  10/2/2024  30 Day Post-Discharge Date: 9/23/24    No Known Allergies  Outpatient Medications Marked as Taking for the 10/2/24 encounter (Office Visit) with Karine Cole APRN - CNP   Medication Sig Dispense Refill    fluticasone-salmeterol (ADVAIR) 250-50 MCG/ACT AEPB diskus inhaler Inhale 1 puff into the lungs in the morning and 1 puff in the evening.      empagliflozin (JARDIANCE) 10 MG tablet Take 1 tablet by mouth daily 30 tablet 3    hydrocortisone (ANUSOL-HC) 2.5 % CREA rectal cream Place 1 Application rectally 2 times daily for 15 days 1 each 1    torsemide 40 MG TABS Take 40 mg by mouth daily 30 tablet 3    metoprolol succinate (TOPROL XL) 100 MG extended release tablet TAKE 1 TABLET DAILY 90 tablet 3    pantoprazole (PROTONIX) 40 MG tablet TAKE 1 TABLET ONCE A DAY BEFORE MEAL 90 tablet 3    atorvastatin (LIPITOR) 10 MG tablet Take 1 tablet daily 90 tablet 3    ALPRAZolam (XANAX) 0.5 MG tablet Take 1 tablet by mouth 2 times daily as needed for anxiety. 180 tablet 0    apixaban (ELIQUIS) 5 MG TABS tablet Take 1 tablet by mouth 2 times daily 60 tablet 5    amiodarone (CORDARONE) 200 MG tablet Take 1 tablet by mouth daily 30 tablet 5    acetaminophen (TYLENOL) 500 MG tablet Take 1 tablet by mouth every 6 hours as needed for Pain      venlafaxine (EFFEXOR XR) 150 MG extended release capsule TAKE 1 CAPSULE DAILY 14 capsule 0    Handicap Placard MISC Expires 2/2027 1 each 0    albuterol sulfate  (90 Base) MCG/ACT inhaler Inhale 2 puffs into the lungs every 6 hours as needed for Wheezing 1 each 2         Vitals:    10/02/24 0919 10/02/24 0933   BP: (!) 148/82 136/84   Pulse: 67    Resp: 16    SpO2: 99%    Weight: 106.3 kg (234 lb 6.4 oz)    Height: 1.778 m (5' 10\")      Body mass index is 33.63 kg/m².     Wt Readings from Last 3 Encounters:   10/02/24 106.3 kg (234 lb 6.4 oz)

## 2024-10-02 NOTE — PROGRESS NOTES
[facility]  [address] [id number]  Dept: [phone]    Patient:  Torrey Joy  YOB: 1965  Date: 10/2/2024     The patient is a 58 y.o. male who presents today for consult of the following problems:     Chief Complaint: New Patient (Mr. Joy presents to clinic as a new patient to be evaluated for large external hemorrhoids with large skin tags and redundant skin. He states that he has a h/o hemorrhoids, as he's been dealing with them for at least 20 years. They are now becoming bothersome as they are causing rectal bleeding. His stools are consistent as he goes once a day. He does admit to straining.)       HPI:   This patient presents with problems with hemorrhoids which have been going on for the last 20 years.  Recently he had a colonoscopy done by GI who noticed these large hemorrhoids and referred the patient to me.  Patient does have a history of alcoholic liver disease and possible portal hypertension resulting in rectal varices which were noted at the time of colonoscopy.  Patient's main complaint is bleeding and discomfort.    Last Colonoscopy: 09/23/2024 with Dr. William-advanced hemorrhoids and rectal varices noted.  Last Imaging: CT - 05/06/2023    History:     Past Medical History:   Diagnosis Date    Anxiety 1982    Arthritis     Wilcox esophagus     Chronic back pain     Depression     DU (duodenal ulcer)     Emphysema lung (HCC)     Emphysema of lung (HCC)     GERD (gastroesophageal reflux disease)     [place] (hard of hearing)     Hyperlipidemia     Hypersomnia with sleep apnea, unspecified     CPAP nightly    Hypertension     Obesity     AGUS (obstructive sleep apnea)     wears CPAP    Osteoarthritis     Thoracic outlet syndrome     Rt     Past Surgical History:   Procedure Laterality Date    CARDIOVERSION      CARPAL TUNNEL RELEASE Left 12/17/2019    CARPAL TUNNEL

## 2024-10-02 NOTE — TELEPHONE ENCOUNTER
Last Visit Date: 10/2/2024   Next Visit Date: 10/25/2024   Pt called regarding Jardiance and clarification of use. Pt states he is using medication for his heart not for diabetes and needs insurance company to be informed in order for them to approve.

## 2024-10-02 NOTE — CARE COORDINATION
call: symptom management-follow up on bleeding, vitals, pain  medication management-check on new medication      Bhakti Rich RN

## 2024-10-09 ENCOUNTER — CARE COORDINATION (OUTPATIENT)
Dept: CARE COORDINATION | Age: 59
End: 2024-10-09

## 2024-10-09 NOTE — CARE COORDINATION
Care Transitions Note    Follow Up Call     Attempted to reach patient for transitions of care follow up.  Unable to reach patient.      Outreach Attempts:   HIPAA compliant voicemail left for patient.     Care Summary Note: Attempted to reach patient for subsequent call. Left Hipaa appropriate message with contact information requesting return call to [phone]     Follow Up Appointment:   Future Appointments         Provider Specialty Dept Phone    10/25/2024 10:00 AM Karine Cole APRN - CNP Primary Care [phone]    10/31/2024 2:45 PM David William MD Gastroenterology [phone]    11/25/2024 1:15 PM Jorge Zhao MD Nephrology [phone]    1/15/2025 10:00 AM Domonique Jauregui MD Cardiology [phone]            Plan for follow-up on next business day.  based on severity of symptoms and risk factors. Plan for next call:  2nd attempt sub call    Bhakti Rich RN

## 2024-10-10 ENCOUNTER — CARE COORDINATION (OUTPATIENT)
Dept: CARE COORDINATION | Age: 59
End: 2024-10-10

## 2024-10-10 NOTE — CARE COORDINATION
Care Transitions Note    Follow Up Call     Patient Current Location:  [place] [facility] [address]  [facility] [place] [address]    [facility] Care Coordinator contacted the patient by telephone. Verified name and  as identifiers.    Additional needs identified to be addressed with provider   No needs identified                 Method of communication with provider: none.    Care Summary Note: Spoke to Torrey for follow  up transitional care call. He stated that he is doing okay. He denies HA reports he does get dizziness if he get up to fast. Advised to change positions slowly. He denies CP fever/chills, N/V/D he denies swelling. He reports that when we are off the phone he will be calling provider back regarding Jardiance. Writer advised that it   looks as though it was submitted again to insurance company.     Discussed Neph appointment notes reviewed  with pt, pt was given lab orders /US order Torsemide 20 mg daily he has picked up and is taking.   Cardio appointment completed notes reviewed f/u with Dr. Kohli re ablation lifestyle modifications discussed. Continue amiodarone beta blocker and Eliquis.  Reviewed upcoming appointments 10/25 pcp 10/31 GI     Plan of care updates since last contact:  Discussed medications added Torsemide daily f/u with pcp Jardiance cardio/neph appointments discussed        Advance Care Planning:   Does patient have an Advance Directive: reviewed during previous call, see note. .    Medication Review:  Medications changed since last call, reviewed today.     Remote Patient Monitoring:  Offered patient enrollment in the Remote Patient Monitoring (RPM) program for in-home monitoring: Yes, but did not enroll at this time: declined to enroll in the program becausedeclined discussed previous call  .    Assessments:  Care Transitions ED Follow Up    Care Transitions Interventions     Other Services: Declined     Specialty Service Referral: Declined      Do you have all of your prescriptions and are they

## 2024-10-15 ENCOUNTER — HOSPITAL ENCOUNTER (OUTPATIENT)
Age: 59
Discharge: HOME | End: 2024-10-15
Payer: COMMERCIAL

## 2024-10-15 ENCOUNTER — HOSPITAL ENCOUNTER (OUTPATIENT)
Dept: ULTRASOUND IMAGING | Age: 59
Discharge: HOME | End: 2024-10-17
Attending: INTERNAL MEDICINE
Payer: COMMERCIAL

## 2024-10-15 DIAGNOSIS — N18.31 STAGE 3A CHRONIC KIDNEY DISEASE (HCC): ICD-10-CM

## 2024-10-15 DIAGNOSIS — E87.1 HYPONATREMIA: ICD-10-CM

## 2024-10-15 LAB
ANION GAP SERPL CALCULATED.3IONS-SCNC: 11 MMOL/L (ref 9–16)
BACTERIA URNS QL MICRO: NORMAL
BASOPHILS # BLD: 0.09 K/UL (ref 0–0.2)
BASOPHILS NFR BLD: 1 % (ref 0–2)
BILIRUB UR QL STRIP: NEGATIVE
BUN SERPL-MCNC: 27 MG/DL (ref 8–23)
C3 SERPL-MCNC: 154 MG/DL (ref 90–180)
C4 SERPL-MCNC: 20 MG/DL (ref 10–40)
CALCIUM SERPL-MCNC: 9.3 MG/DL (ref 8.6–10.4)
CASTS #/AREA URNS LPF: NORMAL /LPF (ref 0–8)
CHLORIDE SERPL-SCNC: 96 MMOL/L (ref 98–107)
CHLORIDE UR-SCNC: <20 MMOL/L
CLARITY UR: CLEAR
CO2 SERPL-SCNC: 28 MMOL/L (ref 20–31)
COLOR UR: YELLOW
CREAT SERPL-MCNC: 1.3 MG/DL (ref 0.7–1.2)
CREAT UR-MCNC: 57 MG/DL (ref 39–259)
EOSINOPHIL # BLD: 0.26 K/UL (ref 0–0.44)
EOSINOPHILS RELATIVE PERCENT: 3 % (ref 1–4)
EPI CELLS #/AREA URNS HPF: NORMAL /HPF (ref 0–5)
ERYTHROCYTE [DISTWIDTH] IN BLOOD BY AUTOMATED COUNT: 22.3 % (ref 11.8–14.4)
FREE KAPPA/LAMBDA RATIO: 1.79 (ref 0.22–1.74)
GFR, ESTIMATED: 61 ML/MIN/1.73M2
GLUCOSE SERPL-MCNC: 153 MG/DL (ref 74–99)
GLUCOSE UR STRIP-MCNC: NEGATIVE MG/DL
HAV IGM SERPL QL IA: NONREACTIVE
HBV CORE IGM SERPL QL IA: NONREACTIVE
HBV SURFACE AG SERPL QL IA: NONREACTIVE
HCT VFR BLD AUTO: 37.6 % (ref 40.7–50.3)
HCV AB SERPL QL IA: NONREACTIVE
HGB BLD-MCNC: 10.9 G/DL (ref 13–17)
HGB UR QL STRIP.AUTO: NEGATIVE
IMM GRANULOCYTES # BLD AUTO: 0 K/UL (ref 0–0.3)
IMM GRANULOCYTES NFR BLD: 0 %
KAPPA LC FREE SER-MCNC: 99.2 MG/L
KETONES UR STRIP-MCNC: NEGATIVE MG/DL
LAMBDA LC FREE SERPL-MCNC: 55.5 MG/L (ref 4.2–27.7)
LEUKOCYTE ESTERASE UR QL STRIP: NEGATIVE
LYMPHOCYTES NFR BLD: 2.64 K/UL (ref 1.1–3.7)
LYMPHOCYTES RELATIVE PERCENT: 30 % (ref 24–43)
MCH RBC QN AUTO: 22.7 PG (ref 25.2–33.5)
MCHC RBC AUTO-ENTMCNC: 29 G/DL (ref 28.4–34.8)
MCV RBC AUTO: 78.2 FL (ref 82.6–102.9)
MONOCYTES NFR BLD: 0.79 K/UL (ref 0.1–1.2)
MONOCYTES NFR BLD: 9 % (ref 3–12)
MORPHOLOGY: ABNORMAL
MORPHOLOGY: ABNORMAL
NEUTROPHILS NFR BLD: 57 % (ref 36–65)
NEUTS SEG NFR BLD: 5.02 K/UL (ref 1.5–8.1)
NITRITE UR QL STRIP: NEGATIVE
NRBC BLD-RTO: 0 PER 100 WBC
PH UR STRIP: 6 [PH] (ref 5–8)
PLATELET # BLD AUTO: 297 K/UL (ref 138–453)
PMV BLD AUTO: 9.5 FL (ref 8.1–13.5)
POTASSIUM SERPL-SCNC: 4.6 MMOL/L (ref 3.7–5.3)
PROT UR STRIP-MCNC: NEGATIVE MG/DL
RBC # BLD AUTO: 4.81 M/UL (ref 4.21–5.77)
RBC #/AREA URNS HPF: NORMAL /HPF (ref 0–4)
SODIUM SERPL-SCNC: 135 MMOL/L (ref 136–145)
SODIUM UR-SCNC: 27 MMOL/L
SP GR UR STRIP: 1.01 (ref 1–1.03)
TOTAL PROTEIN, URINE: 11 MG/DL
URINE TOTAL PROTEIN CREATININE RATIO: 0.19
UROBILINOGEN UR STRIP-ACNC: NORMAL EU/DL (ref 0–1)
WBC #/AREA URNS HPF: NORMAL /HPF (ref 0–5)
WBC OTHER # BLD: 8.8 K/UL (ref 3.5–11.3)

## 2024-10-15 PROCEDURE — 86225 DNA ANTIBODY NATIVE: CPT

## 2024-10-15 PROCEDURE — 82436 ASSAY OF URINE CHLORIDE: CPT

## 2024-10-15 PROCEDURE — 80074 ACUTE HEPATITIS PANEL: CPT

## 2024-10-15 PROCEDURE — 80048 BASIC METABOLIC PNL TOTAL CA: CPT

## 2024-10-15 PROCEDURE — 36415 COLL VENOUS BLD VENIPUNCTURE: CPT

## 2024-10-15 PROCEDURE — 84300 ASSAY OF URINE SODIUM: CPT

## 2024-10-15 PROCEDURE — 84165 PROTEIN E-PHORESIS SERUM: CPT

## 2024-10-15 PROCEDURE — 86038 ANTINUCLEAR ANTIBODIES: CPT

## 2024-10-15 PROCEDURE — 84166 PROTEIN E-PHORESIS/URINE/CSF: CPT

## 2024-10-15 PROCEDURE — 86160 COMPLEMENT ANTIGEN: CPT

## 2024-10-15 PROCEDURE — 83521 IG LIGHT CHAINS FREE EACH: CPT

## 2024-10-15 PROCEDURE — 84155 ASSAY OF PROTEIN SERUM: CPT

## 2024-10-15 PROCEDURE — 82570 ASSAY OF URINE CREATININE: CPT

## 2024-10-15 PROCEDURE — 84156 ASSAY OF PROTEIN URINE: CPT

## 2024-10-15 PROCEDURE — 83935 ASSAY OF URINE OSMOLALITY: CPT

## 2024-10-15 PROCEDURE — 76770 US EXAM ABDO BACK WALL COMP: CPT

## 2024-10-15 PROCEDURE — 81001 URINALYSIS AUTO W/SCOPE: CPT

## 2024-10-15 PROCEDURE — 85025 COMPLETE CBC W/AUTO DIFF WBC: CPT

## 2024-10-16 ENCOUNTER — TELEPHONE (OUTPATIENT)
Dept: PRIMARY CARE CLINIC | Age: 59
End: 2024-10-16

## 2024-10-16 ENCOUNTER — CARE COORDINATION (OUTPATIENT)
Dept: CARE COORDINATION | Age: 59
End: 2024-10-16

## 2024-10-16 LAB
ALBUMIN PERCENT: 55 % (ref 45–65)
ALBUMIN SERPL-MCNC: 3.9 G/DL (ref 3.2–5.2)
ALPHA 2 PERCENT: 10 % (ref 6–13)
ALPHA1 GLOB SERPL ELPH-MCNC: 0.3 G/DL (ref 0.1–0.4)
ALPHA1 GLOB SERPL ELPH-MCNC: 4 % (ref 3–6)
ALPHA2 GLOB SERPL ELPH-MCNC: 0.7 G/DL (ref 0.5–0.9)
ANA SER QL IA: NEGATIVE
B-GLOBULIN SERPL ELPH-MCNC: 1 G/DL (ref 0.5–1.1)
B-GLOBULIN SERPL ELPH-MCNC: 15 % (ref 11–19)
DSDNA IGG SER QL IA: <0.5 IU/ML
GAMMA GLOB SERPL ELPH-MCNC: 1.2 G/DL (ref 0.5–1.5)
GAMMA GLOBULIN %: 17 % (ref 9–20)
NUCLEAR IGG SER IA-RTO: 0.1 U/ML
OSMOLALITY UR: 273 MOSM/KG (ref 80–1300)
P E INTERPRETATION, U: NORMAL
PATHOLOGIST: NORMAL
PATHOLOGIST: NORMAL
PROT PATTERN SERPL ELPH-IMP: NORMAL
PROT SERPL-MCNC: 7.2 G/DL (ref 6.6–8.7)
SPECIMEN TYPE: NORMAL
TOTAL PROT. SUM,%: 101 % (ref 98–102)
TOTAL PROT. SUM: 7.1 G/DL (ref 6.3–8.2)
URINE TOTAL PROTEIN: 11 MG/DL

## 2024-10-16 NOTE — CARE COORDINATION
Care Transitions Note    Follow Up Call     Attempted to reach patient for transitions of care follow up.  Unable to reach patient.      Outreach Attempts:   HIPAA compliant voicemail left for patient.     Care Summary Note: 1st attempt    Follow Up Appointment:   Future Appointments         Provider Specialty Dept Phone    10/25/2024 10:00 AM Karine Cole APRN - CNP Primary Care [phone]    10/31/2024 2:45 PM David William MD Gastroenterology [phone]    11/25/2024 1:15 PM Jorge Zhao MD Nephrology [phone]    1/15/2025 10:00 AM Domonique Jauregui MD Cardiology [phone]            Plan for follow-up call in 2-5 days based on severity of symptoms and risk factors. Plan for next call: symptom management-cardiac symptoms ? Swelling? Did he get Jardiance ? Did he contact Dr.El Lujan re ablation?  US scheduled ?     Carolyn Harrison LPN

## 2024-10-16 NOTE — TELEPHONE ENCOUNTER
Patient called stating he just received a call from DENILSON Joy and he thinks it might be related to his disability claim form that he dropped off.    Writer told patient that there is another message in his chart related to the jardiance not being covered and that writer was unsure if Benita called related to the jardiance or if it was related to the form.    Patient understood. Writer will send message to Benita to have her call patient back.

## 2024-10-16 NOTE — TELEPHONE ENCOUNTER
Called and spoke to Patient he stated he has been on Unemployment since he had COVID. Patient stated since he was in the hospital he thought he should be on Disability. I explained to Patient that he would need to contact Cardiology and explain to them what his intentions are for Disability. Also that Jardiance is being denied from Primary Care side they would like for PA to come from Speciality for CHF.     Patient understands and will contact Cardiology

## 2024-10-16 NOTE — TELEPHONE ENCOUNTER
Radha with [employer] called stating pt insurance has denied Jardiance and stated he will need to try metformin first.

## 2024-10-16 NOTE — TELEPHONE ENCOUNTER
He does not need metformin- NOT diabetic  Please review hosp note when med was started:     Cardiology nephrology and GI were consulted. Sodium on admission was 126 and improved to 135 with Lasix 40 mg IV twice daily, and fluid restriction of 1200 cc/day. Cardiology/EP followed for patient's A-fib, hold his Eliquis and continued the patient on amiodarone and metoprolol.  Patient was also started on Jardiance     Please resubmit this to his insurance plan

## 2024-10-18 ENCOUNTER — CARE COORDINATION (OUTPATIENT)
Dept: CARE COORDINATION | Age: 59
End: 2024-10-18

## 2024-10-18 NOTE — CARE COORDINATION
Care Transitions Note    Final Call     Attempted to reach patient for transitions of care follow up.  Unable to reach patient.      Outreach Attempts:# 2   Unable to leave message.     Patient closed (unable to reach patient) from the Care Transitions program on 10/18 .Will resolve episode if no return call.     Handoff: Referred to AC Fiordaliza Liang   Condition management for CHFA-FIB .     Care Summary Note: Writer unable to reach pt x2 attempts will refer to ACM. Pt need to f/u on Jardiance -prescribed after coming home via cardio and possible ablation.     Assessments:  Care Transitions Subsequent and Final Call    Subsequent and Final Calls  Care Transitions Interventions     Other Services: Declined     Specialty Service Referral: Declined    Other Interventions:              Upcoming Appointments:    Future Appointments         Provider Specialty Dept Phone    10/23/2024 3:15 PM Ata Harp MD Cardiac Electrophysiology [phone]    10/25/2024 10:00 AM Karine Cole APRN - CNP Primary Care [phone]    10/31/2024 2:45 PM David William MD Gastroenterology [phone]    11/25/2024 1:15 PM Jorge Zhao MD Nephrology [phone]    1/15/2025 10:00 AM Domonique Jauregui MD Cardiology [phone]            Ria Herrera LPN

## 2024-10-25 ENCOUNTER — OFFICE VISIT (OUTPATIENT)
Dept: PRIMARY CARE CLINIC | Age: 59
End: 2024-10-25
Payer: COMMERCIAL

## 2024-10-25 VITALS
HEIGHT: 70 IN | SYSTOLIC BLOOD PRESSURE: 130 MMHG | BODY MASS INDEX: 34.39 KG/M2 | RESPIRATION RATE: 16 BRPM | WEIGHT: 240.2 LBS | HEART RATE: 64 BPM | DIASTOLIC BLOOD PRESSURE: 82 MMHG

## 2024-10-25 DIAGNOSIS — M54.50 CHRONIC LOW BACK PAIN, UNSPECIFIED BACK PAIN LATERALITY, UNSPECIFIED WHETHER SCIATICA PRESENT: Primary | ICD-10-CM

## 2024-10-25 DIAGNOSIS — G89.29 CHRONIC LOW BACK PAIN, UNSPECIFIED BACK PAIN LATERALITY, UNSPECIFIED WHETHER SCIATICA PRESENT: Primary | ICD-10-CM

## 2024-10-25 DIAGNOSIS — I10 ESSENTIAL HYPERTENSION: ICD-10-CM

## 2024-10-25 DIAGNOSIS — I48.3 TYPICAL ATRIAL FLUTTER (HCC): ICD-10-CM

## 2024-10-25 DIAGNOSIS — E78.2 MIXED HYPERLIPIDEMIA: ICD-10-CM

## 2024-10-25 PROBLEM — R91.8 LUNG MASS: Status: ACTIVE | Noted: 2024-10-21

## 2024-10-25 PROBLEM — F17.210 HEAVY CIGARETTE SMOKER: Status: ACTIVE | Noted: 2024-08-07

## 2024-10-25 PROBLEM — R09.81 NASAL CONGESTION: Status: ACTIVE | Noted: 2024-10-21

## 2024-10-25 PROBLEM — Z96.643 HISTORY OF TOTAL REPLACEMENT OF BOTH HIP JOINTS: Status: ACTIVE | Noted: 2024-10-25

## 2024-10-25 PROBLEM — J43.9 PULMONARY EMPHYSEMA (HCC): Status: ACTIVE | Noted: 2024-10-21

## 2024-10-25 PROBLEM — M25.50 PAIN IN JOINT: Status: ACTIVE | Noted: 2024-10-21

## 2024-10-25 PROBLEM — F41.9 ANXIETY: Status: ACTIVE | Noted: 2024-10-21

## 2024-10-25 PROBLEM — Z99.89 DEPENDENCE ON CONTINUOUS POSITIVE AIRWAY PRESSURE VENTILATION: Status: ACTIVE | Noted: 2024-06-10

## 2024-10-25 PROBLEM — M54.9 BACKACHE: Status: ACTIVE | Noted: 2024-10-21

## 2024-10-25 PROBLEM — J34.89 NASAL DISCHARGE: Status: ACTIVE | Noted: 2024-10-21

## 2024-10-25 PROBLEM — R00.2 PALPITATIONS: Status: ACTIVE | Noted: 2024-10-21

## 2024-10-25 PROBLEM — R59.9 ADENOPATHY: Status: ACTIVE | Noted: 2024-06-11

## 2024-10-25 PROBLEM — R06.2 WHEEZING: Status: ACTIVE | Noted: 2024-10-21

## 2024-10-25 PROBLEM — M25.60 JOINT STIFFNESS: Status: ACTIVE | Noted: 2024-10-21

## 2024-10-25 PROBLEM — R93.89 ABNORMAL RADIOGRAPHIC EXAMINATION: Status: ACTIVE | Noted: 2024-10-21

## 2024-10-25 PROCEDURE — 3075F SYST BP GE 130 - 139MM HG: CPT | Performed by: NURSE PRACTITIONER

## 2024-10-25 PROCEDURE — 99214 OFFICE O/P EST MOD 30 MIN: CPT | Performed by: NURSE PRACTITIONER

## 2024-10-25 PROCEDURE — 3079F DIAST BP 80-89 MM HG: CPT | Performed by: NURSE PRACTITIONER

## 2024-10-25 SDOH — ECONOMIC STABILITY: FOOD INSECURITY: WITHIN THE PAST 12 MONTHS, THE FOOD YOU BOUGHT JUST DIDN'T LAST AND YOU DIDN'T HAVE MONEY TO GET MORE.: NEVER TRUE

## 2024-10-25 SDOH — ECONOMIC STABILITY: INCOME INSECURITY: HOW HARD IS IT FOR YOU TO PAY FOR THE VERY BASICS LIKE FOOD, HOUSING, MEDICAL CARE, AND HEATING?: NOT HARD AT ALL

## 2024-10-25 SDOH — ECONOMIC STABILITY: FOOD INSECURITY: WITHIN THE PAST 12 MONTHS, YOU WORRIED THAT YOUR FOOD WOULD RUN OUT BEFORE YOU GOT MONEY TO BUY MORE.: NEVER TRUE

## 2024-10-25 ASSESSMENT — PATIENT HEALTH QUESTIONNAIRE - PHQ9
SUM OF ALL RESPONSES TO PHQ QUESTIONS 1-9: 0
1. LITTLE INTEREST OR PLEASURE IN DOING THINGS: NOT AT ALL
6. FEELING BAD ABOUT YOURSELF - OR THAT YOU ARE A FAILURE OR HAVE LET YOURSELF OR YOUR FAMILY DOWN: NOT AT ALL
SUM OF ALL RESPONSES TO PHQ QUESTIONS 1-9: 0
5. POOR APPETITE OR OVEREATING: NOT AT ALL
SUM OF ALL RESPONSES TO PHQ9 QUESTIONS 1 & 2: 0
SUM OF ALL RESPONSES TO PHQ QUESTIONS 1-9: 0
2. FEELING DOWN, DEPRESSED OR HOPELESS: NOT AT ALL
7. TROUBLE CONCENTRATING ON THINGS, SUCH AS READING THE NEWSPAPER OR WATCHING TELEVISION: NOT AT ALL
10. IF YOU CHECKED OFF ANY PROBLEMS, HOW DIFFICULT HAVE THESE PROBLEMS MADE IT FOR YOU TO DO YOUR WORK, TAKE CARE OF THINGS AT HOME, OR GET ALONG WITH OTHER PEOPLE: NOT DIFFICULT AT ALL
SUM OF ALL RESPONSES TO PHQ QUESTIONS 1-9: 0
3. TROUBLE FALLING OR STAYING ASLEEP: NOT AT ALL
4. FEELING TIRED OR HAVING LITTLE ENERGY: NOT AT ALL
9. THOUGHTS THAT YOU WOULD BE BETTER OFF DEAD, OR OF HURTING YOURSELF: NOT AT ALL
8. MOVING OR SPEAKING SO SLOWLY THAT OTHER PEOPLE COULD HAVE NOTICED. OR THE OPPOSITE, BEING SO FIGETY OR RESTLESS THAT YOU HAVE BEEN MOVING AROUND A LOT MORE THAN USUAL: NOT AT ALL

## 2024-10-25 ASSESSMENT — ENCOUNTER SYMPTOMS
COUGH: 0
ABDOMINAL PAIN: 0
SHORTNESS OF BREATH: 0
BACK PAIN: 1

## 2024-10-25 NOTE — PROGRESS NOTES
[facility]  [facility] PRIMARY [facility]  [address] [place] [address] [address]  [address]  [facility] [place] [address]  Dept: [phone]  Dept Fax: [phone]    Torrey Joy is a 59 y.o. male who presentstoday for his medical conditions/complaints as noted below.  Torrey Joy is c/o of  Chief Complaint   Patient presents with    Diabetes         HPI:     Presents for one month recheck  BP well controlled  Has gained 4lb since LOV    Having a hard time getting jardiance approved  Insurance is requiring use of metformin   Reviewed it is not for diabetic management  Hga1c 6.1  Given sample in office today- will need to check with cardiology for further management    Has recheck with EP on 10/28/24  Has noted dizziness with position changes  Recheck with cardiology in January    Chronic lower back pain, worsening  Following with chiropractor  States that with exertion, he will get lower back pain that radiates to lower abdomen  Will get nauseated and dry heave  Will update imaging and refer to ortho    Has recheck with nephrology in November    Denies any other problems/concerns        Hemoglobin A1C (%)   Date Value   03/08/2024 6.1 (H)   03/03/2023 5.4   09/09/2022 5.7             ( goal A1C is < 7)   No components found for: \"LABMICR\"  No components found for: \"LDLCHOLESTEROL\", \"LDLCALC\"    (goal LDL is <100)   AST (U/L)   Date Value   10/02/2024 51 (H)     ALT (U/L)   Date Value   10/02/2024 44     BUN (mg/dL)   Date Value   10/15/2024 27 (H)     BP Readings from Last 3 Encounters:   10/25/24 130/82   10/09/24 (!) 146/92   10/04/24 138/78          ([id number])    Past Medical History:   Diagnosis Date    Anxiety 1982    Arthritis     Wilcox esophagus     Chronic back pain     Depression     DU (duodenal ulcer)     Emphysema lung (HCC)     Emphysema of lung (HCC)     GERD (gastroesophageal reflux disease)     [place] (hard of hearing)     Hyperlipidemia     Hypersomnia with sleep apnea, unspecified     CPAP

## 2024-10-28 ENCOUNTER — OFFICE VISIT (OUTPATIENT)
Age: 59
End: 2024-10-28
Payer: COMMERCIAL

## 2024-10-28 VITALS
HEART RATE: 63 BPM | TEMPERATURE: 98.1 F | WEIGHT: 243.4 LBS | BODY MASS INDEX: 34.84 KG/M2 | DIASTOLIC BLOOD PRESSURE: 71 MMHG | SYSTOLIC BLOOD PRESSURE: 115 MMHG | HEIGHT: 70 IN | OXYGEN SATURATION: 98 %

## 2024-10-28 DIAGNOSIS — I48.92 ATRIAL FLUTTER, UNSPECIFIED TYPE (HCC): Primary | ICD-10-CM

## 2024-10-28 DIAGNOSIS — R94.2 ABNORMAL PFT: ICD-10-CM

## 2024-10-28 DIAGNOSIS — I48.91 ATRIAL FIB/FLUTTER, TRANSIENT (HCC): ICD-10-CM

## 2024-10-28 DIAGNOSIS — I48.92 ATRIAL FIB/FLUTTER, TRANSIENT (HCC): ICD-10-CM

## 2024-10-28 DIAGNOSIS — F10.20 ALCOHOLISM (HCC): ICD-10-CM

## 2024-10-28 DIAGNOSIS — E66.812 CLASS 2 OBESITY: ICD-10-CM

## 2024-10-28 DIAGNOSIS — I10 PRIMARY HYPERTENSION: ICD-10-CM

## 2024-10-28 PROCEDURE — 99215 OFFICE O/P EST HI 40 MIN: CPT | Performed by: SPECIALIST

## 2024-10-28 PROCEDURE — 3078F DIAST BP <80 MM HG: CPT | Performed by: SPECIALIST

## 2024-10-28 PROCEDURE — 3074F SYST BP LT 130 MM HG: CPT | Performed by: SPECIALIST

## 2024-10-28 NOTE — PROGRESS NOTES
[facility] CARDIAC ELECTROPHYSIOLOGY  [address], [address]  [facility]  [address]    Date of Visit:  10/28/2024  Patient Name: Torrey Joy   Patient :  1965   Referring By:  No ref. provider found     CHIEF COMPLAINT/HPI:     Torrey Joy is a 59 y.o. male who presents today for an general visit to be evaluated for the following condition(s):  Chief Complaint   Patient presents with    Follow-up     Follow-up to discuss ablation   Since last encounter patient ended up admitted to [facility] with volume overload significant hyponatremia GI bleed and all corrected now.  Nephrology thinks it is related to volume overload and series of tests to rule out different pathologies are going on.    GI thought his bleeding was secondary to hemorrhoids.    He was off oral anticoagulation while in the hospital since it was resumed and he has been taking it faithfully.    He remains on amiodarone 200 mg p.o. daily.    He continued in atrial flutter throughout his hospital stay with seemingly controlled ventricular response.    Last time we brought him for ablation procedure his potassium was significantly high and sodium was abnormal.    He is diagnosed to have significant diastolic dysfunction HFpEF.    History of alcohol increased use.        REVIEW OF SYSTEM      Review of Systems in general he is doing better compared to when he was in the hospital.  I reviewed the notes from the nephrology and cardiology and seen that he is on the right track finally.    REVIEWED INFORMATION      No Known Allergies    Current Outpatient Medications   Medication Sig Note Dispense Refill    torsemide (DEMADEX) 20 MG tablet Take 1 tablet by mouth daily  90 tablet 3    fluticasone-salmeterol (ADVAIR) 250-50 MCG/ACT AEPB diskus inhaler Inhale 1 puff into the lungs in the morning and 1 puff in the evening.       metoprolol succinate (TOPROL XL) 100 MG extended release tablet TAKE 1 TABLET DAILY  90 tablet 3    pantoprazole

## 2024-10-29 ENCOUNTER — TELEPHONE (OUTPATIENT)
Dept: VASCULAR SURGERY | Age: 59
End: 2024-10-29

## 2024-10-29 ENCOUNTER — CARE COORDINATION (OUTPATIENT)
Dept: CARE COORDINATION | Age: 59
End: 2024-10-29

## 2024-10-29 ENCOUNTER — HOSPITAL ENCOUNTER (OUTPATIENT)
Dept: GENERAL RADIOLOGY | Age: 59
Discharge: HOME | End: 2024-10-31
Payer: COMMERCIAL

## 2024-10-29 ENCOUNTER — HOSPITAL ENCOUNTER (OUTPATIENT)
Age: 59
Discharge: HOME | End: 2024-10-31
Payer: COMMERCIAL

## 2024-10-29 DIAGNOSIS — Z01.818 PRE-OP TESTING: ICD-10-CM

## 2024-10-29 DIAGNOSIS — I48.92 ATRIAL FLUTTER, UNSPECIFIED TYPE (HCC): Primary | ICD-10-CM

## 2024-10-29 DIAGNOSIS — G89.29 CHRONIC LOW BACK PAIN, UNSPECIFIED BACK PAIN LATERALITY, UNSPECIFIED WHETHER SCIATICA PRESENT: ICD-10-CM

## 2024-10-29 DIAGNOSIS — M54.50 CHRONIC LOW BACK PAIN, UNSPECIFIED BACK PAIN LATERALITY, UNSPECIFIED WHETHER SCIATICA PRESENT: ICD-10-CM

## 2024-10-29 PROCEDURE — 72100 X-RAY EXAM L-S SPINE 2/3 VWS: CPT

## 2024-10-29 SDOH — HEALTH STABILITY: PHYSICAL HEALTH: ON AVERAGE, HOW MANY MINUTES DO YOU ENGAGE IN EXERCISE AT THIS LEVEL?: 0 MIN

## 2024-10-29 SDOH — HEALTH STABILITY: PHYSICAL HEALTH: ON AVERAGE, HOW MANY DAYS PER WEEK DO YOU ENGAGE IN MODERATE TO STRENUOUS EXERCISE (LIKE A BRISK WALK)?: 0 DAYS

## 2024-10-29 NOTE — TELEPHONE ENCOUNTER
Patient called back, confirmed ablation scheduled for Thursday 11/14/2024 - patient aware to have labs done 3-4 days before surgery, stop Eliquis day of surgery, location, time, NPO  Paperwork mailed as well

## 2024-10-31 ENCOUNTER — OFFICE VISIT (OUTPATIENT)
Dept: ORTHOPEDIC SURGERY | Age: 59
End: 2024-10-31
Payer: COMMERCIAL

## 2024-10-31 ENCOUNTER — OFFICE VISIT (OUTPATIENT)
Dept: GASTROENTEROLOGY | Age: 59
End: 2024-10-31
Payer: COMMERCIAL

## 2024-10-31 VITALS — WEIGHT: 243 LBS | HEIGHT: 70 IN | BODY MASS INDEX: 34.79 KG/M2

## 2024-10-31 VITALS
WEIGHT: 244 LBS | BODY MASS INDEX: 35.01 KG/M2 | TEMPERATURE: 97 F | RESPIRATION RATE: 16 BRPM | HEART RATE: 61 BPM | DIASTOLIC BLOOD PRESSURE: 70 MMHG | SYSTOLIC BLOOD PRESSURE: 110 MMHG

## 2024-10-31 DIAGNOSIS — M54.42 CHRONIC LOW BACK PAIN WITH BILATERAL SCIATICA, UNSPECIFIED BACK PAIN LATERALITY: ICD-10-CM

## 2024-10-31 DIAGNOSIS — G89.29 CHRONIC LOW BACK PAIN, UNSPECIFIED BACK PAIN LATERALITY, UNSPECIFIED WHETHER SCIATICA PRESENT: ICD-10-CM

## 2024-10-31 DIAGNOSIS — R60.9 PITTING EDEMA: ICD-10-CM

## 2024-10-31 DIAGNOSIS — G89.29 CHRONIC LOW BACK PAIN WITH BILATERAL SCIATICA, UNSPECIFIED BACK PAIN LATERALITY: ICD-10-CM

## 2024-10-31 DIAGNOSIS — K29.80 INFLAMMATION PRESENT ON BIOPSY OF DUODENUM: ICD-10-CM

## 2024-10-31 DIAGNOSIS — Z86.0100 HISTORY OF COLONIC POLYPS: ICD-10-CM

## 2024-10-31 DIAGNOSIS — M54.41 CHRONIC LOW BACK PAIN WITH BILATERAL SCIATICA, UNSPECIFIED BACK PAIN LATERALITY: ICD-10-CM

## 2024-10-31 DIAGNOSIS — M47.816 LUMBAR SPONDYLOSIS: ICD-10-CM

## 2024-10-31 DIAGNOSIS — K64.9 HEMORRHOIDS, UNSPECIFIED HEMORRHOID TYPE: ICD-10-CM

## 2024-10-31 DIAGNOSIS — K22.70 BARRETT'S ESOPHAGUS WITHOUT DYSPLASIA: Primary | ICD-10-CM

## 2024-10-31 DIAGNOSIS — M54.50 CHRONIC LOW BACK PAIN, UNSPECIFIED BACK PAIN LATERALITY, UNSPECIFIED WHETHER SCIATICA PRESENT: ICD-10-CM

## 2024-10-31 DIAGNOSIS — M51.369 DEGENERATION OF INTERVERTEBRAL DISC OF LUMBAR REGION, UNSPECIFIED WHETHER PAIN PRESENT: Primary | ICD-10-CM

## 2024-10-31 DIAGNOSIS — K70.0 ALCOHOL INDUCED FATTY LIVER: ICD-10-CM

## 2024-10-31 PROCEDURE — 99214 OFFICE O/P EST MOD 30 MIN: CPT | Performed by: INTERNAL MEDICINE

## 2024-10-31 PROCEDURE — 99203 OFFICE O/P NEW LOW 30 MIN: CPT | Performed by: PHYSICIAN ASSISTANT

## 2024-10-31 ASSESSMENT — ENCOUNTER SYMPTOMS
SORE THROAT: 0
ABDOMINAL PAIN: 1
ABDOMINAL DISTENTION: 1
CHOKING: 0
VOMITING: 0
DIARRHEA: 0
COUGH: 0
RECTAL PAIN: 0
ANAL BLEEDING: 0
VOICE CHANGE: 0
NAUSEA: 1
CONSTIPATION: 0
BLOOD IN STOOL: 0
WHEEZING: 0
TROUBLE SWALLOWING: 0

## 2024-10-31 NOTE — PROGRESS NOTES
Patient ID: Torrey Joy is a 59 y.o. male    Chief Compliant:  Chief Complaint   Patient presents with    Back Pain     Low back pain      HPI:  Patient is a 59 y.o. male who presents to the clinic today for evaluation of chronic low back pain.  Patient with chronic low back pain now in the last month worsening with pain wrapping around to his groin.  Patient with history of bilateral total hip arthroplasties.  Patient mainly reports right-sided buttock discomfort and pain he denies any numbness tingling or weakness in his lower extremities.  Patient denies any history of trauma fall or injury, saddle anesthesia, bowel or bladder concerns.  Patient has not done any recent physical therapy or seeing pain management for any injections.  He denies any surgeries to his back.      Review of Systems   Constitutional: Negative for fever, chills, sweats.   Eyes: Negative for changes in vision, or pain.   HENT: Negative for ear ache, epistaxis, or sore throat.  Respiratory/Cardio: Negative for Chest pain, palpitations, SOB, or cough.  Gastrointestinal: Negative for abdominal pain, N/V/D.   Genitourinary: Negative for dysuria, frequency, urgency, or hematuria.   Neurological: Negative for headache, numbness, or weakness.   Integumentary: Negative for rash, itching, laceration, or abrasion.   Musculoskeletal: Positive for Back Pain (Low back pain)         Past History:    Current Outpatient Medications:     torsemide (DEMADEX) 20 MG tablet, Take 1 tablet by mouth daily, Disp: 90 tablet, Rfl: 3    fluticasone-salmeterol (ADVAIR) 250-50 MCG/ACT AEPB diskus inhaler, Inhale 1 puff into the lungs in the morning and 1 puff in the evening., Disp: , Rfl:     empagliflozin (JARDIANCE) 10 MG tablet, Take 1 tablet by mouth daily (Patient not taking: Reported on 10/28/2024), Disp: 30 tablet, Rfl: 3    metoprolol succinate (TOPROL XL) 100 MG extended release tablet, TAKE 1 TABLET DAILY, Disp: 90 tablet, Rfl: 3    pantoprazole

## 2024-10-31 NOTE — PROGRESS NOTES
time. Mental status is at baseline.   Psychiatric:         Mood and Affect: Mood is not anxious.         Behavior: Behavior is cooperative.         Cognition and Memory: Cognition is not impaired. Memory is not impaired.         IMPRESSION: Mr. Joy is a 59 y.o. male with    Diagnosis Orders   1. 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 Inflammation present on biopsy of duodenum        3. Alcohol induced fatty liver  Alpha-1-Antitrypsin w Phenotype    MRI ABDOMEN W WO CONTRAST MRCP    Comprehensive Metabolic Panel with Bilirubin    Protime-INR      4. Pitting edema        5. History of colonic polyps        6. Hemorrhoids, unspecified hemorrhoid type          Continue protonix for GERD - no heartburn symptoms but he does have bx suggestive of BE  Discussed improtance of EtOH cessation  F/u MRI liver and liver labs. Will get A1AT genotype.  Recommend hep A and B vaccinations    Colonoscopy 2 years      Medication where reviewed, side effects from the GI medication were reviewed with the patient  We did refill the GI medications    Diet/life style/natural hx /complication of the dx were all explained in details   Past medical, past surgical, social history, psychiatric history, medications or allergies, all reviewed and  updated    Thank you for allowing me to participate in the care of Mr. Joy. For any further questions please do not hesitate to contact me.    I have reviewed and agree with the ROS entered by the MA/RN.     Note is dictated utilizing voice recognition software. Unfortunately this leads to occasional typographical errors. Please contact our office if you have any questions.  This note is created with the assistance of the speech recognition program. While intending to generate a document that actually reflects the content of the visit, it can still have some errors including those of syntax and sound-a-like substitutions which may escape proof reading. Actual meaning can be extrapolated

## 2024-11-03 ENCOUNTER — HOSPITAL ENCOUNTER (OUTPATIENT)
Age: 59
Discharge: HOME | End: 2024-11-03
Payer: COMMERCIAL

## 2024-11-03 DIAGNOSIS — K70.0 ALCOHOL INDUCED FATTY LIVER: ICD-10-CM

## 2024-11-03 LAB
INR PPP: 1
PROTHROMBIN TIME: 10.3 SEC (ref 9.4–12.6)

## 2024-11-03 PROCEDURE — 82103 ALPHA-1-ANTITRYPSIN TOTAL: CPT

## 2024-11-03 PROCEDURE — 36415 COLL VENOUS BLD VENIPUNCTURE: CPT

## 2024-11-03 PROCEDURE — 85610 PROTHROMBIN TIME: CPT

## 2024-11-03 PROCEDURE — 82104 ALPHA-1-ANTITRYPSIN PHENO: CPT

## 2024-11-05 ENCOUNTER — HOSPITAL ENCOUNTER (OUTPATIENT)
Dept: PHYSICAL THERAPY | Facility: CLINIC | Age: 59
Setting detail: THERAPIES SERIES
Discharge: HOME | End: 2024-11-05
Payer: COMMERCIAL

## 2024-11-05 PROCEDURE — 97161 PT EVAL LOW COMPLEX 20 MIN: CPT

## 2024-11-05 PROCEDURE — 97110 THERAPEUTIC EXERCISES: CPT

## 2024-11-05 NOTE — CONSULTS
reps    Treatment Plan:  [x] Therapeutic Exercise   10347  [] Iontophoresis: 4 mg/mL Dexamethasone Sodium Phosphate  mAmin  70396   [x] Therapeutic Activity  01866 [] Vasopneumatic cold with compression  51516    [] Gait Training   85157 [] Ultrasound   22251   [] Neuromuscular Re-education  78249 [] Electrical Stimulation Unattended  77458   [x] Manual Therapy  52448 [] Electrical Stimulation Attended  87130   [x] Instruction in HEP  [] Lumbar/Cervical Traction  94640   [] Aquatic Therapy   34112 [x] Cold/hotpack    [] Massage   64663      [] Dry Needling, 1 or 2 muscles  88427   [] Biofeedback, first 15 minutes   90912  [] Biofeedback, additional 15 minutes   90913 [] Dry Needling, 3 or more muscles  20561     Frequency:  1-2 x/week for 12 visits    Today’s Treatment:  Modalities:   Precautions [] No  [x] Yes: past bilateral hip replacements  Exercises:  Exercise Reps/ Time Weight/ Level Comments   LTR 10x ea side     TA sets 10x3\"     Supine PPT 15x3\"     Supine march 10x ea     Seated lumbar flexion stretch 2x30\"     Other:    Specific Instructions for next treatment:  Progress flexion based exercises and core strengthening - patient does have diastasis recti    Evaluation Complexity:  History (Personal factors, comorbidities) [] 0 [x] 1-2 [] 3+   Exam (limitations, restrictions) [x] 1-2 [] 3 [] 4+   Clinical presentation (progression) [x] Stable [] Evolving  [] Unstable   Decision Making [x] Low [] Moderate [] High    [x] Low Complexity [] Moderate Complexity [] High Complexity        Treatment Charges: Mins Units   [x] Evaluation       [x]  Low       []  Moderate       []  High  1   []  Modalities       [x]  Ther Exercise 15 1   []  Neuromuscular Re-ed     []  Gait Training     []  Manual Therapy     []  Ther Activities     []  Aquatics     []  Vasocompression     []  Cervical Traction     []  Other     Total Billable time 50 min 2       Time in: 1100      Time out: 1150    Electronically signed by:

## 2024-11-06 ENCOUNTER — HOSPITAL ENCOUNTER (OUTPATIENT)
Age: 59
Setting detail: SPECIMEN
Discharge: HOME | End: 2024-11-06
Payer: COMMERCIAL

## 2024-11-06 LAB
ALBUMIN SERPL-MCNC: 4.1 G/DL (ref 3.5–5.2)
ALBUMIN/GLOB SERPL: 1.3 {RATIO} (ref 1–2.5)
ALP SERPL-CCNC: 110 U/L (ref 40–129)
ALT SERPL-CCNC: 22 U/L (ref 10–50)
ANION GAP SERPL CALCULATED.3IONS-SCNC: 10 MMOL/L (ref 9–16)
AST SERPL-CCNC: 26 U/L (ref 10–50)
BILIRUB DIRECT SERPL-MCNC: 0.2 MG/DL (ref 0–0.2)
BILIRUB INDIRECT SERPL-MCNC: 0.2 MG/DL (ref 0–1)
BILIRUB SERPL-MCNC: 0.4 MG/DL (ref 0–1.2)
BUN SERPL-MCNC: 21 MG/DL (ref 8–23)
CALCIUM SERPL-MCNC: 8.9 MG/DL (ref 8.6–10.4)
CHLORIDE SERPL-SCNC: 96 MMOL/L (ref 98–107)
CO2 SERPL-SCNC: 28 MMOL/L (ref 20–31)
CREAT SERPL-MCNC: 1.3 MG/DL (ref 0.7–1.2)
GFR, ESTIMATED: 63 ML/MIN/1.73M2
GLUCOSE SERPL-MCNC: 135 MG/DL (ref 74–99)
INR PPP: 1
POTASSIUM SERPL-SCNC: 4.8 MMOL/L (ref 3.7–5.3)
PROT SERPL-MCNC: 7.2 G/DL (ref 6.6–8.7)
PROTHROMBIN TIME: 10.3 SEC (ref 9.4–12.6)
SODIUM SERPL-SCNC: 134 MMOL/L (ref 136–145)

## 2024-11-06 PROCEDURE — 82103 ALPHA-1-ANTITRYPSIN TOTAL: CPT

## 2024-11-06 PROCEDURE — 36415 COLL VENOUS BLD VENIPUNCTURE: CPT

## 2024-11-06 PROCEDURE — 82104 ALPHA-1-ANTITRYPSIN PHENO: CPT

## 2024-11-06 PROCEDURE — 82248 BILIRUBIN DIRECT: CPT

## 2024-11-06 PROCEDURE — 85610 PROTHROMBIN TIME: CPT

## 2024-11-06 PROCEDURE — 80053 COMPREHEN METABOLIC PANEL: CPT

## 2024-11-07 LAB
ALPHA-1 ANTITRYPSIN PHENOTYPE: NORMAL
ALPHA-1 ANTITRYPSIN: 169 MG/DL (ref 90–200)

## 2024-11-09 LAB
ALPHA-1 ANTITRYPSIN PHENOTYPE: NORMAL
ALPHA-1 ANTITRYPSIN: 167 MG/DL (ref 90–200)

## 2024-11-11 ENCOUNTER — HOSPITAL ENCOUNTER (OUTPATIENT)
Age: 59
Discharge: HOME | End: 2024-11-11
Payer: COMMERCIAL

## 2024-11-11 DIAGNOSIS — Z01.818 PRE-OP TESTING: ICD-10-CM

## 2024-11-11 DIAGNOSIS — I48.92 ATRIAL FLUTTER, UNSPECIFIED TYPE (HCC): ICD-10-CM

## 2024-11-11 LAB
ANION GAP SERPL CALCULATED.3IONS-SCNC: 10 MMOL/L (ref 9–17)
BASOPHILS # BLD: 0.1 K/UL (ref 0–0.2)
BASOPHILS NFR BLD: 1 % (ref 0–2)
BUN SERPL-MCNC: 25 MG/DL (ref 8–23)
CALCIUM SERPL-MCNC: 9.1 MG/DL (ref 8.6–10.4)
CHLORIDE SERPL-SCNC: 99 MMOL/L (ref 98–107)
CO2 SERPL-SCNC: 27 MMOL/L (ref 20–31)
CREAT SERPL-MCNC: 1.3 MG/DL (ref 0.7–1.2)
EOSINOPHIL # BLD: 0.3 K/UL (ref 0–0.4)
EOSINOPHILS RELATIVE PERCENT: 4 % (ref 1–4)
ERYTHROCYTE [DISTWIDTH] IN BLOOD BY AUTOMATED COUNT: 22.2 % (ref 12.5–15.4)
GFR, ESTIMATED: 63 ML/MIN/1.73M2
GLUCOSE SERPL-MCNC: 137 MG/DL (ref 70–99)
HCT VFR BLD AUTO: 31.8 % (ref 41–53)
HGB BLD-MCNC: 10.1 G/DL (ref 13.5–17.5)
LYMPHOCYTES NFR BLD: 2.2 K/UL (ref 1–4.8)
LYMPHOCYTES RELATIVE PERCENT: 29 % (ref 24–44)
MCH RBC QN AUTO: 23.9 PG (ref 26–34)
MCHC RBC AUTO-ENTMCNC: 31.8 G/DL (ref 31–37)
MCV RBC AUTO: 75.2 FL (ref 80–100)
MONOCYTES NFR BLD: 0.6 K/UL (ref 0.1–1.2)
MONOCYTES NFR BLD: 8 % (ref 2–11)
NEUTROPHILS NFR BLD: 58 % (ref 36–66)
NEUTS SEG NFR BLD: 4.4 K/UL (ref 1.8–7.7)
PLATELET # BLD AUTO: 323 K/UL (ref 140–450)
PMV BLD AUTO: 6.7 FL (ref 6–12)
POTASSIUM SERPL-SCNC: 5 MMOL/L (ref 3.7–5.3)
RBC # BLD AUTO: 4.23 M/UL (ref 4.5–5.9)
SODIUM SERPL-SCNC: 136 MMOL/L (ref 135–144)
WBC OTHER # BLD: 7.6 K/UL (ref 3.5–11)

## 2024-11-11 PROCEDURE — 36415 COLL VENOUS BLD VENIPUNCTURE: CPT

## 2024-11-11 PROCEDURE — 80048 BASIC METABOLIC PNL TOTAL CA: CPT

## 2024-11-11 PROCEDURE — 85025 COMPLETE CBC W/AUTO DIFF WBC: CPT

## 2024-11-12 ENCOUNTER — TELEPHONE (OUTPATIENT)
Age: 59
End: 2024-11-12

## 2024-11-13 ENCOUNTER — ANESTHESIA EVENT (OUTPATIENT)
Age: 59
End: 2024-11-13
Payer: COMMERCIAL

## 2024-11-14 ENCOUNTER — HOSPITAL ENCOUNTER (OUTPATIENT)
Age: 59
Setting detail: OUTPATIENT SURGERY
Discharge: HOME | End: 2024-11-14
Attending: SPECIALIST | Admitting: SPECIALIST
Payer: COMMERCIAL

## 2024-11-14 ENCOUNTER — ANESTHESIA (OUTPATIENT)
Age: 59
End: 2024-11-14
Payer: COMMERCIAL

## 2024-11-14 VITALS
DIASTOLIC BLOOD PRESSURE: 65 MMHG | TEMPERATURE: 96.8 F | RESPIRATION RATE: 18 BRPM | HEIGHT: 70 IN | BODY MASS INDEX: 34.65 KG/M2 | SYSTOLIC BLOOD PRESSURE: 129 MMHG | HEART RATE: 72 BPM | WEIGHT: 242 LBS | OXYGEN SATURATION: 98 %

## 2024-11-14 DIAGNOSIS — I48.92 ATRIAL FLUTTER, UNSPECIFIED TYPE (HCC): ICD-10-CM

## 2024-11-14 LAB
ACT BLD: 186 SEC (ref 79–149)
ACT BLD: 195 SEC (ref 79–149)
ECHO BSA: 2.33 M2

## 2024-11-14 PROCEDURE — C1759 CATH, INTRA ECHOCARDIOGRAPHY: HCPCS | Performed by: SPECIALIST

## 2024-11-14 PROCEDURE — 93620 COMP EP EVL R AT VEN PAC&REC: CPT | Performed by: SPECIALIST

## 2024-11-14 PROCEDURE — 2580000003 HC RX 258

## 2024-11-14 PROCEDURE — 6360000002 HC RX W HCPCS

## 2024-11-14 PROCEDURE — 2720000010 HC SURG SUPPLY STERILE: Performed by: SPECIALIST

## 2024-11-14 PROCEDURE — 93662 INTRACARDIAC ECG (ICE): CPT | Performed by: SPECIALIST

## 2024-11-14 PROCEDURE — C1731 CATH, EP, 20 OR MORE ELEC: HCPCS | Performed by: SPECIALIST

## 2024-11-14 PROCEDURE — 3700000001 HC ADD 15 MINUTES (ANESTHESIA): Performed by: SPECIALIST

## 2024-11-14 PROCEDURE — C1892 INTRO/SHEATH,FIXED,PEEL-AWAY: HCPCS | Performed by: SPECIALIST

## 2024-11-14 PROCEDURE — 2500000003 HC RX 250 WO HCPCS

## 2024-11-14 PROCEDURE — 93612 INTRAVENTRICULAR PACING: CPT | Performed by: SPECIALIST

## 2024-11-14 PROCEDURE — 2580000003 HC RX 258: Performed by: SPECIALIST

## 2024-11-14 PROCEDURE — 2709999900 HC NON-CHARGEABLE SUPPLY: Performed by: SPECIALIST

## 2024-11-14 PROCEDURE — 76376 3D RENDER W/INTRP POSTPROCES: CPT | Performed by: SPECIALIST

## 2024-11-14 PROCEDURE — 7100000010 HC PHASE II RECOVERY - FIRST 15 MIN: Performed by: SPECIALIST

## 2024-11-14 PROCEDURE — 3700000000 HC ANESTHESIA ATTENDED CARE: Performed by: SPECIALIST

## 2024-11-14 PROCEDURE — 93653 COMPRE EP EVAL TX SVT: CPT | Performed by: SPECIALIST

## 2024-11-14 PROCEDURE — C1894 INTRO/SHEATH, NON-LASER: HCPCS | Performed by: SPECIALIST

## 2024-11-14 PROCEDURE — C1732 CATH, EP, DIAG/ABL, 3D/VECT: HCPCS | Performed by: SPECIALIST

## 2024-11-14 PROCEDURE — 7100000011 HC PHASE II RECOVERY - ADDTL 15 MIN: Performed by: SPECIALIST

## 2024-11-14 PROCEDURE — 99213 OFFICE O/P EST LOW 20 MIN: CPT | Performed by: SPECIALIST

## 2024-11-14 PROCEDURE — 7100000000 HC PACU RECOVERY - FIRST 15 MIN: Performed by: SPECIALIST

## 2024-11-14 PROCEDURE — C1760 CLOSURE DEV, VASC: HCPCS | Performed by: SPECIALIST

## 2024-11-14 PROCEDURE — 93613 INTRACARDIAC EPHYS 3D MAPG: CPT | Performed by: SPECIALIST

## 2024-11-14 PROCEDURE — 85347 COAGULATION TIME ACTIVATED: CPT

## 2024-11-14 PROCEDURE — 7100000001 HC PACU RECOVERY - ADDTL 15 MIN: Performed by: SPECIALIST

## 2024-11-14 RX ORDER — LIDOCAINE HYDROCHLORIDE 10 MG/ML
INJECTION, SOLUTION EPIDURAL; INFILTRATION; INTRACAUDAL; PERINEURAL
Status: DISCONTINUED | OUTPATIENT
Start: 2024-11-14 | End: 2024-11-14 | Stop reason: SDUPTHER

## 2024-11-14 RX ORDER — HEPARIN SODIUM 1000 [USP'U]/ML
INJECTION, SOLUTION INTRAVENOUS; SUBCUTANEOUS
Status: DISCONTINUED | OUTPATIENT
Start: 2024-11-14 | End: 2024-11-14 | Stop reason: SDUPTHER

## 2024-11-14 RX ORDER — SODIUM CHLORIDE, SODIUM LACTATE, POTASSIUM CHLORIDE, CALCIUM CHLORIDE 600; 310; 30; 20 MG/100ML; MG/100ML; MG/100ML; MG/100ML
INJECTION, SOLUTION INTRAVENOUS
Status: DISCONTINUED | OUTPATIENT
Start: 2024-11-14 | End: 2024-11-14 | Stop reason: SDUPTHER

## 2024-11-14 RX ORDER — DEXAMETHASONE SODIUM PHOSPHATE 10 MG/ML
INJECTION, SOLUTION INTRAMUSCULAR; INTRAVENOUS
Status: DISCONTINUED | OUTPATIENT
Start: 2024-11-14 | End: 2024-11-14 | Stop reason: SDUPTHER

## 2024-11-14 RX ORDER — PROPOFOL 10 MG/ML
INJECTION, EMULSION INTRAVENOUS
Status: DISCONTINUED | OUTPATIENT
Start: 2024-11-14 | End: 2024-11-14 | Stop reason: SDUPTHER

## 2024-11-14 RX ORDER — ONDANSETRON 2 MG/ML
INJECTION INTRAMUSCULAR; INTRAVENOUS
Status: DISCONTINUED | OUTPATIENT
Start: 2024-11-14 | End: 2024-11-14 | Stop reason: SDUPTHER

## 2024-11-14 RX ORDER — FENTANYL CITRATE 50 UG/ML
INJECTION, SOLUTION INTRAMUSCULAR; INTRAVENOUS
Status: DISCONTINUED | OUTPATIENT
Start: 2024-11-14 | End: 2024-11-14 | Stop reason: SDUPTHER

## 2024-11-14 RX ORDER — NALOXONE HYDROCHLORIDE 0.4 MG/ML
INJECTION, SOLUTION INTRAMUSCULAR; INTRAVENOUS; SUBCUTANEOUS PRN
Status: DISCONTINUED | OUTPATIENT
Start: 2024-11-14 | End: 2024-11-14 | Stop reason: HOSPADM

## 2024-11-14 RX ORDER — SODIUM CHLORIDE 0.9 % (FLUSH) 0.9 %
5-40 SYRINGE (ML) INJECTION EVERY 12 HOURS SCHEDULED
Status: DISCONTINUED | OUTPATIENT
Start: 2024-11-14 | End: 2024-11-14 | Stop reason: HOSPADM

## 2024-11-14 RX ORDER — ALPRAZOLAM 0.5 MG
0.5 TABLET ORAL 2 TIMES DAILY
COMMUNITY

## 2024-11-14 RX ORDER — SODIUM CHLORIDE 9 MG/ML
INJECTION, SOLUTION INTRAVENOUS PRN
Status: DISCONTINUED | OUTPATIENT
Start: 2024-11-14 | End: 2024-11-14 | Stop reason: HOSPADM

## 2024-11-14 RX ORDER — HEPARIN SODIUM 10000 [USP'U]/100ML
INJECTION, SOLUTION INTRAVENOUS
Status: DISCONTINUED | OUTPATIENT
Start: 2024-11-14 | End: 2024-11-14 | Stop reason: SDUPTHER

## 2024-11-14 RX ORDER — SODIUM CHLORIDE 9 MG/ML
INJECTION, SOLUTION INTRAVENOUS
Status: DISCONTINUED | OUTPATIENT
Start: 2024-11-14 | End: 2024-11-14 | Stop reason: SDUPTHER

## 2024-11-14 RX ORDER — ROCURONIUM BROMIDE 10 MG/ML
INJECTION, SOLUTION INTRAVENOUS
Status: DISCONTINUED | OUTPATIENT
Start: 2024-11-14 | End: 2024-11-14 | Stop reason: SDUPTHER

## 2024-11-14 RX ORDER — ONDANSETRON 2 MG/ML
4 INJECTION INTRAMUSCULAR; INTRAVENOUS
Status: DISCONTINUED | OUTPATIENT
Start: 2024-11-14 | End: 2024-11-14 | Stop reason: HOSPADM

## 2024-11-14 RX ORDER — MIDAZOLAM HYDROCHLORIDE 1 MG/ML
INJECTION, SOLUTION INTRAMUSCULAR; INTRAVENOUS
Status: DISCONTINUED | OUTPATIENT
Start: 2024-11-14 | End: 2024-11-14 | Stop reason: SDUPTHER

## 2024-11-14 RX ORDER — SODIUM CHLORIDE 0.9 % (FLUSH) 0.9 %
5-40 SYRINGE (ML) INJECTION PRN
Status: DISCONTINUED | OUTPATIENT
Start: 2024-11-14 | End: 2024-11-14 | Stop reason: HOSPADM

## 2024-11-14 RX ORDER — SODIUM CHLORIDE 9 MG/ML
INJECTION, SOLUTION INTRAVENOUS CONTINUOUS
Status: DISCONTINUED | OUTPATIENT
Start: 2024-11-14 | End: 2024-11-14 | Stop reason: HOSPADM

## 2024-11-14 RX ADMIN — SODIUM CHLORIDE: 900 INJECTION INTRAVENOUS at 08:47

## 2024-11-14 RX ADMIN — HEPARIN SODIUM 2000 UNITS/HR: 10000 INJECTION, SOLUTION INTRAVENOUS at 10:03

## 2024-11-14 RX ADMIN — DEXAMETHASONE SODIUM PHOSPHATE 4 MG: 10 INJECTION, SOLUTION INTRAMUSCULAR; INTRAVENOUS at 09:25

## 2024-11-14 RX ADMIN — SODIUM CHLORIDE: 900 INJECTION INTRAVENOUS at 08:45

## 2024-11-14 RX ADMIN — ROCURONIUM BROMIDE 10 MG: 10 INJECTION, SOLUTION INTRAVENOUS at 10:09

## 2024-11-14 RX ADMIN — PHENYLEPHRINE HYDROCHLORIDE 100 MCG: 10 INJECTION INTRAVENOUS at 09:26

## 2024-11-14 RX ADMIN — SUGAMMADEX 400 MG: 100 INJECTION, SOLUTION INTRAVENOUS at 11:08

## 2024-11-14 RX ADMIN — ROCURONIUM BROMIDE 20 MG: 10 INJECTION, SOLUTION INTRAVENOUS at 10:22

## 2024-11-14 RX ADMIN — ONDANSETRON 4 MG: 2 INJECTION INTRAMUSCULAR; INTRAVENOUS at 11:00

## 2024-11-14 RX ADMIN — PHENYLEPHRINE HYDROCHLORIDE 10 MCG/MIN: 10 INJECTION INTRAVENOUS at 09:21

## 2024-11-14 RX ADMIN — PHENYLEPHRINE HYDROCHLORIDE 100 MCG: 10 INJECTION INTRAVENOUS at 09:21

## 2024-11-14 RX ADMIN — ROCURONIUM BROMIDE 50 MG: 10 INJECTION, SOLUTION INTRAVENOUS at 08:50

## 2024-11-14 RX ADMIN — SODIUM CHLORIDE, SODIUM LACTATE, POTASSIUM CHLORIDE, CALCIUM CHLORIDE: 600; 310; 30; 20 INJECTION, SOLUTION INTRAVENOUS at 08:57

## 2024-11-14 RX ADMIN — MIDAZOLAM 2 MG: 1 INJECTION INTRAMUSCULAR; INTRAVENOUS at 08:53

## 2024-11-14 RX ADMIN — ROCURONIUM BROMIDE 10 MG: 10 INJECTION, SOLUTION INTRAVENOUS at 10:37

## 2024-11-14 RX ADMIN — PHENYLEPHRINE HYDROCHLORIDE 100 MCG: 10 INJECTION INTRAVENOUS at 09:30

## 2024-11-14 RX ADMIN — PROPOFOL 150 MG: 10 INJECTION, EMULSION INTRAVENOUS at 08:50

## 2024-11-14 RX ADMIN — HEPARIN SODIUM 5000 UNITS: 1000 INJECTION INTRAVENOUS; SUBCUTANEOUS at 10:03

## 2024-11-14 RX ADMIN — FENTANYL CITRATE 50 MCG: 50 INJECTION, SOLUTION INTRAMUSCULAR; INTRAVENOUS at 09:35

## 2024-11-14 RX ADMIN — LIDOCAINE HYDROCHLORIDE 50 MG: 10 INJECTION, SOLUTION EPIDURAL; INFILTRATION; INTRACAUDAL; PERINEURAL at 08:50

## 2024-11-14 RX ADMIN — ROCURONIUM BROMIDE 30 MG: 10 INJECTION, SOLUTION INTRAVENOUS at 09:22

## 2024-11-14 RX ADMIN — ROCURONIUM BROMIDE 10 MG: 10 INJECTION, SOLUTION INTRAVENOUS at 09:51

## 2024-11-14 ASSESSMENT — COPD QUESTIONNAIRES: CAT_SEVERITY: NO INTERVAL CHANGE

## 2024-11-14 ASSESSMENT — ENCOUNTER SYMPTOMS: SHORTNESS OF BREATH: 1

## 2024-11-14 NOTE — PROGRESS NOTES
Patient admitted, consent signed and questions answered.  Call light to reach with side rails up 2 of 2. Bilateral Groin clipped with writer and KAMILA Woods present.  RN at bedside with patient.  History and physical to be updated. VS & Pulses obtained and documented. Anesthesia at bedside and completed consent. Will continue to monitor.

## 2024-11-14 NOTE — DISCHARGE SUMMARY
Discharge Summary    Date: 11/14/2024  Patient Name: Torrey Joy    YOB: 1965     Age: 59 y.o.    Admit Date: 11/14/2024  Discharge Date: 11/14/2024  Discharge Condition: Good    Admission Diagnosis  Atrial flutter, unspecified type (HCC) [I48.92]      Discharge Diagnosis  Active Problems:    * No active hospital problems. *  Resolved Problems:    * No resolved hospital problems. *      Hospital Stay  Narrative of Hospital Course:  KLEBER mena abaltion    Consultants:  None    Surgeries/procedures Performed:      Treatments:    Procedures        Discharge Plan/Disposition:  Home    Hospital/Incidental Findings Requiring Follow Up:    Patient Instructions:    Diet: Diabetic Diet, Cardiac Diet and Low Fat, Low Cholesterol Diet    Activity:Activiity as Tolerated, No Driving for Today, Ambulate in House, No Lifting, Driving or Strenuous Excercise and No Driving While on Analgesics  For number of days (if applicable):      Other Instructions:    Provider Follow-Up:   No follow-ups on file.     Significant Diagnostic Studies:    Recent Labs:  Admission on 11/14/2024  Body Surface Area                             Date: 11/14/2024  Value: 2.33        Ref range: m2                 Status: Final  Activated Clotting Time                       Date: 11/14/2024  Value: 195 (H)     Ref range: 79 - 149 sec       Status: Final  Activated Clotting Time                       Date: 11/14/2024  Value: 186 (H)     Ref range: 79 - 149 sec       Status: Final  ------------    Radiology last 7 days:  No results found.     [unfilled]    Discharge Medications    Current Discharge Medication List        Current Discharge Medication List        Current Discharge Medication List    CONTINUE these medications which have NOT CHANGED    ALPRAZolam (XANAX) 0.5 MG tablet  Take 1 tablet by mouth in the morning and at bedtime. Max Daily Amount: 1 mg    torsemide (DEMADEX) 20 MG tablet  Take 1 tablet by mouth daily  Qty: 90 tablet  Refills: 3  Associated Diagnoses:Stage 3a chronic kidney disease (HCC)    fluticasone-salmeterol (ADVAIR) 250-50 MCG/ACT AEPB diskus inhaler  Inhale 1 puff into the lungs in the morning and 1 puff in the evening.    metoprolol succinate (TOPROL XL) 100 MG extended release tablet  TAKE 1 TABLET DAILY  Qty: 90 tablet Refills: 3  Associated Diagnoses:Essential hypertension    pantoprazole (PROTONIX) 40 MG tablet  TAKE 1 TABLET ONCE A DAY BEFORE MEAL  Qty: 90 tablet Refills: 3    atorvastatin (LIPITOR) 10 MG tablet  Take 1 tablet daily  Qty: 90 tablet Refills: 3  Associated Diagnoses:Mixed hyperlipidemia    apixaban (ELIQUIS) 5 MG TABS tablet  Take 1 tablet by mouth 2 times daily  Qty: 60 tablet Refills: 5    amiodarone (CORDARONE) 200 MG tablet  Take 1 tablet by mouth daily  Qty: 30 tablet Refills: 5    acetaminophen (TYLENOL) 500 MG tablet  Take 1 tablet by mouth every 6 hours as needed for Pain    venlafaxine (EFFEXOR XR) 150 MG extended release capsule  TAKE 1 CAPSULE DAILY  Qty: 14 capsule Refills: 0  Associated Diagnoses:Anxiety and depression    Handicap Klever MISC  Expires 2/2027  Qty: 1 each Refills: 0  Associated Diagnoses:Primary osteoarthritis involving multiple joints    albuterol sulfate  (90 Base) MCG/ACT inhaler  Inhale 2 puffs into the lungs every 6 hours as needed for Wheezing  Qty: 1 each Refills: 2  Associated Diagnoses:Cough    empagliflozin (JARDIANCE) 10 MG tablet  Take 1 tablet by mouth daily  Qty: 30 tablet Refills: 3          Current Discharge Medication List        Time Spent on Discharge:  30 minutes were spent in patient examination, evaluation, counseling as well as medication reconciliation, prescriptions for required medications, discharge plan, and follow up.    Electronically signed by Ata Harp MD on 11/14/24 at 4:59 PM EST

## 2024-11-14 NOTE — PROGRESS NOTES
Patient returned to PCC room post  procedure.  Assessment & VS obtained. Restrictions/Education reviewed with patient. Post procedure pathway initiated. Pt without complications. Groin site soft, dry & no hematoma noted. R radial art line I place. RN at bedside. Supine position with BLE straight with back of head flat on pillow.. Pulses obtained and documented. Dr. Harp at bedside to speak with patient. Patient in NSR.  Patient to remain flat for 4 hours or until 1530.   Will continue to monitor. Right radial Art line removed at 1210. Pressure held for 10 min.  1540: Writer educated patient and patients wife on discharge instructions. Renae Lujan will see patient at bedside prior to discharge and discharge print out. R groin clean, dry, no hematoma, or bleeding.  Patient eating and drinking w/o issue. Patient ambulatory.

## 2024-11-14 NOTE — H&P
Since last encounter patient ended up admitted to [place] with volume overload significant hyponatremia GI bleed and all corrected now.  Nephrology thinks it is related to volume overload and series of tests to rule out different pathologies are going on.     GI thought his bleeding was secondary to hemorrhoids.     He was off oral anticoagulation while in the hospital since it was resumed and he has been taking it faithfully.     He remains on amiodarone 200 mg p.o. daily.     He continued in atrial flutter throughout his hospital stay with seemingly controlled ventricular response.     Last time we brought him for ablation procedure his potassium was significantly high and sodium was abnormal.     He is diagnosed to have significant diastolic dysfunction HFpEF.     History of alcohol increased use.           REVIEW OF SYSTEM                                                                       Review of Systems in general he is doing better compared to when he was in the hospital.  I reviewed the notes from the nephrology and cardiology and seen that he is on the right track finally.     REVIEWED INFORMATION                                                                       Allergies   No Known Allergies        Current Facility-Administered Medications           Current Outpatient Medications   Medication Sig Note Dispense Refill    torsemide (DEMADEX) 20 MG tablet Take 1 tablet by mouth daily   90 tablet 3    fluticasone-salmeterol (ADVAIR) 250-50 MCG/ACT AEPB diskus inhaler Inhale 1 puff into the lungs in the morning and 1 puff in the evening.          metoprolol succinate (TOPROL XL) 100 MG extended release tablet TAKE 1 TABLET DAILY   90 tablet 3    pantoprazole (PROTONIX) 40 MG tablet TAKE 1 TABLET ONCE A DAY BEFORE MEAL   90 tablet 3    atorvastatin (LIPITOR) 10 MG tablet Take 1 tablet daily   90 tablet 3    ALPRAZolam (XANAX) 0.5 MG tablet Take 1 tablet by mouth 2 times daily as needed for anxiety.    right atrial isthmus.  Hoping that ablating the right atrial isthmus will take care of his flutter and hopefully if it is the trigger for fibrillation we will see an impact on that.  It is simpler procedure and not as invasive as the atrial fibrillation ablation.     Above was discussed with the patient and his wife.  They understand the logic and they agree.  We will schedule it and make sure that we get blood tests done 3 to 4 days prior to the day of the procedure so we do not get into situation like last time.     Patient was advised to continue on his Eliquis till the day of the procedure itself.

## 2024-11-14 NOTE — DISCHARGE INSTRUCTIONS
DISCHARGE INSTRUCTIONS    Home Care :  Ok to remove band-aid in 24 hours then then shower.  Do not apply further band aids. Keep clean, dry and open to air.  Avoid power, lotion, hand  on or around site to prevent infection.  Do not soak in a pool, Hot Tub, or bath tub and for one week after the test to prevent infection.   If there is any bleeding at the site, apply firm pressure with your Fingers/hands until the bleeding stops.   If bleeding continues after 3 minutes or if there is any swelling/firm areas (Hematoma) at your puncture site, Call 911.   Drink plenty of fluids after the test. This will flush the x-ray dye from your system.   Return to your normal diet.    The sedative will make you sleepy. Rest until the effects have worn off.   Ask your doctor when you will be able to return to work.   Avoid lifting objects heavier than 8- 10 pounds (gallon of Milk) for 5-7 days.  Avoid Physically demanding activities, and sexual activity for 5-7 days.   Try to change positions frequently to prevent blood clots.    Medications:  Resume normal medicines. Use acetaminophen(Tylenol) for pain relief.  DO NOT STOP TAKING BLOOD THINNERS UNLESS TOLD TO BY YOUR CARDIOLOGIST  Do not take metformin (Glucophage) or glyburide and metformin (Glucovance) for 48 hours after the test.     Call Your Doctor's office If Any of the Following Occurs :  Signs of infection, including fever and chills ,Redness, swelling, increasing pain, excessive bleeding, or any discharge from the insertion site     CALL 911 if Any of the Following Occurs:  Drooping facial muscles, Changes in vision or speech   Change in sensation to affected leg, including numbness and/or tingling.   Discoloration of nail beds and/or coolness of affected limp.  Extreme sweating, nausea or vomiting   Dizziness or lightheadedness. Weakness or fainting  Rapid, irregular heartbeat, Palpitations, Chest Pain.   shortness of breath, or difficulty

## 2024-11-14 NOTE — PROCEDURES
PROCEDURE NOTE  Date: 11/14/2024   Name: Torrey Joy  YOB: 1965    Procedures        Procedures is EP study, 3D mapping using cardio, ice catheter, CS pacing and mapping.    Preoperative diagnosis is typical symptomatic atrial flutter with significant diastolic dysfunction.    Postoperative diagnosis is successful ablation of the right atrial isthmus with resumption of sinus rhythm.    Patient was brought to the EP lab in the postabsorptive state.  Vitals were stable.  Procedure was done under general anesthesia.  A timeout was called.  After the patient was intubated the right groin was prepared and draped in the usual manner.  The right groin was infiltrated with 2% Xylocaine.  Guided by ultrasonography the right femoral vein was captured 3 separate times.  3 separate guidewires were advanced.  3 separate sheath and introducers were advanced over one of the guidewires at that time.  Dilators and guidewires were removed.    Livewire by Abbott catheter was advanced for the high right atrium and CS,    Ice catheter was advanced to the right atrium, then to the right ventricle.  The left-sided pericardial space was evaluated.  The catheter was removed and parked in the right atrium to help with the ablation procedure.    BiosAnaqua Morrow catheter force catheter irrigated catheter was advanced.  Hiss was mapped.    Guided by ice the right atrial isthmus was identified.    Heparin bolus and drip was started.  ACT was a started.    By radiofrequency, the right atrial isthmus was successfully ablated.  The atrial flutter cycle length gradually prolonged from 250 ms and terminated at 1 point.  More insurance applications were delivered.  Sinus rhythm was a spontaneous.  Pacing from the CS distal and from the high right atrium ablation catheter were done.  Lines of a blockage were documented.  No inducible atrial flutter after the ablation.    Heparin was stopped.  Sheaths were removed and [place]

## 2024-11-14 NOTE — ANESTHESIA POSTPROCEDURE EVALUATION
Department of Anesthesiology  Postprocedure Note    Patient: Torrey Joy  MRN: [id number]  YOB: 1965  Date of evaluation: 11/14/2024    Procedure Summary       Date: 11/14/24 Room / Location: [facility] EP LAB  2 / [facility] CARDIAC CATH LAB    Anesthesia Start: 0847 Anesthesia Stop: 1138    Procedure: el atassi / Ablation A-flutter w/anes / carto, ice Diagnosis:       Atrial flutter, unspecified type (HCC)      (Successful right atrial isthmus ablation with resumption of sinus rhythm.)    Providers: Ata Harp MD Responsible Provider: Dai Ramey MD    Anesthesia Type: general ASA Status: 3            Anesthesia Type: No value filed.    Mita Phase I: Mita Score: 10    Mita Phase II:      Anesthesia Post Evaluation    Patient location during evaluation: PACU  Patient participation: complete - patient participated  Level of consciousness: awake and alert  Pain score: 0  Airway patency: patent  Nausea & Vomiting: no vomiting and no nausea  Cardiovascular status: hemodynamically stable  Respiratory status: acceptable  Hydration status: stable  Pain management: adequate    There were no known notable events for this encounter.

## 2024-11-14 NOTE — PROGRESS NOTES
All discharge instructions reviewed with [facility] and patient, questions answered.  Patient discharged per w/c with wife and belongings.

## 2024-11-15 ENCOUNTER — CARE COORDINATION (OUTPATIENT)
Dept: CARE COORDINATION | Age: 59
End: 2024-11-15

## 2024-11-15 NOTE — CARE COORDINATION
Ambulatory Care Coordination Note     11/15/2024 6:20 PM     Patient Current Location:  Home: [address] [place] [address]     ACM contacted the patient by telephone. Verified name and  with patient as identifiers.         ACM: Fiordaliza Liang RN     Challenges to be reviewed by the provider   Additional needs identified to be addressed with provider No  none               Method of communication with provider: none.    Utilization: Patient has not had any utilization since our last call.     Care Summary Note: Talked to patient, feels better since the ablation. Breathing easier. No episodes of dizziness or chest pain. Chronic back pain is the same. Has some BLE swelling. Has 50 oz fluid restrictions, this has been difficult for him to follow.  No questions about discharge instructions. Aware of his upcoming appointments.     Offered patient enrollment in the Remote Patient Monitoring (RPM) program for in-home monitoring: Patient being discharged from remote patient monitoring program today.     Assessments Completed:   Congestive Heart Failure Assessment    Are you currently restricting fluids?: Other (Comment: 50 oz)  Do you understand a low sodium diet?: Yes  Do you understand how to read food labels?: Yes  Do you salt your food before tasting it?: No         Symptoms:     Symptom course: stable  Salt intake watch compared to last visit: stable      ,   General Assessment    Do you have any symptoms that are causing concern?: No          Medications Reviewed:   Completed during a previous call     Advance Care Planning:   Not reviewed during this call     Care Planning:   Not completed during this call    PCP/Specialist follow up:   Future Appointments         Provider Specialty Dept Phone    2024 10:30 AM (Arrive by 10:00 AM) [facility] Radiology [phone]    2024 11:00 AM Neri Rangel PTA Physical Therapy [phone]    2024 11:00 AM Neri Rangel PTA Physical Therapy

## 2024-11-18 ENCOUNTER — HOSPITAL ENCOUNTER (OUTPATIENT)
Age: 59
Discharge: HOME | End: 2024-11-18
Payer: COMMERCIAL

## 2024-11-18 ENCOUNTER — HOSPITAL ENCOUNTER (OUTPATIENT)
Dept: MRI IMAGING | Age: 59
Discharge: HOME | End: 2024-11-20
Payer: COMMERCIAL

## 2024-11-18 DIAGNOSIS — N18.31 STAGE 3A CHRONIC KIDNEY DISEASE (HCC): ICD-10-CM

## 2024-11-18 DIAGNOSIS — K70.0 ALCOHOL INDUCED FATTY LIVER: ICD-10-CM

## 2024-11-18 LAB
25(OH)D3 SERPL-MCNC: 17.9 NG/ML (ref 30–100)
ANION GAP SERPL CALCULATED.3IONS-SCNC: 10 MMOL/L (ref 9–16)
BUN SERPL-MCNC: 16 MG/DL (ref 8–23)
CA-I BLD-SCNC: 1.2 MMOL/L (ref 1.13–1.33)
CALCIUM SERPL-MCNC: 8.7 MG/DL (ref 8.6–10.4)
CHLORIDE SERPL-SCNC: 96 MMOL/L (ref 98–107)
CO2 SERPL-SCNC: 29 MMOL/L (ref 20–31)
CREAT SERPL-MCNC: 1.3 MG/DL (ref 0.7–1.2)
GFR, ESTIMATED: 63 ML/MIN/1.73M2
GLUCOSE SERPL-MCNC: 106 MG/DL (ref 74–99)
PHOSPHATE SERPL-MCNC: 4.3 MG/DL (ref 2.5–4.5)
POTASSIUM SERPL-SCNC: 5.2 MMOL/L (ref 3.7–5.3)
PTH-INTACT SERPL-MCNC: 105 PG/ML (ref 15–65)
SODIUM SERPL-SCNC: 135 MMOL/L (ref 136–145)

## 2024-11-18 PROCEDURE — 82330 ASSAY OF CALCIUM: CPT

## 2024-11-18 PROCEDURE — 83970 ASSAY OF PARATHORMONE: CPT

## 2024-11-18 PROCEDURE — A9579 GAD-BASE MR CONTRAST NOS,1ML: HCPCS | Performed by: PHYSICIAN ASSISTANT

## 2024-11-18 PROCEDURE — 82306 VITAMIN D 25 HYDROXY: CPT

## 2024-11-18 PROCEDURE — 74183 MRI ABD W/O CNTR FLWD CNTR: CPT

## 2024-11-18 PROCEDURE — 80048 BASIC METABOLIC PNL TOTAL CA: CPT

## 2024-11-18 PROCEDURE — 6360000004 HC RX CONTRAST MEDICATION: Performed by: PHYSICIAN ASSISTANT

## 2024-11-18 PROCEDURE — 84100 ASSAY OF PHOSPHORUS: CPT

## 2024-11-18 PROCEDURE — 36415 COLL VENOUS BLD VENIPUNCTURE: CPT

## 2024-11-18 PROCEDURE — 2580000003 HC RX 258: Performed by: PHYSICIAN ASSISTANT

## 2024-11-18 RX ORDER — SODIUM CHLORIDE 0.9 % (FLUSH) 0.9 %
10 SYRINGE (ML) INJECTION PRN
Status: DISCONTINUED | OUTPATIENT
Start: 2024-11-18 | End: 2024-11-21 | Stop reason: HOSPADM

## 2024-11-18 RX ORDER — 0.9 % SODIUM CHLORIDE 0.9 %
100 INTRAVENOUS SOLUTION INTRAVENOUS ONCE
Status: COMPLETED | OUTPATIENT
Start: 2024-11-18 | End: 2024-11-18

## 2024-11-18 RX ADMIN — SODIUM CHLORIDE 50 ML: 9 INJECTION, SOLUTION INTRAVENOUS at 10:03

## 2024-11-18 RX ADMIN — GADOTERIDOL 20 ML: 279.3 INJECTION, SOLUTION INTRAVENOUS at 10:02

## 2024-11-18 RX ADMIN — SODIUM CHLORIDE, PRESERVATIVE FREE 10 ML: 5 INJECTION INTRAVENOUS at 10:03

## 2024-11-19 ENCOUNTER — HOSPITAL ENCOUNTER (OUTPATIENT)
Dept: PHYSICAL THERAPY | Facility: CLINIC | Age: 59
Setting detail: THERAPIES SERIES
Discharge: HOME | End: 2024-11-19
Payer: COMMERCIAL

## 2024-11-19 PROCEDURE — 97110 THERAPEUTIC EXERCISES: CPT

## 2024-11-19 NOTE — FLOWSHEET NOTE
Pain: decrease resting pain to 0/10 to promote adequate rest between therapy sessions  ? Function: decrease SAMMY to <17/50 to show progression towards PLOF  Patient to be independent with home exercise program as demonstrated by performance with correct form without cues.  Demonstrate Knowledge of fall prevention  LTG: (to be met in 12 treatments)  ? Function: decrease SAMMY to <15/50 to show progression towards PLOF  ? ROM: increase lumbar extension ROM to without increased pain immediately  ? Strength: increase bilateral hip lateral strength to at least 5/5 without lower back pain  ? Pain: decrease average pain to <2/10 during previously painful activities to show progression towards PLOF       Pt. Education:  [x] Yes  [] No  [x] Reviewed Prior HEP/Ed  Method of Education: [] Verbal  [] Demo  [] Written  Comprehension of Education:  [x] Verbalizes understanding.  [x] Demonstrates understanding.  [] Needs review.  [] Demonstrates/verbalizes HEP/Ed previously given.     Access Code: [email or web address]  URL: [email or web address]  Date: 11/05/2024  Prepared by: Enriqueta Tellez     Exercises  - Supine Lower Trunk Rotation  - 1 x daily - 7 x weekly - 2 sets - 10 reps - 2-3 seconds hold  - Supine Transversus Abdominis Bracing - Hands on Stomach  - 1-2 x daily - 7 x weekly - 2 sets - 10 reps - 2 breaths hold  - Supine Posterior Pelvic Tilt  - 1 x daily - 7 x weekly - 2 sets - 10 reps - 3 seconds hold  - Supine March  - 1 x daily - 7 x weekly - 2 sets - 10 reps  - Seated Lumbar Flexion Stretch  - 1 x daily - 7 x weekly - 2 sets - 10 reps      Plan: [x] Continue current frequency toward long and short term goals.    [x] Specific Instructions for subsequent treatments: Progress flexion based exercises and core strengthening - patient does have diastasis recti     Frequency:  1-2 x/week for 12 visits         Time In: 11:00             Time Out: 11:35    Electronically signed by:  Neri Rangel PTA

## 2024-11-22 ENCOUNTER — HOSPITAL ENCOUNTER (OUTPATIENT)
Dept: PHYSICAL THERAPY | Facility: CLINIC | Age: 59
Setting detail: THERAPIES SERIES
Discharge: HOME | End: 2024-11-22
Payer: COMMERCIAL

## 2024-11-22 NOTE — FLOWSHEET NOTE
[] [facility]  Outpatient Rehabilitation &  Therapy  [address]    [phone]  F: [phone]   [] [facility]  Outpatient Rehabilitation &  Therapy  [address]   [address]  P: [phone]  F: [phone]  [] [facility]  Outpatient Rehabilitation &  Therapy  [address]  P: [phone]  F: [phone] [x] [facility]  Outpatient Rehabilitation &  Therapy  [address]  P: [phone]  F: [phone]  [] [facility]  Outpatient Rehabilitation &  Therapy  [address]   P: [phone]  F: [phone]   [] [facility]   Outpatient Rehabilitation & Therapy  [address]  P: [phone]   F: [phone]     Physical Therapy Cancel/No Show note    Date: 2024  Patient: Torrey Joy  : 1965  MRN: [id number]    Cancels/No Shows to date:     For today's appointment patient:    [x]  Cancelled    [] Rescheduled appointment    [] No-show     Reason given by patient:    []  Patient ill    []  Conflicting appointment    [] No transportation      [] Conflict with work    [x] No reason given    [] Weather related    [] COVID-19    [x] Other:      Comments:  Pt called  PT and stated he couldn't get through to our phone to cancel. No reason given.      [] Next appointment was confirmed    Electronically signed by: Sharda Kendrick

## 2024-11-25 ENCOUNTER — TELEPHONE (OUTPATIENT)
Dept: VASCULAR SURGERY | Age: 59
End: 2024-11-25

## 2024-11-25 NOTE — TELEPHONE ENCOUNTER
Patient called stating that his blood pressure is a little high and irregular Questioning if loop is picking it up. Patient denies palpitations, Discussed last loop interpretation. Patient informed his rate would not cause increased blood pressure. Verbalized understanding.

## 2024-11-26 ENCOUNTER — HOSPITAL ENCOUNTER (OUTPATIENT)
Dept: PHYSICAL THERAPY | Facility: CLINIC | Age: 59
Setting detail: THERAPIES SERIES
Discharge: HOME | End: 2024-11-26
Payer: COMMERCIAL

## 2024-11-26 PROCEDURE — 97110 THERAPEUTIC EXERCISES: CPT

## 2024-11-26 NOTE — FLOWSHEET NOTE
[] [facility]  Outpatient Rehabilitation &  Therapy  [address]  [phone]  [phone] [] [facility]  Outpatient Rehabilitation &  Therapy  [address]  P: [phone]  F: [phone] [] [facility]  Outpatient Rehabilitation &  Therapy  [address]  P: [phone]  F: [phone] [x] [facility]  Outpatient Rehabilitation &  Therapy  [address]  [phone]  [phone] [] [facility]  Outpatient Rehabilitation &  Therapy  [address]   P: [phone]  F: [phone]  [] [facility]  Outpatient Rehabilitation &  Therapy  [address]  P: [phone]  F: [phone] [] [facility]  Outpatient Rehabilitation &  Therapy  [address]  P: [phone]  F: [phone] [] [facility]  Outpatient Rehabilitation &  Therapy  [address]  P: [phone]  F: [phone] [] [facility]  Outpatient Rehabilitation &  Therapy  [address]  P: [phone]  F: [phone]  [] [facility] Outpatient Rehabilitation &  Therapy  [address]  P: [phone]  F: [phone]     Physical Therapy Daily Treatment Note    Date:  2024  Patient Name:  Torrey Joy    :  1965  MRN: [id number]    Physician:  YUNG Hughes                         Insurance: BCBS; Arturo yr; 20/15vs; auth after 6vs; 15% coins; family ded met; 2300/1104.65 remaining ind OOP   Medical Diagnosis: Chronic low back pain, unspecified back pain laterality, unspecified whether sciatica present [M54.50, G89.29], Lumbar spondylosis [M47.816], Chronic low back pain with bilateral sciatica, unspecified back pain laterality [M54.41, G89.29, M54.42]  Rehab Codes: M54.5  Onset Date: 1985             Next 's appt.:

## 2024-11-29 ENCOUNTER — HOSPITAL ENCOUNTER (OUTPATIENT)
Dept: PHYSICAL THERAPY | Facility: CLINIC | Age: 59
Setting detail: THERAPIES SERIES
Discharge: HOME | End: 2024-11-29
Payer: COMMERCIAL

## 2024-11-29 NOTE — FLOWSHEET NOTE
[] [facility]  Outpatient Rehabilitation &  Therapy  [address]  [phone]  [phone] [] [facility]  Outpatient Rehabilitation &  Therapy  [address]  P: [phone]  F: [phone] [] [facility]  Outpatient Rehabilitation &  Therapy  [address]  P: [phone]  F: [phone] [x] [facility]  Outpatient Rehabilitation &  Therapy  [address]  [phone]  [phone] [] [facility]  Outpatient Rehabilitation &  Therapy  [address]   P: [phone]  F: [phone]  [] [facility]  Outpatient Rehabilitation &  Therapy  [address]  P: [phone]  F: [phone] [] [facility]  Outpatient Rehabilitation &  Therapy  [address]  P: [phone]  F: [phone] [] [facility]  Outpatient Rehabilitation &  Therapy  [address]  P: [phone]  F: [phone] [] [facility]  Outpatient Rehabilitation &  Therapy  [address]  P: [phone]  F: [phone]  [] [facility] Outpatient Rehabilitation &  Therapy  [address]  P: [phone]  F: [phone]     Therapy Cancel/No Show note    Date: 2024  Patient: Torrey Joy  : 1965  MRN: [id number]    Cancels/No Shows to date: 0/0    For today's appointment patient:    [x]  Cancelled    [] Rescheduled appointment    [] No-show     Reason given by patient:    []  Patient ill    []  Conflicting appointment    [] No transportation      [] Conflict with work    [] No reason given    [] Weather related    [] COVID-19    [x] Other:      Comments:  provider cancelled d/t no heat in building.       [x] Next appointment was confirmed    Electronically signed by: Tj Armstrong PT       Dr Mir

## 2024-12-02 ENCOUNTER — TELEPHONE (OUTPATIENT)
Age: 59
End: 2024-12-02

## 2024-12-03 ENCOUNTER — HOSPITAL ENCOUNTER (OUTPATIENT)
Dept: PHYSICAL THERAPY | Facility: CLINIC | Age: 59
Setting detail: THERAPIES SERIES
Discharge: HOME | End: 2024-12-03
Payer: COMMERCIAL

## 2024-12-03 PROCEDURE — 97140 MANUAL THERAPY 1/> REGIONS: CPT

## 2024-12-03 PROCEDURE — 97110 THERAPEUTIC EXERCISES: CPT

## 2024-12-03 NOTE — FLOWSHEET NOTE
[] [facility]  Outpatient Rehabilitation &  Therapy  [address]  [phone]  [phone] [] [facility]  Outpatient Rehabilitation &  Therapy  [address]  P: [phone]  F: [phone] [] [facility]  Outpatient Rehabilitation &  Therapy  [address]  P: [phone]  F: [phone] [x] [facility]  Outpatient Rehabilitation &  Therapy  [address]  [phone]  [phone] [] [facility]  Outpatient Rehabilitation &  Therapy  [address]   P: [phone]  F: [phone]  [] [facility]  Outpatient Rehabilitation &  Therapy  [address]  P: [phone]  F: [phone] [] [facility]  Outpatient Rehabilitation &  Therapy  [address]  P: [phone]  F: [phone] [] [facility]  Outpatient Rehabilitation &  Therapy  [address]  P: [phone]  F: [phone] [] [facility]  Outpatient Rehabilitation &  Therapy  [address]  P: [phone]  F: [phone]  [] [facility] Outpatient Rehabilitation &  Therapy  [address]  P: [phone]  F: [phone]     Physical Therapy Daily Treatment Note    Date:  12/3/2024  Patient Name:  Torrey Joy    :  1965  MRN: [id number]    Physician:  YUNG Hughes                         Insurance: BCBS; Arturo yr; 20/15vs; auth after 6vs; 15% coins; family ded met; 2300/1104.65 remaining ind OOP   Medical Diagnosis: Chronic low back pain, unspecified back pain laterality, unspecified whether sciatica present [M54.50, G89.29], Lumbar spondylosis [M47.816], Chronic low back pain with bilateral sciatica, unspecified back pain laterality [M54.41, G89.29, M54.42]  Rehab Codes: M54.5  Onset Date: 1985             Next 's appt.: 2024    Visit#

## 2024-12-04 ENCOUNTER — CARE COORDINATION (OUTPATIENT)
Dept: CARE COORDINATION | Age: 59
End: 2024-12-04

## 2024-12-04 NOTE — CARE COORDINATION
Ambulatory Care Coordination Note     2024 6:22 PM     Patient Current Location:  Home: [address] [place] [address]     ACM contacted the patient by telephone. Verified name and  with patient as identifiers.         ACM: Fiordaliza Liang RN     Challenges to be reviewed by the provider   Additional needs identified to be addressed with provider No  none               Method of communication with provider: none.    Utilization: Patient has not had any utilization since our last call.     Care Summary Note: Has some BLE swelling, keeps track of his weight daily, has 50 oz fluid restrictions. No chest pain or shortness of breath. He has chronic back pain; this is not new. He is going to outpatient physical therapy. Aware of his upcoming appointments.    Future Appointments   Date Time Provider Department Center   2024 11:00 AM Enriqueta Tellez, PT [facility] PT [place]   2024  2:00 PM Ariana Mcpherson, PTA [facility] PT [place]   2024  9:15 AM Ngoc Nolasco PA [facility] ORT SP [facility]   2024  2:00 PM Ata Harp MD SV ELECTROPH [facility]   1/15/2025 10:00 AM Domonique Jauregui MD AFL TCC [facility]   2025  9:00 AM Petra Bianchi PA [place] [facility]   2025 11:50 AM Jorge Zhao MD AFL Neph [facility] None   2025  9:15 AM Karine Cole APRN - CNP [place] [facility] ECC DEP         Offered patient enrollment in the Remote Patient Monitoring (RPM) program for in-home monitoring: Yes, but did not enroll at this time: declined .     Assessments Completed:   No changes since last call    Medications Reviewed:   Completed during a previous call     Advance Care Planning:   Reviewed during previous call      Care Planning:    Goals Addressed                   This Visit's Progress     Conditions and Symptoms   On track     I will schedule office visits, as directed by my provider.  I will notify my provider of any barriers to my plan of care.  I will follow my Zone

## 2024-12-06 ENCOUNTER — HOSPITAL ENCOUNTER (OUTPATIENT)
Dept: PHYSICAL THERAPY | Facility: CLINIC | Age: 59
Setting detail: THERAPIES SERIES
Discharge: HOME | End: 2024-12-06
Payer: COMMERCIAL

## 2024-12-06 PROCEDURE — 97110 THERAPEUTIC EXERCISES: CPT

## 2024-12-06 NOTE — FLOWSHEET NOTE
[] [facility]  Outpatient Rehabilitation &  Therapy  [address]  [phone]  [phone] [] [facility]  Outpatient Rehabilitation &  Therapy  [address]  P: [phone]  F: [phone] [] [facility]  Outpatient Rehabilitation &  Therapy  [address]  P: [phone]  F: [phone] [x] [facility]  Outpatient Rehabilitation &  Therapy  [address]  [phone]  [phone] [] [facility]  Outpatient Rehabilitation &  Therapy  [address]   P: [phone]  F: [phone]  [] [facility]  Outpatient Rehabilitation &  Therapy  [address]  P: [phone]  F: [phone] [] [facility]  Outpatient Rehabilitation &  Therapy  [address]  P: [phone]  F: [phone] [] [facility]  Outpatient Rehabilitation &  Therapy  [address]  P: [phone]  F: [phone] [] [facility]  Outpatient Rehabilitation &  Therapy  [address]  P: [phone]  F: [phone]  [] [facility] Outpatient Rehabilitation &  Therapy  [address]  P: [phone]  F: [phone]     Physical Therapy Daily Treatment Note    Date:  2024  Patient Name:  Torrey Joy    :  1965  MRN: [id number]    Physician:  YUNG Hughes                         Insurance: BCBS; Arturo yr; 20/15vs; auth after 6vs; 15% coins; family ded met; 2300/1104.65 remaining ind OOP   Medical Diagnosis: Chronic low back pain, unspecified back pain laterality, unspecified whether sciatica present [M54.50, G89.29], Lumbar spondylosis [M47.816], Chronic low back pain with bilateral sciatica, unspecified back pain laterality [M54.41, G89.29, M54.42]  Rehab Codes: M54.5  Onset Date: 1985             Next 's appt.: 2024  Visit# /

## 2024-12-09 ENCOUNTER — HOSPITAL ENCOUNTER (OUTPATIENT)
Dept: PHYSICAL THERAPY | Facility: CLINIC | Age: 59
Setting detail: THERAPIES SERIES
Discharge: HOME | End: 2024-12-09
Payer: COMMERCIAL

## 2024-12-09 PROCEDURE — 97110 THERAPEUTIC EXERCISES: CPT

## 2024-12-09 NOTE — FLOWSHEET NOTE
[] [facility]  Outpatient Rehabilitation &  Therapy  [address]  [phone]  [phone] [] [facility]  Outpatient Rehabilitation &  Therapy  [address]  P: [phone]  F: [phone] [] [facility]  Outpatient Rehabilitation &  Therapy  [address]  P: [phone]  F: [phone] [x] [facility]  Outpatient Rehabilitation &  Therapy  [address]  [phone]  [phone] [] [facility]  Outpatient Rehabilitation &  Therapy  [address]   P: [phone]  F: [phone]  [] [facility]  Outpatient Rehabilitation &  Therapy  [address]  P: [phone]  F: [phone] [] [facility]  Outpatient Rehabilitation &  Therapy  [address]  P: [phone]  F: [phone] [] [facility]  Outpatient Rehabilitation &  Therapy  [address]  P: [phone]  F: [phone] [] [facility]  Outpatient Rehabilitation &  Therapy  [address]  P: [phone]  F: [phone]  [] [facility] Outpatient Rehabilitation &  Therapy  [address]  P: [phone]  F: [phone]     Physical Therapy Daily Treatment Note    Date:  2024  Patient Name:  Torery Joy    :  1965  MRN: [id number]    Physician:  YUNG Hughes                         Insurance: BCBS; Arturo yr; 20/15vs; auth after 6vs; 15% coins; family ded met; 2300/1104.65 remaining ind OOP   Medical Diagnosis: Chronic low back pain, unspecified back pain laterality, unspecified whether sciatica present [M54.50, G89.29], Lumbar spondylosis [M47.816], Chronic low back pain with bilateral sciatica, unspecified back pain laterality [M54.41, G89.29, M54.42]  Rehab Codes: M54.5  Onset Date: 1985             Next 's appt.: 2024  Visit# /

## 2024-12-11 ENCOUNTER — TELEPHONE (OUTPATIENT)
Age: 59
End: 2024-12-11

## 2024-12-11 DIAGNOSIS — I48.19 PERSISTENT ATRIAL FIBRILLATION (HCC): Primary | ICD-10-CM

## 2024-12-11 RX ORDER — AMIODARONE HYDROCHLORIDE 200 MG/1
200 TABLET ORAL DAILY
Qty: 30 TABLET | Refills: 5 | Status: SHIPPED | OUTPATIENT
Start: 2024-12-11 | End: 2025-06-09

## 2024-12-11 NOTE — TELEPHONE ENCOUNTER
Patient informed Thyroid testing needs completed every 6 months for amiodarone use. Order placed. Patient states he will have lab drawn tomorrow.

## 2024-12-12 ENCOUNTER — HOSPITAL ENCOUNTER (OUTPATIENT)
Age: 59
Discharge: HOME | End: 2024-12-12
Payer: COMMERCIAL

## 2024-12-12 ENCOUNTER — OFFICE VISIT (OUTPATIENT)
Dept: ORTHOPEDIC SURGERY | Age: 59
End: 2024-12-12
Payer: COMMERCIAL

## 2024-12-12 VITALS — BODY MASS INDEX: 35.07 KG/M2 | WEIGHT: 245 LBS | HEIGHT: 70 IN

## 2024-12-12 DIAGNOSIS — M54.50 CHRONIC LOW BACK PAIN, UNSPECIFIED BACK PAIN LATERALITY, UNSPECIFIED WHETHER SCIATICA PRESENT: Primary | ICD-10-CM

## 2024-12-12 DIAGNOSIS — I48.19 PERSISTENT ATRIAL FIBRILLATION (HCC): ICD-10-CM

## 2024-12-12 DIAGNOSIS — M47.816 LUMBAR SPONDYLOSIS: ICD-10-CM

## 2024-12-12 DIAGNOSIS — G89.29 CHRONIC LOW BACK PAIN, UNSPECIFIED BACK PAIN LATERALITY, UNSPECIFIED WHETHER SCIATICA PRESENT: Primary | ICD-10-CM

## 2024-12-12 DIAGNOSIS — M51.369 DEGENERATION OF INTERVERTEBRAL DISC OF LUMBAR REGION, UNSPECIFIED WHETHER PAIN PRESENT: ICD-10-CM

## 2024-12-12 LAB — TSH SERPL DL<=0.05 MIU/L-ACNC: 3.8 UIU/ML (ref 0.27–4.2)

## 2024-12-12 PROCEDURE — 36415 COLL VENOUS BLD VENIPUNCTURE: CPT

## 2024-12-12 PROCEDURE — 84443 ASSAY THYROID STIM HORMONE: CPT

## 2024-12-12 PROCEDURE — 99213 OFFICE O/P EST LOW 20 MIN: CPT | Performed by: PHYSICIAN ASSISTANT

## 2024-12-12 NOTE — PROGRESS NOTES
Patient ID: Torrey Joy is a 59 y.o. male    Chief Compliant:  Chief Complaint   Patient presents with    Back Pain     LBP      HPI:  This is a 59 y.o. male who presents to the clinic today for Follow-up of chronic low back pain.  Pain in the right buttock with paresthesias bilaterally.  Has completed some physical therapy without relief.  Has tried medication such as Tylenol.  No new fall trauma or injury..       Review of Systems   All other systems reviewed and are negative.    Past History:    Current Outpatient Medications:     amiodarone (CORDARONE) 200 MG tablet, Take 1 tablet by mouth daily, Disp: 30 tablet, Rfl: 5    empagliflozin (JARDIANCE) 10 MG tablet, Take 1 tablet by mouth daily, Disp: 90 tablet, Rfl: 3    ALPRAZolam (XANAX) 0.5 MG tablet, Take 1 tablet by mouth in the morning and at bedtime., Disp: , Rfl:     torsemide (DEMADEX) 20 MG tablet, Take 1 tablet by mouth daily, Disp: 90 tablet, Rfl: 3    fluticasone-salmeterol (ADVAIR) 250-50 MCG/ACT AEPB diskus inhaler, Inhale 1 puff into the lungs in the morning and 1 puff in the evening., Disp: , Rfl:     metoprolol succinate (TOPROL XL) 100 MG extended release tablet, TAKE 1 TABLET DAILY, Disp: 90 tablet, Rfl: 3    pantoprazole (PROTONIX) 40 MG tablet, TAKE 1 TABLET ONCE A DAY BEFORE MEAL, Disp: 90 tablet, Rfl: 3    atorvastatin (LIPITOR) 10 MG tablet, Take 1 tablet daily, Disp: 90 tablet, Rfl: 3    apixaban (ELIQUIS) 5 MG TABS tablet, Take 1 tablet by mouth 2 times daily, Disp: 60 tablet, Rfl: 5    acetaminophen (TYLENOL) 500 MG tablet, Take 1 tablet by mouth every 6 hours as needed for Pain, Disp: , Rfl:     venlafaxine (EFFEXOR XR) 150 MG extended release capsule, TAKE 1 CAPSULE DAILY, Disp: 14 capsule, Rfl: 0    Handicap Placard MISC, Expires 2/2027, Disp: 1 each, Rfl: 0    albuterol sulfate  (90 Base) MCG/ACT inhaler, Inhale 2 puffs into the lungs every 6 hours as needed for Wheezing, Disp: 1 each, Rfl: 2  No Known Allergies  Social

## 2024-12-13 ENCOUNTER — TELEPHONE (OUTPATIENT)
Dept: VASCULAR SURGERY | Age: 59
End: 2024-12-13

## 2024-12-20 ENCOUNTER — CARE COORDINATION (OUTPATIENT)
Dept: CARE COORDINATION | Age: 59
End: 2024-12-20

## 2024-12-20 NOTE — CARE COORDINATION
Ambulatory Care Coordination Note     2024 3:48 PM     Patient Current Location:  Home: [address] [place] [id number]     ACM contacted the patient by telephone. Verified name and  with patient as identifiers.         ACM: Fiordaliza Liang RN     Challenges to be reviewed by the provider   Additional needs identified to be addressed with provider No  none               Method of communication with provider: none.    Utilization: Patient has not had any utilization since our last call.     Care Summary Note: Talked to patient, he finished physical therapy. Continues to have low back pain, no worsening symptoms. He is scheduled to have an MRI. No chest pain, shortness of breath or swelling.  Aware of his appointments. Follows with nephrology, cardiology, ortho, GI    Offered patient enrollment in the Remote Patient Monitoring (RPM) program for in-home monitoring: Yes, but did not enroll at this time: declined .     Assessments Completed:   General Assessment    Do you have any symptoms that are causing concern?: Yes  Progression since Onset: Unchanged  Reported Symptoms: Pain          Medications Reviewed:   Completed during a previous call     Advance Care Planning:   Not reviewed during this call     Care Planning:    Goals Addressed                   This Visit's Progress     Conditions and Symptoms   On track     I will schedule office visits, as directed by my provider.  I will notify my provider of any barriers to my plan of care.  I will follow my Zone Management tool to seek urgent or emergent care.  I will notify my provider of any symptoms that indicate a worsening of my condition.    Barriers: overwhelmed by and lack of education  Plan for overcoming my barriers: education, care management services   Confidence:10/10  Anticipated Goal Completion Date:                  PCP/Specialist follow up:   Future Appointments         Provider Specialty Dept Phone    2024 10:30 AM (Arrive by

## 2024-12-28 ENCOUNTER — HOSPITAL ENCOUNTER (OUTPATIENT)
Dept: MRI IMAGING | Age: 59
Discharge: HOME | End: 2024-12-30
Payer: COMMERCIAL

## 2024-12-28 DIAGNOSIS — F41.9 ANXIETY AND DEPRESSION: Primary | ICD-10-CM

## 2024-12-28 DIAGNOSIS — M51.369 DEGENERATION OF INTERVERTEBRAL DISC OF LUMBAR REGION, UNSPECIFIED WHETHER PAIN PRESENT: ICD-10-CM

## 2024-12-28 DIAGNOSIS — M54.50 CHRONIC LOW BACK PAIN, UNSPECIFIED BACK PAIN LATERALITY, UNSPECIFIED WHETHER SCIATICA PRESENT: ICD-10-CM

## 2024-12-28 DIAGNOSIS — G89.29 CHRONIC LOW BACK PAIN, UNSPECIFIED BACK PAIN LATERALITY, UNSPECIFIED WHETHER SCIATICA PRESENT: ICD-10-CM

## 2024-12-28 DIAGNOSIS — F32.A ANXIETY AND DEPRESSION: Primary | ICD-10-CM

## 2024-12-28 PROCEDURE — 72148 MRI LUMBAR SPINE W/O DYE: CPT

## 2024-12-28 RX ORDER — PANTOPRAZOLE SODIUM 40 MG/1
40 TABLET, DELAYED RELEASE ORAL 2 TIMES DAILY
Qty: 180 TABLET | Refills: 3 | Status: SHIPPED | OUTPATIENT
Start: 2024-12-28

## 2024-12-28 RX ORDER — ALPRAZOLAM 0.5 MG
TABLET ORAL
Qty: 180 TABLET | Refills: 0 | Status: SHIPPED | OUTPATIENT
Start: 2024-12-28 | End: 2025-03-28

## 2024-12-28 RX ORDER — PANTOPRAZOLE SODIUM 40 MG/1
40 TABLET, DELAYED RELEASE ORAL 2 TIMES DAILY
Qty: 180 TABLET | Refills: 3 | Status: SHIPPED | OUTPATIENT
Start: 2024-12-28 | End: 2024-12-28 | Stop reason: SDUPTHER

## 2025-01-03 ENCOUNTER — CARE COORDINATION (OUTPATIENT)
Dept: CARE COORDINATION | Age: 60
End: 2025-01-03

## 2025-01-03 ENCOUNTER — TELEPHONE (OUTPATIENT)
Age: 60
End: 2025-01-03

## 2025-01-03 NOTE — CARE COORDINATION
Ambulatory Care Coordination Note     1/3/2025 4:09 PM     Patient Current Location:  Home: [address] [place] [id number]     ACM contacted the patient by telephone. Verified name and  with patient as identifiers.         ACM: Fiordaliza Liang RN     Challenges to be reviewed by the provider   Additional needs identified to be addressed with provider No  none               Method of communication with provider: none.    Utilization: Patient has not had any utilization since our last call.     Care Summary Note: has chronic low back pain, and states, he's waiting to hear about his MRI results, He is calling ortho next week to check about a follow up appointment. He has difficulty following fluid restrictions. Has some swelling of BLE, below his knees.,and can tell if he's gained weight. No new symptoms. Denies chest pain. He follows with nephrology, cardiology, ortho, GI    Offered patient enrollment in the Remote Patient Monitoring (RPM) program for in-home monitoring: Yes, but did not enroll at this time: already monitoring with home equipment.     Assessments Completed:   Congestive Heart Failure Assessment    Are you currently restricting fluids?: Other (Comment: 50 oz)  Do you understand a low sodium diet?: Yes  Do you understand how to read food labels?: Yes  Do you salt your food before tasting it?: No         Symptoms:  CHF associated leg swelling: Pos      Symptom course: no change  Salt intake watch compared to last visit: stable      ,   General Assessment    Do you have any symptoms that are causing concern?: No          Medications Reviewed:   Patient denies any changes with medications and reports taking all medications as prescribed.    Advance Care Planning:   Not reviewed during this call     Care Planning:    Goals Addressed                   This Visit's Progress     Conditions and Symptoms   On track     I will schedule office visits, as directed by my provider.  I will notify my provider of

## 2025-01-09 ENCOUNTER — OFFICE VISIT (OUTPATIENT)
Dept: ORTHOPEDIC SURGERY | Age: 60
End: 2025-01-09
Payer: COMMERCIAL

## 2025-01-09 VITALS — RESPIRATION RATE: 14 BRPM | BODY MASS INDEX: 35.07 KG/M2 | WEIGHT: 245 LBS | HEIGHT: 70 IN

## 2025-01-09 DIAGNOSIS — M48.062 LUMBAR STENOSIS WITH NEUROGENIC CLAUDICATION: Primary | ICD-10-CM

## 2025-01-09 DIAGNOSIS — G89.29 CHRONIC LOW BACK PAIN WITH BILATERAL SCIATICA, UNSPECIFIED BACK PAIN LATERALITY: ICD-10-CM

## 2025-01-09 DIAGNOSIS — M54.42 CHRONIC LOW BACK PAIN WITH BILATERAL SCIATICA, UNSPECIFIED BACK PAIN LATERALITY: ICD-10-CM

## 2025-01-09 DIAGNOSIS — M54.41 CHRONIC LOW BACK PAIN WITH BILATERAL SCIATICA, UNSPECIFIED BACK PAIN LATERALITY: ICD-10-CM

## 2025-01-09 PROCEDURE — 99214 OFFICE O/P EST MOD 30 MIN: CPT | Performed by: PHYSICIAN ASSISTANT

## 2025-01-09 NOTE — PROGRESS NOTES
Patient ID: Torrey Joy is a 59 y.o. male    Chief Compliant:  Chief Complaint   Patient presents with    Follow-up     Low back pain      HPI:  This is a 59 y.o. male who presents to the clinic today for MRI follow-up of lumbar spine.  Patient with ongoing low back and bilateral buttock pain with paresthesias this been ongoing for years.  Patient did physical therapy without any relief at this point.      MRI of lumbar spine from December 28, 2024 with multilevel bilateral foraminal narrowing and facet hypertrophy.  Severe multilevel degenerative changes with endplate Mobic changes at L3-4.  Moderate to severe canal stenosis at L2-3 L3-4 and L4-5.       Review of Systems   All other systems reviewed and are negative.    Past History:    Current Outpatient Medications:     ALPRAZolam (XANAX) 0.5 MG tablet, TAKE 1 TABLET BY MOUTH 2 TIMES A DAY AS NEEDED FOR ANXIETY, Disp: 180 tablet, Rfl: 0    pantoprazole (PROTONIX) 40 MG tablet, Take 1 tablet by mouth in the morning and at bedtime, Disp: 180 tablet, Rfl: 3    amiodarone (CORDARONE) 200 MG tablet, Take 1 tablet by mouth daily, Disp: 30 tablet, Rfl: 5    empagliflozin (JARDIANCE) 10 MG tablet, Take 1 tablet by mouth daily, Disp: 90 tablet, Rfl: 3    torsemide (DEMADEX) 20 MG tablet, Take 1 tablet by mouth daily, Disp: 90 tablet, Rfl: 3    fluticasone-salmeterol (ADVAIR) 250-50 MCG/ACT AEPB diskus inhaler, Inhale 1 puff into the lungs in the morning and 1 puff in the evening., Disp: , Rfl:     metoprolol succinate (TOPROL XL) 100 MG extended release tablet, TAKE 1 TABLET DAILY, Disp: 90 tablet, Rfl: 3    atorvastatin (LIPITOR) 10 MG tablet, Take 1 tablet daily, Disp: 90 tablet, Rfl: 3    apixaban (ELIQUIS) 5 MG TABS tablet, Take 1 tablet by mouth 2 times daily, Disp: 60 tablet, Rfl: 5    acetaminophen (TYLENOL) 500 MG tablet, Take 1 tablet by mouth every 6 hours as needed for Pain, Disp: , Rfl:     venlafaxine (EFFEXOR XR) 150 MG extended release capsule, TAKE 1

## 2025-01-15 PROBLEM — I71.019: Status: ACTIVE | Noted: 2025-01-15

## 2025-01-16 ENCOUNTER — TELEPHONE (OUTPATIENT)
Age: 60
End: 2025-01-16

## 2025-01-20 ENCOUNTER — OFFICE VISIT (OUTPATIENT)
Age: 60
End: 2025-01-20

## 2025-01-20 VITALS
OXYGEN SATURATION: 94 % | HEART RATE: 85 BPM | WEIGHT: 254 LBS | DIASTOLIC BLOOD PRESSURE: 73 MMHG | BODY MASS INDEX: 36.45 KG/M2 | SYSTOLIC BLOOD PRESSURE: 119 MMHG

## 2025-01-20 DIAGNOSIS — F10.20 ALCOHOLISM (HCC): ICD-10-CM

## 2025-01-20 DIAGNOSIS — I48.92 ATRIAL FLUTTER, UNSPECIFIED TYPE (HCC): Primary | ICD-10-CM

## 2025-01-20 DIAGNOSIS — I48.91 ATRIAL FIB/FLUTTER, TRANSIENT (HCC): ICD-10-CM

## 2025-01-20 DIAGNOSIS — E66.812 CLASS 2 OBESITY: ICD-10-CM

## 2025-01-20 DIAGNOSIS — I48.92 ATRIAL FIB/FLUTTER, TRANSIENT (HCC): ICD-10-CM

## 2025-01-20 DIAGNOSIS — G47.33 OSA (OBSTRUCTIVE SLEEP APNEA): ICD-10-CM

## 2025-01-20 NOTE — PROGRESS NOTES
[facility] CARDIAC ELECTROPHYSIOLOGY  [address], [address]  [facility]  [address]    Date of Visit:  2025  Patient Name: Torrey Joy   Patient :  1965   Referring By:  No ref. provider found     CHIEF COMPLAINT/HPI:     Torrey Joy is a 59 y.o. male who presents today for an general visit to be evaluated for the following condition(s):  Chief Complaint   Patient presents with    Follow-up     FOLLOW UP - s/p Ablation     Patient presents for follow-up his atrial flutter.  In November ablation was done for his atrial flutter.  There is significant improvement in the symptoms.  Interrogation of the loop and there has been episodes of paroxysmal atrial flutter majority of them happened when he is expected to be asleep.  The major difference is they are paroxysmal now in the past he was in sustained persistent atrial flutter now he is in paroxysmal.  He feels much better.  There is significant reduction in the burden of flutter since ablation that was done in November which is not clearly documented by his implantable loop.  Continues to be on medications including amiodarone 200 mg p.o. daily and Eliquis 5 mg p.o. twice daily.    If symptoms of obstructive sleep apnea.  REVIEW OF SYSTEM      Review of Systems  Noncontributory otherwise he feels much better than before  REVIEWED INFORMATION      No Known Allergies    Current Outpatient Medications   Medication Sig Dispense Refill    ALPRAZolam (XANAX) 0.5 MG tablet TAKE 1 TABLET BY MOUTH 2 TIMES A DAY AS NEEDED FOR ANXIETY 180 tablet 0    pantoprazole (PROTONIX) 40 MG tablet Take 1 tablet by mouth in the morning and at bedtime 180 tablet 3    amiodarone (CORDARONE) 200 MG tablet Take 1 tablet by mouth daily 30 tablet 5    empagliflozin (JARDIANCE) 10 MG tablet Take 1 tablet by mouth daily 90 tablet 3    torsemide (DEMADEX) 20 MG tablet Take 1 tablet by mouth daily (Patient taking differently: Take 1 tablet by mouth daily Pt states he is

## 2025-01-27 ENCOUNTER — INITIAL CONSULT (OUTPATIENT)
Dept: PAIN MANAGEMENT | Age: 60
End: 2025-01-27
Payer: COMMERCIAL

## 2025-01-27 VITALS — WEIGHT: 254 LBS | HEIGHT: 70 IN | BODY MASS INDEX: 36.36 KG/M2

## 2025-01-27 DIAGNOSIS — M51.360 DEGENERATION OF INTERVERTEBRAL DISC OF LUMBAR REGION WITH DISCOGENIC BACK PAIN: ICD-10-CM

## 2025-01-27 DIAGNOSIS — M47.817 LUMBOSACRAL SPONDYLOSIS WITHOUT MYELOPATHY: Primary | ICD-10-CM

## 2025-01-27 DIAGNOSIS — M48.061 SPINAL STENOSIS OF LUMBAR REGION, UNSPECIFIED WHETHER NEUROGENIC CLAUDICATION PRESENT: ICD-10-CM

## 2025-01-27 PROCEDURE — 99204 OFFICE O/P NEW MOD 45 MIN: CPT | Performed by: PAIN MEDICINE

## 2025-01-27 ASSESSMENT — ENCOUNTER SYMPTOMS: BACK PAIN: 1

## 2025-01-27 NOTE — PROGRESS NOTES
Exercise per Session: 0 min   Stress: Stress Concern Present (10/29/2024)    [employer] - Occupational Stress Questionnaire     Feeling of Stress : Rather much   Social Connections: Not on file   Intimate Partner Violence: Not on file   Housing Stability: Low Risk  (10/25/2024)    Housing Stability Vital Sign     Unable to Pay for Housing in the Last Year: No     Number of Times Moved in the Last Year: 0     Homeless in the Last Year: No       Review of Systems:  Review of Systems   Musculoskeletal:  Positive for back pain.       Physical Exam:  Ht 1.778 m (5' 10\")   Wt 115.2 kg (254 lb)   BMI 36.45 kg/m²     Physical Exam    Constitutional:       Appearance: Normal appearance.   Pulmonary:      Effort: Pulmonary effort is normal.   Neurological:      Mental Status: Alert.   Psychiatric:         Attention and Perception: Attention and perception normal.         Mood and Affect: Mood and affect normal.       Record/Diagnostics Review:    As above, I did independently review the imaging    Orders:  Orders Placed This Encounter   Procedures    FACET JOINT L/S 2ND LEVEL    FL INJ LUMB/SAC FACET SINGLE LEVEL       Assessment:  1. Lumbosacral spondylosis without myelopathy    2. Degeneration of intervertebral disc of lumbar region with discogenic back pain    3. Spinal stenosis of lumbar region, unspecified whether neurogenic claudication present        Treatment Plan:  DISCUSSION: Treatment options discussed with patient and all questions answered to patient's satisfaction.  Risks, benefits, alternatives of treatment discussed with patient.    OARRS Review: Reviewed and acceptable for medications prescribed.  TREATMENT OPTIONS:     Discussed different treatment options including continued conservative care such as physical therapy, chiropractic care, acupuncture.  Discussed different interventional options such as epidural steroids or medial branch blocks.  Also discussed surgical

## 2025-01-28 ENCOUNTER — CARE COORDINATION (OUTPATIENT)
Dept: CARE COORDINATION | Age: 60
End: 2025-01-28

## 2025-01-28 NOTE — CARE COORDINATION
Ambulatory Care Coordination Note     2025 12:28 PM     Patient Current Location:  Home: [address] [place], [place] [id number]     ACM contacted the patient by telephone. Verified name and  with patient as identifiers.         ACM: Fiordaliza Liang RN     Challenges to be reviewed by the provider   Additional needs identified to be addressed with provider No  none               Method of communication with provider: none.    Utilization: Patient has not had any utilization since our last call.     Care Summary Note: Has episodes of shortness of breath, feels tired and fatigues easily. He recently saw cardiology.  He's using his CPAP and is going to follow up with pulmonary and the sleep lab. He struggles with following fluid restrictions, and stating  he often feels thirsty.   Has chronic back pain, states he saw pain management and he's considering treatment options.      Offered patient enrollment in the Remote Patient Monitoring (RPM) program for in-home monitoring: Yes, but did not enroll at this time: declined to enroll in the program because, decline .     Assessments Completed:   Congestive Heart Failure Assessment    Are you currently restricting fluids?: Other (Comment: 50 oz)  Do you understand a low sodium diet?: Yes  Do you understand how to read food labels?: Yes  Do you salt your food before tasting it?: No         Symptoms:  CHF associated dyspnea on exertion: Pos, CHF associated fatigue: Pos, CHF associated leg swelling: Pos      Symptom course: no change  Patient-reported weight (lb): 247  Salt intake watch compared to last visit: stable      ,   General Assessment              Medications Reviewed:   Completed during a previous call     Advance Care Planning:   Not reviewed during this call     Care Planning:   Not completed during this call    PCP/Specialist follow up:   Future Appointments         Provider Specialty Dept Phone    2025 9:00 AM Petra Bianchi PA-C Gastroenterology

## 2025-02-06 ENCOUNTER — OFFICE VISIT (OUTPATIENT)
Dept: GASTROENTEROLOGY | Age: 60
End: 2025-02-06
Payer: COMMERCIAL

## 2025-02-06 VITALS
HEIGHT: 70 IN | BODY MASS INDEX: 36.65 KG/M2 | WEIGHT: 256 LBS | DIASTOLIC BLOOD PRESSURE: 78 MMHG | TEMPERATURE: 97.7 F | HEART RATE: 72 BPM | SYSTOLIC BLOOD PRESSURE: 140 MMHG

## 2025-02-06 DIAGNOSIS — K64.9 HEMORRHOIDS, UNSPECIFIED HEMORRHOID TYPE: ICD-10-CM

## 2025-02-06 DIAGNOSIS — K70.0 ALCOHOL INDUCED FATTY LIVER: ICD-10-CM

## 2025-02-06 DIAGNOSIS — K22.70 BARRETT'S ESOPHAGUS WITHOUT DYSPLASIA: Primary | ICD-10-CM

## 2025-02-06 PROCEDURE — 99214 OFFICE O/P EST MOD 30 MIN: CPT | Performed by: PHYSICIAN ASSISTANT

## 2025-02-06 RX ORDER — PANTOPRAZOLE SODIUM 40 MG/1
40 TABLET, DELAYED RELEASE ORAL 2 TIMES DAILY
Qty: 180 TABLET | Refills: 3 | Status: SHIPPED | OUTPATIENT
Start: 2025-02-06

## 2025-02-06 NOTE — PROGRESS NOTES
GI CLINIC FOLLOW UP    INTERVAL HISTORY:   No referring provider defined for this encounter.    Chief Complaint   Patient presents with    Follow-up     Wilcox's Esophagus        HISTORY OF PRESENT ILLNESS: Mr.Randy DEREJE Joy is a 59 y.o. male , referred for evaluation of ***.    Here for f/u  ***  Denies fever/chills, change in appetite, unintentional weight loss, dysphagia/odynophagia, n/v, heartburn, abd pain, diarrhea/constipation, black or bloody stools.       Last EGD: ***  Last colonoscopy: ***  Labs: ***  Last abd imaging: ***      PAST MEDICAL HISTORY:  Past Medical History:   Diagnosis Date    Anxiety 1982    Arthritis     Wilcox esophagus     Chronic back pain     Depression     DU (duodenal ulcer)     Emphysema lung (HCC)     Emphysema of lung (HCC)     GERD (gastroesophageal reflux disease)     [place] (hard of hearing)     Hyperlipidemia     Hypersomnia with sleep apnea, unspecified     CPAP nightly    Hypertension     Obesity     AGUS (obstructive sleep apnea)     wears CPAP    Osteoarthritis     Thoracic outlet syndrome     Rt       Past Surgical History:   Procedure Laterality Date    CARDIOVERSION      CARPAL TUNNEL RELEASE Left 12/17/2019    CARPAL TUNNEL RELEASE performed by Carlos Hardin MD at [facility] OR    CARPAL TUNNEL RELEASE Right 01/27/2020    CARPAL TUNNEL RELEASE performed by Carlos Hardin MD at [facility] OR    CATARACT REMOVAL  2021    CATARACT REMOVAL WITH IMPLANT Right     COLONOSCOPY  05/27/2016    COLONOSCOPY N/A 09/23/2024    COLONOSCOPY POLYPECTOMY BIOPSY AT RECTUM performed by David William MD at [facility] OR    EP DEVICE PROCEDURE N/A 06/17/2024    el-josephine / Loop recorder insert performed by Ata Harp MD at [facility] CARDIAC CATH LAB    EP DEVICE PROCEDURE N/A 11/14/2024    el cassidyssmorgan / Ablation A-flutter w/anes / carto, ice performed by Ata Harp MD at [facility] CARDIAC CATH LAB    EYE SURGERY      JOINT REPLACEMENT Bilateral     hips    LYMPH NODE DISSECTION

## 2025-02-06 NOTE — PROGRESS NOTES
GI CLINIC FOLLOW UP    INTERVAL HISTORY:   No referring provider defined for this encounter.    Chief Complaint   Patient presents with    Follow-up     Wilcox's Esophagus        HISTORY OF PRESENT ILLNESS: Mr.Randy DEREJE Joy is a 59 y.o. male , referred for evaluation of alcohol liver disease, rectal bleeding, hemorrhoids.    Here for f/u  At last visit, liver maintenance ordered due to EtOH liver disease. MRI showing no mass lesions, no cirrhosis. INR 1.0, LFTs normalized. TG elevated. He states he is not drinking alcohol as often - only 2-3 beers every few days    Today he reports continued rectal bleeding that occurs daily with each BM.  He has BMs every 1-2 days with incomplete emptying that require straining. Stools are formed but not very hard. Not on any stool softeners or OTC rx. Reports his belly is making lots of noises after eating any foods. No pain. He is established with Dr August and he is to call when ready to schedule hemorrhoid surgery.      No upper GI symptoms. Continues protonix. Denies nausea, vomiting, epi pain, loss of appetite. No wt loss. No rashes.  No serum celiac in the past. No new abdominal distention, peripheral edema, jaundice, icterus, rash, pruritus.    Denies fever/chills, change in appetite, unintentional weight loss, dysphagia/odynophagia, n/v, heartburn, abd pain, diarrhea/constipation, black or bloody stools.     Cardic ablation on 11/14/24 with Dr Erwin Lujan for a fib with significant improvement in his symptoms. On eliquis + amiodarone      Last EGD: 9/23/24 - biopsy suggestive of BE. Duodenal biopsy showing villous blunting suggestive of Celiac   Last colonoscopy: 9/23/24 - engorged rectal veins possible rectal varices, large hemorrhoids, polyps   Labs: M1M1, negative RAFAEL/AMA/ASMA, negative hepatitis  11/2024 - INR 1.0, ALT 22, AST 26,   Last abd imaging:   MRI abd 11/18/24 - unremarkable      PAST MEDICAL HISTORY:  Past Medical History:   Diagnosis Date

## 2025-02-11 ENCOUNTER — CARE COORDINATION (OUTPATIENT)
Dept: CARE COORDINATION | Age: 60
End: 2025-02-11

## 2025-02-11 NOTE — CARE COORDINATION
Quin WAGGONER MD Pain Management [phone]    3/6/2025 11:00 AM Quin Medina MD Pain Management [phone]    3/13/2025 8:00 AM Tripp Rush MD Orthopedic Surgery [phone]    4/17/2025 11:50 AM Jorge Zhao MD Nephrology [phone]    4/25/2025 9:15 AM Karine Cole APRN - CNP Primary Care [phone]    6/18/2025 1:00 PM Ata Harp MD Cardiac Electrophysiology [phone]    8/6/2025 10:00 AM Petra Bianchi PAAspenC Gastroenterology [phone]            Follow Up:   Plan for next ACM outreach in approximately 2 weeks to complete:  - disease specific assessments  - goal progression  - education .   Patient  is agreeable to this plan.

## 2025-02-12 ENCOUNTER — TELEPHONE (OUTPATIENT)
Age: 60
End: 2025-02-12

## 2025-02-20 ENCOUNTER — HOSPITAL ENCOUNTER (OUTPATIENT)
Dept: PAIN MANAGEMENT | Facility: CLINIC | Age: 60
Discharge: HOME | End: 2025-02-20
Payer: COMMERCIAL

## 2025-02-20 VITALS
BODY MASS INDEX: 36.65 KG/M2 | HEIGHT: 70 IN | HEART RATE: 68 BPM | TEMPERATURE: 97.7 F | WEIGHT: 256 LBS | DIASTOLIC BLOOD PRESSURE: 66 MMHG | OXYGEN SATURATION: 99 % | SYSTOLIC BLOOD PRESSURE: 126 MMHG | RESPIRATION RATE: 10 BRPM

## 2025-02-20 DIAGNOSIS — M47.817 LUMBOSACRAL SPONDYLOSIS WITHOUT MYELOPATHY: ICD-10-CM

## 2025-02-20 DIAGNOSIS — R52 PAIN MANAGEMENT: ICD-10-CM

## 2025-02-20 PROCEDURE — 6360000002 HC RX W HCPCS: Performed by: PAIN MEDICINE

## 2025-02-20 PROCEDURE — 64493 INJ PARAVERT F JNT L/S 1 LEV: CPT

## 2025-02-20 PROCEDURE — 64494 INJ PARAVERT F JNT L/S 2 LEV: CPT

## 2025-02-20 RX ORDER — MIDAZOLAM HYDROCHLORIDE 2 MG/2ML
INJECTION, SOLUTION INTRAMUSCULAR; INTRAVENOUS
Status: COMPLETED | OUTPATIENT
Start: 2025-02-20 | End: 2025-02-20

## 2025-02-20 RX ORDER — BUPIVACAINE HYDROCHLORIDE 2.5 MG/ML
INJECTION, SOLUTION EPIDURAL; INFILTRATION; INTRACAUDAL
Status: COMPLETED | OUTPATIENT
Start: 2025-02-20 | End: 2025-02-20

## 2025-02-20 RX ORDER — LIDOCAINE HYDROCHLORIDE 5 MG/ML
INJECTION, SOLUTION INFILTRATION; INTRAVENOUS
Status: COMPLETED | OUTPATIENT
Start: 2025-02-20 | End: 2025-02-20

## 2025-02-20 RX ADMIN — MIDAZOLAM HYDROCHLORIDE 1 MG: 1 INJECTION, SOLUTION INTRAMUSCULAR; INTRAVENOUS at 13:14

## 2025-02-20 RX ADMIN — BUPIVACAINE HYDROCHLORIDE 10 ML: 2.5 INJECTION, SOLUTION EPIDURAL; INFILTRATION; INTRACAUDAL; PERINEURAL at 13:16

## 2025-02-20 RX ADMIN — LIDOCAINE HYDROCHLORIDE 6 ML: 5 INJECTION, SOLUTION INFILTRATION at 13:15

## 2025-02-20 ASSESSMENT — PAIN - FUNCTIONAL ASSESSMENT
PAIN_FUNCTIONAL_ASSESSMENT: 0-10
PAIN_FUNCTIONAL_ASSESSMENT: NONE - DENIES PAIN
PAIN_FUNCTIONAL_ASSESSMENT: PREVENTS OR INTERFERES WITH ALL ACTIVE AND SOME PASSIVE ACTIVITIES

## 2025-02-20 ASSESSMENT — PAIN DESCRIPTION - DESCRIPTORS: DESCRIPTORS: PRESSURE;ACHING

## 2025-02-20 NOTE — DISCHARGE INSTRUCTIONS
You have received a sedative/anesthetic therefore you should not consume any alcoholic beverages for 24 hours.  Do not drive or operate machinery for 24 hours.   Do not take a tub bath for 72 hours after procedure (this includes hot tubs).  You may shower, but avoid hot water to injection site.   Avoid strenuous activity TODAY especially if you experience dizziness.   Remove band-aid the next day.    Wash off any residual iodine 24 hours from today.   Do not use heat, heating pad, or any other heating device over the injection site for 3 days after the procedure.    If you experience pain after your procedure, you may continue with your current pain medication as prescribed.  (DO NOT INCREASE YOUR PAIN MEDICATION WITHOUT TALKING TO DOCTOR)  Soreness and pain at injection site is common, may use ice to reduce soreness.    Please complete pain diary as instructed. The office staff will call you to discuss the results of the procedure within 24 hours, please call the office if you do not hear from them.      Call [facility] at [phone] if you experience:   Fever, chills or temperature over 100    Vomiting, headache, persistent stiff neck, nausea or blurred vision   Difficulty urinating or unable to urinate within 8 hours   Increase in weakness, numbness or loss of function of limbs  Increased redness, swelling or drainage at the injection site

## 2025-02-20 NOTE — H&P
Pain Pre-Op H&P Note    Quin Medina MD    HPI: Torrey Joy  presents with back pain.  Wishes to proceed with diagnostic medial branch block.    Past Medical History:   Diagnosis Date    Anxiety 1982    Arthritis     Wilcox esophagus     Chronic back pain     Depression     DU (duodenal ulcer)     Emphysema lung (HCC)     Emphysema of lung (HCC)     GERD (gastroesophageal reflux disease)     [place] (hard of hearing)     Hyperlipidemia     Hypersomnia with sleep apnea, unspecified     CPAP nightly    Hypertension     Obesity     AGUS (obstructive sleep apnea)     wears CPAP    Osteoarthritis     Thoracic outlet syndrome     Rt       Past Surgical History:   Procedure Laterality Date    CARDIOVERSION      CARPAL TUNNEL RELEASE Left 12/17/2019    CARPAL TUNNEL RELEASE performed by Carlos Hardin MD at [facility] OR    CARPAL TUNNEL RELEASE Right 01/27/2020    CARPAL TUNNEL RELEASE performed by Carlos Hardin MD at [facility] OR    CATARACT REMOVAL  2021    CATARACT REMOVAL WITH IMPLANT Right     COLONOSCOPY  05/27/2016    COLONOSCOPY N/A 09/23/2024    COLONOSCOPY POLYPECTOMY BIOPSY AT RECTUM performed by David William MD at [facility] OR    EP DEVICE PROCEDURE N/A 06/17/2024    amber-josephine / Loop recorder insert performed by Ata Harp MD at [facility] CARDIAC CATH LAB    EP DEVICE PROCEDURE N/A 11/14/2024    el josephine / Ablation A-flutter w/anes / carto, ice performed by Ata Harp MD at [facility] CARDIAC CATH LAB    EYE SURGERY      JOINT REPLACEMENT Bilateral     hips    LYMPH NODE DISSECTION      neck    SOFT TISSUE TUMOR RESECTION      back    UPPER GASTROINTESTINAL ENDOSCOPY N/A 12/11/2018    EGD ESOPHAGOGASTRODUODENOSCOPY performed by Tripp Solitario DO at [facility] Endoscopy    UPPER GASTROINTESTINAL ENDOSCOPY N/A 07/21/2020    EGD BIOPSY performed by Leeroy Jarquin MD at [facility] Endoscopy    UPPER GASTROINTESTINAL ENDOSCOPY N/A 09/23/2024    ESOPHAGOGASTRODUODENOSCOPY BIOPSY performed by David William MD at [facility]

## 2025-02-20 NOTE — OP NOTE
Lumbar Facet Nerve Block Injection:  Surgeon: Quin Medina MD     PRE-OP DIAGNOSIS: M47.817 (lumbosacral spondylosis), M54.5 (low back pain)    POST-OP DIAGNOSIS: Same.    PROCEDURE PERFORMED: Lumbar Facet Nerve Block Multiple Levels  Bilateral L4 - 5 and L5 - S1.     Physician confirmed and marked the surgical site.    EBL: minimal      CONSENT: Patient has undergone the educational process with this procedure, is aware and fully understands the risks involved: potential damage to any and all body organs including possible bleeding, infection, nerve injury, paralysis, allergic reaction and headache. Patient also understands that the procedure will be undertaken in a safe, controlled, and monitored setting. Patient recognizes that the benefits include relief from pain and reduction in the oral use of medications. Patient agreed to proceed.    Risks, benefits, and alternatives including postponing the procedure were discussed. The patient does wish to proceed with the procedure at this time.      PREP: Timeout was performed prior to starting the procedure. The patient's back was prepped with chloroprep and draped appropriately. 0.5% lidocaine was used to anesthetize the skin and subcutaneous tissue.     PROCEDURE NOTE: 25 gauge spinal needles were advanced under  fluoroscopic guidance to the appropriate anatomic location for the medial branches and/or dorsal ramus corresponding to the indicated facet joints. Aspiration was negative. 1 ml of 0.25% marcaine was then injected at each site to block medial branch nerve innervating the  Bilateral L4 - 5 and L5 - S1 facet joints.    Patient tolerated the procedure well, no complications occurred.    At the end of the injection the physician withdrew the needle and the nurse applied a sterile bandage to the site. Patient transferred to the recovery room in satisfactory condition. Appropriate written discharge instructions were given to the patient. If good results are

## 2025-02-21 ENCOUNTER — TELEPHONE (OUTPATIENT)
Dept: PAIN MANAGEMENT | Age: 60
End: 2025-02-21

## 2025-02-21 NOTE — TELEPHONE ENCOUNTER
S/P: Lumbar Facet Nerve Block Multiple Levels  Bilateral L4 - 5 and L5 - S1.       DOS: 02/20/2025    Pain  before procedure with activity : 8    Pain after procedure with activity: 6    Activities following procedure include: Shoveled driveway, walked around    % of pain relief: 20%    Procedure successful: No    OV scheduled: 03/03/2025

## 2025-02-27 DIAGNOSIS — I48.19 PERSISTENT ATRIAL FIBRILLATION (HCC): ICD-10-CM

## 2025-02-27 DIAGNOSIS — I48.19 PERSISTENT ATRIAL FIBRILLATION (HCC): Primary | ICD-10-CM

## 2025-03-10 ENCOUNTER — OFFICE VISIT (OUTPATIENT)
Dept: PAIN MANAGEMENT | Age: 60
End: 2025-03-10
Payer: COMMERCIAL

## 2025-03-10 VITALS — BODY MASS INDEX: 36.65 KG/M2 | WEIGHT: 256 LBS | HEIGHT: 70 IN

## 2025-03-10 DIAGNOSIS — M48.061 SPINAL STENOSIS OF LUMBAR REGION, UNSPECIFIED WHETHER NEUROGENIC CLAUDICATION PRESENT: ICD-10-CM

## 2025-03-10 DIAGNOSIS — M47.817 LUMBOSACRAL SPONDYLOSIS WITHOUT MYELOPATHY: Primary | ICD-10-CM

## 2025-03-10 PROCEDURE — 99213 OFFICE O/P EST LOW 20 MIN: CPT | Performed by: PAIN MEDICINE

## 2025-03-10 ASSESSMENT — ENCOUNTER SYMPTOMS: BACK PAIN: 1

## 2025-03-10 NOTE — PROGRESS NOTES
injections.  Consider epidural steroids.  Consider SI joint injection.    He is on Eliquis.      Sheriff Carol M.D.        I have reviewed the chief complaint and history of present illness (including ROS and PFSH) and vital documentation by my staff and I agree with their documentation and have added where applicable.

## 2025-03-11 DIAGNOSIS — I48.19 PERSISTENT ATRIAL FIBRILLATION (HCC): ICD-10-CM

## 2025-03-11 DIAGNOSIS — F41.9 ANXIETY AND DEPRESSION: ICD-10-CM

## 2025-03-11 DIAGNOSIS — F32.A ANXIETY AND DEPRESSION: ICD-10-CM

## 2025-03-11 DIAGNOSIS — I10 ESSENTIAL HYPERTENSION: ICD-10-CM

## 2025-03-11 DIAGNOSIS — E78.2 MIXED HYPERLIPIDEMIA: ICD-10-CM

## 2025-03-11 RX ORDER — ALPRAZOLAM 0.5 MG
0.5 TABLET ORAL 2 TIMES DAILY PRN
Qty: 180 TABLET | Refills: 0 | Status: SHIPPED | OUTPATIENT
Start: 2025-03-11 | End: 2025-06-09

## 2025-03-11 RX ORDER — VENLAFAXINE HYDROCHLORIDE 150 MG/1
CAPSULE, EXTENDED RELEASE ORAL
Qty: 14 CAPSULE | Refills: 0 | Status: SHIPPED | OUTPATIENT
Start: 2025-03-11

## 2025-03-11 RX ORDER — METOPROLOL SUCCINATE 100 MG/1
TABLET, EXTENDED RELEASE ORAL
Qty: 90 TABLET | Refills: 3 | Status: SHIPPED | OUTPATIENT
Start: 2025-03-11

## 2025-03-11 RX ORDER — ATORVASTATIN CALCIUM 10 MG/1
TABLET, FILM COATED ORAL
Qty: 90 TABLET | Refills: 3 | Status: SHIPPED | OUTPATIENT
Start: 2025-03-11

## 2025-03-12 RX ORDER — AMIODARONE HYDROCHLORIDE 200 MG/1
200 TABLET ORAL DAILY
Qty: 30 TABLET | Refills: 5 | Status: SHIPPED | OUTPATIENT
Start: 2025-03-12 | End: 2025-09-08

## 2025-03-13 ENCOUNTER — OFFICE VISIT (OUTPATIENT)
Dept: ORTHOPEDIC SURGERY | Age: 60
End: 2025-03-13
Payer: COMMERCIAL

## 2025-03-13 VITALS — HEIGHT: 70 IN | WEIGHT: 256 LBS | RESPIRATION RATE: 14 BRPM | BODY MASS INDEX: 36.65 KG/M2

## 2025-03-13 DIAGNOSIS — M54.42 CHRONIC LOW BACK PAIN WITH BILATERAL SCIATICA, UNSPECIFIED BACK PAIN LATERALITY: Primary | ICD-10-CM

## 2025-03-13 DIAGNOSIS — M48.062 LUMBAR STENOSIS WITH NEUROGENIC CLAUDICATION: ICD-10-CM

## 2025-03-13 DIAGNOSIS — G89.29 CHRONIC LOW BACK PAIN WITH BILATERAL SCIATICA, UNSPECIFIED BACK PAIN LATERALITY: Primary | ICD-10-CM

## 2025-03-13 DIAGNOSIS — M54.41 CHRONIC LOW BACK PAIN WITH BILATERAL SCIATICA, UNSPECIFIED BACK PAIN LATERALITY: Primary | ICD-10-CM

## 2025-03-13 DIAGNOSIS — M51.369 DEGENERATION OF INTERVERTEBRAL DISC OF LUMBAR REGION, UNSPECIFIED WHETHER PAIN PRESENT: ICD-10-CM

## 2025-03-13 PROCEDURE — 99213 OFFICE O/P EST LOW 20 MIN: CPT | Performed by: ORTHOPAEDIC SURGERY

## 2025-03-13 NOTE — PROGRESS NOTES
Patient ID: Torrey Joy is a 59 y.o. male    Chief Compliant:  No chief complaint on file.       Diagnostic imaging:    X-rays lumbar spine advanced multilevel lumbar degenerative disc disease disc space collapse with loss of lumbar lordosis status post bilateral total hip arthroplasty    MRI lumbar spine advanced lumbar spondylosis all levels severe lumbar spinal stenosis L2-L5    Assessment and Plan:  1. Chronic low back pain with bilateral sciatica, unspecified back pain laterality    2. Lumbar stenosis with neurogenic claudication    3. Degeneration of intervertebral disc of lumbar region, unspecified whether pain present        Ongoing low back pain with occasional bilateral buttock pain with paresthesias     I discussed he is a candidate for an L2-5 PLIF    . He would like to continue with pain management for the time being, and will follow up to discuss if he would like to proceed with surgical intervention. He has some worries about surgery due to prior family hx of complications.    Follow up 12 weeks    HPI:  This is a 59 y.o. male who presents to the clinic today for follow up of low back and buttock evaluation, previously seeing Ngoc Nolasco PA-C.     Ongoing low back pain with occasional bilateral buttock pain with paresthesias     He reports difficulty with standing in one position. He uses a cart to ambulate around [facility].    Hx of pain management and injections without relief    Hx of PT without relief    Patient takes Eliquis    Review of Systems   All other systems reviewed and are negative.      Past History:    Current Outpatient Medications:     apixaban (ELIQUIS) 5 MG TABS tablet, Take 1 tablet by mouth 2 times daily, Disp: 60 tablet, Rfl: 5    amiodarone (CORDARONE) 200 MG tablet, Take 1 tablet by mouth daily, Disp: 30 tablet, Rfl: 5    empagliflozin (JARDIANCE) 10 MG tablet, Take 1 tablet by mouth daily, Disp: 90 tablet, Rfl: 3    venlafaxine (EFFEXOR XR) 150 MG extended release capsule,

## 2025-03-19 ENCOUNTER — CARE COORDINATION (OUTPATIENT)
Dept: CARE COORDINATION | Age: 60
End: 2025-03-19

## 2025-03-19 NOTE — CARE COORDINATION
Ambulatory Care Coordination Note     3/19/2025 3:35 PM     ACM outreach attempt by this ACM today to perform care management follow up . ACM was unable to reach the patient by telephone today;   left voice message requesting a return phone call to this ACM.     ACM: Fiordaliza Liang RN     Care Summary Note:     PCP/Specialist follow up:   Future Appointments         Provider Specialty Dept Phone    4/4/2025 10:30 AM Quin Medina MD Pain Management [phone]    4/17/2025 11:50 AM Jorge Zhao MD Nephrology [phone]    4/28/2025 8:30 AM Karine Cole APRN - CNP Primary Care [phone]    6/5/2025 10:00 AM Tripp Rush MD Orthopedic Surgery [phone]    6/18/2025 1:00 PM Ata Harp MD Cardiac Electrophysiology [phone]    8/6/2025 10:00 AM Petra Bianchi PA-C Gastroenterology [phone]            Follow Up:   Plan for next ACM outreach in approximately 1 week to complete:  - disease specific assessments  - goal progression  - education .

## 2025-03-24 DIAGNOSIS — I48.19 PERSISTENT ATRIAL FIBRILLATION (HCC): ICD-10-CM

## 2025-03-26 ENCOUNTER — CARE COORDINATION (OUTPATIENT)
Dept: CARE COORDINATION | Age: 60
End: 2025-03-26

## 2025-03-26 NOTE — CARE COORDINATION
Ambulatory Care Coordination Note     3/26/2025 11:59 AM     ACM outreach attempt by this ACM today to perform care management follow up . ACM was unable to reach the patient by telephone today;   left voice message requesting a return phone call to this ACM.     ACM: Fiordaliza Liang RN     Care Summary Note:     PCP/Specialist follow up:   Future Appointments         Provider Specialty Dept Phone    4/4/2025 10:30 AM Quin Medina MD Pain Management [phone]    4/17/2025 11:50 AM Jorge Zhao MD Nephrology [phone]    4/28/2025 8:30 AM Karine Cole APRN - CNP Primary Care [phone]    6/5/2025 10:00 AM Tripp Rush MD Orthopedic Surgery [phone]    6/18/2025 1:00 PM Ata Harp MD Cardiac Electrophysiology [phone]    8/6/2025 10:00 AM Petra Bianchi PA-C Gastroenterology [phone]            Follow Up:   Plan for next ACM outreach in approximately 1 week to complete:  - disease specific assessments  - goal progression  - education .

## 2025-04-01 ENCOUNTER — CARE COORDINATION (OUTPATIENT)
Dept: CARE COORDINATION | Age: 60
End: 2025-04-01

## 2025-04-01 NOTE — CARE COORDINATION
Ambulatory Care Coordination Note     4/1/2025 11:23 AM     ACM  outreach attempt by this ACM today to perform care management follow up . AC was unable to reach the patient by telephone today;   left voice message requesting a return phone call to this ACM.     Patient graduated from the High Risk Care Management program on 4/1/2025.  Care management goals have been completed. No further Ambulatory Care Manager follow up scheduled.             Future Appointments   Date Time Provider Department Center   4/4/2025 10:30 AM Quin Medina MD [facility]   4/17/2025 11:50 AM Jorge Zhao MD [facility] Neph [facility] None   4/28/2025  8:30 AM Karine Cole APRN - CNP [facility]   6/5/2025 10:00 AM Tripp Rush MD [facility]   6/18/2025  1:00 PM Ata Harp MD [facility] ELECTROPH [facility]   8/6/2025 10:00 AM Petra Bianchi PA-C [place] [facility]

## 2025-04-04 ENCOUNTER — OFFICE VISIT (OUTPATIENT)
Dept: PAIN MANAGEMENT | Age: 60
End: 2025-04-04
Payer: COMMERCIAL

## 2025-04-04 VITALS — BODY MASS INDEX: 36.65 KG/M2 | WEIGHT: 256 LBS | HEIGHT: 70 IN

## 2025-04-04 DIAGNOSIS — M46.1 SACROILIITIS: Primary | ICD-10-CM

## 2025-04-04 DIAGNOSIS — M48.061 SPINAL STENOSIS OF LUMBAR REGION, UNSPECIFIED WHETHER NEUROGENIC CLAUDICATION PRESENT: ICD-10-CM

## 2025-04-04 PROCEDURE — 99214 OFFICE O/P EST MOD 30 MIN: CPT | Performed by: PAIN MEDICINE

## 2025-04-04 ASSESSMENT — ENCOUNTER SYMPTOMS: BACK PAIN: 1

## 2025-04-04 NOTE — PROGRESS NOTES
and 1 puff in the evening. prn., Disp: , Rfl:     acetaminophen (TYLENOL) 500 MG tablet, Take 1 tablet by mouth every 6 hours as needed for Pain, Disp: , Rfl:     Handicap Klever MISC, Expires 2/2027, Disp: 1 each, Rfl: 0    albuterol sulfate  (90 Base) MCG/ACT inhaler, Inhale 2 puffs into the lungs every 6 hours as needed for Wheezing, Disp: 1 each, Rfl: 2    Family History   Problem Relation Age of Onset    Mult Sclerosis Father     Heart Disease Sister     High Blood Pressure Sister     Heart Surgery Sister         valve replaced    Heart Disease Paternal Uncle        Social History     Socioeconomic History    Marital status:      Spouse name: Not on file    Number of children: Not on file    Years of education: Not on file    Highest education level: Not on file   Occupational History    Not on file   Tobacco Use    Smoking status: Former     Current packs/day: 1.50     Average packs/day: 1.6 packs/day for 56.3 years (90.1 ttl pk-yrs)     Types: Cigarettes    Smokeless tobacco: Never   Vaping Use    Vaping status: Never Used   Substance and Sexual Activity    Alcohol use: Yes     Comment: daily, 8 beer daily    Drug use: No    Sexual activity: Not on file   Other Topics Concern    Not on file   Social History Narrative    Not on file     Social Drivers of Health     Financial Resource Strain: Low Risk  (10/25/2024)    Overall Financial Resource Strain (CARDIA)     Difficulty of Paying Living Expenses: Not hard at all   Food Insecurity: No Food Insecurity (2/11/2025)    Received from [facility]    Hunger Screening     Within the past 12 months we worried whether our food would run out before we got money to buy more.: Never True     Within the past 12 months the food we bought just didn't last and we didn't have money to get more.: Never True   Transportation Needs: No Transportation Needs (10/25/2024)    PRAPARE - Transportation     Lack of Transportation (Medical): No     Lack of

## 2025-04-08 ENCOUNTER — TELEPHONE (OUTPATIENT)
Dept: PAIN MANAGEMENT | Age: 60
End: 2025-04-08

## 2025-04-11 ENCOUNTER — HOSPITAL ENCOUNTER (OUTPATIENT)
Age: 60
Discharge: HOME | End: 2025-04-11
Payer: COMMERCIAL

## 2025-04-11 ENCOUNTER — HOSPITAL ENCOUNTER (EMERGENCY)
Age: 60
Discharge: HOME | End: 2025-04-11
Attending: EMERGENCY MEDICINE
Payer: COMMERCIAL

## 2025-04-11 VITALS
WEIGHT: 250 LBS | TEMPERATURE: 98.5 F | OXYGEN SATURATION: 98 % | BODY MASS INDEX: 35.87 KG/M2 | DIASTOLIC BLOOD PRESSURE: 73 MMHG | RESPIRATION RATE: 18 BRPM | HEART RATE: 71 BPM | SYSTOLIC BLOOD PRESSURE: 116 MMHG

## 2025-04-11 DIAGNOSIS — N18.31 STAGE 3A CHRONIC KIDNEY DISEASE (HCC): ICD-10-CM

## 2025-04-11 DIAGNOSIS — D64.9 ANEMIA, UNSPECIFIED TYPE: Primary | ICD-10-CM

## 2025-04-11 LAB
25(OH)D3 SERPL-MCNC: 14.7 NG/ML (ref 30–100)
ALBUMIN SERPL-MCNC: 4.3 G/DL (ref 3.5–5.2)
ALBUMIN/GLOB SERPL: 1.2 {RATIO} (ref 1–2.5)
ALP SERPL-CCNC: 122 U/L (ref 40–129)
ALT SERPL-CCNC: 13 U/L (ref 5–41)
ANION GAP SERPL CALCULATED.3IONS-SCNC: 12 MMOL/L (ref 9–16)
AST SERPL-CCNC: 17 U/L
BASOPHILS # BLD: 0.07 K/UL (ref 0–0.2)
BASOPHILS NFR BLD: 1 % (ref 0–2)
BILIRUB DIRECT SERPL-MCNC: 0.1 MG/DL
BILIRUB INDIRECT SERPL-MCNC: 0.4 MG/DL (ref 0–1)
BILIRUB SERPL-MCNC: 0.5 MG/DL (ref 0.3–1.2)
BUN SERPL-MCNC: 24 MG/DL (ref 8–23)
CA-I BLD-SCNC: 1.11 MMOL/L (ref 1.13–1.33)
CALCIUM SERPL-MCNC: 8.7 MG/DL (ref 8.6–10.4)
CHLORIDE SERPL-SCNC: 99 MMOL/L (ref 98–107)
CO2 SERPL-SCNC: 24 MMOL/L (ref 20–31)
CREAT SERPL-MCNC: 1.5 MG/DL (ref 0.7–1.2)
CREAT UR-MCNC: 19.7 MG/DL (ref 39–259)
EOSINOPHIL # BLD: 0.22 K/UL (ref 0–0.44)
EOSINOPHILS RELATIVE PERCENT: 3 % (ref 1–4)
ERYTHROCYTE [DISTWIDTH] IN BLOOD BY AUTOMATED COUNT: 19.9 % (ref 11.8–14.4)
GFR, ESTIMATED: 53 ML/MIN/1.73M2
GLUCOSE SERPL-MCNC: 177 MG/DL (ref 74–99)
HCT VFR BLD AUTO: 29.1 % (ref 40.7–50.3)
HGB BLD-MCNC: 7.4 G/DL (ref 13–17)
IMM GRANULOCYTES # BLD AUTO: 0 K/UL (ref 0–0.3)
IMM GRANULOCYTES NFR BLD: 0 %
INR PPP: 1.1 (ref 0.9–1.2)
LYMPHOCYTES NFR BLD: 1.9 K/UL (ref 1.1–3.7)
LYMPHOCYTES RELATIVE PERCENT: 26 % (ref 24–43)
MCH RBC QN AUTO: 17.2 PG (ref 25.2–33.5)
MCHC RBC AUTO-ENTMCNC: 25.4 G/DL (ref 28.4–34.8)
MCV RBC AUTO: 67.7 FL (ref 82.6–102.9)
MONOCYTES NFR BLD: 0.58 K/UL (ref 0.1–1.2)
MONOCYTES NFR BLD: 8 % (ref 3–12)
MORPHOLOGY: ABNORMAL
NEUTROPHILS NFR BLD: 62 % (ref 36–65)
NEUTS SEG NFR BLD: 4.53 K/UL (ref 1.5–8.1)
NRBC BLD-RTO: 0 PER 100 WBC
PHOSPHATE SERPL-MCNC: 3.9 MG/DL (ref 2.5–4.5)
PLATELET # BLD AUTO: 325 K/UL (ref 138–453)
PMV BLD AUTO: 10.1 FL (ref 8.1–13.5)
POTASSIUM SERPL-SCNC: 4.9 MMOL/L (ref 3.7–5.3)
PROT SERPL-MCNC: 8 G/DL (ref 6.4–8.3)
PROTHROMBIN TIME: 13.9 SEC (ref 11.8–14.6)
PTH-INTACT SERPL-MCNC: 161 PG/ML (ref 17.9–58.6)
RBC # BLD AUTO: 4.3 M/UL (ref 4.21–5.77)
SODIUM SERPL-SCNC: 135 MMOL/L (ref 136–145)
TOTAL PROTEIN, URINE: <4 MG/DL
TROPONIN I SERPL HS-MCNC: 21 NG/L (ref 0–22)
URINE TOTAL PROTEIN CREATININE RATIO: ABNORMAL (ref 0–0.2)
WBC OTHER # BLD: 7.3 K/UL (ref 3.5–11.3)

## 2025-04-11 PROCEDURE — 36415 COLL VENOUS BLD VENIPUNCTURE: CPT

## 2025-04-11 PROCEDURE — 36430 TRANSFUSION BLD/BLD COMPNT: CPT

## 2025-04-11 PROCEDURE — 93005 ELECTROCARDIOGRAM TRACING: CPT | Performed by: NURSE PRACTITIONER

## 2025-04-11 PROCEDURE — 80048 BASIC METABOLIC PNL TOTAL CA: CPT

## 2025-04-11 PROCEDURE — 86923 COMPATIBILITY TEST ELECTRIC: CPT

## 2025-04-11 PROCEDURE — 85610 PROTHROMBIN TIME: CPT

## 2025-04-11 PROCEDURE — 82570 ASSAY OF URINE CREATININE: CPT

## 2025-04-11 PROCEDURE — 82306 VITAMIN D 25 HYDROXY: CPT

## 2025-04-11 PROCEDURE — 82330 ASSAY OF CALCIUM: CPT

## 2025-04-11 PROCEDURE — 83970 ASSAY OF PARATHORMONE: CPT

## 2025-04-11 PROCEDURE — 80076 HEPATIC FUNCTION PANEL: CPT

## 2025-04-11 PROCEDURE — 85025 COMPLETE CBC W/AUTO DIFF WBC: CPT

## 2025-04-11 PROCEDURE — 99285 EMERGENCY DEPT VISIT HI MDM: CPT | Performed by: EMERGENCY MEDICINE

## 2025-04-11 PROCEDURE — 84100 ASSAY OF PHOSPHORUS: CPT

## 2025-04-11 PROCEDURE — 84156 ASSAY OF PROTEIN URINE: CPT

## 2025-04-11 PROCEDURE — 84484 ASSAY OF TROPONIN QUANT: CPT

## 2025-04-11 PROCEDURE — 86901 BLOOD TYPING SEROLOGIC RH(D): CPT

## 2025-04-11 PROCEDURE — 86850 RBC ANTIBODY SCREEN: CPT

## 2025-04-11 PROCEDURE — P9016 RBC LEUKOCYTES REDUCED: HCPCS

## 2025-04-11 PROCEDURE — 86900 BLOOD TYPING SEROLOGIC ABO: CPT

## 2025-04-11 RX ORDER — SODIUM CHLORIDE 9 MG/ML
INJECTION, SOLUTION INTRAVENOUS PRN
Status: DISCONTINUED | OUTPATIENT
Start: 2025-04-11 | End: 2025-04-12 | Stop reason: HOSPADM

## 2025-04-11 ASSESSMENT — ENCOUNTER SYMPTOMS
ABDOMINAL PAIN: 0
SHORTNESS OF BREATH: 1

## 2025-04-11 ASSESSMENT — PAIN - FUNCTIONAL ASSESSMENT: PAIN_FUNCTIONAL_ASSESSMENT: NONE - DENIES PAIN

## 2025-04-11 NOTE — ED PROVIDER NOTES
[facility] Emergency Department  [address]  [place] [address]  Phone: [phone]  Fax: [phone]      Attending Physician Attestation          CHIEF COMPLAINT       Chief Complaint   Patient presents with    abnormal lab work     Patient states his PCP called him due to his lab work being abnormal lab work today. Patient states his hgb was 7.4, parathyroid level was 161. Patient c/o increased shortness of breath and fatigue these last couple of days.     Shortness of Breath    Fatigue       DIAGNOSTIC RESULTS     LABS:  Labs Reviewed   PROTIME-INR   HEPATIC FUNCTION PANEL   TROPONIN   TYPE AND SCREEN   PREPARE RBC (CROSSMATCH)       All other labs were within normal range or not returned as of this dictation.    RADIOLOGY:  No orders to display         EMERGENCY DEPARTMENT COURSE:   Vitals:    Vitals:    04/11/25 1945 04/11/25 1950 04/11/25 1958 04/11/25 2013   BP: 120/69 116/76 129/76 133/79   Pulse: 77 70 70 72   Resp: 16 18     Temp: 98.5 °F (36.9 °C) 98.5 °F (36.9 °C)     TempSrc: Oral Oral     SpO2: 99% 99% 98% 98%   Weight:         -------------------------  BP: 133/79, Temp: 98.5 °F (36.9 °C), Pulse: 72, Respirations: 18             PERTINENT ATTENDING PHYSICIAN COMMENTS:    I performed a history and physical examination of the patient and discussed management with the mid level provider. I reviewed the mid level provider's note and agree with the documented findings and plan of care. Any areas of disagreement are noted on the chart. I was personally present for the key portions of any procedures. I have documented in the chart those procedures where I was not present during the key portions. I have reviewed the emergency nurses triage note. I agree with the chief complaint, past medical history, past surgical history, allergies, medications, social and family history as documented unless otherwise noted below. Documentation of the HPI, Physical Exam and Medical Decision

## 2025-04-11 NOTE — CONSENT
Informed Consent for Blood Component Transfusion Note    I have discussed with the patient the rationale for blood component transfusion; its benefits in treating or preventing fatigue, organ damage, or death; and its risk which includes mild transfusion reactions, rare risk of blood borne infection, or more serious but rare reactions. I have discussed the alternatives to transfusion, including the risk and consequences of not receiving transfusion. The patient had an opportunity to ask questions and had agreed to proceed with transfusion of blood components.    Electronically signed by BULMARO Parker CNP on 4/11/25 at 5:23 PM EDT

## 2025-04-11 NOTE — ED PROVIDER NOTES
[facility] EMERGENCY DEPARTMENT  Emergency Department Encounter  Mid Level Provider     Pt Name: Torrey Joy  MRN: [id number]  Birthdate 1965  Date of evaluation: 4/11/25  PCP:  Karine Cole APRN - CNP    CHIEF COMPLAINT       Chief Complaint   Patient presents with    abnormal lab work     Patient states his PCP called him due to his lab work being abnormal lab work today. Patient states his hgb was 7.4, parathyroid level was 161. Patient c/o increased shortness of breath and fatigue these last couple of days.     Shortness of Breath    Fatigue       HISTORY OF PRESENT ILLNESS  (Location/Symptom, Timing/Onset,Context/Setting, Quality, Duration, Modifying Factors, Severity.)      Torrey Joy is a 59 y.o. male who presents with being sent in for low hemoglobin.  Patient states he always runs low but no one explains know why this is.  Patient states he had a colonoscopy in September was told he had severe hemorrhoids that need to be removed but he is not interested in surgery at this time as yet other things going on with his health.  Patient follows with GI services.  Also follows with nephrology who did the basic labs today and found that his hemoglobin is down to 7.4.   he was told that if he had symptoms he should go to the ER.  He states over the last couple days he has felt more weak and tired with increased of his baseline shortness of breath.  Patient does drink 3 beers a day.  Has CHF.  He is on Eliquis for A-fib.  He has had 2 cardioversions and an ablation historically but they have not worked.  Brought in by family.    PAST MEDICAL /SURGICAL / SOCIAL / FAMILY HISTORY      has a past medical history of Anxiety, Arthritis, Wilcox esophagus, Chronic back pain, Depression, DU (duodenal ulcer), Emphysema lung (HCC), Emphysema of lung (HCC), GERD (gastroesophageal reflux disease), [place] (hard of hearing), Hyperlipidemia, Hypersomnia with sleep apnea, unspecified, Hypertension,  in 1 week and have them see him in the office    PROCEDURES:  EKG sinus rhythm rate 71, , QRS 84, QTc 484    FINAL IMPRESSION      1. Anemia, unspecified type          DISPOSITION / PLAN     DISPOSITION Discharge - Pending Orders Complete    PATIENT REFERRED TO:  Karine Cole APRN - CNP  [address] [address] [phone]    Schedule an appointment as soon as possible for a visit in 3 days      Jorge Zhao MD  [address] [address] [phone]    On 4/17/2025        DISCHARGE MEDICATIONS:  New Prescriptions    No medications on file       BULMARO Parker CNP   Emergency Medicine Nurse Practitioner    (Please note that portions of this note were completed with a voice recognitionprogram.  Efforts were made to edit the dictations but occasionally words are mis-transcribed.)      Marla Blank APRN - CNP  04/11/25 2042

## 2025-04-12 LAB
ABO/RH: NORMAL
ANTIBODY SCREEN: NEGATIVE
ARM BAND NUMBER: NORMAL
BLOOD BANK BLOOD PRODUCT EXPIRATION DATE: NORMAL
BLOOD BANK DISPENSE STATUS: NORMAL
BLOOD BANK ISBT PRODUCT BLOOD TYPE: 6200
BLOOD BANK PRODUCT CODE: NORMAL
BLOOD BANK SAMPLE EXPIRATION: NORMAL
BLOOD BANK UNIT TYPE AND RH: NORMAL
BPU ID: NORMAL
COMPONENT: NORMAL
CROSSMATCH RESULT: NORMAL
TRANSFUSION STATUS: NORMAL
UNIT DIVISION: 0
UNIT ISSUE DATE/TIME: NORMAL

## 2025-04-12 PROCEDURE — 93297 REM INTERROG DEV EVAL ICPMS: CPT | Performed by: SPECIALIST

## 2025-04-12 NOTE — DISCHARGE INSTRUCTIONS
I have put in labs to be drawn the day before your appointment with Dr. Zhao.  If symptoms or concerns develop before that appointment return immediately to the emergency room

## 2025-04-13 LAB
EKG ATRIAL RATE: 71 BPM
EKG P AXIS: 59 DEGREES
EKG P-R INTERVAL: 180 MS
EKG Q-T INTERVAL: 446 MS
EKG QRS DURATION: 84 MS
EKG QTC CALCULATION (BAZETT): 484 MS
EKG R AXIS: 52 DEGREES
EKG T AXIS: 57 DEGREES
EKG VENTRICULAR RATE: 71 BPM

## 2025-04-13 PROCEDURE — 93010 ELECTROCARDIOGRAM REPORT: CPT | Performed by: INTERNAL MEDICINE

## 2025-04-14 DIAGNOSIS — I48.19 PERSISTENT ATRIAL FIBRILLATION (HCC): ICD-10-CM

## 2025-04-16 ENCOUNTER — HOSPITAL ENCOUNTER (OUTPATIENT)
Age: 60
Discharge: HOME | End: 2025-04-16
Payer: COMMERCIAL

## 2025-04-16 DIAGNOSIS — D64.9 ANEMIA, UNSPECIFIED TYPE: ICD-10-CM

## 2025-04-16 LAB
ANION GAP SERPL CALCULATED.3IONS-SCNC: 13 MMOL/L (ref 9–16)
BASOPHILS # BLD: 0.08 K/UL (ref 0–0.2)
BASOPHILS NFR BLD: 1 % (ref 0–2)
BUN SERPL-MCNC: 28 MG/DL (ref 8–23)
CALCIUM SERPL-MCNC: 9.1 MG/DL (ref 8.6–10.4)
CHLORIDE SERPL-SCNC: 97 MMOL/L (ref 98–107)
CO2 SERPL-SCNC: 24 MMOL/L (ref 20–31)
CREAT SERPL-MCNC: 1.9 MG/DL (ref 0.7–1.2)
EOSINOPHIL # BLD: 0.31 K/UL (ref 0–0.44)
EOSINOPHILS RELATIVE PERCENT: 4 % (ref 1–4)
ERYTHROCYTE [DISTWIDTH] IN BLOOD BY AUTOMATED COUNT: 20 % (ref 11.8–14.4)
GFR, ESTIMATED: 40 ML/MIN/1.73M2
GLUCOSE SERPL-MCNC: 138 MG/DL (ref 74–99)
HCT VFR BLD AUTO: 31.2 % (ref 40.7–50.3)
HGB BLD-MCNC: 8.1 G/DL (ref 13–17)
IMM GRANULOCYTES # BLD AUTO: 0 K/UL (ref 0–0.3)
IMM GRANULOCYTES NFR BLD: 0 %
LYMPHOCYTES NFR BLD: 2.5 K/UL (ref 1.1–3.7)
LYMPHOCYTES RELATIVE PERCENT: 32 % (ref 24–43)
MCH RBC QN AUTO: 17.3 PG (ref 25.2–33.5)
MCHC RBC AUTO-ENTMCNC: 26 G/DL (ref 28.4–34.8)
MCV RBC AUTO: 66.7 FL (ref 82.6–102.9)
MONOCYTES NFR BLD: 0.86 K/UL (ref 0.1–1.2)
MONOCYTES NFR BLD: 11 % (ref 3–12)
MORPHOLOGY: ABNORMAL
NEUTROPHILS NFR BLD: 52 % (ref 36–65)
NEUTS SEG NFR BLD: 4.05 K/UL (ref 1.5–8.1)
NRBC BLD-RTO: 0 PER 100 WBC
PLATELET # BLD AUTO: 291 K/UL (ref 138–453)
PMV BLD AUTO: 9.9 FL (ref 8.1–13.5)
POTASSIUM SERPL-SCNC: 4.8 MMOL/L (ref 3.7–5.3)
RBC # BLD AUTO: 4.68 M/UL (ref 4.21–5.77)
SODIUM SERPL-SCNC: 134 MMOL/L (ref 136–145)
WBC OTHER # BLD: 7.8 K/UL (ref 3.5–11.3)

## 2025-04-16 PROCEDURE — 85025 COMPLETE CBC W/AUTO DIFF WBC: CPT

## 2025-04-16 PROCEDURE — 36415 COLL VENOUS BLD VENIPUNCTURE: CPT

## 2025-04-16 PROCEDURE — 80048 BASIC METABOLIC PNL TOTAL CA: CPT

## 2025-04-21 ENCOUNTER — HOSPITAL ENCOUNTER (OUTPATIENT)
Dept: PAIN MANAGEMENT | Facility: CLINIC | Age: 60
Discharge: HOME | End: 2025-04-21
Payer: COMMERCIAL

## 2025-04-21 VITALS
TEMPERATURE: 97.5 F | DIASTOLIC BLOOD PRESSURE: 77 MMHG | WEIGHT: 253 LBS | BODY MASS INDEX: 35.42 KG/M2 | HEIGHT: 71 IN | HEART RATE: 76 BPM | OXYGEN SATURATION: 97 % | SYSTOLIC BLOOD PRESSURE: 145 MMHG | RESPIRATION RATE: 12 BRPM

## 2025-04-21 DIAGNOSIS — R52 PAIN MANAGEMENT: ICD-10-CM

## 2025-04-21 LAB — GLUCOSE BLD-MCNC: 91 MG/DL (ref 75–110)

## 2025-04-21 PROCEDURE — G0260 INJ FOR SACROILIAC JT ANESTH: HCPCS

## 2025-04-21 PROCEDURE — 27096 INJECT SACROILIAC JOINT: CPT | Performed by: PAIN MEDICINE

## 2025-04-21 PROCEDURE — 82947 ASSAY GLUCOSE BLOOD QUANT: CPT

## 2025-04-21 PROCEDURE — 6360000002 HC RX W HCPCS: Performed by: PAIN MEDICINE

## 2025-04-21 RX ORDER — LIDOCAINE HYDROCHLORIDE 5 MG/ML
INJECTION, SOLUTION INFILTRATION; INTRAVENOUS
Status: COMPLETED | OUTPATIENT
Start: 2025-04-21 | End: 2025-04-21

## 2025-04-21 RX ORDER — BUPIVACAINE HYDROCHLORIDE 2.5 MG/ML
INJECTION, SOLUTION EPIDURAL; INFILTRATION; INTRACAUDAL; PERINEURAL
Status: COMPLETED | OUTPATIENT
Start: 2025-04-21 | End: 2025-04-21

## 2025-04-21 RX ORDER — DEXAMETHASONE SODIUM PHOSPHATE 10 MG/ML
INJECTION, SOLUTION INTRAMUSCULAR; INTRAVENOUS
Status: COMPLETED | OUTPATIENT
Start: 2025-04-21 | End: 2025-04-21

## 2025-04-21 RX ADMIN — DEXAMETHASONE SODIUM PHOSPHATE 10 MG: 10 INJECTION INTRAMUSCULAR; INTRAVENOUS at 14:44

## 2025-04-21 RX ADMIN — LIDOCAINE HYDROCHLORIDE 6 ML: 5 INJECTION, SOLUTION INFILTRATION; INTRAVENOUS at 14:43

## 2025-04-21 RX ADMIN — BUPIVACAINE HYDROCHLORIDE 5 ML: 2.5 INJECTION, SOLUTION EPIDURAL; INFILTRATION; INTRACAUDAL; PERINEURAL at 14:44

## 2025-04-21 ASSESSMENT — PAIN SCALES - GENERAL: PAINLEVEL_OUTOF10: 10

## 2025-04-21 ASSESSMENT — PAIN DESCRIPTION - LOCATION: LOCATION: BACK

## 2025-04-21 ASSESSMENT — PAIN DESCRIPTION - FREQUENCY: FREQUENCY: CONTINUOUS

## 2025-04-21 ASSESSMENT — PAIN DESCRIPTION - DIRECTION: RADIATING_TOWARDS: BILAT BUTTOCKS

## 2025-04-21 ASSESSMENT — PAIN DESCRIPTION - PAIN TYPE: TYPE: CHRONIC PAIN

## 2025-04-21 ASSESSMENT — PAIN DESCRIPTION - DESCRIPTORS: DESCRIPTORS: SHARP;SHOOTING

## 2025-04-21 ASSESSMENT — PAIN DESCRIPTION - ORIENTATION: ORIENTATION: LOWER;RIGHT;LEFT

## 2025-04-21 ASSESSMENT — PAIN DESCRIPTION - ONSET: ONSET: ON-GOING

## 2025-04-21 ASSESSMENT — PAIN - FUNCTIONAL ASSESSMENT: PAIN_FUNCTIONAL_ASSESSMENT: PREVENTS OR INTERFERES WITH ALL ACTIVE AND SOME PASSIVE ACTIVITIES

## 2025-04-21 NOTE — OP NOTE
Sacro-Iliac Joint Injection:  SURGEON: Quin Medina MD    PRE-OP DIAGNOSIS: Sacroiliitis (M46.1), Low back pain (M54.5)    POST-OP DIAGNOSIS: Same.    Procedure performed: Bilateral Sacroiliac Joint Injection.    Physician confirmed and marked the surgical site.    EBL: minimal    CONSENT: Patient has undergone the educational process with this procedure, is aware and fully understands the risks involved: potential damage to any and all body organs including possible bleeding, infection, nerve injury, paralysis, allergic reaction and headache. Patient also understands that the procedure will be undertaken in a safe, controlled and monitored setting. Patient recognizes that the benefits may include relief from pain and reduction in the oral use of medications. Patient agreed to proceed.    Risks, benefits, and alternatives including postponing the procedure were discussed. The patient does wish to proceed with the procedure at this time.      PREP: The patient's back was prepped with chloroprep and draped appropriately. 0.5% lidocaine was used to anesthetize the skin and subcutaneous tissue.    PROCEDURE NOTE: 25 gauge spinal needle(s) was/were advanced to the  Bilateral SI joint under fluoroscopic guidance. Aspiration was negative. 2ml of  0.25% bupivacaine was mixed with 5mg Dexamethasone and slowly injected into the joint(s).    The needle was withdrawn by the physician and the nurse applied a sterile dressing. The patient tolerated the procedure well. No complications occurred. Patient transferred to the recovery room in satisfactory condition. Appropriate written discharge instructions given to the patient.      Quin Medina MD

## 2025-04-21 NOTE — DISCHARGE INSTRUCTIONS
You have received a sedative/anesthetic therefore you should not consume any alcoholic beverages for 24 hours.  Do not drive or operate machinery for 24 hours.  Do not take a tub bath for 72 hours after procedure (this includes hot tubs).  You may shower, but avoid hot water to injection site.   Avoid strenuous activity TODAY especially if you experience dizziness.   Remove band-aid the next day.    Wash off any residual iodine 24 hours from today.   Do not use heat, heating pad, or any other heating device over the injection site for 3 days after the procedure.    If you experience pain after your procedure, you may continue with your current pain medication as prescribed.  (DO NOT INCREASE YOUR PAIN MEDICATION WITHOUT TALKING TO DOCTOR)  Soreness and pain at injection site is common, may use ice to reduce soreness.    What to expect:  You may experience facial flushing, night sweats and irritability.  Other common steroid related side effects are increased appetite, mood elevation, insomnia and fluid retention.  These effects usually subside in a few days.  Steroids used in epidural injections may cause muscle spasms for a few days.  If you are diabetic, your blood sugar may be elevated after your procedure due to the steroids.  You will need to monitor your blood sugar more closely while going through a series of injections (Check blood sugar at meals and bedtime for 5 days).  You may require adjustment in your diabetic medications, contact your PCP office to discuss.    Call [facility] at [phone] if you experience:   Fever, chills or temperature over 100    Vomiting, headache, persistent stiff neck, nausea or blurred vision   Difficulty urinating or unable to urinate within 8 hours   Increase in weakness, numbness or loss of function of limbs  Increased redness, swelling or drainage at the injection site

## 2025-04-21 NOTE — H&P
capsule by mouth once a week for 12 doses, Disp: 12 capsule, Rfl: 0    Handicap Klever MISC, Expires 2/2027, Disp: 1 each, Rfl: 0    Social History     Tobacco Use    Smoking status: Former     Current packs/day: 1.50     Average packs/day: 1.6 packs/day for 56.3 years (90.1 ttl pk-yrs)     Types: Cigarettes    Smokeless tobacco: Never   Substance Use Topics    Alcohol use: Yes     Comment: daily, 8 beer daily       Review of Systems:   Focused review of systems was performed, and negative as pertinent to diagnosis, except as stated in HPI.      Physical Exam  Constitutional:       Appearance: Normal appearance.   Pulmonary:      Effort: Pulmonary effort is normal.   Neurological:      Mental Status: alert.   Psychiatric:         Attention and Perception: Attention and perception normal.         Mood and Affect: Mood and affect normal.   Cardiovascular:      Rate: Normal rate.         ASA: 3          Mallampati: 2       Patient's current physical status, medications, medical history, and HPI have been reviewed and updated as appropriate on this date: 04/21/25    Risk/Benefit(s): The risks, benefits, alternatives, and potential complications have been discussed with the patient/family and informed consent has been obtained for the procedure/sedation.    Diagnosis:   Back pain      Plan: SI joint injection        Quin Medina MD

## 2025-04-27 ASSESSMENT — PATIENT HEALTH QUESTIONNAIRE - PHQ9
4. FEELING TIRED OR HAVING LITTLE ENERGY: SEVERAL DAYS
SUM OF ALL RESPONSES TO PHQ QUESTIONS 1-9: 5
3. TROUBLE FALLING OR STAYING ASLEEP: SEVERAL DAYS
SUM OF ALL RESPONSES TO PHQ QUESTIONS 1-9: 5
SUM OF ALL RESPONSES TO PHQ QUESTIONS 1-9: 5
7. TROUBLE CONCENTRATING ON THINGS, SUCH AS READING THE NEWSPAPER OR WATCHING TELEVISION: NOT AT ALL
5. POOR APPETITE OR OVEREATING: NOT AT ALL
6. FEELING BAD ABOUT YOURSELF - OR THAT YOU ARE A FAILURE OR HAVE LET YOURSELF OR YOUR FAMILY DOWN: SEVERAL DAYS
5. POOR APPETITE OR OVEREATING: NOT AT ALL
8. MOVING OR SPEAKING SO SLOWLY THAT OTHER PEOPLE COULD HAVE NOTICED. OR THE OPPOSITE - BEING SO FIDGETY OR RESTLESS THAT YOU HAVE BEEN MOVING AROUND A LOT MORE THAN USUAL: NOT AT ALL
1. LITTLE INTEREST OR PLEASURE IN DOING THINGS: SEVERAL DAYS
1. LITTLE INTEREST OR PLEASURE IN DOING THINGS: SEVERAL DAYS
7. TROUBLE CONCENTRATING ON THINGS, SUCH AS READING THE NEWSPAPER OR WATCHING TELEVISION: NOT AT ALL
9. THOUGHTS THAT YOU WOULD BE BETTER OFF DEAD, OR OF HURTING YOURSELF: NOT AT ALL
8. MOVING OR SPEAKING SO SLOWLY THAT OTHER PEOPLE COULD HAVE NOTICED. OR THE OPPOSITE, BEING SO FIGETY OR RESTLESS THAT YOU HAVE BEEN MOVING AROUND A LOT MORE THAN USUAL: NOT AT ALL
9. THOUGHTS THAT YOU WOULD BE BETTER OFF DEAD, OR OF HURTING YOURSELF: NOT AT ALL
2. FEELING DOWN, DEPRESSED OR HOPELESS: SEVERAL DAYS
6. FEELING BAD ABOUT YOURSELF - OR THAT YOU ARE A FAILURE OR HAVE LET YOURSELF OR YOUR FAMILY DOWN: SEVERAL DAYS
SUM OF ALL RESPONSES TO PHQ QUESTIONS 1-9: 5
SUM OF ALL RESPONSES TO PHQ QUESTIONS 1-9: 5
3. TROUBLE FALLING OR STAYING ASLEEP: SEVERAL DAYS
2. FEELING DOWN, DEPRESSED OR HOPELESS: SEVERAL DAYS
10. IF YOU CHECKED OFF ANY PROBLEMS, HOW DIFFICULT HAVE THESE PROBLEMS MADE IT FOR YOU TO DO YOUR WORK, TAKE CARE OF THINGS AT HOME, OR GET ALONG WITH OTHER PEOPLE: NOT DIFFICULT AT ALL
4. FEELING TIRED OR HAVING LITTLE ENERGY: SEVERAL DAYS
10. IF YOU CHECKED OFF ANY PROBLEMS, HOW DIFFICULT HAVE THESE PROBLEMS MADE IT FOR YOU TO DO YOUR WORK, TAKE CARE OF THINGS AT HOME, OR GET ALONG WITH OTHER PEOPLE: NOT DIFFICULT AT ALL

## 2025-04-28 ENCOUNTER — RESULTS FOLLOW-UP (OUTPATIENT)
Dept: PRIMARY CARE CLINIC | Age: 60
End: 2025-04-28

## 2025-04-28 ENCOUNTER — OFFICE VISIT (OUTPATIENT)
Dept: PRIMARY CARE CLINIC | Age: 60
End: 2025-04-28
Payer: COMMERCIAL

## 2025-04-28 ENCOUNTER — HOSPITAL ENCOUNTER (OUTPATIENT)
Age: 60
Setting detail: SPECIMEN
Discharge: HOME | End: 2025-04-28

## 2025-04-28 VITALS
WEIGHT: 254.8 LBS | DIASTOLIC BLOOD PRESSURE: 86 MMHG | SYSTOLIC BLOOD PRESSURE: 136 MMHG | HEART RATE: 74 BPM | OXYGEN SATURATION: 97 % | BODY MASS INDEX: 35.67 KG/M2 | HEIGHT: 71 IN

## 2025-04-28 DIAGNOSIS — Z12.5 SCREENING FOR PROSTATE CANCER: ICD-10-CM

## 2025-04-28 DIAGNOSIS — Z13.29 SCREENING FOR THYROID DISORDER: ICD-10-CM

## 2025-04-28 DIAGNOSIS — D64.9 ANEMIA, UNSPECIFIED TYPE: ICD-10-CM

## 2025-04-28 DIAGNOSIS — D64.9 ANEMIA, UNSPECIFIED TYPE: Primary | ICD-10-CM

## 2025-04-28 DIAGNOSIS — J43.9 PULMONARY EMPHYSEMA, UNSPECIFIED EMPHYSEMA TYPE (HCC): ICD-10-CM

## 2025-04-28 DIAGNOSIS — E11.9 TYPE 2 DIABETES MELLITUS WITHOUT COMPLICATION, WITHOUT LONG-TERM CURRENT USE OF INSULIN (HCC): ICD-10-CM

## 2025-04-28 DIAGNOSIS — E78.5 HYPERLIPIDEMIA, UNSPECIFIED HYPERLIPIDEMIA TYPE: ICD-10-CM

## 2025-04-28 DIAGNOSIS — I71.019 DISSECTION OF THORACIC AORTA, UNSPECIFIED PART (HCC): ICD-10-CM

## 2025-04-28 DIAGNOSIS — E11.9 TYPE 2 DIABETES MELLITUS WITHOUT COMPLICATION, WITHOUT LONG-TERM CURRENT USE OF INSULIN (HCC): Primary | ICD-10-CM

## 2025-04-28 LAB
ALBUMIN SERPL-MCNC: 4.1 G/DL (ref 3.5–5.2)
ALBUMIN/GLOB SERPL: 1.3 {RATIO} (ref 1–2.5)
ALP SERPL-CCNC: 120 U/L (ref 40–129)
ALT SERPL-CCNC: 16 U/L (ref 10–50)
ANION GAP SERPL CALCULATED.3IONS-SCNC: 13 MMOL/L (ref 9–16)
AST SERPL-CCNC: 21 U/L (ref 10–50)
BILIRUB SERPL-MCNC: 0.3 MG/DL (ref 0–1.2)
BUN SERPL-MCNC: 29 MG/DL (ref 8–23)
CALCIUM SERPL-MCNC: 9 MG/DL (ref 8.6–10.4)
CHLORIDE SERPL-SCNC: 100 MMOL/L (ref 98–107)
CHOLEST SERPL-MCNC: 136 MG/DL (ref 0–199)
CHOLESTEROL/HDL RATIO: 4.7
CO2 SERPL-SCNC: 23 MMOL/L (ref 20–31)
CREAT SERPL-MCNC: 1.3 MG/DL (ref 0.7–1.2)
CREAT UR-MCNC: 32.3 MG/DL (ref 39–259)
ERYTHROCYTE [DISTWIDTH] IN BLOOD BY AUTOMATED COUNT: 20.6 % (ref 11.8–14.4)
GFR, ESTIMATED: 63 ML/MIN/1.73M2
GLUCOSE SERPL-MCNC: 131 MG/DL (ref 74–99)
HBA1C MFR BLD: 5.8 %
HCT VFR BLD AUTO: 32.3 % (ref 40.7–50.3)
HDLC SERPL-MCNC: 29 MG/DL
HGB BLD-MCNC: 8.2 G/DL (ref 13–17)
LDLC SERPL CALC-MCNC: 77 MG/DL (ref 0–100)
MCH RBC QN AUTO: 17.3 PG (ref 25.2–33.5)
MCHC RBC AUTO-ENTMCNC: 25.4 G/DL (ref 28.4–34.8)
MCV RBC AUTO: 68 FL (ref 82.6–102.9)
MICROALBUMIN UR-MCNC: 16 MG/L (ref 0–20)
MICROALBUMIN/CREAT UR-RTO: 50 MCG/MG CREAT (ref 0–17)
NRBC BLD-RTO: 0 PER 100 WBC
PLATELET # BLD AUTO: 376 K/UL (ref 138–453)
PMV BLD AUTO: 9.5 FL (ref 8.1–13.5)
POTASSIUM SERPL-SCNC: 5.3 MMOL/L (ref 3.7–5.3)
PROT SERPL-MCNC: 7.3 G/DL (ref 6.6–8.7)
PSA SERPL-MCNC: 0.58 NG/ML (ref 0–4)
RBC # BLD AUTO: 4.75 M/UL (ref 4.21–5.77)
SODIUM SERPL-SCNC: 136 MMOL/L (ref 136–145)
TRIGL SERPL-MCNC: 150 MG/DL
TSH SERPL DL<=0.05 MIU/L-ACNC: 3.33 UIU/ML (ref 0.27–4.2)
VLDLC SERPL CALC-MCNC: 30 MG/DL (ref 1–30)
WBC OTHER # BLD: 8.7 K/UL (ref 3.5–11.3)

## 2025-04-28 PROCEDURE — 99214 OFFICE O/P EST MOD 30 MIN: CPT | Performed by: NURSE PRACTITIONER

## 2025-04-28 PROCEDURE — 83036 HEMOGLOBIN GLYCOSYLATED A1C: CPT | Performed by: NURSE PRACTITIONER

## 2025-04-28 PROCEDURE — 3044F HG A1C LEVEL LT 7.0%: CPT | Performed by: NURSE PRACTITIONER

## 2025-04-28 ASSESSMENT — ENCOUNTER SYMPTOMS
BACK PAIN: 1
ABDOMINAL PAIN: 0
SHORTNESS OF BREATH: 0
COUGH: 0

## 2025-04-28 NOTE — PROGRESS NOTES
GERD (gastroesophageal reflux disease)     [place] (hard of hearing)     Hyperlipidemia     Hypersomnia with sleep apnea, unspecified     CPAP nightly    Hypertension     Obesity     AGUS (obstructive sleep apnea)     wears CPAP    Osteoarthritis     Thoracic outlet syndrome     Rt      Past Surgical History:   Procedure Laterality Date    CARDIOVERSION      CARPAL TUNNEL RELEASE Left 12/17/2019    CARPAL TUNNEL RELEASE performed by Carlos Hardin MD at [facility] OR    CARPAL TUNNEL RELEASE Right 01/27/2020    CARPAL TUNNEL RELEASE performed by Carlos Hardin MD at [facility] OR    CATARACT REMOVAL  2021    CATARACT REMOVAL WITH IMPLANT Right     COLONOSCOPY  05/27/2016    COLONOSCOPY N/A 09/23/2024    COLONOSCOPY POLYPECTOMY BIOPSY AT RECTUM performed by David William MD at [facility] OR    EP DEVICE PROCEDURE N/A 06/17/2024    el-josephine / Loop recorder insert performed by Ata Harp MD at [facility] CARDIAC CATH LAB    EP DEVICE PROCEDURE N/A 11/14/2024    el hermelindai / Ablation A-flutter w/anes / carto, ice performed by Ata Harp MD at [facility] CARDIAC CATH LAB    EYE SURGERY      JOINT REPLACEMENT Bilateral     hips    LYMPH NODE DISSECTION      neck    SOFT TISSUE TUMOR RESECTION      back    UPPER GASTROINTESTINAL ENDOSCOPY N/A 12/11/2018    EGD ESOPHAGOGASTRODUODENOSCOPY performed by Tripp Solitario DO at [facility] Endoscopy    UPPER GASTROINTESTINAL ENDOSCOPY N/A 07/21/2020    EGD BIOPSY performed by Leeroy Jarquin MD at [facility] Endoscopy    UPPER GASTROINTESTINAL ENDOSCOPY N/A 09/23/2024    ESOPHAGOGASTRODUODENOSCOPY BIOPSY performed by David William MD at [facility] OR       Family History   Problem Relation Age of Onset    Mult Sclerosis Father     Heart Disease Sister     High Blood Pressure Sister     Heart Surgery Sister         valve replaced    Heart Disease Paternal Uncle           Social History     Tobacco Use    Smoking status: Former     Current packs/day: 1.50     Average packs/day: 1.6 packs/day

## 2025-05-02 DIAGNOSIS — I48.19 PERSISTENT ATRIAL FIBRILLATION (HCC): ICD-10-CM

## 2025-05-05 ENCOUNTER — HOSPITAL ENCOUNTER (OUTPATIENT)
Age: 60
Discharge: HOME | End: 2025-05-05
Payer: COMMERCIAL

## 2025-05-05 ENCOUNTER — TELEPHONE (OUTPATIENT)
Age: 60
End: 2025-05-05

## 2025-05-05 ENCOUNTER — INITIAL CONSULT (OUTPATIENT)
Age: 60
End: 2025-05-05
Payer: COMMERCIAL

## 2025-05-05 VITALS
WEIGHT: 255 LBS | SYSTOLIC BLOOD PRESSURE: 130 MMHG | BODY MASS INDEX: 48.15 KG/M2 | HEIGHT: 61 IN | DIASTOLIC BLOOD PRESSURE: 73 MMHG | RESPIRATION RATE: 15 BRPM | HEART RATE: 62 BPM | TEMPERATURE: 96.7 F

## 2025-05-05 DIAGNOSIS — K90.9 INTESTINAL MALABSORPTION, UNSPECIFIED TYPE: ICD-10-CM

## 2025-05-05 DIAGNOSIS — Z79.01 ANTICOAGULATED: ICD-10-CM

## 2025-05-05 DIAGNOSIS — D50.9 MICROCYTIC ANEMIA: Primary | ICD-10-CM

## 2025-05-05 DIAGNOSIS — D50.9 MICROCYTIC ANEMIA: ICD-10-CM

## 2025-05-05 LAB
BASOPHILS # BLD: 0.1 K/UL (ref 0–0.2)
BASOPHILS NFR BLD: 1 % (ref 0–2)
EOSINOPHIL # BLD: 0.3 K/UL (ref 0–0.44)
EOSINOPHILS RELATIVE PERCENT: 3 % (ref 1–4)
ERYTHROCYTE [DISTWIDTH] IN BLOOD BY AUTOMATED COUNT: 20.3 % (ref 11.8–14.4)
FERRITIN SERPL-MCNC: 13 NG/ML
FOLATE SERPL-MCNC: 12.5 NG/ML (ref 4.8–24.2)
HCT VFR BLD AUTO: 31.8 % (ref 40.7–50.3)
HGB BLD-MCNC: 8.5 G/DL (ref 13–17)
IMM GRANULOCYTES # BLD AUTO: 0 K/UL (ref 0–0.3)
IMM GRANULOCYTES NFR BLD: 0 %
IMM RETICS NFR: 29 % (ref 2.7–18.3)
IRON SATN MFR SERPL: 3 % (ref 20–55)
IRON SERPL-MCNC: 13 UG/DL (ref 61–157)
LYMPHOCYTES NFR BLD: 2.6 K/UL (ref 1.1–3.7)
LYMPHOCYTES RELATIVE PERCENT: 26 % (ref 24–43)
MCH RBC QN AUTO: 17.7 PG (ref 25.2–33.5)
MCHC RBC AUTO-ENTMCNC: 26.7 G/DL (ref 28.4–34.8)
MCV RBC AUTO: 66.3 FL (ref 82.6–102.9)
MONOCYTES NFR BLD: 0.7 K/UL (ref 0.1–1.2)
MONOCYTES NFR BLD: 7 % (ref 3–12)
MORPHOLOGY: ABNORMAL
NEUTROPHILS NFR BLD: 63 % (ref 36–65)
NEUTS SEG NFR BLD: 6.3 K/UL (ref 1.5–8.1)
NRBC BLD-RTO: 0 PER 100 WBC
PLATELET # BLD AUTO: 293 K/UL (ref 138–453)
PMV BLD AUTO: 9.1 FL (ref 8.1–13.5)
RBC # BLD AUTO: 4.8 M/UL (ref 4.21–5.77)
RETIC HEMOGLOBIN: 15.4 PG (ref 28.2–35.7)
RETICS # AUTO: 0.1 M/UL (ref 0.03–0.08)
RETICS/RBC NFR AUTO: 2 % (ref 0.5–1.9)
TIBC SERPL-MCNC: 466 UG/DL (ref 250–450)
UNSATURATED IRON BINDING CAPACITY: 453 UG/DL (ref 112–347)
VIT B12 SERPL-MCNC: 350 PG/ML (ref 232–1245)
WBC OTHER # BLD: 10 K/UL (ref 3.5–11.3)

## 2025-05-05 PROCEDURE — 84155 ASSAY OF PROTEIN SERUM: CPT

## 2025-05-05 PROCEDURE — 82607 VITAMIN B-12: CPT

## 2025-05-05 PROCEDURE — 83550 IRON BINDING TEST: CPT

## 2025-05-05 PROCEDURE — 82728 ASSAY OF FERRITIN: CPT

## 2025-05-05 PROCEDURE — 85045 AUTOMATED RETICULOCYTE COUNT: CPT

## 2025-05-05 PROCEDURE — 99205 OFFICE O/P NEW HI 60 MIN: CPT | Performed by: INTERNAL MEDICINE

## 2025-05-05 PROCEDURE — 36415 COLL VENOUS BLD VENIPUNCTURE: CPT

## 2025-05-05 PROCEDURE — 86334 IMMUNOFIX E-PHORESIS SERUM: CPT

## 2025-05-05 PROCEDURE — 83540 ASSAY OF IRON: CPT

## 2025-05-05 PROCEDURE — 83521 IG LIGHT CHAINS FREE EACH: CPT

## 2025-05-05 PROCEDURE — 84165 PROTEIN E-PHORESIS SERUM: CPT

## 2025-05-05 PROCEDURE — 82746 ASSAY OF FOLIC ACID SERUM: CPT

## 2025-05-05 PROCEDURE — 85025 COMPLETE CBC W/AUTO DIFF WBC: CPT

## 2025-05-05 NOTE — TELEPHONE ENCOUNTER
LOAN HERE FOR CONSULTATION  Labs now rv in 1 week to review labs   LABS DONE ON EXIT  MD VISIT 5/12/25 @ 12:45PM  AVS PRINTED W/ INSTRUCTIONS AND GIVEN TO PT ON EXIT

## 2025-05-05 NOTE — PROGRESS NOTES
Klever MISC Expires 2/2027 1 each 0     No current facility-administered medications for this visit.     Allergies:   Patient has no known allergies.    Review of Systems:    Constitutional: No fever or chills. No night sweats, no weight loss positive for fatigue  Eyes: No eye discharge, double vision, or eye pain   HEENT: negative for sore mouth, sore throat, hoarseness and voice change   Respiratory: negative for cough , sputum, dyspnea, wheezing, hemoptysis, chest pain   Cardiovascular: negative for chest pain, dyspnea, palpitations, orthopnea, PND   Gastrointestinal: negative for nausea, vomiting, diarrhea, constipation, abdominal pain, Dysphagia, hematemesis and hematochezia   Genitourinary: negative for frequency, dysuria, nocturia, urinary incontinence, and hematuria   Integument: negative for rash, skin lesions, bruises.   Hematologic/Lymphatic: negative for easy bruising, bleeding, lymphadenopathy, or petechiae   Endocrine: negative for heat or cold intolerance,weight changes, change in bowel habits and hair loss   Musculoskeletal: negative for myalgias, arthralgias, pain, joint swelling,and bone pain   Neurological: negative for headaches, dizziness, seizures, weakness, numbness    Physical Exam:  Vitals: /73 (BP Site: Left Upper Arm, Patient Position: Sitting)   Pulse 62   Temp (!) 96.7 °F (35.9 °C) (Temporal)   Resp 15   Ht 1.549 m (5' 1\")   Wt 115.7 kg (255 lb)   BMI 48.18 kg/m²   General appearance - well appearing, no in pain or distress  Mental status - AAO X3  Eyes - pupils equal and reactive, extraocular eye movements intact  Mouth - mucous membranes moist, pharynx normal without lesions  Neck - supple, no significant adenopathy  Lymphatics - no palpable lymphadenopathy, no hepatosplenomegaly  Chest - clear to auscultation, no wheezes, rales or rhonchi, symmetric air entry  Heart - normal rate, regular rhythm, normal S1, S2, no murmurs  Abdomen - soft, nontender, nondistended, no

## 2025-05-06 ENCOUNTER — TELEPHONE (OUTPATIENT)
Dept: ADMINISTRATIVE | Age: 60
End: 2025-05-06

## 2025-05-06 DIAGNOSIS — R14.0 ABDOMINAL BLOATING: ICD-10-CM

## 2025-05-06 DIAGNOSIS — R10.9 ABDOMINAL PAIN, UNSPECIFIED ABDOMINAL LOCATION: Primary | ICD-10-CM

## 2025-05-06 LAB
PATH REV BLD -IMP: NORMAL
SURGICAL PATHOLOGY REPORT: NORMAL

## 2025-05-06 NOTE — TELEPHONE ENCOUNTER
Patient is scheduled 8/6/2025 would like a sooner appt also wanted to know if he could get labs ordered for suliac disease ?    Please contact patient if we can order labs and move appt sooner

## 2025-05-07 ENCOUNTER — HOSPITAL ENCOUNTER (OUTPATIENT)
Age: 60
Discharge: HOME | End: 2025-05-07
Payer: COMMERCIAL

## 2025-05-07 DIAGNOSIS — R10.9 ABDOMINAL PAIN, UNSPECIFIED ABDOMINAL LOCATION: ICD-10-CM

## 2025-05-07 DIAGNOSIS — R14.0 ABDOMINAL BLOATING: ICD-10-CM

## 2025-05-07 LAB
ALBUMIN PERCENT: 55 % (ref 56–66)
ALBUMIN SERPL-MCNC: 3.9 G/DL (ref 3.2–5.2)
ALPHA 2 PERCENT: 10 % (ref 7–12)
ALPHA1 GLOB SERPL ELPH-MCNC: 0.3 G/DL (ref 0.1–0.4)
ALPHA1 GLOB SERPL ELPH-MCNC: 5 % (ref 3–5)
ALPHA2 GLOB SERPL ELPH-MCNC: 0.7 G/DL (ref 0.5–0.9)
B-GLOBULIN SERPL ELPH-MCNC: 1.1 G/DL (ref 0.7–1.4)
B-GLOBULIN SERPL ELPH-MCNC: 15 % (ref 8–13)
FREE KAPPA/LAMBDA RATIO: 1.39 (ref 0.22–1.74)
GAMMA GLOB SERPL ELPH-MCNC: 1.1 G/DL (ref 0.5–1.5)
GAMMA GLOBULIN %: 15 % (ref 11–19)
ITYP INTERPRETATION: ABNORMAL
KAPPA LC FREE SER-MCNC: 82.3 MG/L
LAMBDA LC FREE SERPL-MCNC: 59 MG/L (ref 4.2–27.7)
PATH REV: ABNORMAL
PATHOLOGIST: ABNORMAL
PROT PATTERN SERPL ELPH-IMP: ABNORMAL
PROT SERPL-MCNC: 7.2 G/DL (ref 6.6–8.7)
TOTAL PROT. SUM,%: 100 % (ref 98–102)
TOTAL PROT. SUM: 7.1 G/DL (ref 6.3–8.2)

## 2025-05-07 PROCEDURE — 36415 COLL VENOUS BLD VENIPUNCTURE: CPT

## 2025-05-07 PROCEDURE — 83516 IMMUNOASSAY NONANTIBODY: CPT

## 2025-05-07 PROCEDURE — 82784 ASSAY IGA/IGD/IGG/IGM EACH: CPT

## 2025-05-08 DIAGNOSIS — F32.A ANXIETY AND DEPRESSION: ICD-10-CM

## 2025-05-08 DIAGNOSIS — F41.9 ANXIETY AND DEPRESSION: ICD-10-CM

## 2025-05-08 LAB
GLIADIN IGA SER IA-ACNC: 67 U/ML
GLIADIN IGG SER IA-ACNC: 21 U/ML
IGA SERPL-MCNC: 605 MG/DL (ref 70–400)
TTG IGA SER IA-ACNC: >128 U/ML

## 2025-05-08 RX ORDER — VENLAFAXINE HYDROCHLORIDE 150 MG/1
CAPSULE, EXTENDED RELEASE ORAL DAILY
Qty: 14 CAPSULE | Refills: 0 | Status: SHIPPED | OUTPATIENT
Start: 2025-05-08

## 2025-05-09 ENCOUNTER — RESULTS FOLLOW-UP (OUTPATIENT)
Dept: GASTROENTEROLOGY | Age: 60
End: 2025-05-09

## 2025-05-12 ENCOUNTER — TELEPHONE (OUTPATIENT)
Age: 60
End: 2025-05-12

## 2025-05-12 ENCOUNTER — OFFICE VISIT (OUTPATIENT)
Age: 60
End: 2025-05-12
Payer: COMMERCIAL

## 2025-05-12 VITALS
HEIGHT: 71 IN | WEIGHT: 249.6 LBS | HEART RATE: 61 BPM | TEMPERATURE: 97.7 F | SYSTOLIC BLOOD PRESSURE: 129 MMHG | BODY MASS INDEX: 34.94 KG/M2 | DIASTOLIC BLOOD PRESSURE: 63 MMHG | RESPIRATION RATE: 16 BRPM

## 2025-05-12 DIAGNOSIS — Z79.01 ANTICOAGULATED: ICD-10-CM

## 2025-05-12 DIAGNOSIS — E61.1 IRON DEFICIENCY: ICD-10-CM

## 2025-05-12 DIAGNOSIS — D50.9 MICROCYTIC ANEMIA: Primary | ICD-10-CM

## 2025-05-12 DIAGNOSIS — K90.9 INTESTINAL MALABSORPTION, UNSPECIFIED TYPE: ICD-10-CM

## 2025-05-12 PROCEDURE — 99214 OFFICE O/P EST MOD 30 MIN: CPT | Performed by: INTERNAL MEDICINE

## 2025-05-12 RX ORDER — EPINEPHRINE 1 MG/ML
0.3 INJECTION, SOLUTION, CONCENTRATE INTRAVENOUS PRN
OUTPATIENT
Start: 2025-05-12

## 2025-05-12 RX ORDER — SODIUM CHLORIDE 9 MG/ML
5-250 INJECTION, SOLUTION INTRAVENOUS PRN
OUTPATIENT
Start: 2025-05-12

## 2025-05-12 RX ORDER — SODIUM CHLORIDE 0.9 % (FLUSH) 0.9 %
5-40 SYRINGE (ML) INJECTION PRN
OUTPATIENT
Start: 2025-05-12

## 2025-05-12 RX ORDER — ALBUTEROL SULFATE 90 UG/1
4 INHALANT RESPIRATORY (INHALATION) PRN
OUTPATIENT
Start: 2025-05-12

## 2025-05-12 RX ORDER — DIPHENHYDRAMINE HYDROCHLORIDE 50 MG/ML
50 INJECTION, SOLUTION INTRAMUSCULAR; INTRAVENOUS
OUTPATIENT
Start: 2025-05-12

## 2025-05-12 RX ORDER — MEPERIDINE HYDROCHLORIDE 50 MG/ML
12.5 INJECTION INTRAMUSCULAR; INTRAVENOUS; SUBCUTANEOUS PRN
OUTPATIENT
Start: 2025-05-12

## 2025-05-12 RX ORDER — ACETAMINOPHEN 325 MG/1
650 TABLET ORAL ONCE
OUTPATIENT
Start: 2025-05-12 | End: 2025-05-12

## 2025-05-12 RX ORDER — ONDANSETRON 2 MG/ML
8 INJECTION INTRAMUSCULAR; INTRAVENOUS
OUTPATIENT
Start: 2025-05-12

## 2025-05-12 RX ORDER — HYDROCORTISONE SODIUM SUCCINATE 100 MG/2ML
100 INJECTION INTRAMUSCULAR; INTRAVENOUS
OUTPATIENT
Start: 2025-05-12

## 2025-05-12 RX ORDER — HEPARIN SODIUM (PORCINE) LOCK FLUSH IV SOLN 100 UNIT/ML 100 UNIT/ML
500 SOLUTION INTRAVENOUS PRN
OUTPATIENT
Start: 2025-05-12

## 2025-05-12 RX ORDER — ACETAMINOPHEN 325 MG/1
650 TABLET ORAL
OUTPATIENT
Start: 2025-05-12

## 2025-05-12 RX ORDER — SODIUM CHLORIDE 9 MG/ML
INJECTION, SOLUTION INTRAVENOUS CONTINUOUS
OUTPATIENT
Start: 2025-05-12

## 2025-05-12 RX ORDER — FAMOTIDINE 10 MG/ML
20 INJECTION, SOLUTION INTRAVENOUS
OUTPATIENT
Start: 2025-05-12

## 2025-05-12 NOTE — TELEPHONE ENCOUNTER
LOAN HERE FOR MD VISIT  Infed infusion as soon approved  Rv in 4 months with labs   NEW ORDER IS PENDING PRECERT  LAB ORDERS GIVEN TO PT   MD VISIT 9/15/25 @ 2:15PM  AVS PRINTED W/ INSTRUCTIONS AND GIVEN TO PT ON EXIT

## 2025-05-12 NOTE — PROGRESS NOTES
Torrey Joy                                                                                                                  5/12/2025  MRN:   [id number]  YOB: 1965  PCP:                           Karine Cole APRN - CNP  Referring Physician: No ref. provider found  Treating Physician Name: DANICA MILLER MD      Reason for consultation:  Chief Complaint   Patient presents with    Results    Follow-up   Microcytic anemia    Current problems:  Microcytic anemia  Iron deficiency  Malabsorption/celiac disease  Wilcox's esophagus  A-fib on anticoagulation  Hemorrhoids  Sarcoidosis    Active and recent treatments:  Anticipating INFeD infusion    Interval history:  Patient presents to the clinic for a follow-up visit and to discuss results of his lab workup.  Patient lab workup consistent with iron deficiency.  B12 also low normal.  Celiac panel came back abnormal.  Patient has an appointment with GI in the next month or so.  Complains of being tired.  Has hemorrhoidal bleeding on and off.    During this visit patient's allergy, social, medical, surgical history and medications were reviewed and updated.    Summary of Case/History:  Torrey Joy a 59 y.o.male is a patient with microcytic anemia presents to the clinic to establish care and for further workup and evaluation.  History of Present Illness  The patient is a 59-year-old male who presents for evaluation of anemia.    He has been under the care of Dr. Zhao, a nephrologist, since 09/2024 due to chronic kidney disease (CKD) stage 3. His most recent laboratory tests were conducted on 04/28/2025, showed microcytic anemia.  Subsequently patient referred to our office for further workup and evaluation.  He has a history of anemia, necessitating blood transfusions. His hemoglobin was last recorded at 8.2, which is significantly lower than the normal range. He reports no current use of iron supplements.     He has a history of

## 2025-05-16 ENCOUNTER — PATIENT MESSAGE (OUTPATIENT)
Dept: PRIMARY CARE CLINIC | Age: 60
End: 2025-05-16

## 2025-05-16 DIAGNOSIS — F41.9 ANXIETY AND DEPRESSION: ICD-10-CM

## 2025-05-16 DIAGNOSIS — F32.A ANXIETY AND DEPRESSION: ICD-10-CM

## 2025-05-17 RX ORDER — VENLAFAXINE HYDROCHLORIDE 150 MG/1
150 CAPSULE, EXTENDED RELEASE ORAL DAILY
Qty: 14 CAPSULE | Refills: 0 | Status: SHIPPED | OUTPATIENT
Start: 2025-05-17

## 2025-05-20 DIAGNOSIS — F32.A ANXIETY AND DEPRESSION: ICD-10-CM

## 2025-05-20 DIAGNOSIS — F41.9 ANXIETY AND DEPRESSION: ICD-10-CM

## 2025-05-20 RX ORDER — VENLAFAXINE HYDROCHLORIDE 150 MG/1
150 CAPSULE, EXTENDED RELEASE ORAL DAILY
Qty: 30 CAPSULE | Refills: 0 | Status: SHIPPED | OUTPATIENT
Start: 2025-05-20

## 2025-05-20 NOTE — TELEPHONE ENCOUNTER
Last Visit Date: 4/28/2025   Next Visit Date: Visit date not found       Pt has been dispensed 14 pills the last couple refills and would like a full script, pended for 30

## 2025-05-30 ENCOUNTER — TELEPHONE (OUTPATIENT)
Dept: INFUSION THERAPY | Facility: MEDICAL CENTER | Age: 60
End: 2025-05-30

## 2025-05-30 NOTE — TELEPHONE ENCOUNTER
I TRIED TO CALL LOAN TO SCHEDULE HIS IRON INFUSION AND HAD TO LEAVE A MESSAGE TO CALL THE OFFICE TO SCHEDULE.

## 2025-06-02 ENCOUNTER — OFFICE VISIT (OUTPATIENT)
Dept: PRIMARY CARE CLINIC | Age: 60
End: 2025-06-02
Payer: COMMERCIAL

## 2025-06-02 VITALS
WEIGHT: 249.2 LBS | HEIGHT: 71 IN | OXYGEN SATURATION: 98 % | RESPIRATION RATE: 16 BRPM | BODY MASS INDEX: 34.89 KG/M2 | DIASTOLIC BLOOD PRESSURE: 76 MMHG | SYSTOLIC BLOOD PRESSURE: 128 MMHG

## 2025-06-02 DIAGNOSIS — F32.A ANXIETY AND DEPRESSION: ICD-10-CM

## 2025-06-02 DIAGNOSIS — F41.9 ANXIETY AND DEPRESSION: ICD-10-CM

## 2025-06-02 DIAGNOSIS — L30.9 DERMATITIS: Primary | ICD-10-CM

## 2025-06-02 PROCEDURE — 99214 OFFICE O/P EST MOD 30 MIN: CPT | Performed by: NURSE PRACTITIONER

## 2025-06-02 RX ORDER — HYDROXYZINE HYDROCHLORIDE 25 MG/1
25 TABLET, FILM COATED ORAL EVERY 8 HOURS PRN
Qty: 30 TABLET | Refills: 0 | Status: SHIPPED | OUTPATIENT
Start: 2025-06-02 | End: 2025-06-12

## 2025-06-02 RX ORDER — PREDNISONE 20 MG/1
TABLET ORAL
Qty: 18 TABLET | Refills: 0 | Status: SHIPPED | OUTPATIENT
Start: 2025-06-02 | End: 2025-06-12

## 2025-06-02 RX ORDER — VENLAFAXINE HYDROCHLORIDE 150 MG/1
150 CAPSULE, EXTENDED RELEASE ORAL DAILY
Qty: 90 CAPSULE | Refills: 3 | Status: SHIPPED | OUTPATIENT
Start: 2025-06-02

## 2025-06-02 SDOH — ECONOMIC STABILITY: FOOD INSECURITY: WITHIN THE PAST 12 MONTHS, YOU WORRIED THAT YOUR FOOD WOULD RUN OUT BEFORE YOU GOT MONEY TO BUY MORE.: NEVER TRUE

## 2025-06-02 SDOH — ECONOMIC STABILITY: FOOD INSECURITY: WITHIN THE PAST 12 MONTHS, THE FOOD YOU BOUGHT JUST DIDN'T LAST AND YOU DIDN'T HAVE MONEY TO GET MORE.: NEVER TRUE

## 2025-06-02 ASSESSMENT — PATIENT HEALTH QUESTIONNAIRE - PHQ9
SUM OF ALL RESPONSES TO PHQ QUESTIONS 1-9: 6
5. POOR APPETITE OR OVEREATING: NOT AT ALL
SUM OF ALL RESPONSES TO PHQ QUESTIONS 1-9: 6
2. FEELING DOWN, DEPRESSED OR HOPELESS: NOT AT ALL
9. THOUGHTS THAT YOU WOULD BE BETTER OFF DEAD, OR OF HURTING YOURSELF: NOT AT ALL
4. FEELING TIRED OR HAVING LITTLE ENERGY: NEARLY EVERY DAY
7. TROUBLE CONCENTRATING ON THINGS, SUCH AS READING THE NEWSPAPER OR WATCHING TELEVISION: NOT AT ALL
SUM OF ALL RESPONSES TO PHQ QUESTIONS 1-9: 6
8. MOVING OR SPEAKING SO SLOWLY THAT OTHER PEOPLE COULD HAVE NOTICED. OR THE OPPOSITE, BEING SO FIGETY OR RESTLESS THAT YOU HAVE BEEN MOVING AROUND A LOT MORE THAN USUAL: NOT AT ALL
6. FEELING BAD ABOUT YOURSELF - OR THAT YOU ARE A FAILURE OR HAVE LET YOURSELF OR YOUR FAMILY DOWN: NOT AT ALL
10. IF YOU CHECKED OFF ANY PROBLEMS, HOW DIFFICULT HAVE THESE PROBLEMS MADE IT FOR YOU TO DO YOUR WORK, TAKE CARE OF THINGS AT HOME, OR GET ALONG WITH OTHER PEOPLE: SOMEWHAT DIFFICULT
SUM OF ALL RESPONSES TO PHQ QUESTIONS 1-9: 6
1. LITTLE INTEREST OR PLEASURE IN DOING THINGS: NOT AT ALL
3. TROUBLE FALLING OR STAYING ASLEEP: NEARLY EVERY DAY

## 2025-06-02 ASSESSMENT — ENCOUNTER SYMPTOMS
SHORTNESS OF BREATH: 0
ABDOMINAL PAIN: 0
COUGH: 0
BACK PAIN: 0

## 2025-06-02 NOTE — PROGRESS NOTES
[facility]  [facility]  [address] [place] [address] [address]  [address]  [facility] [address]  Dept: [phone]  Dept Fax: [phone]    Torrey Joy is a 59 y.o. male who presentstoday for his medical conditions/complaints as noted below.  Torrey Joy is c/o of  Chief Complaint   Patient presents with    Rash     On back after injection with pain management Hefzy, rash started at back and butt has spread to groin and all over body, looks like eczema           HPI:     Presents for acute concerns  BP well controlled  Has lost 5lb since LOV    Concern for rash post back injection  Has a raised rash at top of buttocks  It is very itchy, he continually scratches  Has spread to small patches at elbows, above bilateral knees and in groin  Has tried topical steroid spray without improvement    Also had started supplements around time of rash outbreak- but has dcd use    Anxiety well controlled with use of effexor  Requesting 90 day rx    Denies any other problems/concerns        Hemoglobin A1C (%)   Date Value   04/28/2025 5.8   03/08/2024 6.1 (H)   03/03/2023 5.4             ( goal A1C is < 7)   No components found for: \"LABMICR\"  No components found for: \"LDLCHOLESTEROL\", \"LDLCALC\"    (goal LDL is <100)   AST (U/L)   Date Value   04/28/2025 21     ALT (U/L)   Date Value   04/28/2025 16     BUN (mg/dL)   Date Value   04/28/2025 29 (H)     BP Readings from Last 3 Encounters:   06/02/25 128/76   05/12/25 129/63   05/05/25 130/73          (vema111/80)    Past Medical History:   Diagnosis Date    Anemia     Anxiety 1982    Arthritis     Wilcox esophagus     Cataract     Chronic back pain     Depression     DU (duodenal ulcer)     Emphysema lung (HCC)     Emphysema of lung (HCC)     GERD (gastroesophageal reflux disease)     History of blood transfusion     [place] (hard of hearing)     Hyperlipidemia     Hypersomnia with sleep apnea, unspecified     CPAP nightly    Hypertension     Obesity     AGUS

## 2025-06-03 DIAGNOSIS — I48.92 ATRIAL FLUTTER, UNSPECIFIED TYPE (HCC): ICD-10-CM

## 2025-06-03 DIAGNOSIS — I48.19 PERSISTENT ATRIAL FIBRILLATION (HCC): ICD-10-CM

## 2025-06-03 DIAGNOSIS — I48.19 PERSISTENT ATRIAL FIBRILLATION (HCC): Primary | ICD-10-CM

## 2025-06-05 ENCOUNTER — OFFICE VISIT (OUTPATIENT)
Dept: ORTHOPEDIC SURGERY | Age: 60
End: 2025-06-05
Payer: COMMERCIAL

## 2025-06-05 VITALS — HEIGHT: 71 IN | BODY MASS INDEX: 34.86 KG/M2 | WEIGHT: 249 LBS

## 2025-06-05 DIAGNOSIS — M48.062 LUMBAR STENOSIS WITH NEUROGENIC CLAUDICATION: Primary | ICD-10-CM

## 2025-06-05 PROCEDURE — 99213 OFFICE O/P EST LOW 20 MIN: CPT | Performed by: ORTHOPAEDIC SURGERY

## 2025-06-05 NOTE — PROGRESS NOTES
Patient ID: Torrey Joy is a 59 y.o. male    Chief Compliant:  No chief complaint on file.       Diagnostic imaging:        Assessment and Plan:  1. Lumbar stenosis with neurogenic claudication    Lumbar spinal stenosis neurogenic claudication with some radicular leg pain.    Patient is not quite ready for surgery regardless patient also recently diagnosed with anemia and celiac disease          I discussed he is a candidate for a L2-5 PLIF    Benefits, risks, and recovery process were discussed with the patient. Risks include infection, bleeding, neurologic injury, systemic and anesthetic complications, no relief of pain, need for future surgery.    Patient states due to upcoming medical treatments, such as an iron infusion, he would like to wait a while to move forward with surgery.    Follow up in 8 weeks    HPI:  This is a 59 y.o. male who presents to the clinic today for follow up evaluation of low back pain.     Ongoing low back pain with occasional bilateral buttock pain with paresthesias     Patient states he was recently diagnosed with celiac disease and he is receiving an iron infusion soon.      Review of Systems   All other systems reviewed and are negative.      Past History:    Current Outpatient Medications:     torsemide (DEMADEX) 20 MG tablet, TAKE 1 TABLET BY MOUTH 2 TIMES DAILY AS NEEDED (PT TO TAKE SECOND DOSING AT NIGHT IF SWELLING AND GAINED MORE THEN 4-5LB) PT TO TAKE SECOND DOSING AT NIGHT IF SWELLING AND GAINED MORE THEN 4-5LB, Disp: 45 tablet, Rfl: 1    predniSONE (DELTASONE) 20 MG tablet, 3 tabs x 3 days, then 2 tabs x 3 days, then 1 tab x 3 days, Disp: 18 tablet, Rfl: 0    hydrOXYzine HCl (ATARAX) 25 MG tablet, Take 1 tablet by mouth every 8 hours as needed for Itching, Disp: 30 tablet, Rfl: 0    venlafaxine (EFFEXOR XR) 150 MG extended release capsule, Take 1 capsule by mouth daily, Disp: 90 capsule, Rfl: 3    cyanocobalamin ([employer] VITAMIN B12) 1000 MCG tablet, Take 1 tablet by mouth

## 2025-06-06 PROCEDURE — 93297 REM INTERROG DEV EVAL ICPMS: CPT | Performed by: SPECIALIST

## 2025-06-11 ENCOUNTER — CLINICAL DOCUMENTATION (OUTPATIENT)
Facility: HOSPITAL | Age: 60
End: 2025-06-11

## 2025-06-11 ENCOUNTER — HOSPITAL ENCOUNTER (OUTPATIENT)
Dept: INFUSION THERAPY | Facility: MEDICAL CENTER | Age: 60
Discharge: HOME | End: 2025-06-11
Payer: COMMERCIAL

## 2025-06-11 VITALS
SYSTOLIC BLOOD PRESSURE: 143 MMHG | RESPIRATION RATE: 18 BRPM | HEART RATE: 67 BPM | DIASTOLIC BLOOD PRESSURE: 84 MMHG | TEMPERATURE: 98.4 F

## 2025-06-11 DIAGNOSIS — E61.1 IRON DEFICIENCY: Primary | ICD-10-CM

## 2025-06-11 DIAGNOSIS — K90.9 INTESTINAL MALABSORPTION, UNSPECIFIED TYPE: ICD-10-CM

## 2025-06-11 PROCEDURE — 96365 THER/PROPH/DIAG IV INF INIT: CPT

## 2025-06-11 PROCEDURE — 6360000002 HC RX W HCPCS: Performed by: INTERNAL MEDICINE

## 2025-06-11 PROCEDURE — 2580000003 HC RX 258: Performed by: INTERNAL MEDICINE

## 2025-06-11 PROCEDURE — 96366 THER/PROPH/DIAG IV INF ADDON: CPT

## 2025-06-11 PROCEDURE — 96375 TX/PRO/DX INJ NEW DRUG ADDON: CPT

## 2025-06-11 RX ORDER — HYDROCORTISONE SODIUM SUCCINATE 100 MG/2ML
100 INJECTION INTRAMUSCULAR; INTRAVENOUS
OUTPATIENT
Start: 2025-06-11

## 2025-06-11 RX ORDER — HEPARIN 100 UNIT/ML
500 SYRINGE INTRAVENOUS PRN
OUTPATIENT
Start: 2025-06-11

## 2025-06-11 RX ORDER — SODIUM CHLORIDE 9 MG/ML
INJECTION, SOLUTION INTRAVENOUS CONTINUOUS
OUTPATIENT
Start: 2025-06-11

## 2025-06-11 RX ORDER — SODIUM CHLORIDE 9 MG/ML
5-250 INJECTION, SOLUTION INTRAVENOUS PRN
OUTPATIENT
Start: 2025-06-11

## 2025-06-11 RX ORDER — ACETAMINOPHEN 325 MG/1
650 TABLET ORAL
OUTPATIENT
Start: 2025-06-11

## 2025-06-11 RX ORDER — ONDANSETRON 2 MG/ML
8 INJECTION INTRAMUSCULAR; INTRAVENOUS
OUTPATIENT
Start: 2025-06-11

## 2025-06-11 RX ORDER — MEPERIDINE HYDROCHLORIDE 50 MG/ML
12.5 INJECTION INTRAMUSCULAR; INTRAVENOUS; SUBCUTANEOUS PRN
OUTPATIENT
Start: 2025-06-11

## 2025-06-11 RX ORDER — SODIUM CHLORIDE 9 MG/ML
5-250 INJECTION, SOLUTION INTRAVENOUS PRN
Status: CANCELLED | OUTPATIENT
Start: 2025-06-11

## 2025-06-11 RX ORDER — FAMOTIDINE 10 MG/ML
20 INJECTION, SOLUTION INTRAVENOUS
OUTPATIENT
Start: 2025-06-11

## 2025-06-11 RX ORDER — DEXAMETHASONE SODIUM PHOSPHATE 10 MG/ML
10 INJECTION, SOLUTION INTRA-ARTICULAR; INTRALESIONAL; INTRAMUSCULAR; INTRAVENOUS; SOFT TISSUE ONCE
Status: CANCELLED | OUTPATIENT
Start: 2025-06-11 | End: 2025-06-11

## 2025-06-11 RX ORDER — ALBUTEROL SULFATE 90 UG/1
4 INHALANT RESPIRATORY (INHALATION) PRN
OUTPATIENT
Start: 2025-06-11

## 2025-06-11 RX ORDER — EPINEPHRINE 1 MG/ML
0.3 INJECTION, SOLUTION INTRAMUSCULAR; SUBCUTANEOUS PRN
OUTPATIENT
Start: 2025-06-11

## 2025-06-11 RX ORDER — DIPHENHYDRAMINE HYDROCHLORIDE 50 MG/ML
50 INJECTION, SOLUTION INTRAMUSCULAR; INTRAVENOUS
OUTPATIENT
Start: 2025-06-11

## 2025-06-11 RX ORDER — SODIUM CHLORIDE 9 MG/ML
5-250 INJECTION, SOLUTION INTRAVENOUS PRN
Status: DISCONTINUED | OUTPATIENT
Start: 2025-06-11 | End: 2025-06-12 | Stop reason: HOSPADM

## 2025-06-11 RX ORDER — ACETAMINOPHEN 325 MG/1
650 TABLET ORAL ONCE
Status: DISCONTINUED | OUTPATIENT
Start: 2025-06-11 | End: 2025-06-12 | Stop reason: HOSPADM

## 2025-06-11 RX ORDER — ACETAMINOPHEN 325 MG/1
650 TABLET ORAL ONCE
Status: CANCELLED | OUTPATIENT
Start: 2025-06-11 | End: 2025-06-11

## 2025-06-11 RX ORDER — DEXAMETHASONE SODIUM PHOSPHATE 10 MG/ML
10 INJECTION, SOLUTION INTRA-ARTICULAR; INTRALESIONAL; INTRAMUSCULAR; INTRAVENOUS; SOFT TISSUE ONCE
Status: COMPLETED | OUTPATIENT
Start: 2025-06-11 | End: 2025-06-11

## 2025-06-11 RX ORDER — SODIUM CHLORIDE 0.9 % (FLUSH) 0.9 %
5-40 SYRINGE (ML) INJECTION PRN
OUTPATIENT
Start: 2025-06-11

## 2025-06-11 RX ADMIN — SODIUM CHLORIDE 50 ML/HR: 0.9 INJECTION, SOLUTION INTRAVENOUS at 08:20

## 2025-06-11 RX ADMIN — SODIUM CHLORIDE 975 MG: 0.9 INJECTION, SOLUTION INTRAVENOUS at 10:05

## 2025-06-11 RX ADMIN — DEXAMETHASONE SODIUM PHOSPHATE 10 MG: 10 INJECTION INTRAMUSCULAR; INTRAVENOUS at 09:34

## 2025-06-11 RX ADMIN — SODIUM CHLORIDE 25 MG: 9 INJECTION, SOLUTION INTRAVENOUS at 08:31

## 2025-06-11 NOTE — PROGRESS NOTES
Patient presents ambulatory for Infed infusion. VS as charted. Medications and allergies reviewed. Pt denies any current issues. IV inserted per protocol to right forearm and IV fluids started.    Patient provided written and oral information regarding Infed. All questions answered to patient satisfaction.     Infed test dose administered with no adverse reactions noted, line flushed and patient monitored for 60 minutes. Patient premedicated with Decadron only due to patient taking Tylenol Arthritis Pain this morning around 0700. Infed administered over 120 minutes, pt tolerated well with no adverse reactions. Line flushed.     IV removed per protocol and dressing applied. Pt discharged ambulatory and aware of next appointment.

## 2025-06-11 NOTE — PROGRESS NOTES
Patient Assistance    Insurance and treatment reviewed; no assistance available.

## 2025-06-13 PROCEDURE — NBSRV NON-BILLABLE SERVICE: Performed by: SPECIALIST

## 2025-06-16 DIAGNOSIS — I48.92 ATRIAL FLUTTER, UNSPECIFIED TYPE (HCC): ICD-10-CM

## 2025-06-16 DIAGNOSIS — I48.19 PERSISTENT ATRIAL FIBRILLATION (HCC): ICD-10-CM

## 2025-06-16 PROCEDURE — NBSRV INTERROGATION EVAL REMOTE </30 D CV MNTR SYS: Performed by: SPECIALIST

## 2025-06-18 ENCOUNTER — OFFICE VISIT (OUTPATIENT)
Age: 60
End: 2025-06-18

## 2025-06-18 VITALS
OXYGEN SATURATION: 97 % | DIASTOLIC BLOOD PRESSURE: 80 MMHG | HEART RATE: 65 BPM | WEIGHT: 250 LBS | SYSTOLIC BLOOD PRESSURE: 139 MMHG | BODY MASS INDEX: 34.88 KG/M2

## 2025-06-18 DIAGNOSIS — I48.0 PAROXYSMAL ATRIAL FIBRILLATION (HCC): ICD-10-CM

## 2025-06-18 DIAGNOSIS — G47.33 OSA (OBSTRUCTIVE SLEEP APNEA): ICD-10-CM

## 2025-06-18 DIAGNOSIS — E66.812 CLASS 2 OBESITY: ICD-10-CM

## 2025-06-18 DIAGNOSIS — I48.92 ATRIAL FIBRILLATION AND FLUTTER (HCC): Primary | ICD-10-CM

## 2025-06-18 DIAGNOSIS — I48.92 ATRIAL FLUTTER, UNSPECIFIED TYPE (HCC): ICD-10-CM

## 2025-06-18 DIAGNOSIS — I48.91 ATRIAL FIBRILLATION AND FLUTTER (HCC): Primary | ICD-10-CM

## 2025-06-18 NOTE — PROGRESS NOTES
[facility] CARDIAC ELECTROPHYSIOLOGY  [address], [address]  [place]  [address]    Date of Visit:  2025  Patient Name: Torrey Joy   Patient :  1965   Referring By:  No ref. provider found     CHIEF COMPLAINT/HPI:     Torrey Joy is a 59 y.o. male who presents today for an general visit to be evaluated for the following condition(s):  Chief Complaint   Patient presents with    Follow-up     FOLLOW UP - 5 month f/u atrial flutter     Patient feels sick the flutter ablation was done has not been admitted to the hospital.  He better.  He is not aware of episodes of atrial flutter or fibrillation gives no symptoms to suggest intolerance to the amiodarone was found to have iron deficiency and was started told to have celiac disease.  He is in atrial flutter/fibrillation and only 9.9% of the time significantly better that started he was in it all the time and required repeated hospital admission admission to manage the cyst  REVIEW OF SYSTEM      Review of Systems  Noncontributory  REVIEWED INFORMATION      No Known Allergies    Current Outpatient Medications   Medication Sig Dispense Refill    torsemide (DEMADEX) 20 MG tablet TAKE 1 TABLET BY MOUTH 2 TIMES DAILY AS NEEDED (PT TO TAKE SECOND DOSING AT NIGHT IF SWELLING AND GAINED MORE THEN 4-5LB) PT TO TAKE SECOND DOSING AT NIGHT IF SWELLING AND GAINED MORE THEN 4-5LB 45 tablet 1    venlafaxine (EFFEXOR XR) 150 MG extended release capsule Take 1 capsule by mouth daily 90 capsule 3    cyanocobalamin ([employer] VITAMIN B12) 1000 MCG tablet Take 1 tablet by mouth three times a week 30 tablet 1    vitamin D (CHOLECALCIFEROL) 98928 UNIT CAPS Take 1 capsule by mouth once a week for 12 doses 12 capsule 0    apixaban (ELIQUIS) 5 MG TABS tablet Take 1 tablet by mouth 2 times daily 60 tablet 5    amiodarone (CORDARONE) 200 MG tablet Take 1 tablet by mouth daily 30 tablet 5    empagliflozin (JARDIANCE) 10 MG tablet Take 1 tablet by mouth daily 90 tablet 3

## 2025-06-20 NOTE — TELEPHONE ENCOUNTER
Name:  Joseph Morales  :  1977  Admit Date:  2025    Subjective   Wife at bs.     Occasional palpitation.   Breathing stable. Denies f/c.  Noted some dry cough.   C.o thirst.     Concerned about approach for planned ablation given he had prior R femoral artery repair following complications after cardiac cath.   Endorsed to EP via PS.       Objective       Vitals  Vitals with min/max:      Vital Last Value 24 Hour Range   Temperature 98.1 °F (36.7 °C) (25 1113) Temp  Min: 97.3 °F (36.3 °C)  Max: 98.6 °F (37 °C)   Pulse 84 (25 1113) Pulse  Min: 60  Max: 86   Respiratory 18 (25 1113) Resp  Min: 18  Max: 20   Non-Invasive  Blood Pressure 118/81 (25 1113) BP  Min: 92/60  Max: 121/74   Pulse Oximetry 97 % (25 1113) SpO2  Min: 92 %  Max: 98 %   Arterial   Blood Pressure   No data recorded        I/O's  No intake or output data in the 24 hours ending 25 1259    Weight    25 0649 25 0600 25 1030 25 1149   Weight: 119.8 kg (264 lb 1.8 oz) 116.8 kg (257 lb 8 oz) 116.8 kg (257 lb 8 oz) 116.4 kg (256 lb 9.9 oz)        Body mass index is 46.94 kg/m².    Physical exam  Gen.: NAD  Head: Atraumatic  Eyes: Anicteric  ENT: Hearing intact, lips moist  Neck: obese  Cardiac: RRR  Chest: Clear to auscultation bilaterally, no wheezing/rales/rhonchi  Vascular:  no pretibial edema, no calf pain  Abdomen: obese, soft, nontender/nondistended, no rebound tenderness/guarding/rigidity  Musculoskeletal:  moves all 4 extremities  Neurologic exam: Aox3  , no focal motor neurologic deficits noted  Psychiatric: cooperative  Skin:  psoriatic plaques arms, low back, legs    Laboratory Results:  Recent Labs   Lab 25  0409 25  0321 25  1354 25  0804 25  0330 25  1613   WBC 9.3 9.8  --   --  10.0 10.7   HCT 47.0 46.7  --   --  40.8 42.5   HGB 14.6 14.4  --   --  12.4* 13.3    302  --   --  242 274   SODIUM 140 140  --  139 142 140   POTASSIUM  Spoke with the pharmacy and informed of PCP's instructions, the pharmacy verbalized understanding of PCP instructions.      3.6 3.7 4.0 3.0* 4.0 4.4   CHLORIDE 99 98  --  96* 102 103   CO2 29 30  --  31 31 28   GLUCOSE 117* 118*  --  119* 108* 119*   BUN 19 19  --  13 13 14   CREATININE 1.20* 1.17  --  1.03 0.98 0.85   CALCIUM 8.4 8.4  --  8.3* 8.1* 8.4   ALBUMIN  --   --   --  3.5 3.4 3.3*   MG  --  1.9  --   --  1.9 2.0   BILIRUBIN  --   --   --  1.4* 1.2* 1.0   ALKPT  --   --   --  54 47 56   AST  --   --   --  19 9 19   GPT  --   --   --  25 26 33       Microbiology:  Microbiology Results       None            Imaging  XR CHEST AP OR PA   Final Result   1. Pulmonary edema.      Electronically Signed by: VA RAYMOND M.D.    Signed on: 6/16/2025 5:02 PM    Workstation ID: [id number]      EP Case    (Results Pending)         Active Medications  Current Facility-Administered Medications   Medication    bumetanide (BUMEX) injection 2 mg    triamcinolone (ARISTOCORT) 0.1 % cream    Potassium Replacement (Levels 3.6 - 4)    metoPROLOL succinate (TOPROL-XL) ER tablet 12.5 mg    Upadacitinib ER TABLET SR 24 HR 15 mg    AMIODarone (PACERONE) tablet 200 mg    apixaBAN (ELIQUIS) tablet 5 mg    digoxin (LANOXIN) tablet 250 mcg    pantoprazole (PROTONIX) EC tablet 40 mg    predniSONE (DELTASONE) tablet 5 mg    [Held by provider] sacubitril-valsartan (ENTRESTO) 24-26 MG per tablet 1 tablet    [Held by provider] spironolactone (ALDACTONE) tablet 12.5 mg    sodium chloride 0.9 % injection 10 mL    sodium chloride 0.9 % injection 2 mL    acetaminophen (TYLENOL) tablet 650 mg    Or    acetaminophen (TYLENOL) suppository 650 mg    melatonin tablet 3 mg    polyethylene glycol (MIRALAX) packet 17 g         Assessment and Plan  56-year-old male with past medical history significant for psoriatic arthritis (on chronic prednisone and Rinvoq), HFrEF (status post decommissioned LVAD), ALEXIS (not on CPAP) that presented with interhospital emergency department on June 16, 2025 due to shortness of breath.     Acute on chronic heart failure with reduced  ejection fraction  Prior hx of LVAD due to NICM  ICD in place  Prior records reviewed: Follows Dr. Faisal Ortega. Heartmate 2LVAD (implanted Feb 2015. LVAD decommissioned (February 2019) due to malfunction. However, there was LV recovery  NT pro BNP 2500s (700s 2 months ago)  PTA Entresto and spironolactone were held due to hypotension: GDMT as BP permits  Diuresis per cardiology.  Transitioned to bumex 2 mg po bid, spironolactone   Albumin 25% 12.5g x 1 concurrently for hypotension  Cardiology consulted appreciate recs; 6/17/25 TTE with reduced EF 20% with diffuse hypokinesis, previous TTE 6/12/23 with EF35%.   Pt was given amiodarone bolus and metoprolol    Paroxysmal atrial fibrillation with RVR  Atrial flutter  Continue metoprolol  Continue amiodarone and digoxin  QTC reviewed and not proglonged (438)  Tentative plan to dc digoxin post ablation per EP  Continue Eliquis  6/17 PM given amiodarone bolus converted to sinus  EP consulted appreciate recs.  Plan for ablation today, 6/20.  Discussed with Dr. Dunn.    Hypotension improved  Transient.  Potentially iatrogenic from medication  Hold Entresto and spironolactone for now  Holding parameters placed on metoprolol     Acute respiratory failure with hypoxia resolved  Evidenced by increased O2 requirements beyond baseline  Due to HFrEF exacerbation.  Management is as above  Wean O2 as tolerated, on 4 L NC.      Cough  Likely cardiac cough  COVID/flu/RSV neg     Psoriatic arthritis  Follows Dr. Nancy Marquis  Continue prednisone 5 mg daily (recently completed a taper)  Continue Rinvoq (upadacitinib) if patient brings in his home supply  Added Triamcinolone cream for LE     Obstructive sleep apnea  Off cpap due to insurance issues. Would like CPAP inpatient  Cpap nightly     GERD without esophagitis  Continue PPI    Hx R femoral artery aneurysm repair  Morbid obesity       DVT Prophylaxis    Current Active Medications for DVT Prophylaxis (From admission,  onward)           Stop     apixaBAN (ELIQUIS) tablet 5 mg  5 mg,   Oral,   EVERY 12 HOURS        Note to Pharmacy: OP SIG:Take 1 tablet by mouth every 12 hours.      --                   FULL CODE.       The patient's treatment plans were discussed with patient and family, RN, and consultant(s).    Discharge Planning      Barriers to discharge: Patient is not medically ready and needs to remain in the hospital today due to ablation today  Anticipated discharge destination: Home  Expected Discharge Date: 6/20/2025         PCP  Paolo Logan MD Henna Mussani, DO  [facility]

## 2025-06-23 DIAGNOSIS — I48.19 PERSISTENT ATRIAL FIBRILLATION (HCC): ICD-10-CM

## 2025-06-23 DIAGNOSIS — I48.92 ATRIAL FLUTTER, UNSPECIFIED TYPE (HCC): ICD-10-CM

## 2025-06-24 DIAGNOSIS — F32.A ANXIETY AND DEPRESSION: ICD-10-CM

## 2025-06-24 DIAGNOSIS — F41.9 ANXIETY AND DEPRESSION: ICD-10-CM

## 2025-06-25 RX ORDER — ALPRAZOLAM 0.5 MG
0.5 TABLET ORAL 2 TIMES DAILY PRN
Qty: 180 TABLET | Refills: 0 | Status: SHIPPED | OUTPATIENT
Start: 2025-06-25 | End: 2025-09-23

## 2025-07-14 PROCEDURE — 93297 REM INTERROG DEV EVAL ICPMS: CPT | Performed by: SPECIALIST

## 2025-07-21 DIAGNOSIS — I48.19 PERSISTENT ATRIAL FIBRILLATION (HCC): ICD-10-CM

## 2025-07-21 DIAGNOSIS — I48.92 ATRIAL FLUTTER, UNSPECIFIED TYPE (HCC): ICD-10-CM

## 2025-07-21 PROCEDURE — NBSRV INTERROGATION EVAL REMOTE </30 D CV MNTR SYS: Performed by: SPECIALIST

## 2025-07-22 DIAGNOSIS — I48.92 ATRIAL FLUTTER, UNSPECIFIED TYPE (HCC): ICD-10-CM

## 2025-07-22 DIAGNOSIS — I48.19 PERSISTENT ATRIAL FIBRILLATION (HCC): ICD-10-CM

## 2025-07-22 PROCEDURE — NBSRV INTERROGATION EVAL REMOTE </30 D CV MNTR SYS: Performed by: SPECIALIST

## 2025-07-24 ENCOUNTER — HOSPITAL ENCOUNTER (OUTPATIENT)
Dept: PULMONOLOGY | Age: 60
Discharge: HOME | End: 2025-07-24
Attending: SPECIALIST
Payer: COMMERCIAL

## 2025-07-24 DIAGNOSIS — I48.92 ATRIAL FIBRILLATION AND FLUTTER (HCC): ICD-10-CM

## 2025-07-24 DIAGNOSIS — I48.91 ATRIAL FIBRILLATION AND FLUTTER (HCC): ICD-10-CM

## 2025-07-24 LAB
DLCO %PRED: NORMAL
DLCO PRED: NORMAL
DLCO/VA %PRED: NORMAL
DLCO/VA PRED: NORMAL
DLCO/VA: NORMAL
DLCO: NORMAL
EXPIRATORY TIME: NORMAL
FEF 25-75% %PRED-PRE: NORMAL
FEF 25-75% PRED: NORMAL
FEF 25-75-PRE: NORMAL
FEV1 %PRED-PRE: NORMAL
FEV1 PRED: NORMAL
FEV1/FVC %PRED-PRE: NORMAL
FEV1/FVC PRED: NORMAL
FEV1/FVC: NORMAL
FEV1: NORMAL
FVC %PRED-PRE: NORMAL
FVC PRED: NORMAL
FVC: NORMAL
GAW %PRED: NORMAL
GAW PRED: NORMAL
GAW: NORMAL
IC PRE %PRED: NORMAL
IC PRED: NORMAL
IC: NORMAL
MVV %PRED-PRE: NORMAL
MVV PRED: NORMAL
MVV-PRE: NORMAL
PEF %PRED-PRE: NORMAL
PEF PRED: NORMAL
PEF-PRE: NORMAL
RAW %PRED: NORMAL
RAW PRED: NORMAL
RAW: NORMAL
RV PRE %PRED: NORMAL
RV PRED: NORMAL
RV: NORMAL
SVC %PRED: NORMAL
SVC PRED: NORMAL
SVC: NORMAL
TLC PRE %PRED: NORMAL
TLC PRED: NORMAL
TLC: NORMAL
VA %PRED: NORMAL
VA PRED: NORMAL
VA: NORMAL
VTG %PRED: NORMAL
VTG PRED: NORMAL
VTG: NORMAL

## 2025-07-24 PROCEDURE — 94729 DIFFUSING CAPACITY: CPT

## 2025-07-24 PROCEDURE — 94726 PLETHYSMOGRAPHY LUNG VOLUMES: CPT

## 2025-07-24 PROCEDURE — 94060 EVALUATION OF WHEEZING: CPT

## 2025-07-24 PROCEDURE — 6370000000 HC RX 637 (ALT 250 FOR IP): Performed by: SPECIALIST

## 2025-07-24 RX ORDER — ALBUTEROL SULFATE 90 UG/1
2 INHALANT RESPIRATORY (INHALATION) ONCE
Status: COMPLETED | OUTPATIENT
Start: 2025-07-24 | End: 2025-07-24

## 2025-07-24 RX ADMIN — ALBUTEROL SULFATE 2 PUFF: 90 AEROSOL, METERED RESPIRATORY (INHALATION) at 15:10

## 2025-07-24 NOTE — PROCEDURES
Impression:    Mild restrictive lung disease with air trapping normal diffusion and no significant response to bronchodilators.  Clinical correlation is recommended.        Rikki Galarza MD  Pulmonary critical care and sleep medicine

## 2025-08-05 ENCOUNTER — TELEPHONE (OUTPATIENT)
Age: 60
End: 2025-08-05

## 2025-08-06 ENCOUNTER — OFFICE VISIT (OUTPATIENT)
Dept: GASTROENTEROLOGY | Age: 60
End: 2025-08-06
Payer: COMMERCIAL

## 2025-08-06 VITALS
OXYGEN SATURATION: 99 % | WEIGHT: 252 LBS | HEIGHT: 71 IN | HEART RATE: 56 BPM | BODY MASS INDEX: 35.28 KG/M2 | DIASTOLIC BLOOD PRESSURE: 84 MMHG | SYSTOLIC BLOOD PRESSURE: 151 MMHG

## 2025-08-06 DIAGNOSIS — K64.9 HEMORRHOIDS, UNSPECIFIED HEMORRHOID TYPE: ICD-10-CM

## 2025-08-06 DIAGNOSIS — K22.70 BARRETT'S ESOPHAGUS WITHOUT DYSPLASIA: ICD-10-CM

## 2025-08-06 DIAGNOSIS — E61.1 IRON DEFICIENCY: ICD-10-CM

## 2025-08-06 DIAGNOSIS — K70.0 ALCOHOL INDUCED FATTY LIVER: ICD-10-CM

## 2025-08-06 DIAGNOSIS — F10.29 ALCOHOL DEPENDENCE WITH UNSPECIFIED ALCOHOL-INDUCED DISORDER (HCC): ICD-10-CM

## 2025-08-06 DIAGNOSIS — K90.0 CELIAC DISEASE: Primary | ICD-10-CM

## 2025-08-06 PROCEDURE — 99214 OFFICE O/P EST MOD 30 MIN: CPT | Performed by: PHYSICIAN ASSISTANT

## 2025-08-07 ENCOUNTER — HOSPITAL ENCOUNTER (OUTPATIENT)
Age: 60
Discharge: HOME | End: 2025-08-07
Payer: COMMERCIAL

## 2025-08-07 DIAGNOSIS — E61.1 IRON DEFICIENCY: ICD-10-CM

## 2025-08-07 DIAGNOSIS — K90.0 CELIAC DISEASE: ICD-10-CM

## 2025-08-07 LAB
25(OH)D3 SERPL-MCNC: 64.7 NG/ML (ref 30–100)
FOLATE SERPL-MCNC: 5.3 NG/ML (ref 4.8–24.2)
IRON SATN MFR SERPL: 31 % (ref 20–55)
IRON SERPL-MCNC: 102 UG/DL (ref 61–157)
TIBC SERPL-MCNC: 332 UG/DL (ref 250–450)
UNSATURATED IRON BINDING CAPACITY: 230 UG/DL (ref 112–347)
VIT B12 SERPL-MCNC: 867 PG/ML (ref 232–1245)

## 2025-08-07 PROCEDURE — 82607 VITAMIN B-12: CPT

## 2025-08-07 PROCEDURE — 82746 ASSAY OF FOLIC ACID SERUM: CPT

## 2025-08-07 PROCEDURE — 84630 ASSAY OF ZINC: CPT

## 2025-08-07 PROCEDURE — 83550 IRON BINDING TEST: CPT

## 2025-08-07 PROCEDURE — 84207 ASSAY OF VITAMIN B-6: CPT

## 2025-08-07 PROCEDURE — 84446 ASSAY OF VITAMIN E: CPT

## 2025-08-07 PROCEDURE — 84590 ASSAY OF VITAMIN A: CPT

## 2025-08-07 PROCEDURE — 36415 COLL VENOUS BLD VENIPUNCTURE: CPT

## 2025-08-07 PROCEDURE — 83540 ASSAY OF IRON: CPT

## 2025-08-07 PROCEDURE — 82306 VITAMIN D 25 HYDROXY: CPT

## 2025-08-07 PROCEDURE — 84597 ASSAY OF VITAMIN K: CPT

## 2025-08-10 LAB
ALPHA-TOCOPHEROL: 7.6 MG/L (ref 5.5–18)
GAMMA-TOCOPHEROL: 1.8 MG/L (ref 0–6)
ZINC SERPL-MCNC: 52.9 UG/DL (ref 60–120)

## 2025-08-11 LAB
RETINYL PALMITATE: 0.02 MG/L (ref 0–0.1)
VITAMIN A LEVEL: 0.93 MG/L (ref 0.3–1.2)
VITAMIN A, INTERP: NORMAL

## 2025-08-12 ENCOUNTER — HOSPITAL ENCOUNTER (OUTPATIENT)
Dept: MAMMOGRAPHY | Age: 60
Discharge: HOME | End: 2025-08-14
Payer: COMMERCIAL

## 2025-08-12 DIAGNOSIS — K90.0 CELIAC DISEASE: ICD-10-CM

## 2025-08-12 LAB
PHYTONADIONE SERPL-MCNC: 1.5 NMOL/L (ref 0.22–4.88)
PYRIDOXAL PHOS SERPL-SCNC: 27.1 NMOL/L (ref 20–125)

## 2025-08-12 PROCEDURE — 77080 DXA BONE DENSITY AXIAL: CPT

## 2025-08-13 ENCOUNTER — RESULTS FOLLOW-UP (OUTPATIENT)
Dept: GASTROENTEROLOGY | Age: 60
End: 2025-08-13

## 2025-08-13 ENCOUNTER — OFFICE VISIT (OUTPATIENT)
Dept: SURGERY | Age: 60
End: 2025-08-13
Payer: COMMERCIAL

## 2025-08-13 VITALS
DIASTOLIC BLOOD PRESSURE: 86 MMHG | WEIGHT: 252 LBS | SYSTOLIC BLOOD PRESSURE: 147 MMHG | BODY MASS INDEX: 35.28 KG/M2 | HEIGHT: 71 IN | HEART RATE: 61 BPM

## 2025-08-13 DIAGNOSIS — E60 ZINC DEFICIENCY: Primary | ICD-10-CM

## 2025-08-13 DIAGNOSIS — K64.3 FOURTH DEGREE HEMORRHOIDS: Primary | ICD-10-CM

## 2025-08-13 PROCEDURE — 99212 OFFICE O/P EST SF 10 MIN: CPT | Performed by: COLON & RECTAL SURGERY

## 2025-08-13 RX ORDER — B-COMPLEX WITH VITAMIN C
1 TABLET ORAL DAILY
Qty: 30 TABLET | Refills: 0 | Status: SHIPPED | OUTPATIENT
Start: 2025-08-13

## 2025-08-13 RX ORDER — APIXABAN 5 MG/1
5 TABLET, FILM COATED ORAL 2 TIMES DAILY
Qty: 180 TABLET | Refills: 1 | Status: SHIPPED | OUTPATIENT
Start: 2025-08-13

## 2025-08-13 ASSESSMENT — ENCOUNTER SYMPTOMS
SHORTNESS OF BREATH: 0
VOMITING: 0
CHEST TIGHTNESS: 0
COLOR CHANGE: 0
ABDOMINAL PAIN: 0
ANAL BLEEDING: 1
NAUSEA: 0
ABDOMINAL DISTENTION: 0
COUGH: 0
APNEA: 0
WHEEZING: 0
CONSTIPATION: 0
BACK PAIN: 0
DIARRHEA: 0
RECTAL PAIN: 0
STRIDOR: 0
BLOOD IN STOOL: 0

## 2025-08-14 PROCEDURE — NBSRV NON-BILLABLE SERVICE: Performed by: SPECIALIST

## 2025-08-14 PROCEDURE — 93297 REM INTERROG DEV EVAL ICPMS: CPT | Performed by: SPECIALIST

## 2025-08-19 ENCOUNTER — HOSPITAL ENCOUNTER (EMERGENCY)
Age: 60
Discharge: HOME | End: 2025-08-19
Attending: EMERGENCY MEDICINE
Payer: COMMERCIAL

## 2025-08-19 ENCOUNTER — APPOINTMENT (OUTPATIENT)
Dept: GENERAL RADIOLOGY | Age: 60
End: 2025-08-19
Payer: COMMERCIAL

## 2025-08-19 VITALS
WEIGHT: 252 LBS | OXYGEN SATURATION: 100 % | SYSTOLIC BLOOD PRESSURE: 155 MMHG | HEART RATE: 70 BPM | RESPIRATION RATE: 16 BRPM | HEIGHT: 71 IN | DIASTOLIC BLOOD PRESSURE: 78 MMHG | BODY MASS INDEX: 35.28 KG/M2 | TEMPERATURE: 98.2 F

## 2025-08-19 DIAGNOSIS — I48.92 ATRIAL FLUTTER, UNSPECIFIED TYPE (HCC): ICD-10-CM

## 2025-08-19 DIAGNOSIS — R07.9 NONSPECIFIC CHEST PAIN: Primary | ICD-10-CM

## 2025-08-19 DIAGNOSIS — I48.19 PERSISTENT ATRIAL FIBRILLATION (HCC): ICD-10-CM

## 2025-08-19 DIAGNOSIS — R03.0 ELEVATED BLOOD PRESSURE READING: ICD-10-CM

## 2025-08-19 LAB
ALBUMIN SERPL-MCNC: 4.5 G/DL (ref 3.5–5.2)
ALBUMIN/GLOB SERPL: 1.5 {RATIO} (ref 1–2.5)
ALP SERPL-CCNC: 119 U/L (ref 40–129)
ALT SERPL-CCNC: 31 U/L (ref 10–50)
ANION GAP SERPL CALCULATED.3IONS-SCNC: 15 MMOL/L (ref 9–16)
AST SERPL-CCNC: 27 U/L (ref 10–50)
BASOPHILS # BLD: 0.08 K/UL (ref 0–0.2)
BASOPHILS NFR BLD: 1 % (ref 0–2)
BILIRUB SERPL-MCNC: 0.3 MG/DL (ref 0–1.2)
BNP SERPL-MCNC: 232 PG/ML (ref 0–125)
BUN SERPL-MCNC: 22 MG/DL (ref 8–23)
CALCIUM SERPL-MCNC: 9.4 MG/DL (ref 8.6–10.4)
CHLORIDE SERPL-SCNC: 95 MMOL/L (ref 98–107)
CO2 SERPL-SCNC: 25 MMOL/L (ref 20–31)
CREAT SERPL-MCNC: 1.3 MG/DL (ref 0.7–1.2)
D DIMER PPP FEU-MCNC: 0.52 UG/ML FEU (ref 0–0.59)
EOSINOPHIL # BLD: 0.15 K/UL (ref 0–0.4)
EOSINOPHILS RELATIVE PERCENT: 2 % (ref 1–4)
ERYTHROCYTE [DISTWIDTH] IN BLOOD BY AUTOMATED COUNT: 20.1 % (ref 12.5–15.4)
GFR, ESTIMATED: 63 ML/MIN/1.73M2
GLUCOSE SERPL-MCNC: 101 MG/DL (ref 74–99)
HCT VFR BLD AUTO: 50.4 % (ref 41–53)
HGB BLD-MCNC: 17 G/DL (ref 13.5–17.5)
INR PPP: 1 (ref 0.9–1.2)
LYMPHOCYTES NFR BLD: 1.69 K/UL (ref 1–4.8)
LYMPHOCYTES RELATIVE PERCENT: 22 % (ref 24–44)
MAGNESIUM SERPL-MCNC: 2.2 MG/DL (ref 1.6–2.4)
MCH RBC QN AUTO: 29.7 PG (ref 26–34)
MCHC RBC AUTO-ENTMCNC: 33.7 G/DL (ref 31–37)
MCV RBC AUTO: 88.2 FL (ref 80–100)
MONOCYTES NFR BLD: 0.69 K/UL (ref 0.1–1.2)
MONOCYTES NFR BLD: 9 % (ref 2–11)
MORPHOLOGY: ABNORMAL
NEUTROPHILS NFR BLD: 66 % (ref 36–66)
NEUTS SEG NFR BLD: 5.09 K/UL (ref 1.8–7.7)
PLATELET # BLD AUTO: 240 K/UL (ref 140–450)
PMV BLD AUTO: 6.7 FL (ref 6–12)
POTASSIUM SERPL-SCNC: 4.6 MMOL/L (ref 3.7–5.3)
PROT SERPL-MCNC: 7.6 G/DL (ref 6.6–8.7)
PROTHROMBIN TIME: 13.6 SEC (ref 11.8–14.6)
RBC # BLD AUTO: 5.71 M/UL (ref 4.5–5.9)
SODIUM SERPL-SCNC: 135 MMOL/L (ref 136–145)
TROPONIN I SERPL HS-MCNC: 22 NG/L (ref 0–22)
TROPONIN I SERPL HS-MCNC: 25 NG/L (ref 0–22)
WBC OTHER # BLD: 7.7 K/UL (ref 3.5–11)

## 2025-08-19 PROCEDURE — 99285 EMERGENCY DEPT VISIT HI MDM: CPT

## 2025-08-19 PROCEDURE — 83735 ASSAY OF MAGNESIUM: CPT

## 2025-08-19 PROCEDURE — 71045 X-RAY EXAM CHEST 1 VIEW: CPT

## 2025-08-19 PROCEDURE — 85379 FIBRIN DEGRADATION QUANT: CPT

## 2025-08-19 PROCEDURE — 36415 COLL VENOUS BLD VENIPUNCTURE: CPT

## 2025-08-19 PROCEDURE — 80053 COMPREHEN METABOLIC PANEL: CPT

## 2025-08-19 PROCEDURE — 83880 ASSAY OF NATRIURETIC PEPTIDE: CPT

## 2025-08-19 PROCEDURE — 84484 ASSAY OF TROPONIN QUANT: CPT

## 2025-08-19 PROCEDURE — 85610 PROTHROMBIN TIME: CPT

## 2025-08-19 PROCEDURE — 93005 ELECTROCARDIOGRAM TRACING: CPT | Performed by: EMERGENCY MEDICINE

## 2025-08-19 PROCEDURE — 85025 COMPLETE CBC W/AUTO DIFF WBC: CPT

## 2025-08-19 ASSESSMENT — PAIN DESCRIPTION - LOCATION: LOCATION: CHEST

## 2025-08-19 ASSESSMENT — PAIN SCALES - GENERAL: PAINLEVEL_OUTOF10: 3

## 2025-08-19 ASSESSMENT — PAIN - FUNCTIONAL ASSESSMENT: PAIN_FUNCTIONAL_ASSESSMENT: 0-10

## 2025-08-20 ENCOUNTER — TELEPHONE (OUTPATIENT)
Dept: SURGERY | Age: 60
End: 2025-08-20

## 2025-08-20 PROBLEM — K64.3 FOURTH DEGREE HEMORRHOIDS: Status: ACTIVE | Noted: 2025-08-20

## 2025-08-20 LAB
EKG ATRIAL RATE: 67 BPM
EKG P AXIS: 53 DEGREES
EKG P-R INTERVAL: 168 MS
EKG Q-T INTERVAL: 444 MS
EKG QRS DURATION: 82 MS
EKG QTC CALCULATION (BAZETT): 469 MS
EKG R AXIS: 52 DEGREES
EKG T AXIS: 62 DEGREES
EKG VENTRICULAR RATE: 67 BPM

## 2025-08-20 PROCEDURE — 93010 ELECTROCARDIOGRAM REPORT: CPT | Performed by: INTERNAL MEDICINE

## (undated) DEVICE — STERILE (15.2 TAPERED TO 7.6 X 183CM) POLYETHYLENE ACCORDION-FOLDED COVER FOR USE WITH SIEMENS ACUNAV ULTRASOUND CATHETER FAMILY CONNECTOR: Brand: SWIFTLINK TRANSDUCER COVER

## (undated) DEVICE — STRAP ARMBRD W1.5XL32IN FOAM STR YET SFT W/ HK AND LOOP

## (undated) DEVICE — BANDAGE,ELASTIC,ESMARK,STERILE,4"X9',LF: Brand: MEDLINE

## (undated) DEVICE — SUTURE VICRYL 2-0 L27IN ABSRB CT BRAID COAT UD J275H

## (undated) DEVICE — PADDING CAST W2INXL4YD COT LO LINTING WYTEX

## (undated) DEVICE — BNDG,ELSTC,MATRIX,STRL,2"X5YD,LF,HOOK&LP: Brand: MEDLINE

## (undated) DEVICE — GLOVE ORANGE PI 7 1/2   MSG9075

## (undated) DEVICE — ADAPTER,CATHETER/SYRINGE/LUER,STERILE: Brand: MEDLINE

## (undated) DEVICE — STRAP POS MP 30X3 IN HK LOOP CLOSURE FOAM DISP

## (undated) DEVICE — CATHETER EP LG 2-8-2 MM 7 FRX95 CM LIVEWIRE

## (undated) DEVICE — SURGICAL PROCEDURE SET TBNG EP

## (undated) DEVICE — Z DISCONTINUED USE 2859063 SUTURE VICRYL 3-0 L27IN ABSRB UD PSL L30MM 1/2 CIR TAPERPOINT J502H

## (undated) DEVICE — Device

## (undated) DEVICE — SUTURE ETHLN SZ 3-0 L18IN NONABSORBABLE BLK FS-1 L24MM 3/8 663H

## (undated) DEVICE — ZIMMER® STERILE DISPOSABLE TOURNIQUET CUFF WITH PLC, DUAL PORT, SINGLE BLADDER, 18 IN. (46 CM)

## (undated) DEVICE — GOWN,SIRUS,NONRNF,SETINSLV,XL,20/CS: Brand: MEDLINE

## (undated) DEVICE — SINGLE-USE BIOPSY FORCEPS: Brand: RADIAL JAW 4

## (undated) DEVICE — CLAMP SURG PLAS SERR TEETH FOR CRD TWL AND DRP DISPOSABLE

## (undated) DEVICE — INTRODUCER SHTH STD 8 FRX11 CM FLX KINK RESIST CANN AVNT +

## (undated) DEVICE — INTRO SAFESHEATH 9FR 25CM W/SIDEPORT

## (undated) DEVICE — GLOVE SURG SZ 8 L12IN FNGR THK13MIL BRN LTX SYN POLYMER W

## (undated) DEVICE — CONNECTOR TBNG AUX H2O JET DISP FOR OLY 160/180 SER

## (undated) DEVICE — INTRODUCER SHTH AD L11CM DIA 9FR STD BLK S STL PERIPH BRACH

## (undated) DEVICE — MEDI-TRACE CADENCE ADULT, DEFIBRILLATION ELECTRODE -RTS  (10 PR/PK) - PHYSIO-CONTROL: Brand: MEDI-TRACE CADENCE

## (undated) DEVICE — TUBING PMP FOR CARTO SYS SMARTABLATE

## (undated) DEVICE — PROVE COVER: Brand: UNBRANDED

## (undated) DEVICE — CATHETER US 8FR L90CM FOR SIEMENS SEQUOIA CYPRESS ACUSON

## (undated) DEVICE — STRAP,POSITIONING,KNEE/BODY,FOAM,4X60": Brand: MEDLINE

## (undated) DEVICE — SOLUTION IV IRRIG WATER 1000ML POUR BRL 2F7114

## (undated) DEVICE — ADAPTER CLEANING PORPOISE CLEANING

## (undated) DEVICE — SYSTEM CLOSURE 6-12 FR VEN VASC VASCADE MVP

## (undated) DEVICE — PERRYSBURG ENDO PACK: Brand: MEDLINE INDUSTRIES, INC.

## (undated) DEVICE — BITEBLOCK 54FR W/ DENT RIM BLOX

## (undated) DEVICE — GOWN,AURORA,NONREINFORCED,LARGE: Brand: MEDLINE

## (undated) DEVICE — 4-PORT MANIFOLD: Brand: NEPTUNE 2

## (undated) DEVICE — DRAPE EP LT SUBCLAV ENTRY SHLD SORBX

## (undated) DEVICE — DRESSING,GAUZE,XEROFORM,CURAD,1"X8",ST: Brand: CURAD

## (undated) DEVICE — ELECTRODE PT RET AD L9FT HI MOIST COND ADH HYDRGEL CORDED

## (undated) DEVICE — PATCH REF EXT FOR CARTO 3 SYS (EA = 6 PACKS)

## (undated) DEVICE — SOLUTION IRRIG 1000ML STRL H2O USP PLAS POUR BTL

## (undated) DEVICE — FORCEPS BX L240CM WRK CHN 2.8MM STD CAP W/ NDL MIC MESH

## (undated) DEVICE — SPONGE LAP W18XL18IN WHT COT 4 PLY FLD STRUNG RADPQ DISP ST 2 PER PACK

## (undated) DEVICE — KIT MICRO INTRO 4FR STIFF 40CM NIGHTENALL TUNGSTEN 7SMT

## (undated) DEVICE — Device: Brand: DEFENDO VALVE AND CONNECTOR KIT

## (undated) DEVICE — CATHETER ABLAT 8FR L115CM 1-6-2MM SPC TIP 3.5MM DF CRV

## (undated) DEVICE — CUFF REPROCESS BP TOURN SING BLDR 2 PORT 4X18IN

## (undated) DEVICE — 20 ML SYRINGE LUER-LOCK TIP: Brand: MONOJECT